# Patient Record
Sex: MALE | Race: BLACK OR AFRICAN AMERICAN | NOT HISPANIC OR LATINO | Employment: UNEMPLOYED | URBAN - METROPOLITAN AREA
[De-identification: names, ages, dates, MRNs, and addresses within clinical notes are randomized per-mention and may not be internally consistent; named-entity substitution may affect disease eponyms.]

---

## 2021-09-18 ENCOUNTER — HOSPITAL ENCOUNTER (INPATIENT)
Facility: HOSPITAL | Age: 46
LOS: 3 days | Discharge: HOME | DRG: 177 | End: 2021-09-21
Attending: EMERGENCY MEDICINE | Admitting: EMERGENCY MEDICINE
Payer: OTHER GOVERNMENT

## 2021-09-18 DIAGNOSIS — J12.82 PNEUMONIA DUE TO COVID-19 VIRUS: Primary | ICD-10-CM

## 2021-09-18 DIAGNOSIS — U07.1 PNEUMONIA DUE TO COVID-19 VIRUS: Primary | ICD-10-CM

## 2021-09-18 DIAGNOSIS — R50.9 ACUTE FEBRILE ILLNESS: ICD-10-CM

## 2021-09-18 DIAGNOSIS — R73.9 HYPERGLYCEMIA: ICD-10-CM

## 2021-09-18 DIAGNOSIS — U07.1 COVID-19 VIRUS INFECTION: ICD-10-CM

## 2021-09-18 DIAGNOSIS — R07.9 CHEST PAIN: ICD-10-CM

## 2021-09-18 DIAGNOSIS — E11.9 DIABETES MELLITUS, NEW ONSET: ICD-10-CM

## 2021-09-18 DIAGNOSIS — R05.9 COUGH: ICD-10-CM

## 2021-09-18 PROBLEM — E87.5 HYPERKALEMIA: Status: ACTIVE | Noted: 2021-09-18

## 2021-09-18 PROBLEM — D72.819 LEUKOPENIA: Status: ACTIVE | Noted: 2021-09-18

## 2021-09-18 PROBLEM — E87.29 INCREASED ANION GAP METABOLIC ACIDOSIS: Status: ACTIVE | Noted: 2021-09-18

## 2021-09-18 LAB
ALBUMIN SERPL BCP-MCNC: 3.3 G/DL (ref 3.5–5.2)
ALLENS TEST: ABNORMAL
ALP SERPL-CCNC: 89 U/L (ref 55–135)
ALT SERPL W/O P-5'-P-CCNC: 21 U/L (ref 10–44)
ANION GAP SERPL CALC-SCNC: 11 MMOL/L (ref 8–16)
ANION GAP SERPL CALC-SCNC: 13 MMOL/L (ref 8–16)
ANION GAP SERPL CALC-SCNC: 16 MMOL/L (ref 8–16)
AST SERPL-CCNC: 32 U/L (ref 10–40)
B-OH-BUTYR BLD STRIP-SCNC: 1.5 MMOL/L (ref 0–0.5)
B-OH-BUTYR BLD STRIP-SCNC: 1.7 MMOL/L (ref 0–0.5)
B-OH-BUTYR BLD STRIP-SCNC: 2.7 MMOL/L (ref 0–0.5)
BACTERIA #/AREA URNS HPF: NORMAL /HPF
BASOPHILS # BLD AUTO: 0.01 K/UL (ref 0–0.2)
BASOPHILS NFR BLD: 0.3 % (ref 0–1.9)
BILIRUB SERPL-MCNC: 0.7 MG/DL (ref 0.1–1)
BILIRUB UR QL STRIP: NEGATIVE
BNP SERPL-MCNC: <10 PG/ML (ref 0–99)
BUN SERPL-MCNC: 11 MG/DL (ref 6–20)
BUN SERPL-MCNC: 11 MG/DL (ref 6–20)
BUN SERPL-MCNC: 13 MG/DL (ref 6–20)
CALCIUM SERPL-MCNC: 10.4 MG/DL (ref 8.7–10.5)
CALCIUM SERPL-MCNC: 9 MG/DL (ref 8.7–10.5)
CALCIUM SERPL-MCNC: 9.4 MG/DL (ref 8.7–10.5)
CHLORIDE SERPL-SCNC: 94 MMOL/L (ref 95–110)
CHLORIDE SERPL-SCNC: 98 MMOL/L (ref 95–110)
CHLORIDE SERPL-SCNC: 98 MMOL/L (ref 95–110)
CK SERPL-CCNC: 83 U/L (ref 20–200)
CLARITY UR: CLEAR
CO2 SERPL-SCNC: 22 MMOL/L (ref 23–29)
CO2 SERPL-SCNC: 22 MMOL/L (ref 23–29)
CO2 SERPL-SCNC: 24 MMOL/L (ref 23–29)
COLOR UR: YELLOW
CREAT SERPL-MCNC: 1.1 MG/DL (ref 0.5–1.4)
CREAT SERPL-MCNC: 1.1 MG/DL (ref 0.5–1.4)
CREAT SERPL-MCNC: 1.4 MG/DL (ref 0.5–1.4)
CRP SERPL-MCNC: 137.2 MG/L (ref 0–8.2)
CTP QC/QA: YES
D DIMER PPP IA.FEU-MCNC: 1.69 MG/L FEU
DIFFERENTIAL METHOD: ABNORMAL
EOSINOPHIL # BLD AUTO: 0 K/UL (ref 0–0.5)
EOSINOPHIL NFR BLD: 0 % (ref 0–8)
ERYTHROCYTE [DISTWIDTH] IN BLOOD BY AUTOMATED COUNT: 11.3 % (ref 11.5–14.5)
EST. GFR  (AFRICAN AMERICAN): >60 ML/MIN/1.73 M^2
EST. GFR  (NON AFRICAN AMERICAN): >60 ML/MIN/1.73 M^2
FERRITIN SERPL-MCNC: 1272 NG/ML (ref 20–300)
GLUCOSE SERPL-MCNC: 272 MG/DL (ref 70–110)
GLUCOSE SERPL-MCNC: 281 MG/DL (ref 70–110)
GLUCOSE SERPL-MCNC: 413 MG/DL (ref 70–110)
GLUCOSE UR QL STRIP: ABNORMAL
HCO3 UR-SCNC: 25.3 MMOL/L (ref 24–28)
HCT VFR BLD AUTO: 44.8 % (ref 40–54)
HGB BLD-MCNC: 15.2 G/DL (ref 14–18)
HGB UR QL STRIP: NEGATIVE
HYALINE CASTS #/AREA URNS LPF: 0 /LPF
IMM GRANULOCYTES # BLD AUTO: 0.01 K/UL (ref 0–0.04)
IMM GRANULOCYTES NFR BLD AUTO: 0.3 % (ref 0–0.5)
KETONES UR QL STRIP: ABNORMAL
LACTATE SERPL-SCNC: 1.2 MMOL/L (ref 0.5–2.2)
LACTATE SERPL-SCNC: 2.4 MMOL/L (ref 0.5–2.2)
LDH SERPL L TO P-CCNC: 306 U/L (ref 110–260)
LEUKOCYTE ESTERASE UR QL STRIP: NEGATIVE
LYMPHOCYTES # BLD AUTO: 0.7 K/UL (ref 1–4.8)
LYMPHOCYTES NFR BLD: 17.8 % (ref 18–48)
MAGNESIUM SERPL-MCNC: 2.1 MG/DL (ref 1.6–2.6)
MCH RBC QN AUTO: 30 PG (ref 27–31)
MCHC RBC AUTO-ENTMCNC: 33.9 G/DL (ref 32–36)
MCV RBC AUTO: 89 FL (ref 82–98)
MICROSCOPIC COMMENT: NORMAL
MONOCYTES # BLD AUTO: 0.3 K/UL (ref 0.3–1)
MONOCYTES NFR BLD: 9.3 % (ref 4–15)
NEUTROPHILS # BLD AUTO: 2.7 K/UL (ref 1.8–7.7)
NEUTROPHILS NFR BLD: 72.3 % (ref 38–73)
NITRITE UR QL STRIP: NEGATIVE
NRBC BLD-RTO: 0 /100 WBC
PCO2 BLDA: 44.2 MMHG (ref 35–45)
PH SMN: 7.37 [PH] (ref 7.35–7.45)
PH UR STRIP: 6 [PH] (ref 5–8)
PLATELET # BLD AUTO: 196 K/UL (ref 150–450)
PMV BLD AUTO: 10.2 FL (ref 9.2–12.9)
PO2 BLDA: 27 MMHG (ref 40–60)
POC BE: 0 MMOL/L
POC SATURATED O2: 47 % (ref 95–100)
POC TCO2: 27 MMOL/L (ref 24–29)
POCT GLUCOSE: 300 MG/DL (ref 70–110)
POCT GLUCOSE: 347 MG/DL (ref 70–110)
POTASSIUM SERPL-SCNC: 3.7 MMOL/L (ref 3.5–5.1)
POTASSIUM SERPL-SCNC: 3.9 MMOL/L (ref 3.5–5.1)
POTASSIUM SERPL-SCNC: 5.3 MMOL/L (ref 3.5–5.1)
PROCALCITONIN SERPL IA-MCNC: 0.09 NG/ML
PROT SERPL-MCNC: 8.7 G/DL (ref 6–8.4)
PROT UR QL STRIP: ABNORMAL
RBC # BLD AUTO: 5.06 M/UL (ref 4.6–6.2)
RBC #/AREA URNS HPF: 0 /HPF (ref 0–4)
SAMPLE: ABNORMAL
SARS-COV-2 RDRP RESP QL NAA+PROBE: POSITIVE
SITE: ABNORMAL
SODIUM SERPL-SCNC: 132 MMOL/L (ref 136–145)
SODIUM SERPL-SCNC: 133 MMOL/L (ref 136–145)
SODIUM SERPL-SCNC: 133 MMOL/L (ref 136–145)
SP GR UR STRIP: >1.03 (ref 1–1.03)
TROPONIN I SERPL DL<=0.01 NG/ML-MCNC: 0.02 NG/ML (ref 0–0.03)
URN SPEC COLLECT METH UR: ABNORMAL
UROBILINOGEN UR STRIP-ACNC: NEGATIVE EU/DL
WBC # BLD AUTO: 3.66 K/UL (ref 3.9–12.7)
WBC #/AREA URNS HPF: 2 /HPF (ref 0–5)
YEAST URNS QL MICRO: NORMAL

## 2021-09-18 PROCEDURE — 94640 AIRWAY INHALATION TREATMENT: CPT

## 2021-09-18 PROCEDURE — 80053 COMPREHEN METABOLIC PANEL: CPT | Performed by: PHYSICIAN ASSISTANT

## 2021-09-18 PROCEDURE — 82010 KETONE BODYS QUAN: CPT | Mod: 91 | Performed by: PHYSICIAN ASSISTANT

## 2021-09-18 PROCEDURE — 96365 THER/PROPH/DIAG IV INF INIT: CPT

## 2021-09-18 PROCEDURE — 63600175 PHARM REV CODE 636 W HCPCS: Performed by: PHYSICIAN ASSISTANT

## 2021-09-18 PROCEDURE — 84145 PROCALCITONIN (PCT): CPT | Performed by: PHYSICIAN ASSISTANT

## 2021-09-18 PROCEDURE — 87040 BLOOD CULTURE FOR BACTERIA: CPT | Mod: 59 | Performed by: PHYSICIAN ASSISTANT

## 2021-09-18 PROCEDURE — 83880 ASSAY OF NATRIURETIC PEPTIDE: CPT | Performed by: PHYSICIAN ASSISTANT

## 2021-09-18 PROCEDURE — 25000003 PHARM REV CODE 250: Performed by: PHYSICIAN ASSISTANT

## 2021-09-18 PROCEDURE — 84484 ASSAY OF TROPONIN QUANT: CPT | Performed by: PHYSICIAN ASSISTANT

## 2021-09-18 PROCEDURE — 27100098 HC SPACER

## 2021-09-18 PROCEDURE — U0002 COVID-19 LAB TEST NON-CDC: HCPCS | Performed by: EMERGENCY MEDICINE

## 2021-09-18 PROCEDURE — 83605 ASSAY OF LACTIC ACID: CPT | Mod: 91 | Performed by: STUDENT IN AN ORGANIZED HEALTH CARE EDUCATION/TRAINING PROGRAM

## 2021-09-18 PROCEDURE — 25000242 PHARM REV CODE 250 ALT 637 W/ HCPCS: Performed by: PHYSICIAN ASSISTANT

## 2021-09-18 PROCEDURE — 82550 ASSAY OF CK (CPK): CPT | Performed by: PHYSICIAN ASSISTANT

## 2021-09-18 PROCEDURE — 83735 ASSAY OF MAGNESIUM: CPT | Performed by: PHYSICIAN ASSISTANT

## 2021-09-18 PROCEDURE — 25000003 PHARM REV CODE 250: Performed by: STUDENT IN AN ORGANIZED HEALTH CARE EDUCATION/TRAINING PROGRAM

## 2021-09-18 PROCEDURE — 86140 C-REACTIVE PROTEIN: CPT | Performed by: PHYSICIAN ASSISTANT

## 2021-09-18 PROCEDURE — 93005 ELECTROCARDIOGRAM TRACING: CPT

## 2021-09-18 PROCEDURE — 85379 FIBRIN DEGRADATION QUANT: CPT | Performed by: PHYSICIAN ASSISTANT

## 2021-09-18 PROCEDURE — 83615 LACTATE (LD) (LDH) ENZYME: CPT | Performed by: PHYSICIAN ASSISTANT

## 2021-09-18 PROCEDURE — 80048 BASIC METABOLIC PNL TOTAL CA: CPT | Performed by: PHYSICIAN ASSISTANT

## 2021-09-18 PROCEDURE — 27000207 HC ISOLATION

## 2021-09-18 PROCEDURE — 99900035 HC TECH TIME PER 15 MIN (STAT)

## 2021-09-18 PROCEDURE — 85025 COMPLETE CBC W/AUTO DIFF WBC: CPT | Performed by: PHYSICIAN ASSISTANT

## 2021-09-18 PROCEDURE — 82010 KETONE BODYS QUAN: CPT | Performed by: PHYSICIAN ASSISTANT

## 2021-09-18 PROCEDURE — 82803 BLOOD GASES ANY COMBINATION: CPT

## 2021-09-18 PROCEDURE — 93010 ELECTROCARDIOGRAM REPORT: CPT | Mod: ,,, | Performed by: INTERNAL MEDICINE

## 2021-09-18 PROCEDURE — 36415 COLL VENOUS BLD VENIPUNCTURE: CPT | Performed by: PHYSICIAN ASSISTANT

## 2021-09-18 PROCEDURE — C9399 UNCLASSIFIED DRUGS OR BIOLOG: HCPCS | Performed by: PHYSICIAN ASSISTANT

## 2021-09-18 PROCEDURE — 83605 ASSAY OF LACTIC ACID: CPT | Performed by: PHYSICIAN ASSISTANT

## 2021-09-18 PROCEDURE — 99285 EMERGENCY DEPT VISIT HI MDM: CPT | Mod: 25

## 2021-09-18 PROCEDURE — 82728 ASSAY OF FERRITIN: CPT | Performed by: PHYSICIAN ASSISTANT

## 2021-09-18 PROCEDURE — 93010 EKG 12-LEAD: ICD-10-PCS | Mod: ,,, | Performed by: INTERNAL MEDICINE

## 2021-09-18 PROCEDURE — 81000 URINALYSIS NONAUTO W/SCOPE: CPT | Performed by: PHYSICIAN ASSISTANT

## 2021-09-18 PROCEDURE — 63600175 PHARM REV CODE 636 W HCPCS: Performed by: STUDENT IN AN ORGANIZED HEALTH CARE EDUCATION/TRAINING PROGRAM

## 2021-09-18 PROCEDURE — 11000001 HC ACUTE MED/SURG PRIVATE ROOM

## 2021-09-18 PROCEDURE — 94761 N-INVAS EAR/PLS OXIMETRY MLT: CPT

## 2021-09-18 RX ORDER — INSULIN ASPART 100 [IU]/ML
3 INJECTION, SOLUTION INTRAVENOUS; SUBCUTANEOUS
Status: DISCONTINUED | OUTPATIENT
Start: 2021-09-19 | End: 2021-09-19

## 2021-09-18 RX ORDER — SODIUM CHLORIDE 9 MG/ML
INJECTION, SOLUTION INTRAVENOUS CONTINUOUS
Status: ACTIVE | OUTPATIENT
Start: 2021-09-18 | End: 2021-09-19

## 2021-09-18 RX ORDER — SODIUM,POTASSIUM PHOSPHATES 280-250MG
2 POWDER IN PACKET (EA) ORAL
Status: DISCONTINUED | OUTPATIENT
Start: 2021-09-18 | End: 2021-09-21 | Stop reason: HOSPADM

## 2021-09-18 RX ORDER — GLUCAGON 1 MG
1 KIT INJECTION
Status: DISCONTINUED | OUTPATIENT
Start: 2021-09-18 | End: 2021-09-21 | Stop reason: HOSPADM

## 2021-09-18 RX ORDER — TALC
6 POWDER (GRAM) TOPICAL NIGHTLY PRN
Status: DISCONTINUED | OUTPATIENT
Start: 2021-09-18 | End: 2021-09-21 | Stop reason: HOSPADM

## 2021-09-18 RX ORDER — AMOXICILLIN 250 MG
1 CAPSULE ORAL DAILY
Status: DISCONTINUED | OUTPATIENT
Start: 2021-09-18 | End: 2021-09-18

## 2021-09-18 RX ORDER — LANOLIN ALCOHOL/MO/W.PET/CERES
800 CREAM (GRAM) TOPICAL
Status: DISCONTINUED | OUTPATIENT
Start: 2021-09-18 | End: 2021-09-21 | Stop reason: HOSPADM

## 2021-09-18 RX ORDER — INSULIN ASPART 100 [IU]/ML
5 INJECTION, SOLUTION INTRAVENOUS; SUBCUTANEOUS
Status: DISCONTINUED | OUTPATIENT
Start: 2021-09-18 | End: 2021-09-18

## 2021-09-18 RX ORDER — ALBUTEROL SULFATE 90 UG/1
2 AEROSOL, METERED RESPIRATORY (INHALATION) EVERY 6 HOURS
Status: DISCONTINUED | OUTPATIENT
Start: 2021-09-18 | End: 2021-09-18

## 2021-09-18 RX ORDER — SODIUM CHLORIDE 0.9 % (FLUSH) 0.9 %
10 SYRINGE (ML) INJECTION
Status: DISCONTINUED | OUTPATIENT
Start: 2021-09-18 | End: 2021-09-21 | Stop reason: HOSPADM

## 2021-09-18 RX ORDER — ASCORBIC ACID 500 MG
500 TABLET ORAL 2 TIMES DAILY
Status: DISCONTINUED | OUTPATIENT
Start: 2021-09-18 | End: 2021-09-21 | Stop reason: HOSPADM

## 2021-09-18 RX ORDER — ACETAMINOPHEN 325 MG/1
650 TABLET ORAL EVERY 8 HOURS PRN
Status: DISCONTINUED | OUTPATIENT
Start: 2021-09-18 | End: 2021-09-21 | Stop reason: HOSPADM

## 2021-09-18 RX ORDER — ENOXAPARIN SODIUM 100 MG/ML
40 INJECTION SUBCUTANEOUS EVERY 24 HOURS
Status: DISCONTINUED | OUTPATIENT
Start: 2021-09-18 | End: 2021-09-18

## 2021-09-18 RX ORDER — SODIUM CHLORIDE 0.9 % (FLUSH) 0.9 %
10 SYRINGE (ML) INJECTION EVERY 8 HOURS
Status: DISCONTINUED | OUTPATIENT
Start: 2021-09-18 | End: 2021-09-18

## 2021-09-18 RX ORDER — ENOXAPARIN SODIUM 100 MG/ML
100 INJECTION SUBCUTANEOUS EVERY 12 HOURS
Status: DISCONTINUED | OUTPATIENT
Start: 2021-09-18 | End: 2021-09-21 | Stop reason: HOSPADM

## 2021-09-18 RX ORDER — ACETAMINOPHEN 325 MG/1
650 TABLET ORAL EVERY 4 HOURS PRN
Status: DISCONTINUED | OUTPATIENT
Start: 2021-09-18 | End: 2021-09-21 | Stop reason: HOSPADM

## 2021-09-18 RX ORDER — NALOXONE HCL 0.4 MG/ML
0.02 VIAL (ML) INJECTION
Status: DISCONTINUED | OUTPATIENT
Start: 2021-09-18 | End: 2021-09-21 | Stop reason: HOSPADM

## 2021-09-18 RX ORDER — ALBUTEROL SULFATE 90 UG/1
2 AEROSOL, METERED RESPIRATORY (INHALATION) 2 TIMES DAILY
Status: DISCONTINUED | OUTPATIENT
Start: 2021-09-18 | End: 2021-09-21 | Stop reason: HOSPADM

## 2021-09-18 RX ORDER — ACETAMINOPHEN 500 MG
1000 TABLET ORAL
Status: COMPLETED | OUTPATIENT
Start: 2021-09-18 | End: 2021-09-18

## 2021-09-18 RX ORDER — IBUPROFEN 200 MG
16 TABLET ORAL
Status: DISCONTINUED | OUTPATIENT
Start: 2021-09-18 | End: 2021-09-21 | Stop reason: HOSPADM

## 2021-09-18 RX ORDER — IBUPROFEN 200 MG
24 TABLET ORAL
Status: DISCONTINUED | OUTPATIENT
Start: 2021-09-18 | End: 2021-09-21 | Stop reason: HOSPADM

## 2021-09-18 RX ORDER — INSULIN ASPART 100 [IU]/ML
1-10 INJECTION, SOLUTION INTRAVENOUS; SUBCUTANEOUS
Status: DISCONTINUED | OUTPATIENT
Start: 2021-09-18 | End: 2021-09-21 | Stop reason: HOSPADM

## 2021-09-18 RX ADMIN — INSULIN DETEMIR 12 UNITS: 100 INJECTION, SOLUTION SUBCUTANEOUS at 11:09

## 2021-09-18 RX ADMIN — ENOXAPARIN SODIUM 100 MG: 100 INJECTION SUBCUTANEOUS at 08:09

## 2021-09-18 RX ADMIN — ACETAMINOPHEN 650 MG: 325 TABLET ORAL at 10:09

## 2021-09-18 RX ADMIN — CEFTRIAXONE 2 G: 2 INJECTION, SOLUTION INTRAVENOUS at 12:09

## 2021-09-18 RX ADMIN — SODIUM ZIRCONIUM CYCLOSILICATE 10 G: 10 POWDER, FOR SUSPENSION ORAL at 10:09

## 2021-09-18 RX ADMIN — SODIUM CHLORIDE 1000 ML: 0.9 INJECTION, SOLUTION INTRAVENOUS at 12:09

## 2021-09-18 RX ADMIN — OXYCODONE HYDROCHLORIDE AND ACETAMINOPHEN 500 MG: 500 TABLET ORAL at 08:09

## 2021-09-18 RX ADMIN — ALBUTEROL SULFATE 2 PUFF: 108 INHALANT RESPIRATORY (INHALATION) at 08:09

## 2021-09-18 RX ADMIN — REMDESIVIR 200 MG: 100 INJECTION, POWDER, LYOPHILIZED, FOR SOLUTION INTRAVENOUS at 08:09

## 2021-09-18 RX ADMIN — SODIUM CHLORIDE: 0.9 INJECTION, SOLUTION INTRAVENOUS at 04:09

## 2021-09-18 RX ADMIN — INSULIN ASPART 4 UNITS: 100 INJECTION, SOLUTION INTRAVENOUS; SUBCUTANEOUS at 10:09

## 2021-09-18 RX ADMIN — ACETAMINOPHEN 1000 MG: 500 TABLET ORAL at 11:09

## 2021-09-19 LAB
25(OH)D3+25(OH)D2 SERPL-MCNC: 26 NG/ML (ref 30–96)
ALBUMIN SERPL BCP-MCNC: 2.5 G/DL (ref 3.5–5.2)
ALP SERPL-CCNC: 67 U/L (ref 55–135)
ALT SERPL W/O P-5'-P-CCNC: 17 U/L (ref 10–44)
ANION GAP SERPL CALC-SCNC: 11 MMOL/L (ref 8–16)
APTT BLDCRRT: 33 SEC (ref 21–32)
AST SERPL-CCNC: 30 U/L (ref 10–40)
BASOPHILS # BLD AUTO: 0.02 K/UL (ref 0–0.2)
BASOPHILS NFR BLD: 0.6 % (ref 0–1.9)
BILIRUB SERPL-MCNC: 0.5 MG/DL (ref 0.1–1)
BUN SERPL-MCNC: 10 MG/DL (ref 6–20)
CALCIUM SERPL-MCNC: 8.8 MG/DL (ref 8.7–10.5)
CHLORIDE SERPL-SCNC: 101 MMOL/L (ref 95–110)
CO2 SERPL-SCNC: 22 MMOL/L (ref 23–29)
CREAT SERPL-MCNC: 1 MG/DL (ref 0.5–1.4)
DIFFERENTIAL METHOD: ABNORMAL
EOSINOPHIL # BLD AUTO: 0 K/UL (ref 0–0.5)
EOSINOPHIL NFR BLD: 0 % (ref 0–8)
ERYTHROCYTE [DISTWIDTH] IN BLOOD BY AUTOMATED COUNT: 11.4 % (ref 11.5–14.5)
ERYTHROCYTE [SEDIMENTATION RATE] IN BLOOD BY WESTERGREN METHOD: 55 MM/HR (ref 0–10)
EST. GFR  (AFRICAN AMERICAN): >60 ML/MIN/1.73 M^2
EST. GFR  (NON AFRICAN AMERICAN): >60 ML/MIN/1.73 M^2
GLUCOSE SERPL-MCNC: 229 MG/DL (ref 70–110)
HCT VFR BLD AUTO: 39.2 % (ref 40–54)
HGB BLD-MCNC: 13.3 G/DL (ref 14–18)
IMM GRANULOCYTES # BLD AUTO: 0.01 K/UL (ref 0–0.04)
IMM GRANULOCYTES NFR BLD AUTO: 0.3 % (ref 0–0.5)
INR PPP: 1 (ref 0.8–1.2)
LYMPHOCYTES # BLD AUTO: 1.6 K/UL (ref 1–4.8)
LYMPHOCYTES NFR BLD: 44.4 % (ref 18–48)
MAGNESIUM SERPL-MCNC: 1.9 MG/DL (ref 1.6–2.6)
MCH RBC QN AUTO: 29.9 PG (ref 27–31)
MCHC RBC AUTO-ENTMCNC: 33.9 G/DL (ref 32–36)
MCV RBC AUTO: 88 FL (ref 82–98)
MONOCYTES # BLD AUTO: 0.3 K/UL (ref 0.3–1)
MONOCYTES NFR BLD: 8.9 % (ref 4–15)
NEUTROPHILS # BLD AUTO: 1.6 K/UL (ref 1.8–7.7)
NEUTROPHILS NFR BLD: 45.8 % (ref 38–73)
NRBC BLD-RTO: 0 /100 WBC
PHOSPHATE SERPL-MCNC: 3.8 MG/DL (ref 2.7–4.5)
PLATELET # BLD AUTO: 170 K/UL (ref 150–450)
PMV BLD AUTO: 10.8 FL (ref 9.2–12.9)
POCT GLUCOSE: 236 MG/DL (ref 70–110)
POCT GLUCOSE: 239 MG/DL (ref 70–110)
POCT GLUCOSE: 239 MG/DL (ref 70–110)
POCT GLUCOSE: 282 MG/DL (ref 70–110)
POTASSIUM SERPL-SCNC: 3.8 MMOL/L (ref 3.5–5.1)
PROT SERPL-MCNC: 6.8 G/DL (ref 6–8.4)
PROTHROMBIN TIME: 10.3 SEC (ref 9–12.5)
RBC # BLD AUTO: 4.45 M/UL (ref 4.6–6.2)
SODIUM SERPL-SCNC: 134 MMOL/L (ref 136–145)
WBC # BLD AUTO: 3.58 K/UL (ref 3.9–12.7)

## 2021-09-19 PROCEDURE — 27000207 HC ISOLATION

## 2021-09-19 PROCEDURE — 85025 COMPLETE CBC W/AUTO DIFF WBC: CPT | Performed by: PHYSICIAN ASSISTANT

## 2021-09-19 PROCEDURE — 36415 COLL VENOUS BLD VENIPUNCTURE: CPT | Performed by: PHYSICIAN ASSISTANT

## 2021-09-19 PROCEDURE — 85610 PROTHROMBIN TIME: CPT | Performed by: STUDENT IN AN ORGANIZED HEALTH CARE EDUCATION/TRAINING PROGRAM

## 2021-09-19 PROCEDURE — 94640 AIRWAY INHALATION TREATMENT: CPT

## 2021-09-19 PROCEDURE — 25000242 PHARM REV CODE 250 ALT 637 W/ HCPCS: Performed by: PHYSICIAN ASSISTANT

## 2021-09-19 PROCEDURE — 36415 COLL VENOUS BLD VENIPUNCTURE: CPT | Performed by: STUDENT IN AN ORGANIZED HEALTH CARE EDUCATION/TRAINING PROGRAM

## 2021-09-19 PROCEDURE — 83735 ASSAY OF MAGNESIUM: CPT | Performed by: PHYSICIAN ASSISTANT

## 2021-09-19 PROCEDURE — 84100 ASSAY OF PHOSPHORUS: CPT | Performed by: PHYSICIAN ASSISTANT

## 2021-09-19 PROCEDURE — 85652 RBC SED RATE AUTOMATED: CPT | Performed by: STUDENT IN AN ORGANIZED HEALTH CARE EDUCATION/TRAINING PROGRAM

## 2021-09-19 PROCEDURE — 80053 COMPREHEN METABOLIC PANEL: CPT | Performed by: PHYSICIAN ASSISTANT

## 2021-09-19 PROCEDURE — 83036 HEMOGLOBIN GLYCOSYLATED A1C: CPT | Performed by: PHYSICIAN ASSISTANT

## 2021-09-19 PROCEDURE — 63600175 PHARM REV CODE 636 W HCPCS: Performed by: PHYSICIAN ASSISTANT

## 2021-09-19 PROCEDURE — 82306 VITAMIN D 25 HYDROXY: CPT | Performed by: STUDENT IN AN ORGANIZED HEALTH CARE EDUCATION/TRAINING PROGRAM

## 2021-09-19 PROCEDURE — 94760 N-INVAS EAR/PLS OXIMETRY 1: CPT

## 2021-09-19 PROCEDURE — 25000003 PHARM REV CODE 250: Performed by: PHYSICIAN ASSISTANT

## 2021-09-19 PROCEDURE — 85730 THROMBOPLASTIN TIME PARTIAL: CPT | Performed by: STUDENT IN AN ORGANIZED HEALTH CARE EDUCATION/TRAINING PROGRAM

## 2021-09-19 PROCEDURE — 25000003 PHARM REV CODE 250: Performed by: STUDENT IN AN ORGANIZED HEALTH CARE EDUCATION/TRAINING PROGRAM

## 2021-09-19 PROCEDURE — 11000001 HC ACUTE MED/SURG PRIVATE ROOM

## 2021-09-19 RX ORDER — INSULIN ASPART 100 [IU]/ML
5 INJECTION, SOLUTION INTRAVENOUS; SUBCUTANEOUS
Status: DISCONTINUED | OUTPATIENT
Start: 2021-09-19 | End: 2021-09-20

## 2021-09-19 RX ADMIN — OXYCODONE HYDROCHLORIDE AND ACETAMINOPHEN 500 MG: 500 TABLET ORAL at 09:09

## 2021-09-19 RX ADMIN — ACETAMINOPHEN 650 MG: 325 TABLET ORAL at 01:09

## 2021-09-19 RX ADMIN — INSULIN ASPART 5 UNITS: 100 INJECTION, SOLUTION INTRAVENOUS; SUBCUTANEOUS at 01:09

## 2021-09-19 RX ADMIN — ALBUTEROL SULFATE 2 PUFF: 108 INHALANT RESPIRATORY (INHALATION) at 11:09

## 2021-09-19 RX ADMIN — ALBUTEROL SULFATE 2 PUFF: 108 INHALANT RESPIRATORY (INHALATION) at 09:09

## 2021-09-19 RX ADMIN — INSULIN ASPART 3 UNITS: 100 INJECTION, SOLUTION INTRAVENOUS; SUBCUTANEOUS at 08:09

## 2021-09-19 RX ADMIN — OXYCODONE HYDROCHLORIDE AND ACETAMINOPHEN 500 MG: 500 TABLET ORAL at 08:09

## 2021-09-19 RX ADMIN — INSULIN ASPART 4 UNITS: 100 INJECTION, SOLUTION INTRAVENOUS; SUBCUTANEOUS at 05:09

## 2021-09-19 RX ADMIN — ACETAMINOPHEN 650 MG: 325 TABLET ORAL at 08:09

## 2021-09-19 RX ADMIN — INSULIN ASPART 4 UNITS: 100 INJECTION, SOLUTION INTRAVENOUS; SUBCUTANEOUS at 01:09

## 2021-09-19 RX ADMIN — THERA TABS 1 TABLET: TAB at 09:09

## 2021-09-19 RX ADMIN — INSULIN ASPART 5 UNITS: 100 INJECTION, SOLUTION INTRAVENOUS; SUBCUTANEOUS at 05:09

## 2021-09-19 RX ADMIN — INSULIN ASPART 4 UNITS: 100 INJECTION, SOLUTION INTRAVENOUS; SUBCUTANEOUS at 09:09

## 2021-09-19 RX ADMIN — INSULIN ASPART 3 UNITS: 100 INJECTION, SOLUTION INTRAVENOUS; SUBCUTANEOUS at 09:09

## 2021-09-19 RX ADMIN — ENOXAPARIN SODIUM 100 MG: 100 INJECTION SUBCUTANEOUS at 08:09

## 2021-09-19 RX ADMIN — REMDESIVIR 100 MG: 100 INJECTION, POWDER, LYOPHILIZED, FOR SOLUTION INTRAVENOUS at 09:09

## 2021-09-19 RX ADMIN — ENOXAPARIN SODIUM 100 MG: 100 INJECTION SUBCUTANEOUS at 09:09

## 2021-09-19 RX ADMIN — DEXAMETHASONE 6 MG: 2 TABLET ORAL at 09:09

## 2021-09-20 LAB
ALBUMIN SERPL BCP-MCNC: 2.7 G/DL (ref 3.5–5.2)
ALP SERPL-CCNC: 68 U/L (ref 55–135)
ALT SERPL W/O P-5'-P-CCNC: 23 U/L (ref 10–44)
ANION GAP SERPL CALC-SCNC: 13 MMOL/L (ref 8–16)
AST SERPL-CCNC: 36 U/L (ref 10–40)
BASOPHILS # BLD AUTO: 0 K/UL (ref 0–0.2)
BASOPHILS NFR BLD: 0 % (ref 0–1.9)
BILIRUB SERPL-MCNC: 0.5 MG/DL (ref 0.1–1)
BUN SERPL-MCNC: 13 MG/DL (ref 6–20)
CALCIUM SERPL-MCNC: 9.8 MG/DL (ref 8.7–10.5)
CHLORIDE SERPL-SCNC: 100 MMOL/L (ref 95–110)
CO2 SERPL-SCNC: 23 MMOL/L (ref 23–29)
CREAT SERPL-MCNC: 0.9 MG/DL (ref 0.5–1.4)
CRP SERPL-MCNC: 110.9 MG/L (ref 0–8.2)
D DIMER PPP IA.FEU-MCNC: 0.37 MG/L FEU
DIFFERENTIAL METHOD: ABNORMAL
EOSINOPHIL # BLD AUTO: 0 K/UL (ref 0–0.5)
EOSINOPHIL NFR BLD: 0 % (ref 0–8)
ERYTHROCYTE [DISTWIDTH] IN BLOOD BY AUTOMATED COUNT: 11 % (ref 11.5–14.5)
EST. GFR  (AFRICAN AMERICAN): >60 ML/MIN/1.73 M^2
EST. GFR  (NON AFRICAN AMERICAN): >60 ML/MIN/1.73 M^2
ESTIMATED AVG GLUCOSE: 321 MG/DL (ref 68–131)
FERRITIN SERPL-MCNC: 905 NG/ML (ref 20–300)
GLUCOSE SERPL-MCNC: 250 MG/DL (ref 70–110)
HBA1C MFR BLD: 12.8 % (ref 4–5.6)
HCT VFR BLD AUTO: 41.1 % (ref 40–54)
HGB BLD-MCNC: 14.3 G/DL (ref 14–18)
IMM GRANULOCYTES # BLD AUTO: 0.01 K/UL (ref 0–0.04)
IMM GRANULOCYTES NFR BLD AUTO: 0.4 % (ref 0–0.5)
LYMPHOCYTES # BLD AUTO: 1 K/UL (ref 1–4.8)
LYMPHOCYTES NFR BLD: ABNORMAL % (ref 18–48)
MAGNESIUM SERPL-MCNC: 1.9 MG/DL (ref 1.6–2.6)
MCH RBC QN AUTO: 30 PG (ref 27–31)
MCHC RBC AUTO-ENTMCNC: 34.8 G/DL (ref 32–36)
MCV RBC AUTO: 86 FL (ref 82–98)
MONOCYTES # BLD AUTO: 0.3 K/UL (ref 0.3–1)
MONOCYTES NFR BLD: 10 % (ref 4–15)
NEUTROPHILS # BLD AUTO: 1.4 K/UL (ref 1.8–7.7)
NEUTROPHILS NFR BLD: 51.2 % (ref 38–73)
NRBC BLD-RTO: 0 /100 WBC
PHOSPHATE SERPL-MCNC: 4.9 MG/DL (ref 2.7–4.5)
PLATELET # BLD AUTO: 219 K/UL (ref 150–450)
PMV BLD AUTO: 10.5 FL (ref 9.2–12.9)
POCT GLUCOSE: 242 MG/DL (ref 70–110)
POCT GLUCOSE: 262 MG/DL (ref 70–110)
POCT GLUCOSE: 279 MG/DL (ref 70–110)
POCT GLUCOSE: 286 MG/DL (ref 70–110)
POTASSIUM SERPL-SCNC: 4 MMOL/L (ref 3.5–5.1)
PROT SERPL-MCNC: 7.6 G/DL (ref 6–8.4)
RBC # BLD AUTO: 4.77 M/UL (ref 4.6–6.2)
SODIUM SERPL-SCNC: 136 MMOL/L (ref 136–145)
WBC # BLD AUTO: 2.71 K/UL (ref 3.9–12.7)

## 2021-09-20 PROCEDURE — 83735 ASSAY OF MAGNESIUM: CPT | Performed by: PHYSICIAN ASSISTANT

## 2021-09-20 PROCEDURE — 86140 C-REACTIVE PROTEIN: CPT | Performed by: PHYSICIAN ASSISTANT

## 2021-09-20 PROCEDURE — 94761 N-INVAS EAR/PLS OXIMETRY MLT: CPT

## 2021-09-20 PROCEDURE — 25000003 PHARM REV CODE 250: Performed by: PHYSICIAN ASSISTANT

## 2021-09-20 PROCEDURE — 11000001 HC ACUTE MED/SURG PRIVATE ROOM

## 2021-09-20 PROCEDURE — 25000242 PHARM REV CODE 250 ALT 637 W/ HCPCS: Performed by: PHYSICIAN ASSISTANT

## 2021-09-20 PROCEDURE — 85379 FIBRIN DEGRADATION QUANT: CPT | Performed by: PHYSICIAN ASSISTANT

## 2021-09-20 PROCEDURE — 80053 COMPREHEN METABOLIC PANEL: CPT | Performed by: PHYSICIAN ASSISTANT

## 2021-09-20 PROCEDURE — 63600175 PHARM REV CODE 636 W HCPCS: Performed by: PHYSICIAN ASSISTANT

## 2021-09-20 PROCEDURE — 94640 AIRWAY INHALATION TREATMENT: CPT

## 2021-09-20 PROCEDURE — C9399 UNCLASSIFIED DRUGS OR BIOLOG: HCPCS | Performed by: PHYSICIAN ASSISTANT

## 2021-09-20 PROCEDURE — 82728 ASSAY OF FERRITIN: CPT | Performed by: PHYSICIAN ASSISTANT

## 2021-09-20 PROCEDURE — 36415 COLL VENOUS BLD VENIPUNCTURE: CPT | Performed by: PHYSICIAN ASSISTANT

## 2021-09-20 PROCEDURE — 85025 COMPLETE CBC W/AUTO DIFF WBC: CPT | Performed by: PHYSICIAN ASSISTANT

## 2021-09-20 PROCEDURE — 84100 ASSAY OF PHOSPHORUS: CPT | Performed by: PHYSICIAN ASSISTANT

## 2021-09-20 PROCEDURE — 27000207 HC ISOLATION

## 2021-09-20 RX ORDER — INSULIN ASPART 100 [IU]/ML
6 INJECTION, SOLUTION INTRAVENOUS; SUBCUTANEOUS
Status: DISCONTINUED | OUTPATIENT
Start: 2021-09-20 | End: 2021-09-21 | Stop reason: HOSPADM

## 2021-09-20 RX ORDER — CHOLECALCIFEROL (VITAMIN D3) 25 MCG
1000 TABLET ORAL DAILY
Status: DISCONTINUED | OUTPATIENT
Start: 2021-09-20 | End: 2021-09-21 | Stop reason: HOSPADM

## 2021-09-20 RX ADMIN — INSULIN ASPART 6 UNITS: 100 INJECTION, SOLUTION INTRAVENOUS; SUBCUTANEOUS at 04:09

## 2021-09-20 RX ADMIN — ALBUTEROL SULFATE 2 PUFF: 108 INHALANT RESPIRATORY (INHALATION) at 10:09

## 2021-09-20 RX ADMIN — INSULIN DETEMIR 18 UNITS: 100 INJECTION, SOLUTION SUBCUTANEOUS at 09:09

## 2021-09-20 RX ADMIN — INSULIN ASPART 4 UNITS: 100 INJECTION, SOLUTION INTRAVENOUS; SUBCUTANEOUS at 12:09

## 2021-09-20 RX ADMIN — THERA TABS 1 TABLET: TAB at 08:09

## 2021-09-20 RX ADMIN — ALBUTEROL SULFATE 2 PUFF: 108 INHALANT RESPIRATORY (INHALATION) at 07:09

## 2021-09-20 RX ADMIN — OXYCODONE HYDROCHLORIDE AND ACETAMINOPHEN 500 MG: 500 TABLET ORAL at 09:09

## 2021-09-20 RX ADMIN — ENOXAPARIN SODIUM 100 MG: 100 INJECTION SUBCUTANEOUS at 09:09

## 2021-09-20 RX ADMIN — REMDESIVIR 100 MG: 100 INJECTION, POWDER, LYOPHILIZED, FOR SOLUTION INTRAVENOUS at 08:09

## 2021-09-20 RX ADMIN — INSULIN ASPART 6 UNITS: 100 INJECTION, SOLUTION INTRAVENOUS; SUBCUTANEOUS at 12:09

## 2021-09-20 RX ADMIN — INSULIN ASPART 4 UNITS: 100 INJECTION, SOLUTION INTRAVENOUS; SUBCUTANEOUS at 04:09

## 2021-09-20 RX ADMIN — INSULIN ASPART 6 UNITS: 100 INJECTION, SOLUTION INTRAVENOUS; SUBCUTANEOUS at 08:09

## 2021-09-20 RX ADMIN — DEXAMETHASONE 6 MG: 2 TABLET ORAL at 08:09

## 2021-09-20 RX ADMIN — CHOLECALCIFEROL TAB 25 MCG (1000 UNIT) 1000 UNITS: 25 TAB at 08:09

## 2021-09-20 RX ADMIN — INSULIN ASPART 3 UNITS: 100 INJECTION, SOLUTION INTRAVENOUS; SUBCUTANEOUS at 10:09

## 2021-09-20 RX ADMIN — ENOXAPARIN SODIUM 100 MG: 100 INJECTION SUBCUTANEOUS at 08:09

## 2021-09-20 RX ADMIN — INSULIN ASPART 5 UNITS: 100 INJECTION, SOLUTION INTRAVENOUS; SUBCUTANEOUS at 07:09

## 2021-09-20 RX ADMIN — OXYCODONE HYDROCHLORIDE AND ACETAMINOPHEN 500 MG: 500 TABLET ORAL at 08:09

## 2021-09-21 VITALS
OXYGEN SATURATION: 100 % | TEMPERATURE: 98 F | SYSTOLIC BLOOD PRESSURE: 136 MMHG | WEIGHT: 199.94 LBS | RESPIRATION RATE: 18 BRPM | HEART RATE: 98 BPM | DIASTOLIC BLOOD PRESSURE: 90 MMHG | BODY MASS INDEX: 24.86 KG/M2 | HEIGHT: 75 IN

## 2021-09-21 LAB
ALBUMIN SERPL BCP-MCNC: 2.7 G/DL (ref 3.5–5.2)
ALP SERPL-CCNC: 67 U/L (ref 55–135)
ALT SERPL W/O P-5'-P-CCNC: 24 U/L (ref 10–44)
ANION GAP SERPL CALC-SCNC: 10 MMOL/L (ref 8–16)
AST SERPL-CCNC: 32 U/L (ref 10–40)
BASOPHILS # BLD AUTO: 0.01 K/UL (ref 0–0.2)
BASOPHILS NFR BLD: 0.2 % (ref 0–1.9)
BILIRUB SERPL-MCNC: 0.5 MG/DL (ref 0.1–1)
BUN SERPL-MCNC: 14 MG/DL (ref 6–20)
CALCIUM SERPL-MCNC: 9.5 MG/DL (ref 8.7–10.5)
CHLORIDE SERPL-SCNC: 101 MMOL/L (ref 95–110)
CO2 SERPL-SCNC: 23 MMOL/L (ref 23–29)
CREAT SERPL-MCNC: 0.9 MG/DL (ref 0.5–1.4)
DIFFERENTIAL METHOD: ABNORMAL
EOSINOPHIL # BLD AUTO: 0 K/UL (ref 0–0.5)
EOSINOPHIL NFR BLD: 0 % (ref 0–8)
ERYTHROCYTE [DISTWIDTH] IN BLOOD BY AUTOMATED COUNT: 11 % (ref 11.5–14.5)
EST. GFR  (AFRICAN AMERICAN): >60 ML/MIN/1.73 M^2
EST. GFR  (NON AFRICAN AMERICAN): >60 ML/MIN/1.73 M^2
GLUCOSE SERPL-MCNC: 233 MG/DL (ref 70–110)
HCT VFR BLD AUTO: 38.8 % (ref 40–54)
HGB BLD-MCNC: 13.7 G/DL (ref 14–18)
IMM GRANULOCYTES # BLD AUTO: 0.03 K/UL (ref 0–0.04)
IMM GRANULOCYTES NFR BLD AUTO: 0.5 % (ref 0–0.5)
LYMPHOCYTES # BLD AUTO: 1.5 K/UL (ref 1–4.8)
LYMPHOCYTES NFR BLD: ABNORMAL % (ref 18–48)
MAGNESIUM SERPL-MCNC: 2 MG/DL (ref 1.6–2.6)
MCH RBC QN AUTO: 30.4 PG (ref 27–31)
MCHC RBC AUTO-ENTMCNC: 35.3 G/DL (ref 32–36)
MCV RBC AUTO: 86 FL (ref 82–98)
MONOCYTES # BLD AUTO: 0.5 K/UL (ref 0.3–1)
MONOCYTES NFR BLD: 7.4 % (ref 4–15)
NEUTROPHILS # BLD AUTO: 4.1 K/UL (ref 1.8–7.7)
NEUTROPHILS NFR BLD: 67.5 % (ref 38–73)
NRBC BLD-RTO: 0 /100 WBC
PHOSPHATE SERPL-MCNC: 4.4 MG/DL (ref 2.7–4.5)
PLATELET # BLD AUTO: 253 K/UL (ref 150–450)
PMV BLD AUTO: 11 FL (ref 9.2–12.9)
POCT GLUCOSE: 223 MG/DL (ref 70–110)
POCT GLUCOSE: 237 MG/DL (ref 70–110)
POCT GLUCOSE: 280 MG/DL (ref 70–110)
POTASSIUM SERPL-SCNC: 3.7 MMOL/L (ref 3.5–5.1)
PROT SERPL-MCNC: 7.1 G/DL (ref 6–8.4)
RBC # BLD AUTO: 4.51 M/UL (ref 4.6–6.2)
SODIUM SERPL-SCNC: 134 MMOL/L (ref 136–145)
WBC # BLD AUTO: 6.11 K/UL (ref 3.9–12.7)

## 2021-09-21 PROCEDURE — 63600175 PHARM REV CODE 636 W HCPCS: Performed by: PHYSICIAN ASSISTANT

## 2021-09-21 PROCEDURE — 91300 PHARM REV CODE 636 W HCPCS: CPT | Performed by: HOSPITALIST

## 2021-09-21 PROCEDURE — 0001A HC IMMUNIZ ADMIN, SARS-COV-2 COVID-19 VACC, 30MCG/0.3ML, 1ST DOSE: CPT | Performed by: HOSPITALIST

## 2021-09-21 PROCEDURE — 80053 COMPREHEN METABOLIC PANEL: CPT | Performed by: PHYSICIAN ASSISTANT

## 2021-09-21 PROCEDURE — 84100 ASSAY OF PHOSPHORUS: CPT | Performed by: PHYSICIAN ASSISTANT

## 2021-09-21 PROCEDURE — 25000003 PHARM REV CODE 250: Performed by: STUDENT IN AN ORGANIZED HEALTH CARE EDUCATION/TRAINING PROGRAM

## 2021-09-21 PROCEDURE — 94640 AIRWAY INHALATION TREATMENT: CPT

## 2021-09-21 PROCEDURE — 25000242 PHARM REV CODE 250 ALT 637 W/ HCPCS: Performed by: PHYSICIAN ASSISTANT

## 2021-09-21 PROCEDURE — 36415 COLL VENOUS BLD VENIPUNCTURE: CPT | Performed by: PHYSICIAN ASSISTANT

## 2021-09-21 PROCEDURE — 63600175 PHARM REV CODE 636 W HCPCS: Performed by: HOSPITALIST

## 2021-09-21 PROCEDURE — 85025 COMPLETE CBC W/AUTO DIFF WBC: CPT | Performed by: PHYSICIAN ASSISTANT

## 2021-09-21 PROCEDURE — 25000003 PHARM REV CODE 250: Performed by: PHYSICIAN ASSISTANT

## 2021-09-21 PROCEDURE — 83735 ASSAY OF MAGNESIUM: CPT | Performed by: PHYSICIAN ASSISTANT

## 2021-09-21 PROCEDURE — 94799 UNLISTED PULMONARY SVC/PX: CPT

## 2021-09-21 RX ORDER — LANCETS 33 GAUGE
EACH MISCELLANEOUS 2 TIMES DAILY WITH MEALS
Qty: 100 EACH | Refills: 11 | Status: SHIPPED | OUTPATIENT
Start: 2021-09-21

## 2021-09-21 RX ORDER — BLOOD SUGAR DIAGNOSTIC
1 STRIP MISCELLANEOUS 2 TIMES DAILY WITH MEALS
Qty: 100 EACH | Refills: 11 | Status: SHIPPED | OUTPATIENT
Start: 2021-09-21

## 2021-09-21 RX ORDER — BENZONATATE 100 MG/1
100 CAPSULE ORAL 3 TIMES DAILY PRN
Qty: 30 CAPSULE | Refills: 0 | Status: SHIPPED | OUTPATIENT
Start: 2021-09-21 | End: 2021-10-01

## 2021-09-21 RX ORDER — DEXTROMETHORPHAN HBR. AND GUAIFENESIN 10; 100 MG/5ML; MG/5ML
5 SOLUTION ORAL EVERY 6 HOURS PRN
Qty: 118 ML | Refills: 0 | Status: SHIPPED | OUTPATIENT
Start: 2021-09-21 | End: 2021-10-01

## 2021-09-21 RX ORDER — LANCING DEVICE
1 EACH MISCELLANEOUS 2 TIMES DAILY WITH MEALS
Qty: 1 EACH | Refills: 0 | Status: SHIPPED | OUTPATIENT
Start: 2021-09-21 | End: 2022-09-21

## 2021-09-21 RX ORDER — ALBUTEROL SULFATE 90 UG/1
2 AEROSOL, METERED RESPIRATORY (INHALATION) 2 TIMES DAILY
Qty: 8.5 G | Refills: 0 | Status: SHIPPED | OUTPATIENT
Start: 2021-09-21 | End: 2022-09-21

## 2021-09-21 RX ORDER — INSULIN ASPART 100 [IU]/ML
6 INJECTION, SOLUTION INTRAVENOUS; SUBCUTANEOUS 3 TIMES DAILY
Qty: 15 ML | Refills: 11 | Status: SHIPPED | OUTPATIENT
Start: 2021-09-21 | End: 2022-09-21

## 2021-09-21 RX ORDER — DEXTROSE 4 G
1 TABLET,CHEWABLE ORAL 2 TIMES DAILY WITH MEALS
Qty: 1 EACH | Refills: 0 | Status: SHIPPED | OUTPATIENT
Start: 2021-09-21 | End: 2022-09-21

## 2021-09-21 RX ADMIN — RNA INGREDIENT BNT-162B2 0.3 ML: 0.23 INJECTION, SUSPENSION INTRAMUSCULAR at 01:09

## 2021-09-21 RX ADMIN — THERA TABS 1 TABLET: TAB at 09:09

## 2021-09-21 RX ADMIN — INSULIN ASPART 6 UNITS: 100 INJECTION, SOLUTION INTRAVENOUS; SUBCUTANEOUS at 12:09

## 2021-09-21 RX ADMIN — INSULIN ASPART 4 UNITS: 100 INJECTION, SOLUTION INTRAVENOUS; SUBCUTANEOUS at 09:09

## 2021-09-21 RX ADMIN — ACETAMINOPHEN 650 MG: 325 TABLET ORAL at 04:09

## 2021-09-21 RX ADMIN — INSULIN ASPART 6 UNITS: 100 INJECTION, SOLUTION INTRAVENOUS; SUBCUTANEOUS at 04:09

## 2021-09-21 RX ADMIN — REMDESIVIR 100 MG: 100 INJECTION, POWDER, LYOPHILIZED, FOR SOLUTION INTRAVENOUS at 10:09

## 2021-09-21 RX ADMIN — OXYCODONE HYDROCHLORIDE AND ACETAMINOPHEN 500 MG: 500 TABLET ORAL at 09:09

## 2021-09-21 RX ADMIN — ALBUTEROL SULFATE 2 PUFF: 108 INHALANT RESPIRATORY (INHALATION) at 08:09

## 2021-09-21 RX ADMIN — CHOLECALCIFEROL TAB 25 MCG (1000 UNIT) 1000 UNITS: 25 TAB at 09:09

## 2021-09-21 RX ADMIN — ENOXAPARIN SODIUM 100 MG: 100 INJECTION SUBCUTANEOUS at 09:09

## 2021-09-21 RX ADMIN — INSULIN ASPART 4 UNITS: 100 INJECTION, SOLUTION INTRAVENOUS; SUBCUTANEOUS at 12:09

## 2021-09-21 RX ADMIN — DEXAMETHASONE 6 MG: 2 TABLET ORAL at 09:09

## 2021-09-21 RX ADMIN — INSULIN ASPART 6 UNITS: 100 INJECTION, SOLUTION INTRAVENOUS; SUBCUTANEOUS at 10:09

## 2021-09-22 ENCOUNTER — NURSE TRIAGE (OUTPATIENT)
Dept: ADMINISTRATIVE | Facility: CLINIC | Age: 46
End: 2021-09-22

## 2021-09-22 LAB
BACTERIA BLD CULT: NORMAL
BACTERIA BLD CULT: NORMAL

## 2022-03-26 ENCOUNTER — HOSPITAL ENCOUNTER (INPATIENT)
Facility: HOSPITAL | Age: 47
LOS: 11 days | Discharge: HOME | DRG: 623 | End: 2022-04-06
Attending: EMERGENCY MEDICINE | Admitting: INTERNAL MEDICINE
Payer: MEDICAID

## 2022-03-26 DIAGNOSIS — L97.509 DIABETIC FOOT ULCER: ICD-10-CM

## 2022-03-26 DIAGNOSIS — E11.621 DIABETIC FOOT ULCER ASSOCIATED WITH TYPE 2 DIABETES MELLITUS, UNSPECIFIED LATERALITY, UNSPECIFIED PART OF FOOT, UNSPECIFIED ULCER STAGE: ICD-10-CM

## 2022-03-26 DIAGNOSIS — L97.506: ICD-10-CM

## 2022-03-26 DIAGNOSIS — E11.621 DIABETIC FOOT ULCER: ICD-10-CM

## 2022-03-26 DIAGNOSIS — L97.509 DIABETIC FOOT ULCER ASSOCIATED WITH TYPE 2 DIABETES MELLITUS, UNSPECIFIED LATERALITY, UNSPECIFIED PART OF FOOT, UNSPECIFIED ULCER STAGE: ICD-10-CM

## 2022-03-26 DIAGNOSIS — E11.621: Primary | ICD-10-CM

## 2022-03-26 DIAGNOSIS — T14.90XA TRAUMA: ICD-10-CM

## 2022-03-26 DIAGNOSIS — L97.505: Primary | ICD-10-CM

## 2022-03-26 DIAGNOSIS — E11.621: ICD-10-CM

## 2022-03-26 DIAGNOSIS — L03.115 CELLULITIS OF RIGHT ANKLE: ICD-10-CM

## 2022-03-26 PROCEDURE — 63600175 PHARM REV CODE 636 W HCPCS: Performed by: EMERGENCY MEDICINE

## 2022-03-26 PROCEDURE — 25000003 PHARM REV CODE 250: Performed by: EMERGENCY MEDICINE

## 2022-03-26 PROCEDURE — 80053 COMPREHEN METABOLIC PANEL: CPT | Performed by: EMERGENCY MEDICINE

## 2022-03-26 PROCEDURE — 93005 ELECTROCARDIOGRAM TRACING: CPT

## 2022-03-26 PROCEDURE — 96375 TX/PRO/DX INJ NEW DRUG ADDON: CPT

## 2022-03-26 PROCEDURE — 96365 THER/PROPH/DIAG IV INF INIT: CPT

## 2022-03-26 PROCEDURE — 83605 ASSAY OF LACTIC ACID: CPT | Performed by: EMERGENCY MEDICINE

## 2022-03-26 PROCEDURE — 93010 EKG 12-LEAD: ICD-10-PCS | Mod: ,,, | Performed by: INTERNAL MEDICINE

## 2022-03-26 PROCEDURE — 85025 COMPLETE CBC W/AUTO DIFF WBC: CPT | Performed by: EMERGENCY MEDICINE

## 2022-03-26 PROCEDURE — 93010 ELECTROCARDIOGRAM REPORT: CPT | Mod: ,,, | Performed by: INTERNAL MEDICINE

## 2022-03-26 PROCEDURE — U0002 COVID-19 LAB TEST NON-CDC: HCPCS | Performed by: EMERGENCY MEDICINE

## 2022-03-26 PROCEDURE — 96361 HYDRATE IV INFUSION ADD-ON: CPT

## 2022-03-26 PROCEDURE — 12000002 HC ACUTE/MED SURGE SEMI-PRIVATE ROOM

## 2022-03-26 PROCEDURE — 99285 EMERGENCY DEPT VISIT HI MDM: CPT | Mod: 25

## 2022-03-26 PROCEDURE — 87040 BLOOD CULTURE FOR BACTERIA: CPT | Performed by: EMERGENCY MEDICINE

## 2022-03-26 RX ADMIN — PIPERACILLIN AND TAZOBACTAM 4.5 G: 4; .5 INJECTION, POWDER, LYOPHILIZED, FOR SOLUTION INTRAVENOUS; PARENTERAL at 11:03

## 2022-03-26 RX ADMIN — SODIUM CHLORIDE, SODIUM LACTATE, POTASSIUM CHLORIDE, AND CALCIUM CHLORIDE 1000 ML: .6; .31; .03; .02 INJECTION, SOLUTION INTRAVENOUS at 11:03

## 2022-03-27 PROBLEM — L97.509 DIABETIC FOOT ULCER: Status: ACTIVE | Noted: 2022-03-27

## 2022-03-27 PROBLEM — R73.9 HYPERGLYCEMIA: Status: ACTIVE | Noted: 2022-03-27

## 2022-03-27 PROBLEM — E11.621 DIABETIC FOOT ULCER: Status: ACTIVE | Noted: 2022-03-27

## 2022-03-27 LAB
ALBUMIN SERPL BCP-MCNC: 3.2 G/DL (ref 3.5–5.2)
ALP SERPL-CCNC: 102 U/L (ref 55–135)
ALT SERPL W/O P-5'-P-CCNC: 49 U/L (ref 10–44)
ANION GAP SERPL CALC-SCNC: 11 MMOL/L (ref 8–16)
AST SERPL-CCNC: 35 U/L (ref 10–40)
BASOPHILS # BLD AUTO: 0.02 K/UL (ref 0–0.2)
BASOPHILS NFR BLD: 0.2 % (ref 0–1.9)
BILIRUB SERPL-MCNC: 0.5 MG/DL (ref 0.1–1)
BUN SERPL-MCNC: 10 MG/DL (ref 6–20)
CALCIUM SERPL-MCNC: 9.7 MG/DL (ref 8.7–10.5)
CHLORIDE SERPL-SCNC: 97 MMOL/L (ref 95–110)
CO2 SERPL-SCNC: 27 MMOL/L (ref 23–29)
CREAT SERPL-MCNC: 1.2 MG/DL (ref 0.5–1.4)
CTP QC/QA: YES
DIFFERENTIAL METHOD: ABNORMAL
EOSINOPHIL # BLD AUTO: 0 K/UL (ref 0–0.5)
EOSINOPHIL NFR BLD: 0.4 % (ref 0–8)
ERYTHROCYTE [DISTWIDTH] IN BLOOD BY AUTOMATED COUNT: 11.7 % (ref 11.5–14.5)
EST. GFR  (AFRICAN AMERICAN): >60 ML/MIN/1.73 M^2
EST. GFR  (NON AFRICAN AMERICAN): >60 ML/MIN/1.73 M^2
ESTIMATED AVG GLUCOSE: 312 MG/DL (ref 68–131)
GLUCOSE SERPL-MCNC: 324 MG/DL (ref 70–110)
GRAM STN SPEC: NORMAL
HBA1C MFR BLD: 12.5 % (ref 4–5.6)
HCT VFR BLD AUTO: 36.8 % (ref 40–54)
HGB BLD-MCNC: 12.9 G/DL (ref 14–18)
IMM GRANULOCYTES # BLD AUTO: 0.06 K/UL (ref 0–0.04)
IMM GRANULOCYTES NFR BLD AUTO: 0.6 % (ref 0–0.5)
LACTATE SERPL-SCNC: 1.1 MMOL/L (ref 0.5–2.2)
LYMPHOCYTES # BLD AUTO: 2.5 K/UL (ref 1–4.8)
LYMPHOCYTES NFR BLD: 25.1 % (ref 18–48)
MCH RBC QN AUTO: 30.7 PG (ref 27–31)
MCHC RBC AUTO-ENTMCNC: 35.1 G/DL (ref 32–36)
MCV RBC AUTO: 88 FL (ref 82–98)
MONOCYTES # BLD AUTO: 0.6 K/UL (ref 0.3–1)
MONOCYTES NFR BLD: 6.3 % (ref 4–15)
NEUTROPHILS # BLD AUTO: 6.8 K/UL (ref 1.8–7.7)
NEUTROPHILS NFR BLD: 67.4 % (ref 38–73)
NRBC BLD-RTO: 0 /100 WBC
PLATELET # BLD AUTO: 319 K/UL (ref 150–450)
PMV BLD AUTO: 9.8 FL (ref 9.2–12.9)
POCT GLUCOSE: 212 MG/DL (ref 70–110)
POCT GLUCOSE: 272 MG/DL (ref 70–110)
POCT GLUCOSE: 289 MG/DL (ref 70–110)
POCT GLUCOSE: 358 MG/DL (ref 70–110)
POTASSIUM SERPL-SCNC: 4.1 MMOL/L (ref 3.5–5.1)
PROT SERPL-MCNC: 9.1 G/DL (ref 6–8.4)
RBC # BLD AUTO: 4.2 M/UL (ref 4.6–6.2)
SARS-COV-2 RDRP RESP QL NAA+PROBE: NEGATIVE
SODIUM SERPL-SCNC: 135 MMOL/L (ref 136–145)
WBC # BLD AUTO: 10.13 K/UL (ref 3.9–12.7)

## 2022-03-27 PROCEDURE — G0378 HOSPITAL OBSERVATION PER HR: HCPCS

## 2022-03-27 PROCEDURE — 63600175 PHARM REV CODE 636 W HCPCS: Performed by: EMERGENCY MEDICINE

## 2022-03-27 PROCEDURE — 87077 CULTURE AEROBIC IDENTIFY: CPT

## 2022-03-27 PROCEDURE — 99223 PR INITIAL HOSPITAL CARE,LEVL III: ICD-10-PCS | Mod: ,,, | Performed by: PODIATRIST

## 2022-03-27 PROCEDURE — 87075 CULTR BACTERIA EXCEPT BLOOD: CPT

## 2022-03-27 PROCEDURE — 83036 HEMOGLOBIN GLYCOSYLATED A1C: CPT | Performed by: NURSE PRACTITIONER

## 2022-03-27 PROCEDURE — 87186 SC STD MICRODIL/AGAR DIL: CPT

## 2022-03-27 PROCEDURE — 87205 SMEAR GRAM STAIN: CPT

## 2022-03-27 PROCEDURE — 25000003 PHARM REV CODE 250: Performed by: EMERGENCY MEDICINE

## 2022-03-27 PROCEDURE — 25000003 PHARM REV CODE 250: Performed by: NURSE PRACTITIONER

## 2022-03-27 PROCEDURE — 36415 COLL VENOUS BLD VENIPUNCTURE: CPT | Performed by: NURSE PRACTITIONER

## 2022-03-27 PROCEDURE — 21400001 HC TELEMETRY ROOM

## 2022-03-27 PROCEDURE — 63600175 PHARM REV CODE 636 W HCPCS: Performed by: NURSE PRACTITIONER

## 2022-03-27 PROCEDURE — 94760 N-INVAS EAR/PLS OXIMETRY 1: CPT

## 2022-03-27 PROCEDURE — 96366 THER/PROPH/DIAG IV INF ADDON: CPT | Mod: 59

## 2022-03-27 PROCEDURE — 96372 THER/PROPH/DIAG INJ SC/IM: CPT | Performed by: NURSE PRACTITIONER

## 2022-03-27 PROCEDURE — 87070 CULTURE OTHR SPECIMN AEROBIC: CPT

## 2022-03-27 PROCEDURE — 96365 THER/PROPH/DIAG IV INF INIT: CPT | Mod: 59

## 2022-03-27 PROCEDURE — 99223 1ST HOSP IP/OBS HIGH 75: CPT | Mod: ,,, | Performed by: PODIATRIST

## 2022-03-27 PROCEDURE — 87076 CULTURE ANAEROBE IDENT EACH: CPT

## 2022-03-27 RX ORDER — ENOXAPARIN SODIUM 100 MG/ML
40 INJECTION SUBCUTANEOUS EVERY 24 HOURS
Status: DISCONTINUED | OUTPATIENT
Start: 2022-03-27 | End: 2022-03-27

## 2022-03-27 RX ORDER — IBUPROFEN 200 MG
24 TABLET ORAL
Status: DISCONTINUED | OUTPATIENT
Start: 2022-03-27 | End: 2022-04-06 | Stop reason: HOSPADM

## 2022-03-27 RX ORDER — HYDRALAZINE HYDROCHLORIDE 20 MG/ML
10 INJECTION INTRAMUSCULAR; INTRAVENOUS EVERY 8 HOURS PRN
Status: DISCONTINUED | OUTPATIENT
Start: 2022-03-27 | End: 2022-04-06 | Stop reason: HOSPADM

## 2022-03-27 RX ORDER — SODIUM CHLORIDE 0.9 % (FLUSH) 0.9 %
10 SYRINGE (ML) INJECTION EVERY 12 HOURS PRN
Status: DISCONTINUED | OUTPATIENT
Start: 2022-03-27 | End: 2022-04-06 | Stop reason: HOSPADM

## 2022-03-27 RX ORDER — IBUPROFEN 200 MG
16 TABLET ORAL
Status: DISCONTINUED | OUTPATIENT
Start: 2022-03-27 | End: 2022-04-06 | Stop reason: HOSPADM

## 2022-03-27 RX ORDER — ENOXAPARIN SODIUM 100 MG/ML
40 INJECTION SUBCUTANEOUS EVERY 24 HOURS
Status: ACTIVE | OUTPATIENT
Start: 2022-03-27 | End: 2022-03-28

## 2022-03-27 RX ORDER — ACETAMINOPHEN 325 MG/1
650 TABLET ORAL EVERY 6 HOURS PRN
Status: DISCONTINUED | OUTPATIENT
Start: 2022-03-27 | End: 2022-03-27

## 2022-03-27 RX ORDER — INSULIN ASPART 100 [IU]/ML
1-10 INJECTION, SOLUTION INTRAVENOUS; SUBCUTANEOUS
Status: DISCONTINUED | OUTPATIENT
Start: 2022-03-27 | End: 2022-04-06 | Stop reason: HOSPADM

## 2022-03-27 RX ORDER — TRAMADOL HYDROCHLORIDE 50 MG/1
50 TABLET ORAL EVERY 6 HOURS PRN
Status: DISCONTINUED | OUTPATIENT
Start: 2022-03-27 | End: 2022-04-01 | Stop reason: DRUGHIGH

## 2022-03-27 RX ORDER — INSULIN ASPART 100 [IU]/ML
6 INJECTION, SOLUTION INTRAVENOUS; SUBCUTANEOUS
Status: DISCONTINUED | OUTPATIENT
Start: 2022-03-27 | End: 2022-03-27

## 2022-03-27 RX ORDER — ACETAMINOPHEN 500 MG
1000 TABLET ORAL EVERY 6 HOURS PRN
Status: DISCONTINUED | OUTPATIENT
Start: 2022-03-27 | End: 2022-04-06 | Stop reason: HOSPADM

## 2022-03-27 RX ORDER — INSULIN ASPART 100 [IU]/ML
5 INJECTION, SOLUTION INTRAVENOUS; SUBCUTANEOUS
Status: DISCONTINUED | OUTPATIENT
Start: 2022-03-27 | End: 2022-03-28

## 2022-03-27 RX ORDER — GLUCAGON 1 MG
1 KIT INJECTION
Status: DISCONTINUED | OUTPATIENT
Start: 2022-03-27 | End: 2022-04-06 | Stop reason: HOSPADM

## 2022-03-27 RX ADMIN — INSULIN ASPART 10 UNITS: 100 INJECTION, SOLUTION INTRAVENOUS; SUBCUTANEOUS at 04:03

## 2022-03-27 RX ADMIN — INSULIN HUMAN 4 UNITS: 100 INJECTION, SOLUTION PARENTERAL at 12:03

## 2022-03-27 RX ADMIN — PIPERACILLIN AND TAZOBACTAM 4.5 G: 4; .5 INJECTION, POWDER, LYOPHILIZED, FOR SOLUTION INTRAVENOUS; PARENTERAL at 04:03

## 2022-03-27 RX ADMIN — INSULIN ASPART 5 UNITS: 100 INJECTION, SOLUTION INTRAVENOUS; SUBCUTANEOUS at 04:03

## 2022-03-27 RX ADMIN — VANCOMYCIN HYDROCHLORIDE 2000 MG: 10 INJECTION, POWDER, LYOPHILIZED, FOR SOLUTION INTRAVENOUS at 12:03

## 2022-03-27 RX ADMIN — TRAMADOL HYDROCHLORIDE 50 MG: 50 TABLET, COATED ORAL at 03:03

## 2022-03-27 RX ADMIN — VANCOMYCIN HYDROCHLORIDE 1500 MG: 1.5 INJECTION, POWDER, LYOPHILIZED, FOR SOLUTION INTRAVENOUS at 01:03

## 2022-03-27 RX ADMIN — INSULIN ASPART 2 UNITS: 100 INJECTION, SOLUTION INTRAVENOUS; SUBCUTANEOUS at 08:03

## 2022-03-27 RX ADMIN — PIPERACILLIN AND TAZOBACTAM 4.5 G: 4; .5 INJECTION, POWDER, LYOPHILIZED, FOR SOLUTION INTRAVENOUS; PARENTERAL at 09:03

## 2022-03-27 RX ADMIN — TRAMADOL HYDROCHLORIDE 50 MG: 50 TABLET, COATED ORAL at 04:03

## 2022-03-27 RX ADMIN — TRAMADOL HYDROCHLORIDE 50 MG: 50 TABLET, COATED ORAL at 11:03

## 2022-03-27 RX ADMIN — VANCOMYCIN HYDROCHLORIDE 1500 MG: 1.5 INJECTION, POWDER, LYOPHILIZED, FOR SOLUTION INTRAVENOUS at 11:03

## 2022-03-27 NOTE — NURSING
Patient on the bed.  Not in any distress. IV site flushing well.  Assessment and vital signs are charted on the flow sheet.  Safety maintained in every ways.  Medication given per order and charting done accordingly.  Shift was uneventful.
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
## 2022-03-27 NOTE — NURSING
Patient received from the ED.  Patient placed on comfortable position.  Tele on place.  Orientation given about Visiting hours and call light.  He has wound to his rt foot lateral on his little finger, it was open to air and oozing pus and blood. Placed dressing on the wound with gauze and curlix dressing.  Patient given sandwich to eat.  A set of vitals taken and general assessment done.  Patient in pain of level 8 so gave Tramadol PO pill to him.  Urinal and call bell in reach.  Safety maintained.  Bed in lowest locked position.  Will continue to monitor.

## 2022-03-27 NOTE — CARE UPDATE
Patient admitted with right foot injury that occurred at work last Friday (9 days ago). Soaking right foot in epson salt without improvement.   Right ankle X ray no acute displaced fracture   Right foot x ray no fracture but does show soft tissue swelling with small amount of tissue air involving 5th toe.   Afebrile and no leukocytosis  Right ventral lateral foot wound and abcess? Erythema and edema extending up dorsal foot and lateral malleolus. Able to wiggle toes. Numbness on exam.   No drainage expressed  Continue IV zosyn, vancomycin  NPO   MRI right forefoot  Podiatry consult     Terri Nicolas NP   Staff: Olya De Santiago MD

## 2022-03-27 NOTE — SUBJECTIVE & OBJECTIVE
"Past Medical History:   Diagnosis Date    Diabetes mellitus     Essential hypertension, benign 7/18/2013    Herpes        History reviewed. No pertinent surgical history.    Review of patient's allergies indicates:  No Known Allergies    No current facility-administered medications on file prior to encounter.     Current Outpatient Medications on File Prior to Encounter   Medication Sig    albuterol (PROVENTIL/VENTOLIN HFA) 90 mcg/actuation inhaler Inhale 2 puffs into the lungs 2 (two) times daily. Rescue    blood sugar diagnostic Strp 1 strip by Misc.(Non-Drug; Combo Route) route 2 (two) times daily with meals.    blood-glucose meter Misc 1 Device by Misc.(Non-Drug; Combo Route) route 2 (two) times daily with meals.    insulin aspart U-100 (NOVOLOG) 100 unit/mL (3 mL) InPn pen Inject 6 Units into the skin 3 (three) times daily.    insulin detemir U-100 (LEVEMIR FLEXTOUCH) 100 unit/mL (3 mL) SubQ InPn pen Inject 18 Units into the skin every evening.    lancets 33 gauge Misc 1 each by Misc.(Non-Drug; Combo Route) route 2 (two) times daily with meals.    lancing device Misc 1 Device by Misc.(Non-Drug; Combo Route) route 2 (two) times daily with meals.    multivitamin (THERAGRAN) per tablet Take 1 tablet by mouth once daily.     pen needle, diabetic 32 gauge x 1/4" Ndle 1 each by Misc.(Non-Drug; Combo Route) route 2 (two) times daily with meals.    pulse oximeter (PULSE OXIMETER) device by Apply Externally route 2 (two) times a day. Use twice daily at 8 AM and 3 PM and [facility] as directed.     Family History       Problem Relation (Age of Onset)    Diabetes Mother          Tobacco Use    Smoking status: Never Smoker    Smokeless tobacco: Never Used   Substance and Sexual Activity    Alcohol use: No    Drug use: No    Sexual activity: Yes     Partners: Female     Birth control/protection: None, Condom     Review of Systems  Constitutional: Positive for chills (resolved). Negative for activity change, " appetite change, diaphoresis, fatigue, fever and unexpected weight change.   HENT: Negative.    Eyes: Negative.    Respiratory: Negative.    Cardiovascular: Negative.    Gastrointestinal: Positive for vomiting (resolved). Negative for abdominal distention, abdominal pain, anal bleeding, blood in stool, constipation, diarrhea, nausea and rectal pain.   Genitourinary: Negative.    Musculoskeletal: Negative for back pain, neck pain and neck stiffness.        Positive for right ankle swelling. Positive for right foot swelling. Positive for right foot pain.    Skin: Negative.    Neurological: Negative.    Objective:     Vital Signs (Most Recent):  Temp: 98.2 °F (36.8 °C) (03/26/22 2110)  Pulse: 89 (03/27/22 0101)  Resp: 17 (03/26/22 2110)  BP: (!) 177/92 (03/27/22 0101)  SpO2: 100 % (03/27/22 0101)   Vital Signs (24h Range):  Temp:  [98.2 °F (36.8 °C)] 98.2 °F (36.8 °C)  Pulse:  [] 89  Resp:  [17] 17  SpO2:  [97 %-100 %] 100 %  BP: (136-177)/(88-93) 177/92     Weight: 95.3 kg (210 lb)  Body mass index is 26.25 kg/m².    Physical Exam  Constitutional:       Appearance: Normal appearance.   HENT:      Head: Normocephalic and atraumatic.      Mouth/Throat:      Mouth: Mucous membranes are moist.   Eyes:      Extraocular Movements: Extraocular movements intact.      Pupils: Pupils are equal, round, and reactive to light.   Cardiovascular:      Rate and Rhythm: Normal rate and regular rhythm.   Pulmonary:      Effort: Pulmonary effort is normal.      Breath sounds: Normal breath sounds.   Abdominal:      General: Bowel sounds are normal.      Palpations: Abdomen is soft.   Musculoskeletal:         General: Swelling present.      Cervical back: Normal range of motion.      Right lower leg: Edema present.   Skin:     General: Skin is warm and dry.      Comments: Open wound to right outer last toe and foot    Neurological:      Mental Status: He is alert and oriented to person, place, and time. Mental status is at  baseline.         CRANIAL NERVES     CN III, IV, VI   Pupils are equal, round, and reactive to light.     Significant Labs: All pertinent labs within the past 24 hours have been reviewed.    Significant Imaging: I have reviewed all pertinent imaging results/findings within the past 24 hours.

## 2022-03-27 NOTE — CONSULTS
Thank you for your consult to [facility]. We have reviewed the patient chart. This patient does not meet criteria for [facility] service at this time due to Patient is a new admission. Will hand back to In-house service.     Please re-consult tomorrow for acceptance to [facility] on Tuesday AM.    hPoebe La MD  [facility]

## 2022-03-27 NOTE — PROGRESS NOTES
Pharmacokinetic Initial Assessment: IV Vancomycin    Assessment/Plan:    Initiate intravenous vancomycin with loading dose of 2000 mg once followed by a maintenance dose of vancomycin 1500mg IV every 12 hours  Desired empiric serum trough concentration is 10 to 20 mcg/mL  Draw vancomycin trough level 60 min prior to fourth dose on 3/28/22 at approximately 11:00  Pharmacy will continue to follow and monitor vancomycin.      Please contact pharmacy at extension [phone] with any questions regarding this assessment.     Thank you for the consult,   Chante Adams       Patient brief summary:  Ashutosh Ferrari is a 46 y.o. male initiated on antimicrobial therapy with IV Vancomycin for treatment of suspected skin & soft tissue infection    Drug Allergies:   Review of patient's allergies indicates:  No Known Allergies    Actual Body Weight:   95.3 kg    Renal Function:   Estimated Creatinine Clearance: 93.9 mL/min (based on SCr of 1.2 mg/dL).,     Dialysis Method (if applicable):  N/A    CBC (last 72 hours):  Recent Labs   Lab Result Units 03/26/22  2341   WBC K/uL 10.13   Hemoglobin g/dL 12.9*   Hematocrit % 36.8*   Platelets K/uL 319   Gran % % 67.4   Lymph % % 25.1   Mono % % 6.3   Eosinophil % % 0.4   Basophil % % 0.2   Differential Method  Automated       Metabolic Panel (last 72 hours):  Recent Labs   Lab Result Units 03/26/22  2341   Sodium mmol/L 135*   Potassium mmol/L 4.1   Chloride mmol/L 97   CO2 mmol/L 27   Glucose mg/dL 324*   BUN mg/dL 10   Creatinine mg/dL 1.2   Albumin g/dL 3.2*   Total Bilirubin mg/dL 0.5   Alkaline Phosphatase U/L 102   AST U/L 35   ALT U/L 49*       Drug levels (last 3 results):  No results for input(s): VANCOMYCINRA, VANCOMYCINPE, VANCOMYCINTR in the last 72 hours.    Microbiologic Results:  Microbiology Results (last 7 days)       Procedure Component Value Units Date/Time    Blood Culture #1 **CANNOT BE ORDERED STAT** [[id number]] Collected: 03/26/22 2349    Order Status: Sent Specimen: Blood  from Peripheral, Forearm, Left Updated: 03/26/22 2357    Blood Culture #2 **CANNOT BE ORDERED STAT** [[id number]] Collected: 03/26/22 2337    Order Status: Sent Specimen: Blood from Peripheral, Forearm, Right Updated: 03/26/22 2357

## 2022-03-27 NOTE — SUBJECTIVE & OBJECTIVE
Scheduled Meds:   enoxaparin  40 mg Subcutaneous Daily    [START ON 3/28/2022] insulin detemir U-100  40 Units Subcutaneous QHS    piperacillin-tazobactam (ZOSYN) IVPB  4.5 g Intravenous Q8H    vancomycin (VANCOCIN) IVPB  1,500 mg Intravenous Q12H     Continuous Infusions:  PRN Meds:acetaminophen, dextrose 10%, dextrose 10%, glucagon (human recombinant), glucose, glucose, insulin aspart U-100, sodium chloride 0.9%, traMADoL, Pharmacy to dose Vancomycin consult **AND** vancomycin - pharmacy to dose    Review of patient's allergies indicates:  No Known Allergies     Past Medical History:   Diagnosis Date    Diabetes mellitus     Essential hypertension, benign 7/18/2013    Herpes      History reviewed. No pertinent surgical history.    Family History       Problem Relation (Age of Onset)    Diabetes Mother          Tobacco Use    Smoking status: Never Smoker    Smokeless tobacco: Never Used   Substance and Sexual Activity    Alcohol use: No    Drug use: No    Sexual activity: Yes     Partners: Female     Birth control/protection: None, Condom     Review of Systems   Constitutional:  Positive for activity change. Negative for appetite change, chills and fever.   Cardiovascular:  Negative for chest pain and leg swelling.   Gastrointestinal:  Negative for abdominal pain, diarrhea, nausea and vomiting.   Endocrine: Negative for polydipsia, polyphagia and polyuria.   Skin:  Positive for color change and wound.   Neurological:  Positive for numbness. Negative for weakness.   Psychiatric/Behavioral:  Negative for agitation, behavioral problems and confusion.    Objective:     Vital Signs (Most Recent):  Temp: 98.7 °F (37.1 °C) (03/27/22 0725)  Pulse: 66 (03/27/22 0725)  Resp: 19 (03/27/22 0725)  BP: 105/65 (03/27/22 0725)  SpO2: 100 % (03/27/22 0725) Vital Signs (24h Range):  Temp:  [98.2 °F (36.8 °C)-99.2 °F (37.3 °C)] 98.7 °F (37.1 °C)  Pulse:  [] 66  Resp:  [16-19] 19  SpO2:  [97 %-100 %] 100 %  BP: (105-177)/(65-96)  105/65     Weight: 86.2 kg (190 lb 1.6 oz)  Body mass index is 23.76 kg/m².    Foot Exam    General  Orientation: alert and oriented to person, place, and time   Affect: appropriate       Right Foot/Ankle     Inspection and Palpation  Skin Exam: drainage, cellulitis, skin changes, abnormal color, ulcer and erythema; skin not intact     Neurovascular  Dorsalis pedis: 2+  Posterior tibial: 1+  Saphenous nerve sensation: absent  Tibial nerve sensation: absent  Superficial peroneal nerve sensation: absent  Deep peroneal nerve sensation: absent  Sural nerve sensation: absent      Left Foot/Ankle      Inspection and Palpation  Ecchymosis: none  Tenderness: none   Swelling: none   Skin Exam: no drainage, skin not intact, no cellulitis, no abnormal color and no erythema     Neurovascular  Dorsalis pedis: 2+  Posterior tibial: 1+  Saphenous nerve sensation: absent  Tibial nerve sensation: absent  Superficial peroneal nerve sensation: absent  Deep peroneal nerve sensation: absent  Sural nerve sensation: absent    Comments  Plantar lateral hyperkeratotic lesion sub 5th met head      Laboratory:    CBC:   Recent Labs   Lab 03/26/22  2341   WBC 10.13   RBC 4.20*   HGB 12.9*   HCT 36.8*      MCV 88   MCH 30.7   MCHC 35.1     CMP:   Recent Labs   Lab 03/26/22  2341   *   CALCIUM 9.7   ALBUMIN 3.2*   PROT 9.1*   *   K 4.1   CO2 27   CL 97   BUN 10   CREATININE 1.2   ALKPHOS 102   ALT 49*   AST 35   BILITOT 0.5     All pertinent labs reviewed within the last 24 hours.    Diagnostic Results:    Right Foot/Ankle X-Ray 3/26: No evidence of acute displaced fracture, dislocation, or osseous destructive process.  Ankle mortise is maintained.  Lisfranc articulation appears congruent.  Mild degenerative changes and spurring are seen throughout the midfoot.  There is mild posterior and plantar calcaneal spurring.     Prominent soft tissue swelling with small amount of soft tissue air is seen involving the 5th toe.  Mild  soft tissue swelling is also seen over the lateral malleolus.       Clinical Findings:  Right foot:   Purulence, Mal odor, erythema ascending from plantar lateral distal right foot to mid and fore foot. Temperature notably increased compared to contralateral. Dorsal sinus probes to capsule/periosteum. Lateral sinus probes to soft tissue. Plantar ulceration undermines distally about 1.5 cm.     Left foot:   Stable hyperkeratotic lesion over lateral 5th metatarsal head without SOIs.

## 2022-03-27 NOTE — PLAN OF CARE
[facility]  Initial Discharge Assessment       Primary Care Provider: [facility]    Admission Diagnosis: Trauma [T14.90XA]  Diabetic foot ulcer [E11.621, L97.509]  Cellulitis of right ankle [L03.115]    Admission Date: 3/26/2022  Expected Discharge Date:     Discharge Barriers Identified: None    Payor: /     Extended Emergency Contact Information  Primary Emergency Contact: ReynaldoryderCatie   [place]  Mobile Phone: [phone]  Relation: Brother  Secondary Emergency Contact: Nica Ferrari   [facility]  Home Phone: [phone]  Relation: Relative    Discharge Plan A: Home  Discharge Plan B: Other (TBD)      [id number]  [address]  [address]  [place] [address]  Phone: [phone] Fax: [phone]      Initial Assessment (most recent)     Adult Discharge Assessment - 03/27/22 1013        Discharge Assessment    Assessment Type Discharge Planning Assessment     Confirmed/corrected address, phone number and insurance Yes     Confirmed Demographics Correct on Facesheet     Source of Information patient;health record     Does patient/caregiver understand observation status Yes     Reason For Admission Trauma     Lives With alone     Facility Arrived From: Home     Do you expect to return to your current living situation? Yes     Do you have help at home or someone to help you manage your care at home? Yes     Who are your caregiver(s) and their phone number(s)? Latisha: [phone]     Prior to hospitilization cognitive status: Alert/Oriented     Current cognitive status: Alert/Oriented     Walking or Climbing Stairs Difficulty none     Dressing/Bathing Difficulty none     Home Accessibility wheelchair accessible     Home Layout Able to live on 1st floor     Equipment Currently Used at Home glucometer     Readmission within 30 days? No     Patient currently being followed by outpatient case management? No     Do you  currently have service(s) that help you manage your care at home? No     Do you take prescription medications? Yes     Do you have prescription coverage? Yes     Coverage None     Do you have any problems affording any of your prescribed medications? TBD     Is the patient taking medications as prescribed? yes     Who is going to help you get home at discharge? Labar-brother     How do you get to doctors appointments? car, drives self     Are you on dialysis? No     Do you take coumadin? No     Discharge Plan A Home     Discharge Plan B Other   TBD    DME Needed Upon Discharge  other (see comments)   TBD    Discharge Plan discussed with: Patient     Discharge Barriers Identified None             SW Role explained to patient; two patient identifiers recognized; SW contact information placed on Communication board. Discussed patient managing health care at home; determined who would be helping patient at home with recovery: Catie, brother, relatives, and friends will help with recovery    PCP: [facility]     Extended Emergency Contact Information  Primary Emergency Contact: Catie Ferrari   [place]  Mobile Phone: [phone]  Relation: Brother  Secondary Emergency Contact: Nica Ferrari   [facility]  Home Phone: [phone]  Relation: Relative     [facility]  [address] [place] [address]  Phone: [phone] Fax: [phone]    Payor: /

## 2022-03-28 ENCOUNTER — ANESTHESIA EVENT (OUTPATIENT)
Dept: SURGERY | Facility: HOSPITAL | Age: 47
DRG: 623 | End: 2022-03-28
Payer: MEDICAID

## 2022-03-28 ENCOUNTER — ANESTHESIA (OUTPATIENT)
Dept: SURGERY | Facility: HOSPITAL | Age: 47
DRG: 623 | End: 2022-03-28
Payer: MEDICAID

## 2022-03-28 PROBLEM — Z79.4 TYPE 2 DIABETES MELLITUS WITH HYPERGLYCEMIA, WITH LONG-TERM CURRENT USE OF INSULIN: Status: ACTIVE | Noted: 2022-03-27

## 2022-03-28 PROBLEM — E11.65 TYPE 2 DIABETES MELLITUS WITH HYPERGLYCEMIA, WITH LONG-TERM CURRENT USE OF INSULIN: Status: ACTIVE | Noted: 2022-03-27

## 2022-03-28 LAB
ALBUMIN SERPL BCP-MCNC: 2.8 G/DL (ref 3.5–5.2)
ALP SERPL-CCNC: 84 U/L (ref 55–135)
ALT SERPL W/O P-5'-P-CCNC: 36 U/L (ref 10–44)
ANION GAP SERPL CALC-SCNC: 12 MMOL/L (ref 8–16)
AST SERPL-CCNC: 22 U/L (ref 10–40)
BASOPHILS # BLD AUTO: 0.03 K/UL (ref 0–0.2)
BASOPHILS NFR BLD: 0.3 % (ref 0–1.9)
BILIRUB SERPL-MCNC: 0.6 MG/DL (ref 0.1–1)
BUN SERPL-MCNC: 9 MG/DL (ref 6–20)
CALCIUM SERPL-MCNC: 9.3 MG/DL (ref 8.7–10.5)
CHLORIDE SERPL-SCNC: 98 MMOL/L (ref 95–110)
CO2 SERPL-SCNC: 24 MMOL/L (ref 23–29)
CREAT SERPL-MCNC: 1.1 MG/DL (ref 0.5–1.4)
DIFFERENTIAL METHOD: ABNORMAL
EOSINOPHIL # BLD AUTO: 0 K/UL (ref 0–0.5)
EOSINOPHIL NFR BLD: 0.1 % (ref 0–8)
ERYTHROCYTE [DISTWIDTH] IN BLOOD BY AUTOMATED COUNT: 11.5 % (ref 11.5–14.5)
EST. GFR  (AFRICAN AMERICAN): >60 ML/MIN/1.73 M^2
EST. GFR  (NON AFRICAN AMERICAN): >60 ML/MIN/1.73 M^2
GLUCOSE SERPL-MCNC: 282 MG/DL (ref 70–110)
HCT VFR BLD AUTO: 35.7 % (ref 40–54)
HGB BLD-MCNC: 12.2 G/DL (ref 14–18)
IMM GRANULOCYTES # BLD AUTO: 0.03 K/UL (ref 0–0.04)
IMM GRANULOCYTES NFR BLD AUTO: 0.3 % (ref 0–0.5)
LYMPHOCYTES # BLD AUTO: 1.6 K/UL (ref 1–4.8)
LYMPHOCYTES NFR BLD: 18.7 % (ref 18–48)
MCH RBC QN AUTO: 29.9 PG (ref 27–31)
MCHC RBC AUTO-ENTMCNC: 34.2 G/DL (ref 32–36)
MCV RBC AUTO: 88 FL (ref 82–98)
MONOCYTES # BLD AUTO: 0.4 K/UL (ref 0.3–1)
MONOCYTES NFR BLD: 4.6 % (ref 4–15)
NEUTROPHILS # BLD AUTO: 6.5 K/UL (ref 1.8–7.7)
NEUTROPHILS NFR BLD: 76 % (ref 38–73)
NRBC BLD-RTO: 0 /100 WBC
PLATELET # BLD AUTO: 363 K/UL (ref 150–450)
PMV BLD AUTO: 9.5 FL (ref 9.2–12.9)
POCT GLUCOSE: 259 MG/DL (ref 70–110)
POCT GLUCOSE: 301 MG/DL (ref 70–110)
POCT GLUCOSE: 312 MG/DL (ref 70–110)
POTASSIUM SERPL-SCNC: 4.1 MMOL/L (ref 3.5–5.1)
PROT SERPL-MCNC: 8.2 G/DL (ref 6–8.4)
RBC # BLD AUTO: 4.08 M/UL (ref 4.6–6.2)
SODIUM SERPL-SCNC: 134 MMOL/L (ref 136–145)
VANCOMYCIN TROUGH SERPL-MCNC: 13 UG/ML (ref 10–22)
WBC # BLD AUTO: 8.62 K/UL (ref 3.9–12.7)

## 2022-03-28 PROCEDURE — 80053 COMPREHEN METABOLIC PANEL: CPT | Performed by: NURSE PRACTITIONER

## 2022-03-28 PROCEDURE — 87070 CULTURE OTHR SPECIMN AEROBIC: CPT | Performed by: PODIATRIST

## 2022-03-28 PROCEDURE — 25000003 PHARM REV CODE 250: Performed by: NURSE ANESTHETIST, CERTIFIED REGISTERED

## 2022-03-28 PROCEDURE — 71000033 HC RECOVERY, INTIAL HOUR: Performed by: PODIATRIST

## 2022-03-28 PROCEDURE — 99232 SBSQ HOSP IP/OBS MODERATE 35: CPT | Mod: 25,,, | Performed by: PODIATRIST

## 2022-03-28 PROCEDURE — 87075 CULTR BACTERIA EXCEPT BLOOD: CPT | Performed by: PODIATRIST

## 2022-03-28 PROCEDURE — 25000003 PHARM REV CODE 250: Performed by: NURSE PRACTITIONER

## 2022-03-28 PROCEDURE — 88307 TISSUE EXAM BY PATHOLOGIST: CPT | Mod: 26,,, | Performed by: PATHOLOGY

## 2022-03-28 PROCEDURE — D9220A PRA ANESTHESIA: ICD-10-PCS | Mod: CRNA,,, | Performed by: NURSE ANESTHETIST, CERTIFIED REGISTERED

## 2022-03-28 PROCEDURE — D9220A PRA ANESTHESIA: Mod: ANES,,, | Performed by: ANESTHESIOLOGY

## 2022-03-28 PROCEDURE — 87102 FUNGUS ISOLATION CULTURE: CPT | Performed by: PODIATRIST

## 2022-03-28 PROCEDURE — 87205 SMEAR GRAM STAIN: CPT | Performed by: PODIATRIST

## 2022-03-28 PROCEDURE — 63600175 PHARM REV CODE 636 W HCPCS: Performed by: NURSE ANESTHETIST, CERTIFIED REGISTERED

## 2022-03-28 PROCEDURE — 37000008 HC ANESTHESIA 1ST 15 MINUTES: Performed by: PODIATRIST

## 2022-03-28 PROCEDURE — 36415 COLL VENOUS BLD VENIPUNCTURE: CPT | Performed by: INTERNAL MEDICINE

## 2022-03-28 PROCEDURE — D9220A PRA ANESTHESIA: ICD-10-PCS | Mod: ANES,,, | Performed by: ANESTHESIOLOGY

## 2022-03-28 PROCEDURE — 63600175 PHARM REV CODE 636 W HCPCS: Performed by: NURSE PRACTITIONER

## 2022-03-28 PROCEDURE — 36000707: Performed by: PODIATRIST

## 2022-03-28 PROCEDURE — 87206 SMEAR FLUORESCENT/ACID STAI: CPT | Performed by: PODIATRIST

## 2022-03-28 PROCEDURE — C9399 UNCLASSIFIED DRUGS OR BIOLOG: HCPCS | Performed by: NURSE PRACTITIONER

## 2022-03-28 PROCEDURE — 88307 PR  SURG PATH,LEVEL V: ICD-10-PCS | Mod: 26,,, | Performed by: PATHOLOGY

## 2022-03-28 PROCEDURE — 20220 BONE BIOPSY TROCAR/NDL SUPFC: CPT | Mod: 51,,, | Performed by: PODIATRIST

## 2022-03-28 PROCEDURE — 25000003 PHARM REV CODE 250: Performed by: ANESTHESIOLOGY

## 2022-03-28 PROCEDURE — 63600175 PHARM REV CODE 636 W HCPCS: Performed by: ANESTHESIOLOGY

## 2022-03-28 PROCEDURE — 88307 TISSUE EXAM BY PATHOLOGIST: CPT | Performed by: PATHOLOGY

## 2022-03-28 PROCEDURE — 10061 I&D ABSCESS COMP/MULTIPLE: CPT | Mod: ,,, | Performed by: PODIATRIST

## 2022-03-28 PROCEDURE — 25000003 PHARM REV CODE 250: Performed by: PODIATRIST

## 2022-03-28 PROCEDURE — 20220 PR BONE BIOPSY,TROCAR/NEEDLE SUPERF: ICD-10-PCS | Mod: 51,,, | Performed by: PODIATRIST

## 2022-03-28 PROCEDURE — 10061 PR DRAIN SKIN ABSCESS COMPLIC: ICD-10-PCS | Mod: ,,, | Performed by: PODIATRIST

## 2022-03-28 PROCEDURE — 71000039 HC RECOVERY, EACH ADD'L HOUR: Performed by: PODIATRIST

## 2022-03-28 PROCEDURE — 36415 COLL VENOUS BLD VENIPUNCTURE: CPT | Performed by: NURSE PRACTITIONER

## 2022-03-28 PROCEDURE — 85025 COMPLETE CBC W/AUTO DIFF WBC: CPT | Performed by: NURSE PRACTITIONER

## 2022-03-28 PROCEDURE — 87116 MYCOBACTERIA CULTURE: CPT | Performed by: PODIATRIST

## 2022-03-28 PROCEDURE — 37000009 HC ANESTHESIA EA ADD 15 MINS: Performed by: PODIATRIST

## 2022-03-28 PROCEDURE — 99232 PR SUBSEQUENT HOSPITAL CARE,LEVL II: ICD-10-PCS | Mod: 25,,, | Performed by: PODIATRIST

## 2022-03-28 PROCEDURE — 36000706: Performed by: PODIATRIST

## 2022-03-28 PROCEDURE — D9220A PRA ANESTHESIA: Mod: CRNA,,, | Performed by: NURSE ANESTHETIST, CERTIFIED REGISTERED

## 2022-03-28 PROCEDURE — 21400001 HC TELEMETRY ROOM

## 2022-03-28 PROCEDURE — 80202 ASSAY OF VANCOMYCIN: CPT | Performed by: INTERNAL MEDICINE

## 2022-03-28 RX ORDER — ONDANSETRON 2 MG/ML
INJECTION INTRAMUSCULAR; INTRAVENOUS
Status: DISCONTINUED | OUTPATIENT
Start: 2022-03-28 | End: 2022-03-28

## 2022-03-28 RX ORDER — PROPOFOL 10 MG/ML
VIAL (ML) INTRAVENOUS
Status: DISCONTINUED | OUTPATIENT
Start: 2022-03-28 | End: 2022-03-28

## 2022-03-28 RX ORDER — PROPOFOL 10 MG/ML
VIAL (ML) INTRAVENOUS CONTINUOUS PRN
Status: DISCONTINUED | OUTPATIENT
Start: 2022-03-28 | End: 2022-03-28

## 2022-03-28 RX ORDER — MIDAZOLAM HYDROCHLORIDE 1 MG/ML
INJECTION, SOLUTION INTRAMUSCULAR; INTRAVENOUS
Status: DISCONTINUED | OUTPATIENT
Start: 2022-03-28 | End: 2022-03-28

## 2022-03-28 RX ORDER — INSULIN ASPART 100 [IU]/ML
7 INJECTION, SOLUTION INTRAVENOUS; SUBCUTANEOUS
Status: DISCONTINUED | OUTPATIENT
Start: 2022-03-28 | End: 2022-03-29

## 2022-03-28 RX ORDER — SODIUM CHLORIDE 0.9 % (FLUSH) 0.9 %
3 SYRINGE (ML) INJECTION
Status: DISCONTINUED | OUTPATIENT
Start: 2022-03-28 | End: 2022-03-28 | Stop reason: HOSPADM

## 2022-03-28 RX ORDER — FENTANYL CITRATE 50 UG/ML
25 INJECTION, SOLUTION INTRAMUSCULAR; INTRAVENOUS EVERY 5 MIN PRN
Status: COMPLETED | OUTPATIENT
Start: 2022-03-28 | End: 2022-03-28

## 2022-03-28 RX ORDER — HYDROMORPHONE HYDROCHLORIDE 2 MG/ML
0.2 INJECTION, SOLUTION INTRAMUSCULAR; INTRAVENOUS; SUBCUTANEOUS EVERY 5 MIN PRN
Status: DISCONTINUED | OUTPATIENT
Start: 2022-03-28 | End: 2022-03-28 | Stop reason: HOSPADM

## 2022-03-28 RX ORDER — LABETALOL HYDROCHLORIDE 5 MG/ML
10 INJECTION, SOLUTION INTRAVENOUS ONCE
Status: COMPLETED | OUTPATIENT
Start: 2022-03-28 | End: 2022-03-28

## 2022-03-28 RX ORDER — ONDANSETRON 4 MG/1
4 TABLET, ORALLY DISINTEGRATING ORAL EVERY 8 HOURS PRN
Status: DISCONTINUED | OUTPATIENT
Start: 2022-03-28 | End: 2022-04-06 | Stop reason: HOSPADM

## 2022-03-28 RX ORDER — LIDOCAINE HYDROCHLORIDE 20 MG/ML
INJECTION INTRAVENOUS
Status: DISCONTINUED | OUTPATIENT
Start: 2022-03-28 | End: 2022-03-28

## 2022-03-28 RX ORDER — BUPIVACAINE HYDROCHLORIDE 2.5 MG/ML
INJECTION, SOLUTION INFILTRATION; PERINEURAL
Status: DISCONTINUED | OUTPATIENT
Start: 2022-03-28 | End: 2022-03-28 | Stop reason: HOSPADM

## 2022-03-28 RX ORDER — AMLODIPINE BESYLATE 2.5 MG/1
2.5 TABLET ORAL DAILY
Status: DISCONTINUED | OUTPATIENT
Start: 2022-03-28 | End: 2022-04-06 | Stop reason: HOSPADM

## 2022-03-28 RX ORDER — LIDOCAINE HYDROCHLORIDE 10 MG/ML
INJECTION INFILTRATION; PERINEURAL
Status: DISCONTINUED | OUTPATIENT
Start: 2022-03-28 | End: 2022-03-28 | Stop reason: HOSPADM

## 2022-03-28 RX ADMIN — ONDANSETRON 4 MG: 4 TABLET, ORALLY DISINTEGRATING ORAL at 08:03

## 2022-03-28 RX ADMIN — VANCOMYCIN HYDROCHLORIDE 1500 MG: 1.5 INJECTION, POWDER, LYOPHILIZED, FOR SOLUTION INTRAVENOUS at 04:03

## 2022-03-28 RX ADMIN — PROPOFOL 20 MG: 10 INJECTION, EMULSION INTRAVENOUS at 02:03

## 2022-03-28 RX ADMIN — FENTANYL CITRATE 25 MCG: 50 INJECTION, SOLUTION INTRAMUSCULAR; INTRAVENOUS at 02:03

## 2022-03-28 RX ADMIN — INSULIN DETEMIR 20 UNITS: 100 INJECTION, SOLUTION SUBCUTANEOUS at 09:03

## 2022-03-28 RX ADMIN — TRAMADOL HYDROCHLORIDE 50 MG: 50 TABLET, COATED ORAL at 08:03

## 2022-03-28 RX ADMIN — HYDROMORPHONE HYDROCHLORIDE 0.2 MG: 2 INJECTION INTRAMUSCULAR; INTRAVENOUS; SUBCUTANEOUS at 04:03

## 2022-03-28 RX ADMIN — AMLODIPINE BESYLATE 2.5 MG: 2.5 TABLET ORAL at 04:03

## 2022-03-28 RX ADMIN — LIDOCAINE HYDROCHLORIDE 50 MG: 20 INJECTION, SOLUTION INTRAVENOUS at 02:03

## 2022-03-28 RX ADMIN — INSULIN ASPART 5 UNITS: 100 INJECTION, SOLUTION INTRAVENOUS; SUBCUTANEOUS at 09:03

## 2022-03-28 RX ADMIN — PIPERACILLIN AND TAZOBACTAM 4.5 G: 4; .5 INJECTION, POWDER, LYOPHILIZED, FOR SOLUTION INTRAVENOUS; PARENTERAL at 08:03

## 2022-03-28 RX ADMIN — ONDANSETRON 4 MG: 4 TABLET, ORALLY DISINTEGRATING ORAL at 05:03

## 2022-03-28 RX ADMIN — MIDAZOLAM HYDROCHLORIDE 2 MG: 1 INJECTION, SOLUTION INTRAMUSCULAR; INTRAVENOUS at 01:03

## 2022-03-28 RX ADMIN — ONDANSETRON 4 MG: 2 INJECTION, SOLUTION INTRAMUSCULAR; INTRAVENOUS at 01:03

## 2022-03-28 RX ADMIN — PIPERACILLIN AND TAZOBACTAM 4.5 G: 4; .5 INJECTION, POWDER, LYOPHILIZED, FOR SOLUTION INTRAVENOUS; PARENTERAL at 06:03

## 2022-03-28 RX ADMIN — LABETALOL HYDROCHLORIDE 10 MG: 5 INJECTION INTRAVENOUS at 04:03

## 2022-03-28 RX ADMIN — HYDRALAZINE HYDROCHLORIDE 10 MG: 20 INJECTION, SOLUTION INTRAMUSCULAR; INTRAVENOUS at 05:03

## 2022-03-28 RX ADMIN — PROPOFOL 50 MCG/KG/MIN: 10 INJECTION, EMULSION INTRAVENOUS at 02:03

## 2022-03-28 RX ADMIN — HYDRALAZINE HYDROCHLORIDE 10 MG: 20 INJECTION, SOLUTION INTRAMUSCULAR; INTRAVENOUS at 03:03

## 2022-03-28 RX ADMIN — SODIUM CHLORIDE, SODIUM LACTATE, POTASSIUM CHLORIDE, AND CALCIUM CHLORIDE: .6; .31; .03; .02 INJECTION, SOLUTION INTRAVENOUS at 01:03

## 2022-03-28 NOTE — HOSPITAL COURSE
Admitted with right foot injury that occurred at work last Friday (9 days ago). Soaking right foot in epson salt without improvement. Right ankle X ray no acute displaced fracture Right foot x ray no fracture but does show soft tissue swelling with small amount of tissue air involving 5th toe.Right ventral lateral foot wound and abcess? Erythema and edema extending up dorsal foot and lateral malleolus. Able to wiggle toes. Numbness on exam. Podiatry following. Follow up wound culture. Continue IV zosyn, vancomycin. MRI right forefoot with concerning for osteomyelitis. Underwent bone biopsy and I&D on 3/28 with Podiatry. He was found to have purulent drainage during this procedure. Cultures growing Proteus and GBS. Changed to Cefazolin. Found to have GABRIELA on 3/29 with increase to 2.4 on 3/30. Started on sodium bicarb drip. Still no improvement, Nephrology consulted. Plan to close foot wound at later date, possibly Monday if patient is still inpatient. Renal function stable but no improvement. Continue with IVF. Wound closure done by podiatry, GABRIELA continued to improve, IV cefazolin switched to PO abx per ID recs and patient discharged home with outpatient follow up.

## 2022-03-28 NOTE — TRANSFER OF CARE
"Anesthesia Transfer of Care Note    Patient: Ashutosh Ferrari    Procedure(s) Performed: Procedure(s) (LRB):  INCISION AND DRAINAGE RIGHT FOOT (Right)    Patient location: PACU    Anesthesia Type: MAC    Transport from OR: Transported from OR on room air with adequate spontaneous ventilation    Post pain: adequate analgesia    Post assessment: no apparent anesthetic complications and tolerated procedure well    Post vital signs: stable    Level of consciousness: awake and alert    Nausea/Vomiting: no nausea/vomiting    Complications: none    Transfer of care protocol was followed      Last vitals:   Visit Vitals  BP (!) 139/90 (BP Location: Left arm, Patient Position: Lying)   Pulse 107   Temp 37.1 °C (98.7 °F) (Oral)   Resp 17   Ht 6' 3" (1.905 m)   Wt 86.2 kg (190 lb 1.6 oz)   SpO2 99%   BMI 23.76 kg/m²     "

## 2022-03-28 NOTE — NURSING
Patient on the bed.  Not in any distress. IV site flushing well.  Assessment and vital signs are charted on the flow sheet.  Slept was on and off during night.  Patient was nauseated so tab zofran given.  Had elevated BP this morning. Gave Inj Hydralazine for high BP.  Rechecked again which was 130/80.  chlorox bath given as a part of preparing the patient and changed gown , linen covers.  Is in NPO for the procedure.  Tele box in place.  Safety maintained in every ways.  Medication given per order and charting done accordingly.

## 2022-03-28 NOTE — PROGRESS NOTES
Progress Note    Admit Date: 3/26/2022   LOS: 0 days     SUBJECTIVE:     Follow-up For:  F/u right foot ulceration. Reports pain well controlled. Denies N/V/F/C. Plan for OR today.     Scheduled Meds:   amLODIPine  2.5 mg Oral Daily    enoxaparin  40 mg Subcutaneous Daily    insulin aspart U-100  7 Units Subcutaneous TIDWM    insulin detemir U-100  20 Units Subcutaneous QHS    piperacillin-tazobactam (ZOSYN) IVPB  4.5 g Intravenous Q8H    vancomycin (VANCOCIN) IVPB  1,500 mg Intravenous Q12H     Continuous Infusions:  PRN Meds:acetaminophen, dextrose 10%, dextrose 10%, glucagon (human recombinant), glucose, glucose, hydrALAZINE, insulin aspart U-100, ondansetron, sodium chloride 0.9%, traMADoL, Pharmacy to dose Vancomycin consult **AND** vancomycin - pharmacy to dose    Review of patient's allergies indicates:  No Known Allergies    Review of Systems    Constitutional:  Positive for activity change. Negative for appetite change, chills and fever.   Cardiovascular:  Negative for chest pain and leg swelling.   Gastrointestinal:  Negative for abdominal pain, diarrhea, nausea and vomiting.   Endocrine: Negative for polydipsia, polyphagia and polyuria.   Skin:  Positive for color change and wound.   Neurological:  Positive for numbness. Negative for weakness.   Psychiatric/Behavioral:  Negative for agitation, behavioral problems and confusion.      OBJECTIVE:     Vital Signs (Most Recent)  Temp: 98.7 °F (37.1 °C) (03/28/22 0835)  Pulse: 105 (03/28/22 0835)  Resp: 20 (03/28/22 0835)  BP: (!) 154/91 (03/28/22 0835)  SpO2: 98 % (03/28/22 0835)    Vital Signs Range (Last 24H):  Temp:  [98.4 °F (36.9 °C)-98.9 °F (37.2 °C)]   Pulse:  []   Resp:  [18-20]   BP: (130-181)/()   SpO2:  [97 %-100 %]     I & O (Last 24H):    Intake/Output Summary (Last 24 hours) at 3/28/2022 1049  Last data filed at 3/28/2022 0000  Gross per 24 hour   Intake 480 ml   Output 1000 ml   Net -520 ml     Physical Exam:  Foot  Exam     General  Orientation: alert and oriented to person, place, and time   Affect: appropriate         Right Foot/Ankle      Inspection and Palpation  Skin Exam: drainage, cellulitis, skin changes, abnormal color, ulcer and erythema; skin not intact      Neurovascular  Dorsalis pedis: 2+  Posterior tibial: 1+  Saphenous nerve sensation: absent  Tibial nerve sensation: absent  Superficial peroneal nerve sensation: absent  Deep peroneal nerve sensation: absent  Sural nerve sensation: absent        Left Foot/Ankle       Inspection and Palpation  Ecchymosis: none  Tenderness: none   Swelling: none   Skin Exam: no drainage, skin not intact, no cellulitis, no abnormal color and no erythema      Neurovascular  Dorsalis pedis: 2+  Posterior tibial: 1+  Saphenous nerve sensation: absent  Tibial nerve sensation: absent  Superficial peroneal nerve sensation: absent  Deep peroneal nerve sensation: absent  Sural nerve sensation: absent     Comments  Plantar lateral hyperkeratotic lesion sub 5th met head     Right foot:   Purulence, Mal odor, erythema ascending from plantar lateral distal right foot to mid and fore foot. Temperature notably increased compared to contralateral. Dorsal sinus probes to capsule/periosteum. Lateral sinus probes to soft tissue. Plantar ulceration undermines distally about 1.5 cm.     3/28/22:              Laboratory:  CBC:   Recent Labs   Lab 03/28/22  0446   WBC 8.62   RBC 4.08*   HGB 12.2*   HCT 35.7*      MCV 88   MCH 29.9   MCHC 34.2     BMP:   Recent Labs   Lab 03/28/22  0446   *   *   K 4.1   CL 98   CO2 24   BUN 9   CREATININE 1.1   CALCIUM 9.3       Diagnostic Results:  MRI: Pending    Xray: X-Ray Foot Complete Right  Order: [id number]   Status: Final result     Visible to patient: No (inaccessible in Patient Portal)     Next appt: None     0 Result Notes    Details    Reading Physician Reading Date Result Priority   Wyatt Francois MD  [phone]  3/26/2022      Narrative & Impression  EXAMINATION:  XR ANKLE COMPLETE 3 VIEW RIGHT; XR FOOT COMPLETE 3 VIEW RIGHT     CLINICAL HISTORY:  Injury, unspecified, initial encounter     TECHNIQUE:  AP, lateral, and oblique images of the right ankle were performed.  Right foot three views.     COMPARISON:  None     FINDINGS:  No evidence of acute displaced fracture, dislocation, or osseous destructive process.  Ankle mortise is maintained.  Lisfranc articulation appears congruent.  Mild degenerative changes and spurring are seen throughout the midfoot.  There is mild posterior and plantar calcaneal spurring.     Prominent soft tissue swelling with small amount of soft tissue air is seen involving the 5th toe.  Mild soft tissue swelling is also seen over the lateral malleolus.     Impression:     No acute displaced fracture seen.        X-Ray Ankle Complete Right  Order: [id number]   Status: Final result     Visible to patient: No (inaccessible in Patient Portal)     Next appt: None     0 Result Notes    Details    Reading Physician Reading Date Result Priority   Wyatt Francois MD  [phone]  [phone] 3/26/2022 STAT     Narrative & Impression  EXAMINATION:  XR ANKLE COMPLETE 3 VIEW RIGHT; XR FOOT COMPLETE 3 VIEW RIGHT     CLINICAL HISTORY:  Injury, unspecified, initial encounter     TECHNIQUE:  AP, lateral, and oblique images of the right ankle were performed.  Right foot three views.     COMPARISON:  None     FINDINGS:  No evidence of acute displaced fracture, dislocation, or osseous destructive process.  Ankle mortise is maintained.  Lisfranc articulation appears congruent.  Mild degenerative changes and spurring are seen throughout the midfoot.  There is mild posterior and plantar calcaneal spurring.     Prominent soft tissue swelling with small amount of soft tissue air is seen involving the 5th toe.  Mild soft tissue swelling is also seen over the lateral malleolus.     Impression:     No acute displaced  fracture seen.      \  MRI: MRI Foot (Forefoot) Right Without Contrast  Order: [id number]   Status: Final result     Visible to patient: No (inaccessible in Patient Portal)     Next appt: None     0 Result Notes    Details    Reading Physician Reading Date Result Priority   Davy Echeverria MD  [phone] 3/28/2022 STAT     Narrative & Impression  EXAMINATION:  MRI FOOT (FOREFOOT) RIGHT WITHOUT CONTRAST; MRI FOOT (MIDFOOT) RIGHT WITHOUT CONTRAST     CLINICAL HISTORY:  assess for deep space infection, gas on x ray;; deep space infection;     TECHNIQUE:  Multisequence multiplanar images of the right forefoot and midfoot, without intravenous contrast.     COMPARISON:  Foot radiographs 03/26/2022     FINDINGS:  There is a soft tissue defect along the dorsal and lateral aspect of the 5th MTP joint.  There is adjacent skin thickening and subcutaneous edema.  There is a 2.0 x 0.6 x 0.5 cm subcutaneous fluid collection in the dorsal forefoot overlying the 5th MTP joint (4:27 and 8:21).  There is STIR hyperintensity in the base of the 5th proximal phalanx, with corresponding T1 marrow replacement and cortical irregularity, concerning for osteomyelitis.  There is mild STIR hyperintensity in the head of the 5th metatarsal, with preserved T1 marrow signal, suggesting reactive osteitis.  No 5th MTP joint effusion.     Normal bone marrow signal throughout the remainder of the forefoot and midfoot.  No fracture or osteonecrosis.  No talar dome osteochondral defect.     Small volume tibiotalar and subtalar joint fluid.  Otherwise no joint effusion.     Visualized flexor and extensor tendons are intact, without evidence of tenosynovitis.     There is skin thickening and subcutaneous edema in the midfoot and hindfoot as well, most pronounced over the lateral malleolus.  No organized subcutaneous fluid collections.  Diffuse muscle atrophy with corresponding STIR hyperintensity, likely reflecting denervation given history of  diabetes.     Impression:     Bone marrow signal changes in the base of the 5th proximal phalanx concerning for osteomyelitis.  There is reactive osteitis in the head of the 5th metatarsal.     2.0 cm subcutaneous fluid collection in the dorsal forefoot underlying the soft tissue defect at the level of the 5th MTP joint, which could represent abscess or postoperative changes if the patient underwent I&D prior to MRI.     No evidence of septic arthritis or tenosynovitis.     This report was flagged in Epic as abnormal.         ASSESSMENT/PLAN:     Right foot infection     Plan:     Plan for I&D bone biopsy  in OR today    Discussed two options with patient. Amputation of right 5th  digit vs IV abx for six weeks with aggressive wound care for several months with no guarantee of wound resolution.  Patient would like to pursue wound care, abx.     The risks, benefits, complications, treatment options, and expected outcomes were discussed with the patient. The risks and potential complications of their problem and purposed treatment include but are not limited to infection, nerve injury, vascular injury, persistent pain, potential skin necrosis, deep vein thrombosis, possible pulmonary embolus,and complications of the anesthetics. Informed verbal and written consent was obtained. Consent forms read, signed, witnessed.          DSD applied.

## 2022-03-28 NOTE — OP NOTE
[facility] - Surgery  Operative Note      Date of Procedure: 3/28/2022     Procedure: Procedure(s) (LRB):  INCISION AND DRAINAGE RIGHT FOOT (Right)   Bone biopsy     Surgeon(s) and Role:     * Amena Martinez DPM - Primary    Assisting Surgeon: None    Pre-Operative Diagnosis: Diabetic foot ulcer [E11.621, L97.509]    Post-Operative Diagnosis: Post-Op Diagnosis Codes:     * Diabetic foot ulcer [E11.621, L97.509]    Anesthesia: Local MAC + 10cc 1:1 mixture 0.5% marcaine plain, 1% lidocaine plain       Description of Technical Procedures:     The patient was brought to the operating room on a stretcher and placed on the operating table in a supine position. Following the successful induction of MAC anesthesia, a tourniquet was applied to the patients right  ankle. Following this, a local anesthetic block consisting of aproximately 10cc of  1:1 mixture of 1% lidocaine plain + 0.5% marcaine plain was injected . Then, the right  foot was scrubbed, prepped and draped in the usual aseptic manner. . A time out was performed. Tourniquet not inflated for duration of the procedure.     Attention was then directed to wounds left dorsal foot proximal to 5th toe. [place] elevator utilized to inspect area dorsal lateral and medial wound noted to connect. Next a # 15 blade utilized to connect these 2 wounds. Purulent drainage noted from area. Incision extend proximal. Next freer utilized to inspect wound tracking noted to both medial and lateral 5th toe. Next wound cleansed with 3L NS pulse lavage. Next jamshidi needle utilized to obtain bone specimen from right 5th toe and metatarsal head. Bone noted to be hard. Wound packed with 4x4 gauze soaked in betadine wrapped with cast padding, kerlix and ACE.     The patient tolerated the procedure and anesthesia well. He was transferred to the recovery room with vital signs stable, vascular status intact, and capillary refill time < 3 seconds to the distal right foot.    Following a period  "of post op monitoring, the patient will be returned to inpatient status with the following written and oral post op instructions:     1. Keep dressing dry and intact until reassessment tomorrow   2. Partial heel WB right foot for transfers  3. Continue pain control and medical management per Medicine team.  4. ID consult placed.           Significant Surgical Tasks Conducted by the Assistant(s), if Applicable: None     Estimated Blood Loss (EBL): <10ml           Implants: * No implants in log *    Specimens:   Specimen (24h ago, onward)             Start     Ordered    03/28/22 1436  Specimen to Pathology, Surgery Other (PODIATRY)  Once        Comments: Pre-op Diagnosis: Diabetic foot ulcer [E11.621, L97.509]    Procedure(s):  INCISION AND DRAINAGE RIGHT FOOT     Number of specimens: 1    Name of specimens: "2. RIGHT FIFTH TOE"   References:    Click here for ordering Quick Tip   Question Answer Comment   Procedure Type: Other PODIATRY   Specimen Class: Routine/Screening    Release to patient Immediate        03/28/22 1448                        Condition: Good    Disposition: PACU - hemodynamically stable.    Attestation: I was present and scrubbed for the entire procedure.      " Mucosal Advancement Flap Text: Given the location of the defect, shape of the defect and the proximity to free margins a mucosal advancement flap was deemed most appropriate. Incisions were made with a 15 blade scalpel in the appropriate fashion along the cutaneous vermilion border and the mucosal lip. The remaining actinically damaged mucosal tissue was excised.  The mucosal advancement flap was then elevated to the gingival sulcus with care taken to preserve the neurovascular structures and advanced into the primary defect. Care was taken to ensure that precise realignment of the vermilion border was achieved.

## 2022-03-28 NOTE — PROGRESS NOTES
03/28/22 1600   Vital Signs   BP (!) 180/100     MD notified. 10 mg of labetalol administered .

## 2022-03-28 NOTE — PLAN OF CARE
Problem: Adult Inpatient Plan of Care  Goal: Plan of Care Review  Outcome: Ongoing, Progressing  Goal: Patient-Specific Goal (Individualized)  Outcome: Ongoing, Progressing  Goal: Absence of Hospital-Acquired Illness or Injury  Outcome: Ongoing, Progressing  Goal: Optimal Comfort and Wellbeing  Outcome: Ongoing, Progressing  Goal: Readiness for Transition of Care  Outcome: Ongoing, Progressing     Problem: Oral Intake Inadequate (Acute Kidney Injury/Impairment)  Goal: Optimal Nutrition Intake  Outcome: Ongoing, Progressing

## 2022-03-28 NOTE — NURSING
Patient received from the day nurse.  Is on the bed is not in distress and pain.  IV site assessed.  Introduced myself and safety maintained.  Bed in lowest locked position.personal items and call bell in reach.  Patient will be having procedure I and D.  So will prepare the patient in the morning and keep on NPO after midnight.  Will continue to monitor.

## 2022-03-28 NOTE — NURSING
Pt arrived from Post op. Pt provided with tray and ice water. VSS. Tricia noted to R foot. WCTM. 1500 Zosyn to be rescheduled by pharmacy.
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
## 2022-03-28 NOTE — SUBJECTIVE & OBJECTIVE
"Interval History: currently feels "fine" denies sob or chest pain     Review of Systems   Constitutional:  Positive for activity change and fatigue. Negative for chills and fever.   Respiratory: Negative.     Cardiovascular: Negative.    Gastrointestinal: Negative.    Musculoskeletal:  Positive for gait problem and joint swelling.   Skin:  Positive for wound.   Objective:     Vital Signs (Most Recent):  Temp: 98.7 °F (37.1 °C) (03/28/22 0835)  Pulse: 105 (03/28/22 0835)  Resp: 20 (03/28/22 0835)  BP: (!) 154/91 (03/28/22 0835)  SpO2: 98 % (03/28/22 0835)   Vital Signs (24h Range):  Temp:  [98.4 °F (36.9 °C)-98.9 °F (37.2 °C)] 98.7 °F (37.1 °C)  Pulse:  [] 105  Resp:  [18-20] 20  SpO2:  [97 %-100 %] 98 %  BP: (130-181)/() 154/91     Weight: 86.2 kg (190 lb 1.6 oz)  Body mass index is 23.76 kg/m².    Intake/Output Summary (Last 24 hours) at 3/28/2022 1019  Last data filed at 3/28/2022 0000  Gross per 24 hour   Intake 480 ml   Output 1000 ml   Net -520 ml      Physical Exam  Constitutional:       General: He is not in acute distress.     Appearance: Normal appearance. He is normal weight. He is not ill-appearing.   HENT:      Head: Atraumatic.   Cardiovascular:      Rate and Rhythm: Normal rate and regular rhythm.   Pulmonary:      Effort: Pulmonary effort is normal.      Breath sounds: Normal breath sounds.   Musculoskeletal:         General: Swelling, tenderness and signs of injury present.      Right lower leg: Edema present.      Comments: Right ventral lateral aspect of foot/5th toe. Erythema and edema extending up dorsal foot and lateral malleolus. Able to wiggle toes. Numbness on exam.    Skin:     General: Skin is warm and dry.   Neurological:      Mental Status: He is oriented to person, place, and time. Mental status is at baseline.      Sensory: Sensory deficit present.      Gait: Gait abnormal.       Significant Labs: All pertinent labs within the past 24 hours have been reviewed.    Significant " Imaging: I have reviewed all pertinent imaging results/findings within the past 24 hours.

## 2022-03-28 NOTE — ANESTHESIA PREPROCEDURE EVALUATION
03/28/2022  Ashutosh Ferrari is a 46 y.o., male.      Pre-op Assessment     I have reviewed the Nursing Notes.       Review of Systems  Anesthesia Hx:  No problems with previous Anesthesia    Social:  Non-Smoker    Cardiovascular:   Denies Pacemaker. Hypertension  Denies CABG/stent.     Pulmonary:  Pulmonary Normal    Hepatic/GI:  Hepatic/GI Normal    Neurological:  Neurology Normal    Endocrine:   Diabetes, poorly controlled, type 2, using insulin Denies Hypothyroidism. Denies Hyperthyroidism.  Denies Obesity / BMI > 30, Denies Morbid Obesity / BMI > 40      Physical Exam  General: Well nourished, Cooperative, Alert and Oriented    Airway:  Mallampati: IV   Mouth Opening: < 3 cm  TM Distance: Normal  Tongue: Normal  Neck ROM: Normal ROM    Dental:Many missing teeth and others with caries. Denies loose.      Anesthesia Plan  Type of Anesthesia, risks & benefits discussed:    Anesthesia Type: MAC  Intra-op Monitoring Plan: Standard ASA Monitors  Post Op Pain Control Plan: multimodal analgesia  Induction:  IV  Informed Consent: Informed consent signed with the Patient and all parties understand the risks and agree with anesthesia plan.  All questions answered.   ASA Score: 2  Day of Surgery Review of History & Physical: H&P Update referred to the surgeon/provider.    Ready For Surgery From Anesthesia Perspective.     .

## 2022-03-28 NOTE — PROGRESS NOTES
Pharmacokinetic Assessment Follow Up: IV Vancomycin    Vancomycin serum concentration assessment(s):    The trough level was drawn correctly and can be used to guide therapy at this time. The measurement is within the desired definitive target range of 10 to 20 mcg/mL.    Vancomycin Regimen Plan:    Continue regimen to Vancomycin 1500 mg IV every 12 hours with next serum trough concentration measured at 2300 prior to 3rd dose on 3/29/2022     Drug levels (last 3 results):  Recent Labs   Lab Result Units 03/28/22  1058   Vancomycin-Trough ug/mL 13.0       Pharmacy will continue to follow and monitor vancomycin.    Please contact pharmacy at extension [phone] for questions regarding this assessment.    Thank you for the consult,   Harvey Wallace Jr       Patient brief summary:  Ashutosh Ferrari is a 46 y.o. male initiated on antimicrobial therapy with IV Vancomycin for treatment of skin & soft tissue infection    Drug Allergies:   Review of patient's allergies indicates:  No Known Allergies    Actual Body Weight:   86.2 kg    Renal Function:   Estimated Creatinine Clearance: 102.4 mL/min (based on SCr of 1.1 mg/dL).,     Dialysis Method (if applicable):  N/A    CBC (last 72 hours):  Recent Labs   Lab Result Units 03/26/22  2341 03/27/22  0503 03/28/22  0446   WBC K/uL 10.13  --  8.62   Hemoglobin g/dL 12.9*  --  12.2*   Hemoglobin A1C %  --  12.5*  --    Hematocrit % 36.8*  --  35.7*   Platelets K/uL 319  --  363   Gran % % 67.4  --  76.0*   Lymph % % 25.1  --  18.7   Mono % % 6.3  --  4.6   Eosinophil % % 0.4  --  0.1   Basophil % % 0.2  --  0.3   Differential Method  Automated  --  Automated       Metabolic Panel (last 72 hours):  Recent Labs   Lab Result Units 03/26/22  2341 03/28/22  0446   Sodium mmol/L 135* 134*   Potassium mmol/L 4.1 4.1   Chloride mmol/L 97 98   CO2 mmol/L 27 24   Glucose mg/dL 324* 282*   BUN mg/dL 10 9   Creatinine mg/dL 1.2 1.1   Albumin g/dL 3.2* 2.8*   Total Bilirubin mg/dL 0.5 0.6   Alkaline  Phosphatase U/L 102 84   AST U/L 35 22   ALT U/L 49* 36       Vancomycin Administrations:  vancomycin given in the last 96 hours                     vancomycin 1.5 g in dextrose 5 % 250 mL IVPB (ready to mix) (mg) 1,500 mg New Bag 03/27/22 2355     1,500 mg New Bag  1342    vancomycin (VANCOCIN) 2,000 mg in dextrose 5 % 500 mL IVPB (mg) 2,000 mg New Bag 03/27/22 0000                    Microbiologic Results:  Microbiology Results (last 7 days)       Procedure Component Value Units Date/Time    Aerobic culture [[id number]]  (Abnormal) Collected: 03/27/22 1354    Order Status: Completed Specimen: Wound from Foot, Right Updated: 03/28/22 1103     Aerobic Bacterial Culture PRESUMPTIVE PROTEUS SPECIES  Few  Identification and susceptibility pending        STREPTOCOCCUS AGALACTIAE (GROUP B)  Many  Beta-hemolytic streptococci are routinely susceptible to   penicillins,cephalosporins and carbapenems.      Blood Culture #1 **CANNOT BE ORDERED STAT** [[id number]] Collected: 03/26/22 2349    Order Status: Completed Specimen: Blood from Peripheral, Forearm, Left Updated: 03/28/22 0303     Blood Culture, Routine No Growth to date      No Growth to date    Blood Culture #2 **CANNOT BE ORDERED STAT** [[id number]] Collected: 03/26/22 2337    Order Status: Completed Specimen: Blood from Peripheral, Forearm, Right Updated: 03/28/22 0303     Blood Culture, Routine No Growth to date      No Growth to date    Gram stain [[id number]] Collected: 03/27/22 1354    Order Status: Completed Specimen: Wound from Foot, Right Updated: 03/27/22 1442     Gram Stain Result Few WBC's      Moderate Gram positive cocci in pairs      Few Gram negative rods    Culture, Anaerobe [[id number]] Collected: 03/27/22 1354    Order Status: Sent Specimen: Wound from Foot, Right Updated: 03/27/22 8279

## 2022-03-29 LAB
ALBUMIN SERPL BCP-MCNC: 2.6 G/DL (ref 3.5–5.2)
ALP SERPL-CCNC: 74 U/L (ref 55–135)
ALT SERPL W/O P-5'-P-CCNC: 29 U/L (ref 10–44)
ANION GAP SERPL CALC-SCNC: 11 MMOL/L (ref 8–16)
AST SERPL-CCNC: 19 U/L (ref 10–40)
BACTERIA SPEC AEROBE CULT: ABNORMAL
BACTERIA SPEC AEROBE CULT: ABNORMAL
BASOPHILS # BLD AUTO: 0.02 K/UL (ref 0–0.2)
BASOPHILS NFR BLD: 0.2 % (ref 0–1.9)
BILIRUB SERPL-MCNC: 0.6 MG/DL (ref 0.1–1)
BUN SERPL-MCNC: 12 MG/DL (ref 6–20)
CALCIUM SERPL-MCNC: 9 MG/DL (ref 8.7–10.5)
CHLORIDE SERPL-SCNC: 97 MMOL/L (ref 95–110)
CO2 SERPL-SCNC: 26 MMOL/L (ref 23–29)
CREAT SERPL-MCNC: 1.7 MG/DL (ref 0.5–1.4)
DIFFERENTIAL METHOD: ABNORMAL
EOSINOPHIL # BLD AUTO: 0 K/UL (ref 0–0.5)
EOSINOPHIL NFR BLD: 0.3 % (ref 0–8)
ERYTHROCYTE [DISTWIDTH] IN BLOOD BY AUTOMATED COUNT: 11.8 % (ref 11.5–14.5)
EST. GFR  (AFRICAN AMERICAN): 55 ML/MIN/1.73 M^2
EST. GFR  (NON AFRICAN AMERICAN): 48 ML/MIN/1.73 M^2
GLUCOSE SERPL-MCNC: 307 MG/DL (ref 70–110)
GRAM STN SPEC: NORMAL
GRAM STN SPEC: NORMAL
HCT VFR BLD AUTO: 33.7 % (ref 40–54)
HGB BLD-MCNC: 11.3 G/DL (ref 14–18)
IMM GRANULOCYTES # BLD AUTO: 0.03 K/UL (ref 0–0.04)
IMM GRANULOCYTES NFR BLD AUTO: 0.3 % (ref 0–0.5)
LYMPHOCYTES # BLD AUTO: 1.7 K/UL (ref 1–4.8)
LYMPHOCYTES NFR BLD: 19.1 % (ref 18–48)
MCH RBC QN AUTO: 29.3 PG (ref 27–31)
MCHC RBC AUTO-ENTMCNC: 33.5 G/DL (ref 32–36)
MCV RBC AUTO: 87 FL (ref 82–98)
MONOCYTES # BLD AUTO: 0.7 K/UL (ref 0.3–1)
MONOCYTES NFR BLD: 7.6 % (ref 4–15)
NEUTROPHILS # BLD AUTO: 6.4 K/UL (ref 1.8–7.7)
NEUTROPHILS NFR BLD: 72.5 % (ref 38–73)
NRBC BLD-RTO: 0 /100 WBC
PLATELET # BLD AUTO: 408 K/UL (ref 150–450)
PMV BLD AUTO: 9.3 FL (ref 9.2–12.9)
POCT GLUCOSE: 115 MG/DL (ref 70–110)
POCT GLUCOSE: 194 MG/DL (ref 70–110)
POCT GLUCOSE: 275 MG/DL (ref 70–110)
POCT GLUCOSE: 329 MG/DL (ref 70–110)
POTASSIUM SERPL-SCNC: 3.7 MMOL/L (ref 3.5–5.1)
PROT SERPL-MCNC: 7.8 G/DL (ref 6–8.4)
RBC # BLD AUTO: 3.86 M/UL (ref 4.6–6.2)
SODIUM SERPL-SCNC: 134 MMOL/L (ref 136–145)
VANCOMYCIN TROUGH SERPL-MCNC: 19.8 UG/ML (ref 10–22)
WBC # BLD AUTO: 8.9 K/UL (ref 3.9–12.7)

## 2022-03-29 PROCEDURE — 25000003 PHARM REV CODE 250: Performed by: NURSE PRACTITIONER

## 2022-03-29 PROCEDURE — 21400001 HC TELEMETRY ROOM

## 2022-03-29 PROCEDURE — 85025 COMPLETE CBC W/AUTO DIFF WBC: CPT | Performed by: NURSE PRACTITIONER

## 2022-03-29 PROCEDURE — 36415 COLL VENOUS BLD VENIPUNCTURE: CPT | Performed by: NURSE PRACTITIONER

## 2022-03-29 PROCEDURE — 25000003 PHARM REV CODE 250: Performed by: STUDENT IN AN ORGANIZED HEALTH CARE EDUCATION/TRAINING PROGRAM

## 2022-03-29 PROCEDURE — 99024 PR POST-OP FOLLOW-UP VISIT: ICD-10-PCS | Mod: ,,, | Performed by: PODIATRIST

## 2022-03-29 PROCEDURE — 99222 1ST HOSP IP/OBS MODERATE 55: CPT | Mod: CMP,,, | Performed by: INTERNAL MEDICINE

## 2022-03-29 PROCEDURE — 99222 PR INITIAL HOSPITAL CARE,LEVL II: ICD-10-PCS | Mod: CMP,,, | Performed by: INTERNAL MEDICINE

## 2022-03-29 PROCEDURE — 63600175 PHARM REV CODE 636 W HCPCS: Performed by: NURSE PRACTITIONER

## 2022-03-29 PROCEDURE — 36415 COLL VENOUS BLD VENIPUNCTURE: CPT | Performed by: STUDENT IN AN ORGANIZED HEALTH CARE EDUCATION/TRAINING PROGRAM

## 2022-03-29 PROCEDURE — 80202 ASSAY OF VANCOMYCIN: CPT | Performed by: STUDENT IN AN ORGANIZED HEALTH CARE EDUCATION/TRAINING PROGRAM

## 2022-03-29 PROCEDURE — 63600175 PHARM REV CODE 636 W HCPCS: Performed by: STUDENT IN AN ORGANIZED HEALTH CARE EDUCATION/TRAINING PROGRAM

## 2022-03-29 PROCEDURE — 99024 POSTOP FOLLOW-UP VISIT: CPT | Mod: ,,, | Performed by: PODIATRIST

## 2022-03-29 PROCEDURE — 80053 COMPREHEN METABOLIC PANEL: CPT | Performed by: NURSE PRACTITIONER

## 2022-03-29 PROCEDURE — C9399 UNCLASSIFIED DRUGS OR BIOLOG: HCPCS | Performed by: STUDENT IN AN ORGANIZED HEALTH CARE EDUCATION/TRAINING PROGRAM

## 2022-03-29 RX ORDER — INSULIN ASPART 100 [IU]/ML
12 INJECTION, SOLUTION INTRAVENOUS; SUBCUTANEOUS
Status: DISCONTINUED | OUTPATIENT
Start: 2022-03-29 | End: 2022-04-06 | Stop reason: HOSPADM

## 2022-03-29 RX ORDER — CEFAZOLIN SODIUM 2 G/50ML
2 SOLUTION INTRAVENOUS
Status: DISCONTINUED | OUTPATIENT
Start: 2022-03-29 | End: 2022-03-31

## 2022-03-29 RX ADMIN — TRAMADOL HYDROCHLORIDE 50 MG: 50 TABLET, COATED ORAL at 02:03

## 2022-03-29 RX ADMIN — VANCOMYCIN HYDROCHLORIDE 1500 MG: 1.5 INJECTION, POWDER, LYOPHILIZED, FOR SOLUTION INTRAVENOUS at 12:03

## 2022-03-29 RX ADMIN — AMLODIPINE BESYLATE 2.5 MG: 2.5 TABLET ORAL at 08:03

## 2022-03-29 RX ADMIN — INSULIN ASPART 8 UNITS: 100 INJECTION, SOLUTION INTRAVENOUS; SUBCUTANEOUS at 07:03

## 2022-03-29 RX ADMIN — TRAMADOL HYDROCHLORIDE 50 MG: 50 TABLET, COATED ORAL at 10:03

## 2022-03-29 RX ADMIN — INSULIN DETEMIR 20 UNITS: 100 INJECTION, SOLUTION SUBCUTANEOUS at 08:03

## 2022-03-29 RX ADMIN — CEFAZOLIN SODIUM 2 G: 2 SOLUTION INTRAVENOUS at 10:03

## 2022-03-29 RX ADMIN — INSULIN ASPART 7 UNITS: 100 INJECTION, SOLUTION INTRAVENOUS; SUBCUTANEOUS at 07:03

## 2022-03-29 RX ADMIN — VANCOMYCIN HYDROCHLORIDE 1500 MG: 1.5 INJECTION, POWDER, LYOPHILIZED, FOR SOLUTION INTRAVENOUS at 11:03

## 2022-03-29 RX ADMIN — INSULIN ASPART 12 UNITS: 100 INJECTION, SOLUTION INTRAVENOUS; SUBCUTANEOUS at 05:03

## 2022-03-29 RX ADMIN — INSULIN ASPART 2 UNITS: 100 INJECTION, SOLUTION INTRAVENOUS; SUBCUTANEOUS at 05:03

## 2022-03-29 RX ADMIN — PIPERACILLIN AND TAZOBACTAM 4.5 G: 4; .5 INJECTION, POWDER, LYOPHILIZED, FOR SOLUTION INTRAVENOUS; PARENTERAL at 04:03

## 2022-03-29 RX ADMIN — INSULIN ASPART 6 UNITS: 100 INJECTION, SOLUTION INTRAVENOUS; SUBCUTANEOUS at 11:03

## 2022-03-29 RX ADMIN — INSULIN DETEMIR 20 UNITS: 100 INJECTION, SOLUTION SUBCUTANEOUS at 10:03

## 2022-03-29 RX ADMIN — CEFAZOLIN SODIUM 2 G: 2 SOLUTION INTRAVENOUS at 01:03

## 2022-03-29 RX ADMIN — ONDANSETRON 4 MG: 4 TABLET, ORALLY DISINTEGRATING ORAL at 10:03

## 2022-03-29 RX ADMIN — INSULIN ASPART 12 UNITS: 100 INJECTION, SOLUTION INTRAVENOUS; SUBCUTANEOUS at 11:03

## 2022-03-29 NOTE — PLAN OF CARE
Problem: Adult Inpatient Plan of Care  Goal: Plan of Care Review  Outcome: Ongoing, Progressing  Flowsheets (Taken 3/29/2022 0429)  Plan of Care Reviewed With: patient  Goal: Patient-Specific Goal (Individualized)  Outcome: Ongoing, Progressing  Goal: Absence of Hospital-Acquired Illness or Injury  Outcome: Ongoing, Progressing  Intervention: Identify and Manage Fall Risk  Flowsheets (Taken 3/29/2022 0429)  Safety Promotion/Fall Prevention:   side rails raised x 2   bed alarm set   chair alarm set  Intervention: Prevent Skin Injury  Flowsheets (Taken 3/29/2022 0429)  Body Position: supine  Skin Protection: skin-to-skin areas padded  Intervention: Prevent and Manage VTE (Venous Thromboembolism) Risk  Flowsheets (Taken 3/29/2022 0429)  Activity Management: Ambulated -L4  VTE Prevention/Management: ROM (passive) performed  Intervention: Prevent Infection  Flowsheets (Taken 3/29/2022 0429)  Infection Prevention:   cohorting utilized   environmental surveillance performed   equipment surfaces disinfected   hand hygiene promoted   visitors restricted/screened   single patient room provided   rest/sleep promoted   personal protective equipment utilized  Goal: Optimal Comfort and Wellbeing  Outcome: Ongoing, Progressing  Intervention: Monitor Pain and Promote Comfort  Flowsheets (Taken 3/29/2022 0429)  Pain Management Interventions: pillow support provided  Intervention: Provide Person-Centered Care  Flowsheets (Taken 3/29/2022 0429)  Trust Relationship/Rapport:   care explained   choices provided   thoughts/feelings acknowledged   questions encouraged   reassurance provided   questions answered  Goal: Readiness for Transition of Care  Outcome: Ongoing, Progressing

## 2022-03-29 NOTE — PROGRESS NOTES
[facility] - Telemetry  Podiatry  Progress Note    Patient Name: Ashutosh Ferrari  MRN: [id number]  Admission Date: 3/26/2022  Hospital Length of Stay: 1 days  Attending Physician: Brayden Darden MD  Primary Care Provider: [facility]     Subjective:     Interval History:     03/29/2022 post operative day 1 status post incision and drainage of the right foot with trocar bone biopsy of the proximal phalanx of the 5th digit.  Patient seen at bedside resting comfortably.  He relates some tingling and burning to the surgical site over night but overall pain well controlled.  No further pedal complaints.    Follow-up For: Procedure(s) (LRB):  INCISION AND DRAINAGE RIGHT FOOT (Right)    Post-Operative Day: 1 Day Post-Op    Scheduled Meds:   amLODIPine  2.5 mg Oral Daily    insulin aspart U-100  12 Units Subcutaneous TIDWM    insulin detemir U-100  20 Units Subcutaneous BID    piperacillin-tazobactam (ZOSYN) IVPB  4.5 g Intravenous Q8H    vancomycin (VANCOCIN) IVPB  1,500 mg Intravenous Q12H     Continuous Infusions:  PRN Meds:acetaminophen, dextrose 10%, dextrose 10%, glucagon (human recombinant), glucose, glucose, hydrALAZINE, insulin aspart U-100, ondansetron, sodium chloride 0.9%, traMADoL, Pharmacy to dose Vancomycin consult **AND** vancomycin - pharmacy to dose    Review of Systems   Constitutional: Negative for activity change, appetite change, chills, fatigue and fever.   Respiratory: Negative for cough and shortness of breath.    Cardiovascular: Negative for chest pain and leg swelling.   Gastrointestinal: Negative for diarrhea, nausea and vomiting.   Musculoskeletal: Positive for arthralgias and myalgias.   Skin: Positive for wound.   Neurological: Negative for weakness.        + paresthesia      Objective:     Vital Signs (Most Recent):  Temp: 99.1 °F (37.3 °C) (03/29/22 0807)  Pulse: 93 (03/29/22 0807)  Resp: 16 (03/29/22 0807)  BP: (!) 152/91 (03/29/22 0807)  SpO2: 97 % (03/29/22 0807) Vital Signs  (24h Range):  Temp:  [97.7 °F (36.5 °C)-99.1 °F (37.3 °C)] 99.1 °F (37.3 °C)  Pulse:  [] 93  Resp:  [13-21] 16  SpO2:  [95 %-100 %] 97 %  BP: (129-186)/() 152/91     Weight: 86.2 kg (190 lb 1.6 oz)  Body mass index is 23.76 kg/m².    Foot Exam     General  Orientation: alert and oriented to person, place, and time   Affect: appropriate         Right Foot/Ankle      Inspection and Palpation  Skin Exam: drainage, cellulitis, skin changes, abnormal color, ulcer and erythema; skin not intact      Neurovascular  Dorsalis pedis: 2+  Posterior tibial: 1+  Saphenous nerve sensation: absent  Tibial nerve sensation: absent  Superficial peroneal nerve sensation: absent  Deep peroneal nerve sensation: absent  Sural nerve sensation: absent        Left Foot/Ankle       Inspection and Palpation  Ecchymosis: none  Tenderness: none   Swelling: none   Skin Exam: no drainage, skin not intact, no cellulitis, no abnormal color and no erythema      Neurovascular  Dorsalis pedis: 2+  Posterior tibial: 1+  Saphenous nerve sensation: absent  Tibial nerve sensation: absent  Superficial peroneal nerve sensation: absent  Deep peroneal nerve sensation: absent  Sural nerve sensation: absent     Comments  Plantar lateral hyperkeratotic lesion sub 5th met head     03/29/2022    Ulcer location: surgical incision dorsal lateral proximal phalanx of the right 5th digit  Signs of infection:local edema and erythema  Drainage: primarily sanguinous  Purulence: scant  Crepitus/fluctuance: no  Periwound: Reddened  Base: Granular/Healthy  Depth: muscle  Probe to bone: yes          3/28/22:  Right foot:   Purulence, Mal odor, erythema ascending from plantar lateral distal right foot to mid and fore foot. Temperature notably increased compared to contralateral. Dorsal sinus probes to capsule/periosteum. Lateral sinus probes to soft tissue. Plantar ulceration undermines distally about 1.5 cm.                 Laboratory:  CBC:   Recent Labs   Lab  03/29/22  0402   WBC 8.90   RBC 3.86*   HGB 11.3*   HCT 33.7*      MCV 87   MCH 29.3   MCHC 33.5     CMP:   Recent Labs   Lab 03/29/22  0402   *   CALCIUM 9.0   ALBUMIN 2.6*   PROT 7.8   *   K 3.7   CO2 26   CL 97   BUN 12   CREATININE 1.7*   ALKPHOS 74   ALT 29   AST 19   BILITOT 0.6     CRP: No results for input(s): CRP in the last 168 hours.  ESR: No results for input(s): SEDRATE in the last 168 hours.  Microbiology Results (last 7 days)     Procedure Component Value Units Date/Time    Aerobic culture [[id number]]  (Abnormal)  (Susceptibility) Collected: 03/27/22 1354    Order Status: Completed Specimen: Wound from Foot, Right Updated: 03/29/22 0915     Aerobic Bacterial Culture PROTEUS MIRABILIS  Few        STREPTOCOCCUS AGALACTIAE (GROUP B)  Many  Beta-hemolytic streptococci are routinely susceptible to   penicillins,cephalosporins and carbapenems.      Culture, Anaerobe [[id number]] Collected: 03/27/22 1354    Order Status: Completed Specimen: Wound from Foot, Right Updated: 03/29/22 0839     Anaerobic Culture Culture in progress    Culture, Anaerobe [[id number]] Collected: 03/28/22 1436    Order Status: Completed Specimen: Bone from Toe, Right Foot Updated: 03/29/22 0837     Anaerobic Culture Culture in progress    Narrative:      1. RIGHT FIFTH TOE    Gram stain [[id number]] Collected: 03/28/22 1436    Order Status: Completed Specimen: Bone from Toe, Right Foot Updated: 03/29/22 0738     Gram Stain Result No WBC's      No organisms seen    Narrative:      1. RIGHT FIFTH TOE    Blood Culture #1 **CANNOT BE ORDERED STAT** [[id number]] Collected: 03/26/22 2349    Order Status: Completed Specimen: Blood from Peripheral, Forearm, Left Updated: 03/29/22 0303     Blood Culture, Routine No Growth to date      No Growth to date      No Growth to date    Blood Culture #2 **CANNOT BE ORDERED STAT** [[id number]] Collected: 03/26/22 8887    Order Status: Completed Specimen: Blood from Peripheral, Forearm,  Right Updated: 03/29/22 0303     Blood Culture, Routine No Growth to date      No Growth to date      No Growth to date    Fungus culture [[id number]] Collected: 03/28/22 1436    Order Status: Sent Specimen: Bone from Toe, Right Foot Updated: 03/28/22 1509    AFB Culture & Smear [[id number]] Collected: 03/28/22 1436    Order Status: Sent Specimen: Bone from Toe, Right Foot Updated: 03/28/22 1509    Aerobic culture [[id number]] Collected: 03/28/22 1436    Order Status: Sent Specimen: Bone from Toe, Right Foot Updated: 03/28/22 1508    Gram stain [[id number]] Collected: 03/27/22 1354    Order Status: Completed Specimen: Wound from Foot, Right Updated: 03/27/22 1442     Gram Stain Result Few WBC's      Moderate Gram positive cocci in pairs      Few Gram negative rods          Diagnostic Results:  Imaging Results          X-Ray Foot Complete Right (Final result)  Result time 03/26/22 22:16:22    Final result by Wyatt Francois MD (03/26/22 22:16:22)                 Impression:      No acute displaced fracture seen.      Electronically signed by: Wyatt Francois MD  Date:    03/26/2022  Time:    22:16             Narrative:    EXAMINATION:  XR ANKLE COMPLETE 3 VIEW RIGHT; XR FOOT COMPLETE 3 VIEW RIGHT    CLINICAL HISTORY:  Injury, unspecified, initial encounter    TECHNIQUE:  AP, lateral, and oblique images of the right ankle were performed.  Right foot three views.    COMPARISON:  None    FINDINGS:  No evidence of acute displaced fracture, dislocation, or osseous destructive process.  Ankle mortise is maintained.  Lisfranc articulation appears congruent.  Mild degenerative changes and spurring are seen throughout the midfoot.  There is mild posterior and plantar calcaneal spurring.    Prominent soft tissue swelling with small amount of soft tissue air is seen involving the 5th toe.  Mild soft tissue swelling is also seen over the lateral malleolus.                               X-Ray Ankle Complete Right (Final result)   Result time 03/26/22 22:16:22    Final result by Wyatt Francois MD (03/26/22 22:16:22)                 Impression:      No acute displaced fracture seen.      Electronically signed by: Wyatt Francois MD  Date:    03/26/2022  Time:    22:16             Narrative:    EXAMINATION:  XR ANKLE COMPLETE 3 VIEW RIGHT; XR FOOT COMPLETE 3 VIEW RIGHT    CLINICAL HISTORY:  Injury, unspecified, initial encounter    TECHNIQUE:  AP, lateral, and oblique images of the right ankle were performed.  Right foot three views.    COMPARISON:  None    FINDINGS:  No evidence of acute displaced fracture, dislocation, or osseous destructive process.  Ankle mortise is maintained.  Lisfranc articulation appears congruent.  Mild degenerative changes and spurring are seen throughout the midfoot.  There is mild posterior and plantar calcaneal spurring.    Prominent soft tissue swelling with small amount of soft tissue air is seen involving the 5th toe.  Mild soft tissue swelling is also seen over the lateral malleolus.                                  Assessment/Plan:     Active Diagnoses:    Diagnosis Date Noted POA    PRINCIPAL PROBLEM:  Diabetic foot wound and Cellulitis [E11.621, L97.509] 03/27/2022 Yes    Type 2 diabetes mellitus with hyperglycemia, with long-term current use of insulin [E11.65, Z79.4] 03/27/2022 Not Applicable    HTN (hypertension) [I10] 07/18/2013 Unknown      Problems Resolved During this Admission:         Greater than 50% of this visit spent on counseling and coordination of care.    Education about the prevention of limb loss.    Discussed wound healing cycle, skin integrity, ways to care for skin.Counseled patient on the effects of high blood glucose on healing. He verbalizes understanding that it can increase the chances of delayed healing and this prolonged exposure leads to infection or progression of infection which subsequently can result in loss of limb.    Adequate vitamin supplementation, protein intake,  and hydration - discussed with patient    The surgical wound is cleansed of foreign material as much as possible and the base inspected for bone or abscess.  Scant purulence noted on initial pulling of surgical packing.  Copiously flushed site with vashe.  DSD applied with AquacelAg packing.     Patient understands that in the presence of osteomyelitis, incision and drainage with antibiotics are not guarantee for healing but relates that he is willing to take the necessary steps to assist in healing including but not limited to take control of his blood glucose, avoiding excess ambulation, keeping all appointments, keeping dressing intact.    Surgical cultures and pathology pending.  Infectious Disease on board for tailoring of antibiotics.    Patient will follow in wound clinic on discharge.     Short-term goals include maintaining good offloading and minimizing bioburden, promoting granulation and epithelialization to healing.  Long-term goals include keeping the wound healed by good offloading and medical management under the direction of internist.      Serina Morales DPM  [place]iat[place]  [facility] [place]
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
## 2022-03-29 NOTE — SUBJECTIVE & OBJECTIVE
Interval History: doing well today, had wound unwrapped and redressed. Just wants to get some rest.    Review of Systems   Constitutional:  Positive for activity change and fatigue. Negative for chills and fever.   Respiratory: Negative.     Cardiovascular: Negative.    Gastrointestinal: Negative.    Musculoskeletal:  Positive for gait problem and joint swelling.   Skin:  Positive for wound.   Objective:     Vital Signs (Most Recent):  Temp: 98.9 °F (37.2 °C) (03/29/22 1058)  Pulse: 92 (03/29/22 1058)  Resp: 17 (03/29/22 1058)  BP: (!) 153/94 (03/29/22 1058)  SpO2: 96 % (03/29/22 1058)   Vital Signs (24h Range):  Temp:  [97.7 °F (36.5 °C)-99.1 °F (37.3 °C)] 98.9 °F (37.2 °C)  Pulse:  [] 92  Resp:  [13-21] 17  SpO2:  [95 %-100 %] 96 %  BP: (129-186)/() 153/94     Weight: 86.2 kg (190 lb 1.6 oz)  Body mass index is 23.76 kg/m².    Intake/Output Summary (Last 24 hours) at 3/29/2022 1137  Last data filed at 3/29/2022 0811  Gross per 24 hour   Intake 770 ml   Output 1350 ml   Net -580 ml        Physical Exam  Constitutional:       General: He is not in acute distress.     Appearance: Normal appearance. He is normal weight. He is not ill-appearing.   HENT:      Head: Atraumatic.   Cardiovascular:      Rate and Rhythm: Normal rate and regular rhythm.   Pulmonary:      Effort: Pulmonary effort is normal.      Breath sounds: Normal breath sounds.   Musculoskeletal:      Comments: Right foot is wrapped and dressing intact. Evaluated by Podiatry.   Skin:     General: Skin is warm and dry.   Neurological:      Mental Status: He is oriented to person, place, and time. Mental status is at baseline.      Sensory: Sensory deficit present.      Gait: Gait abnormal.       Significant Labs: All pertinent labs within the past 24 hours have been reviewed.    Significant Imaging: I have reviewed all pertinent imaging results/findings within the past 24 hours.

## 2022-03-29 NOTE — PROGRESS NOTES
Therapy with Vancomycin complete and/or consult discontinued by provider.  Pharmacy will sign off, please re-consult as needed. Thank You
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
## 2022-03-29 NOTE — SUBJECTIVE & OBJECTIVE
"Past Medical History:   Diagnosis Date    Diabetes mellitus     Essential hypertension, benign 7/18/2013    Herpes      Fhx: non contributory    Social History     Socioeconomic History    Marital status: Single   Tobacco Use    Smoking status: Never Smoker    Smokeless tobacco: Never Used   Substance and Sexual Activity    Alcohol use: No    Drug use: No    Sexual activity: Yes     Partners: Female     Birth control/protection: None, Condom         History reviewed. No pertinent surgical history.    Review of patient's allergies indicates:  No Known Allergies    No current facility-administered medications on file prior to encounter.     Current Outpatient Medications on File Prior to Encounter   Medication Sig    albuterol (PROVENTIL/VENTOLIN HFA) 90 mcg/actuation inhaler Inhale 2 puffs into the lungs 2 (two) times daily. Rescue    blood sugar diagnostic Strp 1 strip by Misc.(Non-Drug; Combo Route) route 2 (two) times daily with meals.    blood-glucose meter Misc 1 Device by Misc.(Non-Drug; Combo Route) route 2 (two) times daily with meals.    insulin aspart U-100 (NOVOLOG) 100 unit/mL (3 mL) InPn pen Inject 6 Units into the skin 3 (three) times daily.    insulin detemir U-100 (LEVEMIR) 100 unit/mL injection Inject 40 Units into the skin every evening.    lancets 33 gauge Misc 1 each by Misc.(Non-Drug; Combo Route) route 2 (two) times daily with meals.    lancing device Misc 1 Device by Misc.(Non-Drug; Combo Route) route 2 (two) times daily with meals.    multivitamin (THERAGRAN) per tablet Take 1 tablet by mouth once daily.     pen needle, diabetic 32 gauge x 1/4" Ndle 1 each by Misc.(Non-Drug; Combo Route) route 2 (two) times daily with meals.    pulse oximeter (PULSE OXIMETER) device by Apply Externally route 2 (two) times a day. Use twice daily at 8 AM and 3 PM and record the value in [employer] as directed.     Family History       Problem Relation (Age of Onset)    Diabetes Mother          Tobacco Use    " Smoking status: Never Smoker    Smokeless tobacco: Never Used   Substance and Sexual Activity    Alcohol use: No    Drug use: No    Sexual activity: Yes     Partners: Female     Birth control/protection: None, Condom     Review of Systems   Constitutional:  Positive for chills. Negative for fever.   Respiratory:  Negative for cough and shortness of breath.    Cardiovascular:  Negative for chest pain and leg swelling.   Gastrointestinal:  Negative for abdominal pain.   Genitourinary:  Negative for frequency and urgency.   Musculoskeletal:  Negative for arthralgias.   Skin:  Positive for wound.   Neurological:  Negative for weakness.   Psychiatric/Behavioral:  Negative for agitation.    All other systems reviewed and are negative.      Objective:     Vital Signs (Most Recent):  Temp: 98.9 °F (37.2 °C) (03/29/22 1058)  Pulse: 92 (03/29/22 1058)  Resp: 17 (03/29/22 1058)  BP: (!) 153/94 (03/29/22 1058)  SpO2: 96 % (03/29/22 1058)   Vital Signs (24h Range):  Temp:  [97.7 °F (36.5 °C)-99.1 °F (37.3 °C)] 98.9 °F (37.2 °C)  Pulse:  [] 92  Resp:  [13-21] 17  SpO2:  [95 %-100 %] 96 %  BP: (129-186)/() 153/94     Weight: 86.2 kg (190 lb 1.6 oz)  Body mass index is 23.76 kg/m².    Physical Exam  Constitutional:       Appearance: Normal appearance.   HENT:      Head: Normocephalic and atraumatic.      Mouth/Throat:      Mouth: Mucous membranes are moist.   Eyes:      Extraocular Movements: Extraocular movements intact.      Pupils: Pupils are equal, round, and reactive to light.   Cardiovascular:      Rate and Rhythm: Normal rate and regular rhythm.   Pulmonary:      Effort: Pulmonary effort is normal.      Breath sounds: Normal breath sounds.   Abdominal:      General: Bowel sounds are normal.      Palpations: Abdomen is soft.   Musculoskeletal:         General: Swelling present.      Cervical back: Normal range of motion.      Right lower leg: Edema present.   Skin:     General: Skin is warm and dry.      Comments:  Open wound to right outer last toe and foot    Neurological:      Mental Status: He is alert and oriented to person, place, and time. Mental status is at baseline.           CRANIAL NERVES     CN III, IV, VI   Pupils are equal, round, and reactive to light.     Significant Labs: All pertinent labs within the past 24 hours have been reviewed.    Significant Imaging: I have reviewed all pertinent imaging results/findings within the past 24 hours.

## 2022-03-29 NOTE — NURSING
Pt's BP elevated, pt previously treated with Norvasc 2.5 mg PO, hydralazine 10 mg IV, and labetalol 10 mg IV. PM nurse to recheck BP to ensure medication is effective.
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
## 2022-03-29 NOTE — NURSING
[employer] provided pt with large post-op shoe since XL was not in stock. Shoe doesn't fit pt. Charge Nurse, Kristel, notified. States [employer] will have to try to order show tomorrow and if not possible pt may have to have provider order post-op shoe as a DME request. Will handoff information to next shift RN.

## 2022-03-29 NOTE — CONSULTS
Thank you for your consult to [facility]. We have reviewed the patient chart. This patient does meet criteria for [facility] service at this time. Will assume care on 03/29/22 at 6AM     Phoebe La MD  [facility]

## 2022-03-29 NOTE — PROGRESS NOTES
Vancomycin consult follow-up:    Patient reviewed, renal function decreased, trough rescheduled prior to next dose; Next levels due: 3/29/2022 at 1100.  Pharmacy will adjust dosing following levels.

## 2022-03-30 LAB
ANION GAP SERPL CALC-SCNC: 11 MMOL/L (ref 8–16)
BUN SERPL-MCNC: 16 MG/DL (ref 6–20)
CALCIUM SERPL-MCNC: 9.7 MG/DL (ref 8.7–10.5)
CHLORIDE SERPL-SCNC: 101 MMOL/L (ref 95–110)
CO2 SERPL-SCNC: 25 MMOL/L (ref 23–29)
CREAT SERPL-MCNC: 2.4 MG/DL (ref 0.5–1.4)
EST. GFR  (AFRICAN AMERICAN): 37 ML/MIN/1.73 M^2
EST. GFR  (NON AFRICAN AMERICAN): 32 ML/MIN/1.73 M^2
FINAL PATHOLOGIC DIAGNOSIS: NORMAL
GLUCOSE SERPL-MCNC: 110 MG/DL (ref 70–110)
GROSS: NORMAL
Lab: NORMAL
POCT GLUCOSE: 120 MG/DL (ref 70–110)
POCT GLUCOSE: 164 MG/DL (ref 70–110)
POCT GLUCOSE: 197 MG/DL (ref 70–110)
POCT GLUCOSE: 199 MG/DL (ref 70–110)
POTASSIUM SERPL-SCNC: 3.5 MMOL/L (ref 3.5–5.1)
SODIUM SERPL-SCNC: 137 MMOL/L (ref 136–145)

## 2022-03-30 PROCEDURE — 99024 POSTOP FOLLOW-UP VISIT: CPT | Mod: ,,, | Performed by: PODIATRIST

## 2022-03-30 PROCEDURE — 36415 COLL VENOUS BLD VENIPUNCTURE: CPT | Performed by: STUDENT IN AN ORGANIZED HEALTH CARE EDUCATION/TRAINING PROGRAM

## 2022-03-30 PROCEDURE — 25000003 PHARM REV CODE 250: Performed by: NURSE PRACTITIONER

## 2022-03-30 PROCEDURE — 25000003 PHARM REV CODE 250: Performed by: STUDENT IN AN ORGANIZED HEALTH CARE EDUCATION/TRAINING PROGRAM

## 2022-03-30 PROCEDURE — 63600175 PHARM REV CODE 636 W HCPCS: Performed by: STUDENT IN AN ORGANIZED HEALTH CARE EDUCATION/TRAINING PROGRAM

## 2022-03-30 PROCEDURE — 80048 BASIC METABOLIC PNL TOTAL CA: CPT | Performed by: STUDENT IN AN ORGANIZED HEALTH CARE EDUCATION/TRAINING PROGRAM

## 2022-03-30 PROCEDURE — 94760 N-INVAS EAR/PLS OXIMETRY 1: CPT

## 2022-03-30 PROCEDURE — 99024 PR POST-OP FOLLOW-UP VISIT: ICD-10-PCS | Mod: ,,, | Performed by: PODIATRIST

## 2022-03-30 PROCEDURE — 21400001 HC TELEMETRY ROOM

## 2022-03-30 RX ADMIN — INSULIN ASPART 12 UNITS: 100 INJECTION, SOLUTION INTRAVENOUS; SUBCUTANEOUS at 08:03

## 2022-03-30 RX ADMIN — CEFAZOLIN SODIUM 2 G: 2 SOLUTION INTRAVENOUS at 09:03

## 2022-03-30 RX ADMIN — INSULIN DETEMIR 20 UNITS: 100 INJECTION, SOLUTION SUBCUTANEOUS at 08:03

## 2022-03-30 RX ADMIN — SODIUM BICARBONATE: 84 INJECTION, SOLUTION INTRAVENOUS at 04:03

## 2022-03-30 RX ADMIN — ONDANSETRON 4 MG: 4 TABLET, ORALLY DISINTEGRATING ORAL at 04:03

## 2022-03-30 RX ADMIN — CEFAZOLIN SODIUM 2 G: 2 SOLUTION INTRAVENOUS at 01:03

## 2022-03-30 RX ADMIN — AMLODIPINE BESYLATE 2.5 MG: 2.5 TABLET ORAL at 08:03

## 2022-03-30 RX ADMIN — CEFAZOLIN SODIUM 2 G: 2 SOLUTION INTRAVENOUS at 04:03

## 2022-03-30 RX ADMIN — INSULIN DETEMIR 20 UNITS: 100 INJECTION, SOLUTION SUBCUTANEOUS at 09:03

## 2022-03-30 RX ADMIN — INSULIN ASPART 2 UNITS: 100 INJECTION, SOLUTION INTRAVENOUS; SUBCUTANEOUS at 08:03

## 2022-03-30 NOTE — SUBJECTIVE & OBJECTIVE
Interval History: Had some nausea/vomiting but states he feels better now IV antibiotics from yesterday have stopped, feeling okay today. Worsening renal function.    Review of Systems   Constitutional:  Positive for activity change and fatigue. Negative for chills and fever.   Respiratory: Negative.     Cardiovascular: Negative.    Gastrointestinal: Negative.    Musculoskeletal:  Positive for gait problem and joint swelling.   Skin:  Positive for wound.   Objective:     Vital Signs (Most Recent):  Temp: 97.2 °F (36.2 °C) (03/30/22 1126)  Pulse: 99 (03/30/22 1126)  Resp: 18 (03/30/22 1126)  BP: (!) 158/96 (03/30/22 1126)  SpO2: 99 % (03/30/22 1126)   Vital Signs (24h Range):  Temp:  [97.2 °F (36.2 °C)-98.4 °F (36.9 °C)] 97.2 °F (36.2 °C)  Pulse:  [] 99  Resp:  [16-18] 18  SpO2:  [93 %-99 %] 99 %  BP: (139-162)/(80-96) 158/96     Weight: 86.2 kg (190 lb 1.6 oz)  Body mass index is 23.76 kg/m².    Intake/Output Summary (Last 24 hours) at 3/30/2022 1431  Last data filed at 3/30/2022 1126  Gross per 24 hour   Intake 480 ml   Output 750 ml   Net -270 ml        Physical Exam  Constitutional:       General: He is not in acute distress.     Appearance: Normal appearance. He is normal weight. He is not ill-appearing.   HENT:      Head: Atraumatic.   Cardiovascular:      Rate and Rhythm: Normal rate and regular rhythm.   Pulmonary:      Effort: Pulmonary effort is normal.      Breath sounds: Normal breath sounds.   Musculoskeletal:      Comments: Right foot dressing intact   Skin:     General: Skin is warm and dry.   Neurological:      Mental Status: He is oriented to person, place, and time. Mental status is at baseline.      Sensory: Sensory deficit present.      Gait: Gait abnormal.       Significant Labs: All pertinent labs within the past 24 hours have been reviewed.    Significant Imaging: I have reviewed all pertinent imaging results/findings within the past 24 hours.

## 2022-03-30 NOTE — PLAN OF CARE
Pt not seen today because path was still pending at the time. It has now resulted without evidence of osteomyelitis. Bone cultures also neg. Podiatry noted hard bone during debridement. GABRIELA now worse. Would treat infection as SSTI and not osteomyelitis. Cefazolin (or ceftriaxone) monotherapy fine while inpatient and can step down to an oral cephalosporin on d/c. Will f/u with pt tomorrow
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
## 2022-03-31 LAB
ANION GAP SERPL CALC-SCNC: 11 MMOL/L (ref 8–16)
BACTERIA BLD CULT: NORMAL
BACTERIA BLD CULT: NORMAL
BUN SERPL-MCNC: 16 MG/DL (ref 6–20)
CALCIUM SERPL-MCNC: 9.1 MG/DL (ref 8.7–10.5)
CHLORIDE SERPL-SCNC: 97 MMOL/L (ref 95–110)
CO2 SERPL-SCNC: 31 MMOL/L (ref 23–29)
CREAT SERPL-MCNC: 2.3 MG/DL (ref 0.5–1.4)
EST. GFR  (AFRICAN AMERICAN): 38 ML/MIN/1.73 M^2
EST. GFR  (NON AFRICAN AMERICAN): 33 ML/MIN/1.73 M^2
GLUCOSE SERPL-MCNC: 179 MG/DL (ref 70–110)
POCT GLUCOSE: 137 MG/DL (ref 70–110)
POCT GLUCOSE: 197 MG/DL (ref 70–110)
POCT GLUCOSE: 236 MG/DL (ref 70–110)
POCT GLUCOSE: 94 MG/DL (ref 70–110)
POTASSIUM SERPL-SCNC: 3.6 MMOL/L (ref 3.5–5.1)
SODIUM SERPL-SCNC: 139 MMOL/L (ref 136–145)

## 2022-03-31 PROCEDURE — 25000003 PHARM REV CODE 250: Performed by: NURSE PRACTITIONER

## 2022-03-31 PROCEDURE — 21400001 HC TELEMETRY ROOM

## 2022-03-31 PROCEDURE — 80048 BASIC METABOLIC PNL TOTAL CA: CPT | Performed by: STUDENT IN AN ORGANIZED HEALTH CARE EDUCATION/TRAINING PROGRAM

## 2022-03-31 PROCEDURE — 99232 SBSQ HOSP IP/OBS MODERATE 35: CPT | Mod: ,,, | Performed by: INTERNAL MEDICINE

## 2022-03-31 PROCEDURE — 25000003 PHARM REV CODE 250: Performed by: STUDENT IN AN ORGANIZED HEALTH CARE EDUCATION/TRAINING PROGRAM

## 2022-03-31 PROCEDURE — 63600175 PHARM REV CODE 636 W HCPCS: Performed by: STUDENT IN AN ORGANIZED HEALTH CARE EDUCATION/TRAINING PROGRAM

## 2022-03-31 PROCEDURE — 36415 COLL VENOUS BLD VENIPUNCTURE: CPT | Performed by: STUDENT IN AN ORGANIZED HEALTH CARE EDUCATION/TRAINING PROGRAM

## 2022-03-31 PROCEDURE — 99232 PR SUBSEQUENT HOSPITAL CARE,LEVL II: ICD-10-PCS | Mod: ,,, | Performed by: INTERNAL MEDICINE

## 2022-03-31 RX ORDER — CEFAZOLIN SODIUM 1 G/50ML
2 SOLUTION INTRAVENOUS
Status: DISCONTINUED | OUTPATIENT
Start: 2022-03-31 | End: 2022-04-01

## 2022-03-31 RX ORDER — MORPHINE SULFATE 4 MG/ML
2 INJECTION, SOLUTION INTRAMUSCULAR; INTRAVENOUS EVERY 6 HOURS PRN
Status: DISCONTINUED | OUTPATIENT
Start: 2022-04-01 | End: 2022-04-06 | Stop reason: HOSPADM

## 2022-03-31 RX ADMIN — INSULIN ASPART 2 UNITS: 100 INJECTION, SOLUTION INTRAVENOUS; SUBCUTANEOUS at 09:03

## 2022-03-31 RX ADMIN — ONDANSETRON 4 MG: 4 TABLET, ORALLY DISINTEGRATING ORAL at 08:03

## 2022-03-31 RX ADMIN — CEFAZOLIN SODIUM 2 G: 1 SOLUTION INTRAVENOUS at 09:03

## 2022-03-31 RX ADMIN — CEFAZOLIN SODIUM 2 G: 2 SOLUTION INTRAVENOUS at 01:03

## 2022-03-31 RX ADMIN — AMLODIPINE BESYLATE 2.5 MG: 2.5 TABLET ORAL at 09:03

## 2022-03-31 RX ADMIN — SODIUM BICARBONATE: 84 INJECTION, SOLUTION INTRAVENOUS at 02:03

## 2022-03-31 RX ADMIN — TRAMADOL HYDROCHLORIDE 50 MG: 50 TABLET, COATED ORAL at 08:03

## 2022-03-31 RX ADMIN — CEFAZOLIN SODIUM 2 G: 2 SOLUTION INTRAVENOUS at 04:03

## 2022-03-31 RX ADMIN — INSULIN DETEMIR 20 UNITS: 100 INJECTION, SOLUTION SUBCUTANEOUS at 08:03

## 2022-03-31 RX ADMIN — INSULIN ASPART 12 UNITS: 100 INJECTION, SOLUTION INTRAVENOUS; SUBCUTANEOUS at 01:03

## 2022-03-31 RX ADMIN — INSULIN ASPART 12 UNITS: 100 INJECTION, SOLUTION INTRAVENOUS; SUBCUTANEOUS at 09:03

## 2022-03-31 RX ADMIN — INSULIN ASPART 4 UNITS: 100 INJECTION, SOLUTION INTRAVENOUS; SUBCUTANEOUS at 01:03

## 2022-03-31 RX ADMIN — INSULIN DETEMIR 20 UNITS: 100 INJECTION, SOLUTION SUBCUTANEOUS at 09:03

## 2022-03-31 NOTE — SUBJECTIVE & OBJECTIVE
Interval History: no new issues, feeling okay today.     Review of Systems   Constitutional:  Positive for activity change and fatigue. Negative for chills and fever.   Respiratory: Negative.     Cardiovascular: Negative.    Gastrointestinal: Negative.    Musculoskeletal:  Positive for gait problem and joint swelling.   Skin:  Positive for wound.   Objective:     Vital Signs (Most Recent):  Temp: 98.9 °F (37.2 °C) (03/31/22 0743)  Pulse: 95 (03/31/22 0743)  Resp: 18 (03/31/22 0844)  BP: (!) 152/84 (03/31/22 0743)  SpO2: 97 % (03/31/22 0743)   Vital Signs (24h Range):  Temp:  [97.2 °F (36.2 °C)-99.1 °F (37.3 °C)] 98.9 °F (37.2 °C)  Pulse:  [86-99] 95  Resp:  [18-20] 18  SpO2:  [96 %-100 %] 97 %  BP: (151-160)/(84-97) 152/84     Weight: 86.2 kg (190 lb 1.6 oz)  Body mass index is 23.76 kg/m².    Intake/Output Summary (Last 24 hours) at 3/31/2022 1113  Last data filed at 3/31/2022 0743  Gross per 24 hour   Intake 600 ml   Output 2200 ml   Net -1600 ml        Physical Exam  Constitutional:       General: He is not in acute distress.     Appearance: Normal appearance. He is normal weight. He is not ill-appearing.   HENT:      Head: Atraumatic.   Cardiovascular:      Rate and Rhythm: Normal rate and regular rhythm.   Pulmonary:      Effort: Pulmonary effort is normal.      Breath sounds: Normal breath sounds.   Musculoskeletal:      Comments: Right foot dressing intact   Skin:     General: Skin is warm and dry.   Neurological:      Mental Status: He is oriented to person, place, and time. Mental status is at baseline.      Sensory: Sensory deficit present.      Gait: Gait abnormal.       Significant Labs: All pertinent labs within the past 24 hours have been reviewed.    Significant Imaging: I have reviewed all pertinent imaging results/findings within the past 24 hours.
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
## 2022-03-31 NOTE — SUBJECTIVE & OBJECTIVE
"Interval history: NAEO. Denies complaints    Past Medical History:   Diagnosis Date    Diabetes mellitus     Essential hypertension, benign 7/18/2013    Herpes          Fhx: non contributory    Social History     Socioeconomic History    Marital status: Single   Tobacco Use    Smoking status: Never Smoker    Smokeless tobacco: Never Used   Substance and Sexual Activity    Alcohol use: No    Drug use: No    Sexual activity: Yes     Partners: Female     Birth control/protection: None, Condom         Past Surgical History:   Procedure Laterality Date    INCISION AND DRAINAGE Right 3/28/2022    Procedure: INCISION AND DRAINAGE RIGHT FOOT;  Surgeon: Amena Martinez DPM;  Location: [facility];  Service: Podiatry;  Laterality: Right;       Review of patient's allergies indicates:  No Known Allergies    No current facility-administered medications on file prior to encounter.     Current Outpatient Medications on File Prior to Encounter   Medication Sig    albuterol (PROVENTIL/VENTOLIN HFA) 90 mcg/actuation inhaler Inhale 2 puffs into the lungs 2 (two) times daily. Rescue    blood sugar diagnostic Strp 1 strip by Misc.(Non-Drug; Combo Route) route 2 (two) times daily with meals.    blood-glucose meter Misc 1 Device by Misc.(Non-Drug; Combo Route) route 2 (two) times daily with meals.    insulin aspart U-100 (NOVOLOG) 100 unit/mL (3 mL) InPn pen Inject 6 Units into the skin 3 (three) times daily.    insulin detemir U-100 (LEVEMIR) 100 unit/mL injection Inject 40 Units into the skin every evening.    lancets 33 gauge Misc 1 each by Misc.(Non-Drug; Combo Route) route 2 (two) times daily with meals.    lancing device Misc 1 Device by Misc.(Non-Drug; Combo Route) route 2 (two) times daily with meals.    multivitamin (THERAGRAN) per tablet Take 1 tablet by mouth once daily.     pen needle, diabetic 32 gauge x 1/4" Ndle 1 each by Misc.(Non-Drug; Combo Route) route 2 (two) times daily with meals.    pulse oximeter (PULSE OXIMETER) " device by Apply Externally route 2 (two) times a day. Use twice daily at 8 AM and 3 PM and record the value in MyChart as directed.     Family History       Problem Relation (Age of Onset)    Diabetes Mother          Tobacco Use    Smoking status: Never Smoker    Smokeless tobacco: Never Used   Substance and Sexual Activity    Alcohol use: No    Drug use: No    Sexual activity: Yes     Partners: Female     Birth control/protection: None, Condom     Review of Systems   Constitutional:  Positive for chills. Negative for fever.   Respiratory:  Negative for cough and shortness of breath.    Cardiovascular:  Negative for chest pain and leg swelling.   Gastrointestinal:  Negative for abdominal pain.   Genitourinary:  Negative for frequency and urgency.   Musculoskeletal:  Negative for arthralgias.   Skin:  Positive for wound.   Neurological:  Negative for weakness.   Psychiatric/Behavioral:  Negative for agitation.    All other systems reviewed and are negative.      Objective:     Vital Signs (Most Recent):  Temp: 98.5 °F (36.9 °C) (03/31/22 1150)  Pulse: 81 (03/31/22 1150)  Resp: 20 (03/31/22 1150)  BP: (!) 147/87 (03/31/22 1150)  SpO2: 100 % (03/31/22 1150)   Vital Signs (24h Range):  Temp:  [98 °F (36.7 °C)-99.1 °F (37.3 °C)] 98.5 °F (36.9 °C)  Pulse:  [81-95] 81  Resp:  [18-20] 20  SpO2:  [96 %-100 %] 100 %  BP: (147-160)/(84-97) 147/87     Weight: 86.2 kg (190 lb 1.6 oz)  Body mass index is 23.76 kg/m².    Physical Exam  Constitutional:       Appearance: Normal appearance.   HENT:      Head: Normocephalic and atraumatic.      Mouth/Throat:      Mouth: Mucous membranes are moist.   Eyes:      Extraocular Movements: Extraocular movements intact.      Pupils: Pupils are equal, round, and reactive to light.   Cardiovascular:      Rate and Rhythm: Normal rate and regular rhythm.   Pulmonary:      Effort: Pulmonary effort is normal.      Breath sounds: Normal breath sounds.   Abdominal:      General: Bowel sounds are  normal.      Palpations: Abdomen is soft.   Musculoskeletal:         General: Swelling present.      Cervical back: Normal range of motion.      Right lower leg: Edema present.   Skin:     General: Skin is warm and dry.      Comments: Open wound to right outer last toe and foot    Neurological:      Mental Status: He is alert and oriented to person, place, and time. Mental status is at baseline.           CRANIAL NERVES     CN III, IV, VI   Pupils are equal, round, and reactive to light.     Significant Labs: All pertinent labs within the past 24 hours have been reviewed.    Significant Imaging: I have reviewed all pertinent imaging results/findings within the past 24 hours.
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
## 2022-03-31 NOTE — PROGRESS NOTES
[facility] Telemetry  Podiatry  Progress Note    Patient Name: Ashutosh Ferrari  MRN: [id number]  Admission Date: 3/26/2022  Hospital Length of Stay: 3 days  Attending Physician: Brayden Darden MD  Primary Care Provider: [facility]     Subjective:     Interval History:     03/29/2022 post operative day 1 status post incision and drainage of the right foot with trocar bone biopsy of the proximal phalanx of the 5th digit.  Patient seen at bedside resting comfortably.  He relates some tingling and burning to the surgical site over night but overall pain well controlled.  No further pedal complaints.    3/30/22: Patient seen bedside. Bandage intact right foot.     Follow-up For: Procedure(s) (LRB):  INCISION AND DRAINAGE RIGHT FOOT (Right)    Post-Operative Day: 1 Day Post-Op    Scheduled Meds:   amLODIPine  2.5 mg Oral Daily    ceFAZolin (ANCEF) IVPB  2 g Intravenous Q8H    insulin aspart U-100  12 Units Subcutaneous TIDWM    insulin detemir U-100  20 Units Subcutaneous BID     Continuous Infusions:   sodium bicarbonate drip 125 mL/hr at 03/30/22 1656     PRN Meds:acetaminophen, dextrose 10%, dextrose 10%, glucagon (human recombinant), glucose, glucose, hydrALAZINE, insulin aspart U-100, ondansetron, sodium chloride 0.9%, traMADoL    Review of Systems   Constitutional: Negative for activity change, appetite change, chills, fatigue and fever.   Respiratory: Negative for cough and shortness of breath.    Cardiovascular: Negative for chest pain and leg swelling.   Gastrointestinal: Negative for diarrhea, nausea and vomiting.   Musculoskeletal: Positive for arthralgias and myalgias.   Skin: Positive for wound.   Neurological: Negative for weakness.        + paresthesia      Objective:     Vital Signs (Most Recent):  Temp: 98.8 °F (37.1 °C) (03/31/22 0450)  Pulse: 90 (03/31/22 0450)  Resp: 19 (03/31/22 0450)  BP: (!) 151/89 (reported) (03/31/22 0450)  SpO2: 98 % (03/31/22 0450) Vital Signs (24h Range):  Temp:   [97.2 °F (36.2 °C)-99.1 °F (37.3 °C)] 98.8 °F (37.1 °C)  Pulse:  [] 90  Resp:  [18-19] 19  SpO2:  [95 %-100 %] 98 %  BP: (151-160)/(89-97) 151/89     Weight: 86.2 kg (190 lb 1.6 oz)  Body mass index is 23.76 kg/m².    Foot Exam     General  Orientation: alert and oriented to person, place, and time   Affect: appropriate         Right Foot/Ankle      Inspection and Palpation  Skin Exam: drainage, cellulitis, skin changes, abnormal color, ulcer and erythema; skin not intact      Neurovascular  Dorsalis pedis: 2+  Posterior tibial: 1+  Saphenous nerve sensation: absent  Tibial nerve sensation: absent  Superficial peroneal nerve sensation: absent  Deep peroneal nerve sensation: absent  Sural nerve sensation: absent        Left Foot/Ankle       Inspection and Palpation  Ecchymosis: none  Tenderness: none   Swelling: none   Skin Exam: no drainage, skin not intact, no cellulitis, no abnormal color and no erythema      Neurovascular  Dorsalis pedis: 2+  Posterior tibial: 1+  Saphenous nerve sensation: absent  Tibial nerve sensation: absent  Superficial peroneal nerve sensation: absent  Deep peroneal nerve sensation: absent  Sural nerve sensation: absent     Comments  Plantar lateral hyperkeratotic lesion sub 5th met head    3/30/22:               03/29/2022    Ulcer location: surgical incision dorsal lateral proximal phalanx of the right 5th digit  Signs of infection:local edema and erythema  Drainage: primarily sanguinous  Purulence: scant  Crepitus/fluctuance: no  Periwound: Reddened  Base: Granular/Healthy  Depth: muscle  Probe to bone: yes          3/28/22:  Right foot:   Purulence, Mal odor, erythema ascending from plantar lateral distal right foot to mid and fore foot. Temperature notably increased compared to contralateral. Dorsal sinus probes to capsule/periosteum. Lateral sinus probes to soft tissue. Plantar ulceration undermines distally about 1.5 cm.                 Laboratory:  CBC:   Recent Labs   Lab  03/29/22  0402   WBC 8.90   RBC 3.86*   HGB 11.3*   HCT 33.7*      MCV 87   MCH 29.3   MCHC 33.5     CMP:   Recent Labs   Lab 03/29/22  0402 03/30/22  1326   * 110   CALCIUM 9.0 9.7   ALBUMIN 2.6*  --    PROT 7.8  --    * 137   K 3.7 3.5   CO2 26 25   CL 97 101   BUN 12 16   CREATININE 1.7* 2.4*   ALKPHOS 74  --    ALT 29  --    AST 19  --    BILITOT 0.6  --      CRP: No results for input(s): CRP in the last 168 hours.  ESR: No results for input(s): SEDRATE in the last 168 hours.  Microbiology Results (last 7 days)     Procedure Component Value Units Date/Time    Blood Culture #1 **CANNOT BE ORDERED STAT** [[id number]] Collected: 03/26/22 2349    Order Status: Completed Specimen: Blood from Peripheral, Forearm, Left Updated: 03/31/22 0303     Blood Culture, Routine No Growth after 4 days.     Blood Culture #2 **CANNOT BE ORDERED STAT** [[id number]] Collected: 03/26/22 2337    Order Status: Completed Specimen: Blood from Peripheral, Forearm, Right Updated: 03/31/22 0303     Blood Culture, Routine No Growth after 4 days.     AFB Culture & Smear [[id number]] Collected: 03/28/22 1436    Order Status: Completed Specimen: Bone from Toe, Right Foot Updated: 03/30/22 2127     AFB Culture & Smear Culture in progress     AFB CULTURE STAIN No acid fast bacilli seen.    Narrative:      1. RIGHT FIFTH TOE    Culture, Anaerobe [[id number]] Collected: 03/28/22 1436    Order Status: Completed Specimen: Bone from Toe, Right Foot Updated: 03/30/22 0835     Anaerobic Culture Culture in progress    Narrative:      1. RIGHT FIFTH TOE    Aerobic culture [[id number]] Collected: 03/28/22 1436    Order Status: Completed Specimen: Bone from Toe, Right Foot Updated: 03/30/22 0815     Aerobic Bacterial Culture No growth    Narrative:      1. RIGHT FIFTH TOE    Aerobic culture [[id number]]  (Abnormal)  (Susceptibility) Collected: 03/27/22 1354    Order Status: Completed Specimen: Wound from Foot, Right Updated: 03/29/22 1039      Aerobic Bacterial Culture PROTEUS MIRABILIS  Few        STREPTOCOCCUS AGALACTIAE (GROUP B)  Many  Beta-hemolytic streptococci are routinely susceptible to   penicillins,cephalosporins and carbapenems.      Culture, Anaerobe [[id number]] Collected: 03/27/22 1354    Order Status: Completed Specimen: Wound from Foot, Right Updated: 03/29/22 0839     Anaerobic Culture Culture in progress    Gram stain [[id number]] Collected: 03/28/22 1436    Order Status: Completed Specimen: Bone from Toe, Right Foot Updated: 03/29/22 0738     Gram Stain Result No WBC's      No organisms seen    Narrative:      1. RIGHT FIFTH TOE    Fungus culture [[id number]] Collected: 03/28/22 1436    Order Status: Sent Specimen: Bone from Toe, Right Foot Updated: 03/28/22 1509    Gram stain [[id number]] Collected: 03/27/22 1354    Order Status: Completed Specimen: Wound from Foot, Right Updated: 03/27/22 1442     Gram Stain Result Few WBC's      Moderate Gram positive cocci in pairs      Few Gram negative rods          Diagnostic Results:  Imaging Results          X-Ray Foot Complete Right (Final result)  Result time 03/26/22 22:16:22    Final result by Wyatt Francois MD (03/26/22 22:16:22)                 Impression:      No acute displaced fracture seen.      Electronically signed by: Wyatt Francois MD  Date:    03/26/2022  Time:    22:16             Narrative:    EXAMINATION:  XR ANKLE COMPLETE 3 VIEW RIGHT; XR FOOT COMPLETE 3 VIEW RIGHT    CLINICAL HISTORY:  Injury, unspecified, initial encounter    TECHNIQUE:  AP, lateral, and oblique images of the right ankle were performed.  Right foot three views.    COMPARISON:  None    FINDINGS:  No evidence of acute displaced fracture, dislocation, or osseous destructive process.  Ankle mortise is maintained.  Lisfranc articulation appears congruent.  Mild degenerative changes and spurring are seen throughout the midfoot.  There is mild posterior and plantar calcaneal spurring.    Prominent soft tissue  swelling with small amount of soft tissue air is seen involving the 5th toe.  Mild soft tissue swelling is also seen over the lateral malleolus.                               X-Ray Ankle Complete Right (Final result)  Result time 03/26/22 22:16:22    Final result by Wyatt Francois MD (03/26/22 22:16:22)                 Impression:      No acute displaced fracture seen.      Electronically signed by: Wyatt Francois MD  Date:    03/26/2022  Time:    22:16             Narrative:    EXAMINATION:  XR ANKLE COMPLETE 3 VIEW RIGHT; XR FOOT COMPLETE 3 VIEW RIGHT    CLINICAL HISTORY:  Injury, unspecified, initial encounter    TECHNIQUE:  AP, lateral, and oblique images of the right ankle were performed.  Right foot three views.    COMPARISON:  None    FINDINGS:  No evidence of acute displaced fracture, dislocation, or osseous destructive process.  Ankle mortise is maintained.  Lisfranc articulation appears congruent.  Mild degenerative changes and spurring are seen throughout the midfoot.  There is mild posterior and plantar calcaneal spurring.    Prominent soft tissue swelling with small amount of soft tissue air is seen involving the 5th toe.  Mild soft tissue swelling is also seen over the lateral malleolus.                                  Assessment/Plan:     Active Diagnoses:    Diagnosis Date Noted POA    PRINCIPAL PROBLEM:  Diabetic foot wound and Cellulitis [E11.621, L97.509] 03/27/2022 Yes    Type 2 diabetes mellitus with hyperglycemia, with long-term current use of insulin [E11.65, Z79.4] 03/27/2022 Not Applicable    HTN (hypertension) [I10] 07/18/2013 Yes    GABRIELA (acute kidney injury) [N17.9] 07/17/2013 Yes      Problems Resolved During this Admission:         Greater than 50% of this visit spent on counseling and coordination of care.    Education about the prevention of limb loss.    Discussed wound healing cycle, skin integrity, ways to care for skin.Counseled patient on the effects of high blood glucose  on healing. He verbalizes understanding that it can increase the chances of delayed healing and this prolonged exposure leads to infection or progression of infection which subsequently can result in loss of limb.    Adequate vitamin supplementation, protein intake, and hydration - discussed with patient    The surgical wound is cleansed of foreign material as much as possible and the base inspected for bone or abscess.  Scant purulence noted on initial pulling of surgical packing.  Copiously flushed site with vashe.  DSD applied with AquacelAg packing.     Patient understands that in the presence of osteomyelitis, incision and drainage with antibiotics are not guarantee for healing but relates that he is willing to take the necessary steps to assist in healing including but not limited to take control of his blood glucose, avoiding excess ambulation, keeping all appointments, keeping dressing intact.     Infectious Disease on board for tailoring of antibiotics. Bone pathology and micro negative for OM.     Will consider delayed primary closure of wound possibly on 4/1/22.     Patient will follow in wound clinic on discharge.     Short-term goals include maintaining good offloading and minimizing bioburden, promoting granulation and epithelialization to healing.  Long-term goals include keeping the wound healed by good offloading and medical management under the direction of internist.      Amena Martinez DPM  Podiatry  [facility] - Telemetry

## 2022-04-01 LAB
ANION GAP SERPL CALC-SCNC: 10 MMOL/L (ref 8–16)
BACTERIA SPEC AEROBE CULT: NO GROWTH
BACTERIA SPEC ANAEROBE CULT: ABNORMAL
BACTERIA SPEC ANAEROBE CULT: NORMAL
BILIRUB UR QL STRIP: NEGATIVE
BUN SERPL-MCNC: 17 MG/DL (ref 6–20)
CALCIUM SERPL-MCNC: 9.4 MG/DL (ref 8.7–10.5)
CHLORIDE SERPL-SCNC: 93 MMOL/L (ref 95–110)
CLARITY UR: CLEAR
CO2 SERPL-SCNC: 36 MMOL/L (ref 23–29)
COLOR UR: COLORLESS
CREAT SERPL-MCNC: 2.5 MG/DL (ref 0.5–1.4)
CREAT UR-MCNC: 44.7 MG/DL (ref 23–375)
CREAT UR-MCNC: 44.7 MG/DL (ref 23–375)
EOSINOPHIL URNS QL WRIGHT STN: NORMAL
EST. GFR  (AFRICAN AMERICAN): 35 ML/MIN/1.73 M^2
EST. GFR  (NON AFRICAN AMERICAN): 30 ML/MIN/1.73 M^2
GLUCOSE SERPL-MCNC: 182 MG/DL (ref 70–110)
GLUCOSE UR QL STRIP: ABNORMAL
HGB UR QL STRIP: NEGATIVE
KETONES UR QL STRIP: NEGATIVE
LEUKOCYTE ESTERASE UR QL STRIP: NEGATIVE
NITRITE UR QL STRIP: NEGATIVE
PH UR STRIP: 8 [PH] (ref 5–8)
POCT GLUCOSE: 179 MG/DL (ref 70–110)
POCT GLUCOSE: 181 MG/DL (ref 70–110)
POCT GLUCOSE: 206 MG/DL (ref 70–110)
POCT GLUCOSE: 227 MG/DL (ref 70–110)
POCT GLUCOSE: 299 MG/DL (ref 70–110)
POTASSIUM SERPL-SCNC: 3.8 MMOL/L (ref 3.5–5.1)
PROT UR QL STRIP: NEGATIVE
PROT UR-MCNC: 9 MG/DL
PROT/CREAT UR: 0.2 MG/G{CREAT} (ref 0–0.2)
SODIUM SERPL-SCNC: 139 MMOL/L (ref 136–145)
SODIUM UR-SCNC: 68 MMOL/L (ref 20–250)
SP GR UR STRIP: 1.01 (ref 1–1.03)
URN SPEC COLLECT METH UR: ABNORMAL
UROBILINOGEN UR STRIP-ACNC: NEGATIVE EU/DL

## 2022-04-01 PROCEDURE — 36415 COLL VENOUS BLD VENIPUNCTURE: CPT | Performed by: STUDENT IN AN ORGANIZED HEALTH CARE EDUCATION/TRAINING PROGRAM

## 2022-04-01 PROCEDURE — 63600175 PHARM REV CODE 636 W HCPCS: Performed by: STUDENT IN AN ORGANIZED HEALTH CARE EDUCATION/TRAINING PROGRAM

## 2022-04-01 PROCEDURE — 99499 UNLISTED E&M SERVICE: CPT | Mod: ,,, | Performed by: PODIATRIST

## 2022-04-01 PROCEDURE — 25000003 PHARM REV CODE 250: Performed by: NURSE PRACTITIONER

## 2022-04-01 PROCEDURE — 81003 URINALYSIS AUTO W/O SCOPE: CPT | Performed by: INTERNAL MEDICINE

## 2022-04-01 PROCEDURE — 84156 ASSAY OF PROTEIN URINE: CPT | Performed by: STUDENT IN AN ORGANIZED HEALTH CARE EDUCATION/TRAINING PROGRAM

## 2022-04-01 PROCEDURE — 25000003 PHARM REV CODE 250: Performed by: INTERNAL MEDICINE

## 2022-04-01 PROCEDURE — 87205 SMEAR GRAM STAIN: CPT | Performed by: STUDENT IN AN ORGANIZED HEALTH CARE EDUCATION/TRAINING PROGRAM

## 2022-04-01 PROCEDURE — 63600175 PHARM REV CODE 636 W HCPCS: Performed by: NURSE PRACTITIONER

## 2022-04-01 PROCEDURE — 25000003 PHARM REV CODE 250: Performed by: STUDENT IN AN ORGANIZED HEALTH CARE EDUCATION/TRAINING PROGRAM

## 2022-04-01 PROCEDURE — 99499 NO LOS: ICD-10-PCS | Mod: ,,, | Performed by: PODIATRIST

## 2022-04-01 PROCEDURE — 99024 POSTOP FOLLOW-UP VISIT: CPT | Mod: ,,, | Performed by: PODIATRIST

## 2022-04-01 PROCEDURE — 84300 ASSAY OF URINE SODIUM: CPT | Performed by: STUDENT IN AN ORGANIZED HEALTH CARE EDUCATION/TRAINING PROGRAM

## 2022-04-01 PROCEDURE — 21400001 HC TELEMETRY ROOM

## 2022-04-01 PROCEDURE — 80048 BASIC METABOLIC PNL TOTAL CA: CPT | Performed by: STUDENT IN AN ORGANIZED HEALTH CARE EDUCATION/TRAINING PROGRAM

## 2022-04-01 PROCEDURE — 99024 PR POST-OP FOLLOW-UP VISIT: ICD-10-PCS | Mod: ,,, | Performed by: PODIATRIST

## 2022-04-01 RX ORDER — SODIUM CHLORIDE 9 MG/ML
INJECTION, SOLUTION INTRAVENOUS CONTINUOUS
Status: DISCONTINUED | OUTPATIENT
Start: 2022-04-01 | End: 2022-04-02

## 2022-04-01 RX ORDER — CEFAZOLIN SODIUM 2 G/50ML
2 SOLUTION INTRAVENOUS
Status: DISCONTINUED | OUTPATIENT
Start: 2022-04-01 | End: 2022-04-04

## 2022-04-01 RX ORDER — HYDRALAZINE HYDROCHLORIDE 25 MG/1
25 TABLET, FILM COATED ORAL EVERY 8 HOURS
Status: DISCONTINUED | OUTPATIENT
Start: 2022-04-01 | End: 2022-04-06 | Stop reason: HOSPADM

## 2022-04-01 RX ORDER — TRAMADOL HYDROCHLORIDE 50 MG/1
50 TABLET ORAL EVERY 6 HOURS PRN
Status: DISCONTINUED | OUTPATIENT
Start: 2022-04-01 | End: 2022-04-06 | Stop reason: HOSPADM

## 2022-04-01 RX ADMIN — CEFAZOLIN SODIUM 2 G: 1 SOLUTION INTRAVENOUS at 05:04

## 2022-04-01 RX ADMIN — HYDRALAZINE HYDROCHLORIDE 25 MG: 25 TABLET, FILM COATED ORAL at 01:04

## 2022-04-01 RX ADMIN — INSULIN ASPART 4 UNITS: 100 INJECTION, SOLUTION INTRAVENOUS; SUBCUTANEOUS at 09:04

## 2022-04-01 RX ADMIN — ONDANSETRON 4 MG: 4 TABLET, ORALLY DISINTEGRATING ORAL at 02:04

## 2022-04-01 RX ADMIN — HYDRALAZINE HYDROCHLORIDE 25 MG: 25 TABLET, FILM COATED ORAL at 08:04

## 2022-04-01 RX ADMIN — TRAMADOL HYDROCHLORIDE 50 MG: 50 TABLET, COATED ORAL at 08:04

## 2022-04-01 RX ADMIN — MORPHINE SULFATE 2 MG: 4 INJECTION, SOLUTION INTRAMUSCULAR; INTRAVENOUS at 02:04

## 2022-04-01 RX ADMIN — DEXTROSE 2 G: 50 INJECTION, SOLUTION INTRAVENOUS at 10:04

## 2022-04-01 RX ADMIN — INSULIN DETEMIR 20 UNITS: 100 INJECTION, SOLUTION SUBCUTANEOUS at 09:04

## 2022-04-01 RX ADMIN — AMLODIPINE BESYLATE 2.5 MG: 2.5 TABLET ORAL at 09:04

## 2022-04-01 RX ADMIN — INSULIN ASPART 1 UNITS: 100 INJECTION, SOLUTION INTRAVENOUS; SUBCUTANEOUS at 08:04

## 2022-04-01 RX ADMIN — INSULIN DETEMIR 20 UNITS: 100 INJECTION, SOLUTION SUBCUTANEOUS at 08:04

## 2022-04-01 RX ADMIN — SODIUM BICARBONATE: 84 INJECTION, SOLUTION INTRAVENOUS at 07:04

## 2022-04-01 RX ADMIN — DEXTROSE 2 G: 50 INJECTION, SOLUTION INTRAVENOUS at 01:04

## 2022-04-01 RX ADMIN — INSULIN ASPART 6 UNITS: 100 INJECTION, SOLUTION INTRAVENOUS; SUBCUTANEOUS at 05:04

## 2022-04-01 RX ADMIN — SODIUM CHLORIDE: 0.9 INJECTION, SOLUTION INTRAVENOUS at 03:04

## 2022-04-01 RX ADMIN — INSULIN ASPART 12 UNITS: 100 INJECTION, SOLUTION INTRAVENOUS; SUBCUTANEOUS at 05:04

## 2022-04-01 NOTE — PROGRESS NOTES
[facility]  [facility]  Progress Note    Patient Name: Ashutosh Ferrari  MRN: [id number]  Admission Date: 3/26/2022  Hospital Length of Stay: 4 days  Attending Physician: Brayden Darden MD  Primary Care Provider: [facility]     Subjective:     Interval History:     03/29/2022 post operative day 1 status post incision and drainage of the right foot with trocar bone biopsy of the proximal phalanx of the 5th digit.  Patient seen at bedside resting comfortably.  He relates some tingling and burning to the surgical site over night but overall pain well controlled.  No further pedal complaints.    3/30/22: Patient seen bedside. Bandage intact right foot.     4/1/22: Patient seen bedside. Right foot OR debridement rescheduled to 4/4/22 due scheduling conflicts.     Follow-up For: Procedure(s) (LRB):  INCISION AND DRAINAGE RIGHT FOOT (Right)    Post-Operative Day: 1 Day Post-Op    Scheduled Meds:   amLODIPine  2.5 mg Oral Daily    ceFAZolin (ANCEF) IVPB  2 g Intravenous Q8H    hydrALAZINE  25 mg Oral Q8H    insulin aspart U-100  12 Units Subcutaneous TIDWM    insulin detemir U-100  20 Units Subcutaneous BID     Continuous Infusions:   sodium chloride 0.9%       PRN Meds:acetaminophen, dextrose 10%, dextrose 10%, glucagon (human recombinant), glucose, glucose, hydrALAZINE, insulin aspart U-100, morphine, ondansetron, sodium chloride 0.9%, traMADoL    Review of Systems   Constitutional: Negative for activity change, appetite change, chills, fatigue and fever.   Respiratory: Negative for cough and shortness of breath.    Cardiovascular: Negative for chest pain and leg swelling.   Gastrointestinal: Negative for diarrhea, nausea and vomiting.   Musculoskeletal: Positive for arthralgias and myalgias.   Skin: Positive for wound.   Neurological: Negative for weakness.        + paresthesia      Objective:     Vital Signs (Most Recent):  Temp: 98.1 °F (36.7 °C) (04/01/22 1155)  Pulse: 76 (04/01/22 1155)  Resp:  18 (04/01/22 1155)  BP: (!) 153/89 (04/01/22 1155)  SpO2: 99 % (04/01/22 1155) Vital Signs (24h Range):  Temp:  [97.7 °F (36.5 °C)-98.9 °F (37.2 °C)] 98.1 °F (36.7 °C)  Pulse:  [76-84] 76  Resp:  [15-20] 18  SpO2:  [96 %-99 %] 99 %  BP: (140-173)/() 153/89     Weight: 86.2 kg (190 lb 1.6 oz)  Body mass index is 23.76 kg/m².    Foot Exam     General  Orientation: alert and oriented to person, place, and time   Affect: appropriate         Right Foot/Ankle      Inspection and Palpation  Skin Exam: drainage, cellulitis, skin changes, abnormal color, ulcer and erythema; skin not intact      Neurovascular  Dorsalis pedis: 2+  Posterior tibial: 1+  Saphenous nerve sensation: absent  Tibial nerve sensation: absent  Superficial peroneal nerve sensation: absent  Deep peroneal nerve sensation: absent  Sural nerve sensation: absent        Left Foot/Ankle       Inspection and Palpation  Ecchymosis: none  Tenderness: none   Swelling: none   Skin Exam: no drainage, skin not intact, no cellulitis, no abnormal color and no erythema      Neurovascular  Dorsalis pedis: 2+  Posterior tibial: 1+  Saphenous nerve sensation: absent  Tibial nerve sensation: absent  Superficial peroneal nerve sensation: absent  Deep peroneal nerve sensation: absent  Sural nerve sensation: absent     Comments  Plantar lateral hyperkeratotic lesion sub 5th met head    3/30/22:               03/29/2022    Ulcer location: surgical incision dorsal lateral proximal phalanx of the right 5th digit  Signs of infection:local edema and erythema  Drainage: primarily sanguinous  Purulence: scant  Crepitus/fluctuance: no  Periwound: Reddened  Base: Granular/Healthy  Depth: muscle  Probe to bone: yes          3/28/22:  Right foot:   Purulence, Mal odor, erythema ascending from plantar lateral distal right foot to mid and fore foot. Temperature notably increased compared to contralateral. Dorsal sinus probes to capsule/periosteum. Lateral sinus probes to soft tissue.  Plantar ulceration undermines distally about 1.5 cm.                 Laboratory:  CBC:   Recent Labs   Lab 03/29/22  0402   WBC 8.90   RBC 3.86*   HGB 11.3*   HCT 33.7*      MCV 87   MCH 29.3   MCHC 33.5     CMP:   Recent Labs   Lab 03/29/22  0402 03/30/22  1326 04/01/22  0602   *   < > 182*   CALCIUM 9.0   < > 9.4   ALBUMIN 2.6*  --   --    PROT 7.8  --   --    *   < > 139   K 3.7   < > 3.8   CO2 26   < > 36*   CL 97   < > 93*   BUN 12   < > 17   CREATININE 1.7*   < > 2.5*   ALKPHOS 74  --   --    ALT 29  --   --    AST 19  --   --    BILITOT 0.6  --   --     < > = values in this interval not displayed.     CRP: No results for input(s): CRP in the last 168 hours.  ESR: No results for input(s): SEDRATE in the last 168 hours.  Microbiology Results (last 7 days)     Procedure Component Value Units Date/Time    Culture, Anaerobe [[id number]]  (Abnormal) Collected: 03/27/22 1354    Order Status: Completed Specimen: Wound from Foot, Right Updated: 04/01/22 1337     Anaerobic Culture PREVOTELLA (B.) BIVIA  Few      Culture, Anaerobe [[id number]] Collected: 03/28/22 1436    Order Status: Completed Specimen: Bone from Toe, Right Foot Updated: 04/01/22 0915     Anaerobic Culture No anaerobes isolated    Narrative:      1. RIGHT FIFTH TOE    Aerobic culture [[id number]] Collected: 03/28/22 1436    Order Status: Completed Specimen: Bone from Toe, Right Foot Updated: 04/01/22 0806     Aerobic Bacterial Culture No growth    Narrative:      1. RIGHT FIFTH TOE    Blood Culture #1 **CANNOT BE ORDERED STAT** [[id number]] Collected: 03/26/22 2349    Order Status: Completed Specimen: Blood from Peripheral, Forearm, Left Updated: 03/31/22 0303     Blood Culture, Routine No Growth after 4 days.     Blood Culture #2 **CANNOT BE ORDERED STAT** [[id number]] Collected: 03/26/22 2337    Order Status: Completed Specimen: Blood from Peripheral, Forearm, Right Updated: 03/31/22 0303     Blood Culture, Routine No Growth after 4  days.     AFB Culture & Smear [[id number]] Collected: 03/28/22 1436    Order Status: Completed Specimen: Bone from Toe, Right Foot Updated: 03/30/22 2127     AFB Culture & Smear Culture in progress     AFB CULTURE STAIN No acid fast bacilli seen.    Narrative:      1. RIGHT FIFTH TOE    Aerobic culture [[id number]]  (Abnormal)  (Susceptibility) Collected: 03/27/22 1354    Order Status: Completed Specimen: Wound from Foot, Right Updated: 03/29/22 1039     Aerobic Bacterial Culture PROTEUS MIRABILIS  Few        STREPTOCOCCUS AGALACTIAE (GROUP B)  Many  Beta-hemolytic streptococci are routinely susceptible to   penicillins,cephalosporins and carbapenems.      Gram stain [[id number]] Collected: 03/28/22 1436    Order Status: Completed Specimen: Bone from Toe, Right Foot Updated: 03/29/22 0738     Gram Stain Result No WBC's      No organisms seen    Narrative:      1. RIGHT FIFTH TOE    Fungus culture [[id number]] Collected: 03/28/22 1436    Order Status: Sent Specimen: Bone from Toe, Right Foot Updated: 03/28/22 1509    Gram stain [[id number]] Collected: 03/27/22 1354    Order Status: Completed Specimen: Wound from Foot, Right Updated: 03/27/22 1442     Gram Stain Result Few WBC's      Moderate Gram positive cocci in pairs      Few Gram negative rods          Diagnostic Results:  Imaging Results          X-Ray Foot Complete Right (Final result)  Result time 03/26/22 22:16:22    Final result by Wyatt Francois MD (03/26/22 22:16:22)                 Impression:      No acute displaced fracture seen.      Electronically signed by: Wyatt Francois MD  Date:    03/26/2022  Time:    22:16             Narrative:    EXAMINATION:  XR ANKLE COMPLETE 3 VIEW RIGHT; XR FOOT COMPLETE 3 VIEW RIGHT    CLINICAL HISTORY:  Injury, unspecified, initial encounter    TECHNIQUE:  AP, lateral, and oblique images of the right ankle were performed.  Right foot three views.    COMPARISON:  None    FINDINGS:  No evidence of acute displaced fracture,  dislocation, or osseous destructive process.  Ankle mortise is maintained.  Lisfranc articulation appears congruent.  Mild degenerative changes and spurring are seen throughout the midfoot.  There is mild posterior and plantar calcaneal spurring.    Prominent soft tissue swelling with small amount of soft tissue air is seen involving the 5th toe.  Mild soft tissue swelling is also seen over the lateral malleolus.                               X-Ray Ankle Complete Right (Final result)  Result time 03/26/22 22:16:22    Final result by Wyatt Francois MD (03/26/22 22:16:22)                 Impression:      No acute displaced fracture seen.      Electronically signed by: Wyatt Francois MD  Date:    03/26/2022  Time:    22:16             Narrative:    EXAMINATION:  XR ANKLE COMPLETE 3 VIEW RIGHT; XR FOOT COMPLETE 3 VIEW RIGHT    CLINICAL HISTORY:  Injury, unspecified, initial encounter    TECHNIQUE:  AP, lateral, and oblique images of the right ankle were performed.  Right foot three views.    COMPARISON:  None    FINDINGS:  No evidence of acute displaced fracture, dislocation, or osseous destructive process.  Ankle mortise is maintained.  Lisfranc articulation appears congruent.  Mild degenerative changes and spurring are seen throughout the midfoot.  There is mild posterior and plantar calcaneal spurring.    Prominent soft tissue swelling with small amount of soft tissue air is seen involving the 5th toe.  Mild soft tissue swelling is also seen over the lateral malleolus.                                  Assessment/Plan:     Active Diagnoses:    Diagnosis Date Noted POA    PRINCIPAL PROBLEM:  Diabetic foot wound and Cellulitis [E11.621, L97.509] 03/27/2022 Yes    Type 2 diabetes mellitus with hyperglycemia, with long-term current use of insulin [E11.65, Z79.4] 03/27/2022 Not Applicable    HTN (hypertension) [I10] 07/18/2013 Yes    GABRIELA (acute kidney injury) [N17.9] 07/17/2013 Yes      Problems Resolved During this  Admission:         Greater than 50% of this visit spent on counseling and coordination of care.    Education about the prevention of limb loss.    Discussed wound healing cycle, skin integrity, ways to care for skin.Counseled patient on the effects of high blood glucose on healing. He verbalizes understanding that it can increase the chances of delayed healing and this prolonged exposure leads to infection or progression of infection which subsequently can result in loss of limb.    Adequate vitamin supplementation, protein intake, and hydration - discussed with patient    The surgical wound is cleansed of foreign material as much as possible and the base inspected for bone or abscess.  Scant purulence noted on initial pulling of surgical packing.  Copiously flushed site with vashe.  DSD applied with AquacelAg packing.     Patient understands that in the presence of osteomyelitis, incision and drainage with antibiotics are not guarantee for healing but relates that he is willing to take the necessary steps to assist in healing including but not limited to take control of his blood glucose, avoiding excess ambulation, keeping all appointments, keeping dressing intact.     Infectious Disease on board for tailoring of antibiotics. Bone pathology and micro negative for OM.     Will consider delayed primary closure of wound possibly on 4/4/22 inpatient vs. Outpatient.     Patient will follow in wound clinic on discharge.     Short-term goals include maintaining good offloading and minimizing bioburden, promoting granulation and epithelialization to healing.  Long-term goals include keeping the wound healed by good offloading and medical management under the direction of internist.      Amena Martinez DPM  Podiatry  [facility] - Telemetry

## 2022-04-01 NOTE — PLAN OF CARE
04/01/22 1621   Discharge Reassessment   Assessment Type Discharge Planning Reassessment   Did the patient's condition or plan change since previous assessment? No   Discharge Plan discussed with: Patient   DME Needed Upon Discharge    (tbd)   Discharge Barriers Identified None   Why the patient remains in the hospital Requires continued medical care   Post-Acute Status   Post-Acute Authorization   (tbd)   Patient plans to return home at time of DC.  Her brother will help at home.  Will need Podiatry f/u.

## 2022-04-01 NOTE — PLAN OF CARE
Pt compliant with achs accucheks. Insulin administered per order. I/d postponed til later date. Pt receiving NaCl at 75cc per hr. Labs collected and sent. Wound care to right foot performed by MD. FINENY or reported. Pt remains free from falls or injuries.
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
## 2022-04-01 NOTE — SUBJECTIVE & OBJECTIVE
Interval History: having pain in his left foot overnight as well as some nausea. Renal function has not improved.    Review of Systems   Constitutional:  Positive for activity change and fatigue. Negative for chills and fever.   Respiratory: Negative.     Cardiovascular: Negative.    Gastrointestinal: Negative.    Musculoskeletal:  Positive for gait problem and joint swelling.   Skin:  Positive for wound.   Objective:     Vital Signs (Most Recent):  Temp: 98.1 °F (36.7 °C) (04/01/22 1155)  Pulse: 76 (04/01/22 1155)  Resp: 18 (04/01/22 1155)  BP: (!) 153/89 (04/01/22 1155)  SpO2: 99 % (04/01/22 1155)   Vital Signs (24h Range):  Temp:  [97.7 °F (36.5 °C)-98.9 °F (37.2 °C)] 98.1 °F (36.7 °C)  Pulse:  [76-84] 76  Resp:  [15-20] 18  SpO2:  [96 %-99 %] 99 %  BP: (140-173)/() 153/89     Weight: 86.2 kg (190 lb 1.6 oz)  Body mass index is 23.76 kg/m².    Intake/Output Summary (Last 24 hours) at 4/1/2022 1201  Last data filed at 4/1/2022 1100  Gross per 24 hour   Intake --   Output 1600 ml   Net -1600 ml        Physical Exam  Constitutional:       General: He is not in acute distress.     Appearance: Normal appearance. He is normal weight. He is not ill-appearing.   HENT:      Head: Atraumatic.   Cardiovascular:      Rate and Rhythm: Normal rate and regular rhythm.   Pulmonary:      Effort: Pulmonary effort is normal.      Breath sounds: Normal breath sounds.   Musculoskeletal:      Comments: Right foot dressing intact   Skin:     General: Skin is warm and dry.   Neurological:      Mental Status: He is oriented to person, place, and time. Mental status is at baseline.      Sensory: Sensory deficit present.      Gait: Gait abnormal.       Significant Labs: All pertinent labs within the past 24 hours have been reviewed.    Significant Imaging: I have reviewed all pertinent imaging results/findings within the past 24 hours.
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
## 2022-04-02 LAB
ANION GAP SERPL CALC-SCNC: 11 MMOL/L (ref 8–16)
BUN SERPL-MCNC: 20 MG/DL (ref 6–20)
CALCIUM SERPL-MCNC: 9.2 MG/DL (ref 8.7–10.5)
CHLORIDE SERPL-SCNC: 99 MMOL/L (ref 95–110)
CO2 SERPL-SCNC: 30 MMOL/L (ref 23–29)
CREAT SERPL-MCNC: 2.3 MG/DL (ref 0.5–1.4)
EST. GFR  (AFRICAN AMERICAN): 38 ML/MIN/1.73 M^2
EST. GFR  (NON AFRICAN AMERICAN): 33 ML/MIN/1.73 M^2
GLUCOSE SERPL-MCNC: 139 MG/DL (ref 70–110)
POCT GLUCOSE: 112 MG/DL (ref 70–110)
POCT GLUCOSE: 139 MG/DL (ref 70–110)
POCT GLUCOSE: 155 MG/DL (ref 70–110)
POCT GLUCOSE: 200 MG/DL (ref 70–110)
POTASSIUM SERPL-SCNC: 4 MMOL/L (ref 3.5–5.1)
SODIUM SERPL-SCNC: 140 MMOL/L (ref 136–145)

## 2022-04-02 PROCEDURE — 63600175 PHARM REV CODE 636 W HCPCS: Performed by: STUDENT IN AN ORGANIZED HEALTH CARE EDUCATION/TRAINING PROGRAM

## 2022-04-02 PROCEDURE — 25000003 PHARM REV CODE 250: Performed by: NURSE PRACTITIONER

## 2022-04-02 PROCEDURE — 80048 BASIC METABOLIC PNL TOTAL CA: CPT | Performed by: STUDENT IN AN ORGANIZED HEALTH CARE EDUCATION/TRAINING PROGRAM

## 2022-04-02 PROCEDURE — 25000003 PHARM REV CODE 250: Performed by: STUDENT IN AN ORGANIZED HEALTH CARE EDUCATION/TRAINING PROGRAM

## 2022-04-02 PROCEDURE — 25000003 PHARM REV CODE 250: Performed by: INTERNAL MEDICINE

## 2022-04-02 PROCEDURE — 21400001 HC TELEMETRY ROOM

## 2022-04-02 PROCEDURE — 36415 COLL VENOUS BLD VENIPUNCTURE: CPT | Performed by: STUDENT IN AN ORGANIZED HEALTH CARE EDUCATION/TRAINING PROGRAM

## 2022-04-02 RX ORDER — SODIUM CHLORIDE 450 MG/100ML
INJECTION, SOLUTION INTRAVENOUS CONTINUOUS
Status: ACTIVE | OUTPATIENT
Start: 2022-04-02 | End: 2022-04-04

## 2022-04-02 RX ADMIN — DEXTROSE 2 G: 50 INJECTION, SOLUTION INTRAVENOUS at 10:04

## 2022-04-02 RX ADMIN — INSULIN ASPART 12 UNITS: 100 INJECTION, SOLUTION INTRAVENOUS; SUBCUTANEOUS at 09:04

## 2022-04-02 RX ADMIN — ACETAMINOPHEN 1000 MG: 500 TABLET ORAL at 07:04

## 2022-04-02 RX ADMIN — TRAMADOL HYDROCHLORIDE 50 MG: 50 TABLET, COATED ORAL at 09:04

## 2022-04-02 RX ADMIN — AMLODIPINE BESYLATE 2.5 MG: 2.5 TABLET ORAL at 09:04

## 2022-04-02 RX ADMIN — HYDRALAZINE HYDROCHLORIDE 25 MG: 25 TABLET, FILM COATED ORAL at 06:04

## 2022-04-02 RX ADMIN — SODIUM CHLORIDE 100 ML/HR: 0.45 INJECTION, SOLUTION INTRAVENOUS at 02:04

## 2022-04-02 RX ADMIN — INSULIN ASPART 12 UNITS: 100 INJECTION, SOLUTION INTRAVENOUS; SUBCUTANEOUS at 12:04

## 2022-04-02 RX ADMIN — HYDRALAZINE HYDROCHLORIDE 25 MG: 25 TABLET, FILM COATED ORAL at 02:04

## 2022-04-02 RX ADMIN — INSULIN DETEMIR 20 UNITS: 100 INJECTION, SOLUTION SUBCUTANEOUS at 09:04

## 2022-04-02 RX ADMIN — HYDRALAZINE HYDROCHLORIDE 25 MG: 25 TABLET, FILM COATED ORAL at 09:04

## 2022-04-02 RX ADMIN — DEXTROSE 2 G: 50 INJECTION, SOLUTION INTRAVENOUS at 02:04

## 2022-04-02 RX ADMIN — SODIUM CHLORIDE: 0.9 INJECTION, SOLUTION INTRAVENOUS at 03:04

## 2022-04-02 RX ADMIN — DEXTROSE 2 G: 50 INJECTION, SOLUTION INTRAVENOUS at 06:04

## 2022-04-02 NOTE — NURSING
Patient  Left unit via wheelchair  and IV with Normal saline running @75ml/hr  to Ultrasound  telemetry in place.

## 2022-04-02 NOTE — PLAN OF CARE
Problem: Adult Inpatient Plan of Care  Goal: Plan of Care Review  Outcome: Ongoing, Progressing  Goal: Patient-Specific Goal (Individualized)  Outcome: Ongoing, Progressing  Goal: Absence of Hospital-Acquired Illness or Injury  Outcome: Ongoing, Progressing  Goal: Optimal Comfort and Wellbeing  Outcome: Ongoing, Progressing  Goal: Readiness for Transition of Care  Outcome: Ongoing, Progressing     Problem: Fluid and Electrolyte Imbalance (Acute Kidney Injury/Impairment)  Goal: Fluid and Electrolyte Balance  Outcome: Ongoing, Progressing     Problem: Oral Intake Inadequate (Acute Kidney Injury/Impairment)  Goal: Optimal Nutrition Intake  Outcome: Ongoing, Progressing

## 2022-04-02 NOTE — PLAN OF CARE
Problem: Adult Inpatient Plan of Care  Goal: Plan of Care Review  Outcome: Ongoing, Progressing  Goal: Patient-Specific Goal (Individualized)  Outcome: Ongoing, Progressing  Goal: Absence of Hospital-Acquired Illness or Injury  Outcome: Ongoing, Progressing  Goal: Optimal Comfort and Wellbeing  Outcome: Ongoing, Progressing  Goal: Readiness for Transition of Care  Outcome: Ongoing, Progressing     Problem: Renal Function Impairment (Acute Kidney Injury/Impairment)  Goal: Effective Renal Function  Outcome: Ongoing, Progressing     Problem: Diabetes Comorbidity  Goal: Blood Glucose Level Within Targeted Range  Outcome: Ongoing, Progressing   PRN medication effective for pain . Explained plan of care, verbalized understanding.  No injury during shift, Side rails up x 2, call light by bedside.

## 2022-04-02 NOTE — PROGRESS NOTES
Ashutosh Ferrari is a 46 y.o. male patient.    Follow for GABRIELA    No new c/o, comfortable    Scheduled Meds:   amLODIPine  2.5 mg Oral Daily    ceFAZolin (ANCEF) IVPB  2 g Intravenous Q8H    hydrALAZINE  25 mg Oral Q8H    insulin aspart U-100  12 Units Subcutaneous TIDWM    insulin detemir U-100  20 Units Subcutaneous BID       Review of patient's allergies indicates:  No Known Allergies      Vital Signs Range (Last 24H):  Temp:  [98.1 °F (36.7 °C)-99.2 °F (37.3 °C)]   Pulse:  [78-93]   Resp:  [14-20]   BP: (140-177)/()   SpO2:  [97 %-100 %]     I & O (Last 24H):    Intake/Output Summary (Last 24 hours) at 4/2/2022 1321  Last data filed at 4/2/2022 0743  Gross per 24 hour   Intake 1368.06 ml   Output 1800 ml   Net -431.94 ml           Physical Exam:  General appearance: well developed, well nourished, no distress  Lungs:  clear to auscultation bilaterally and normal respiratory effort  Heart: regular rate and rhythm  Abdomen: soft, non-tender non-distented; bowel sounds normal; no masses,  no organomegaly  Extremities: no cyanosis or edema, or clubbing    Laboratory:  I have reviewed all pertinent lab results within the past 24 hours.  CBC:   Recent Labs   Lab 03/29/22  0402   WBC 8.90   RBC 3.86*   HGB 11.3*   HCT 33.7*      MCV 87   MCH 29.3   MCHC 33.5     CMP:   Recent Labs   Lab 03/29/22  0402 03/30/22  1326 04/02/22  0441   *   < > 139*   CALCIUM 9.0   < > 9.2   ALBUMIN 2.6*  --   --    PROT 7.8  --   --    *   < > 140   K 3.7   < > 4.0   CO2 26   < > 30*   CL 97   < > 99   BUN 12   < > 20   CREATININE 1.7*   < > 2.3*   ALKPHOS 74  --   --    ALT 29  --   --    AST 19  --   --    BILITOT 0.6  --   --     < > = values in this interval not displayed.       Imp/Plan    GABRIELA - ATN, creatinine plateau  DM foot infection  HTN  DM type 2  Anemia of chronic disease    Continue present Rx  CMP in am    Trac T Le  4/2/2022
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
## 2022-04-02 NOTE — SUBJECTIVE & OBJECTIVE
Interval History: Still nauseated. He is able to keep his food down though. His foot pain is controlled.    Review of Systems   Constitutional:  Positive for activity change and fatigue. Negative for chills and fever.   Respiratory: Negative.     Cardiovascular: Negative.    Gastrointestinal:  Positive for nausea.   Musculoskeletal:  Positive for gait problem and joint swelling.   Skin:  Positive for wound.   Objective:     Vital Signs (Most Recent):  Temp: 98.7 °F (37.1 °C) (04/02/22 0743)  Pulse: 78 (04/02/22 0743)  Resp: 17 (04/02/22 0743)  BP: (!) 158/95 (04/02/22 0743)  SpO2: 100 % (04/02/22 0743)   Vital Signs (24h Range):  Temp:  [98.1 °F (36.7 °C)-99.2 °F (37.3 °C)] 98.7 °F (37.1 °C)  Pulse:  [76-93] 78  Resp:  [14-20] 17  SpO2:  [97 %-100 %] 100 %  BP: (140-177)/() 158/95     Weight: 86.2 kg (190 lb 1.6 oz)  Body mass index is 23.76 kg/m².    Intake/Output Summary (Last 24 hours) at 4/2/2022 1115  Last data filed at 4/2/2022 0700  Gross per 24 hour   Intake 1248.06 ml   Output 1800 ml   Net -551.94 ml        Physical Exam  Constitutional:       General: He is not in acute distress.     Appearance: Normal appearance. He is normal weight. He is not ill-appearing.   HENT:      Head: Atraumatic.   Cardiovascular:      Rate and Rhythm: Normal rate and regular rhythm.   Pulmonary:      Effort: Pulmonary effort is normal.      Breath sounds: Normal breath sounds.   Musculoskeletal:      Comments: Right foot dressing intact   Skin:     General: Skin is warm and dry.   Neurological:      Mental Status: He is oriented to person, place, and time. Mental status is at baseline.      Sensory: Sensory deficit present.      Gait: Gait abnormal.       Significant Labs: All pertinent labs within the past 24 hours have been reviewed.    Significant Imaging: I have reviewed all pertinent imaging results/findings within the past 24 hours.

## 2022-04-03 ENCOUNTER — ANESTHESIA EVENT (OUTPATIENT)
Dept: SURGERY | Facility: HOSPITAL | Age: 47
DRG: 623 | End: 2022-04-03
Payer: MEDICAID

## 2022-04-03 LAB
ALBUMIN SERPL BCP-MCNC: 2.9 G/DL (ref 3.5–5.2)
ALP SERPL-CCNC: 66 U/L (ref 55–135)
ALT SERPL W/O P-5'-P-CCNC: 6 U/L (ref 10–44)
ANION GAP SERPL CALC-SCNC: 10 MMOL/L (ref 8–16)
ANION GAP SERPL CALC-SCNC: 10 MMOL/L (ref 8–16)
AST SERPL-CCNC: 13 U/L (ref 10–40)
BILIRUB SERPL-MCNC: 0.4 MG/DL (ref 0.1–1)
BUN SERPL-MCNC: 19 MG/DL (ref 6–20)
BUN SERPL-MCNC: 19 MG/DL (ref 6–20)
CALCIUM SERPL-MCNC: 9.2 MG/DL (ref 8.7–10.5)
CALCIUM SERPL-MCNC: 9.2 MG/DL (ref 8.7–10.5)
CHLORIDE SERPL-SCNC: 101 MMOL/L (ref 95–110)
CHLORIDE SERPL-SCNC: 101 MMOL/L (ref 95–110)
CO2 SERPL-SCNC: 27 MMOL/L (ref 23–29)
CO2 SERPL-SCNC: 27 MMOL/L (ref 23–29)
CREAT SERPL-MCNC: 2.3 MG/DL (ref 0.5–1.4)
CREAT SERPL-MCNC: 2.3 MG/DL (ref 0.5–1.4)
EST. GFR  (AFRICAN AMERICAN): 38 ML/MIN/1.73 M^2
EST. GFR  (AFRICAN AMERICAN): 38 ML/MIN/1.73 M^2
EST. GFR  (NON AFRICAN AMERICAN): 33 ML/MIN/1.73 M^2
EST. GFR  (NON AFRICAN AMERICAN): 33 ML/MIN/1.73 M^2
GLUCOSE SERPL-MCNC: 128 MG/DL (ref 70–110)
GLUCOSE SERPL-MCNC: 128 MG/DL (ref 70–110)
POCT GLUCOSE: 139 MG/DL (ref 70–110)
POCT GLUCOSE: 146 MG/DL (ref 70–110)
POCT GLUCOSE: 178 MG/DL (ref 70–110)
POTASSIUM SERPL-SCNC: 4 MMOL/L (ref 3.5–5.1)
POTASSIUM SERPL-SCNC: 4 MMOL/L (ref 3.5–5.1)
PROT SERPL-MCNC: 8 G/DL (ref 6–8.4)
SODIUM SERPL-SCNC: 138 MMOL/L (ref 136–145)
SODIUM SERPL-SCNC: 138 MMOL/L (ref 136–145)

## 2022-04-03 PROCEDURE — 80053 COMPREHEN METABOLIC PANEL: CPT | Performed by: INTERNAL MEDICINE

## 2022-04-03 PROCEDURE — 21400001 HC TELEMETRY ROOM

## 2022-04-03 PROCEDURE — 25000003 PHARM REV CODE 250: Performed by: NURSE PRACTITIONER

## 2022-04-03 PROCEDURE — 25000003 PHARM REV CODE 250: Performed by: INTERNAL MEDICINE

## 2022-04-03 PROCEDURE — 25000003 PHARM REV CODE 250: Performed by: STUDENT IN AN ORGANIZED HEALTH CARE EDUCATION/TRAINING PROGRAM

## 2022-04-03 PROCEDURE — 94760 N-INVAS EAR/PLS OXIMETRY 1: CPT

## 2022-04-03 PROCEDURE — 36415 COLL VENOUS BLD VENIPUNCTURE: CPT | Performed by: INTERNAL MEDICINE

## 2022-04-03 PROCEDURE — 63600175 PHARM REV CODE 636 W HCPCS: Performed by: STUDENT IN AN ORGANIZED HEALTH CARE EDUCATION/TRAINING PROGRAM

## 2022-04-03 RX ADMIN — DEXTROSE 2 G: 50 INJECTION, SOLUTION INTRAVENOUS at 10:04

## 2022-04-03 RX ADMIN — ONDANSETRON 4 MG: 4 TABLET, ORALLY DISINTEGRATING ORAL at 08:04

## 2022-04-03 RX ADMIN — TRAMADOL HYDROCHLORIDE 50 MG: 50 TABLET, COATED ORAL at 08:04

## 2022-04-03 RX ADMIN — INSULIN ASPART 12 UNITS: 100 INJECTION, SOLUTION INTRAVENOUS; SUBCUTANEOUS at 08:04

## 2022-04-03 RX ADMIN — SODIUM CHLORIDE: 0.45 INJECTION, SOLUTION INTRAVENOUS at 01:04

## 2022-04-03 RX ADMIN — INSULIN ASPART 2 UNITS: 100 INJECTION, SOLUTION INTRAVENOUS; SUBCUTANEOUS at 08:04

## 2022-04-03 RX ADMIN — SODIUM CHLORIDE: 0.45 INJECTION, SOLUTION INTRAVENOUS at 03:04

## 2022-04-03 RX ADMIN — HYDRALAZINE HYDROCHLORIDE 25 MG: 25 TABLET, FILM COATED ORAL at 10:04

## 2022-04-03 RX ADMIN — HYDRALAZINE HYDROCHLORIDE 25 MG: 25 TABLET, FILM COATED ORAL at 03:04

## 2022-04-03 RX ADMIN — AMLODIPINE BESYLATE 2.5 MG: 2.5 TABLET ORAL at 08:04

## 2022-04-03 RX ADMIN — DEXTROSE 2 G: 50 INJECTION, SOLUTION INTRAVENOUS at 03:04

## 2022-04-03 RX ADMIN — HYDRALAZINE HYDROCHLORIDE 25 MG: 25 TABLET, FILM COATED ORAL at 05:04

## 2022-04-03 RX ADMIN — INSULIN ASPART 12 UNITS: 100 INJECTION, SOLUTION INTRAVENOUS; SUBCUTANEOUS at 12:04

## 2022-04-03 RX ADMIN — DEXTROSE 2 G: 50 INJECTION, SOLUTION INTRAVENOUS at 05:04

## 2022-04-03 RX ADMIN — INSULIN DETEMIR 20 UNITS: 100 INJECTION, SOLUTION SUBCUTANEOUS at 08:04

## 2022-04-03 NOTE — NURSING
Report given to nurse Clover who will assume the care of the patient. He is resting comfortably. conplete chart check done.

## 2022-04-03 NOTE — PLAN OF CARE
Problem: Adult Inpatient Plan of Care  Goal: Plan of Care Review  Outcome: Ongoing, Progressing  Goal: Patient-Specific Goal (Individualized)  Outcome: Ongoing, Progressing  Goal: Absence of Hospital-Acquired Illness or Injury  Outcome: Ongoing, Progressing  Goal: Optimal Comfort and Wellbeing  Outcome: Ongoing, Progressing     Problem: Fluid and Electrolyte Imbalance (Acute Kidney Injury/Impairment)  Goal: Fluid and Electrolyte Balance  Outcome: Ongoing, Progressing     Problem: Oral Intake Inadequate (Acute Kidney Injury/Impairment)  Goal: Optimal Nutrition Intake  Outcome: Ongoing, Progressing     Problem: Renal Function Impairment (Acute Kidney Injury/Impairment)  Goal: Effective Renal Function  Outcome: Ongoing, Progressing    PRN  moderately effective for medication effective. Explained plan of care, verbalized understanding. Educated pt .on new medicine . No injury during shift, Side rails up x 2, call light by bedside.
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
## 2022-04-03 NOTE — PROGRESS NOTES
Ashuotsh Ferrari is a 46 y.o. male patient.    Follow for GABRIELA    C/o feeling tired, otherwise stable  Comfortable    Scheduled Meds:   amLODIPine  2.5 mg Oral Daily    ceFAZolin (ANCEF) IVPB  2 g Intravenous Q8H    hydrALAZINE  25 mg Oral Q8H    insulin aspart U-100  12 Units Subcutaneous TIDWM    insulin detemir U-100  20 Units Subcutaneous BID       Review of patient's allergies indicates:  No Known Allergies      Vital Signs Range (Last 24H):  Temp:  [97.9 °F (36.6 °C)-99.1 °F (37.3 °C)]   Pulse:  [75-93]   Resp:  [14-18]   BP: (134-165)/(78-92)   SpO2:  [97 %-100 %]     I & O (Last 24H):    Intake/Output Summary (Last 24 hours) at 4/3/2022 1333  Last data filed at 4/3/2022 1200  Gross per 24 hour   Intake 1140 ml   Output 2250 ml   Net -1110 ml           Physical Exam:  General appearance: well developed, well nourished, no distress  Lungs:  clear to auscultation bilaterally and normal respiratory effort  Heart: regular rate and rhythm  Abdomen: soft, non-tender non-distented; bowel sounds normal; no masses,  no organomegaly  Extremities: no cyanosis or edema, or clubbing    Laboratory:  I have reviewed all pertinent lab results within the past 24 hours.  CBC:   Recent Labs   Lab 03/29/22  0402   WBC 8.90   RBC 3.86*   HGB 11.3*   HCT 33.7*      MCV 87   MCH 29.3   MCHC 33.5     CMP:   Recent Labs   Lab 04/03/22  0440   *  128*   CALCIUM 9.2  9.2   ALBUMIN 2.9*   PROT 8.0     138   K 4.0  4.0   CO2 27  27     101   BUN 19  19   CREATININE 2.3*  2.3*   ALKPHOS 66   ALT 6*   AST 13   BILITOT 0.4       Imp/Plan    GABRIELA - creatinine stable, on oliguric  HTN  DM type 2  DM foot infection  Anemia    Continue present Rx  Watch renal function closely          Trac T Deyanira  4/3/2022

## 2022-04-03 NOTE — CONSULTS
Thank you for your consult to [facility]. We have reviewed the patient chart. This patient does meet criteria for [facility] service at this time. Will assume care on 04/04/22 at 6AM     Phoebe La MD  [facility]

## 2022-04-03 NOTE — SUBJECTIVE & OBJECTIVE
Interval History: Tired this morning. Seen again later, he tells me his vomited his lunch up but currently feels okay.     Review of Systems   Constitutional:  Positive for activity change and fatigue. Negative for chills and fever.   Respiratory: Negative.     Cardiovascular: Negative.    Gastrointestinal:  Positive for nausea.   Musculoskeletal:  Positive for gait problem and joint swelling.   Skin:  Positive for wound.   Objective:     Vital Signs (Most Recent):  Temp: 97.9 °F (36.6 °C) (04/03/22 1234)  Pulse: 86 (04/03/22 1234)  Resp: 18 (04/03/22 1234)  BP: (!) 160/86 (04/03/22 1234)  SpO2: 99 % (04/03/22 1234)   Vital Signs (24h Range):  Temp:  [97.9 °F (36.6 °C)-99.1 °F (37.3 °C)] 97.9 °F (36.6 °C)  Pulse:  [75-93] 86  Resp:  [14-18] 18  SpO2:  [97 %-100 %] 99 %  BP: (134-165)/(78-92) 160/86     Weight: 86.2 kg (190 lb 1.6 oz)  Body mass index is 23.76 kg/m².    Intake/Output Summary (Last 24 hours) at 4/3/2022 1514  Last data filed at 4/3/2022 1200  Gross per 24 hour   Intake 1140 ml   Output 3050 ml   Net -1910 ml        Physical Exam  Constitutional:       General: He is not in acute distress.     Appearance: Normal appearance. He is normal weight. He is not ill-appearing.   HENT:      Head: Atraumatic.   Cardiovascular:      Rate and Rhythm: Normal rate and regular rhythm.   Pulmonary:      Effort: Pulmonary effort is normal.      Breath sounds: Normal breath sounds.   Musculoskeletal:      Comments: Right foot dressing intact   Skin:     General: Skin is warm and dry.   Neurological:      Mental Status: He is oriented to person, place, and time. Mental status is at baseline.      Sensory: Sensory deficit present.      Gait: Gait abnormal.       Significant Labs: All pertinent labs within the past 24 hours have been reviewed.    Significant Imaging: I have reviewed all pertinent imaging results/findings within the past 24 hours.

## 2022-04-03 NOTE — NURSING
Pt refused to have his accu check done by nurse Darin. He stated he didn't eat that much and his sugar should be okay. Last accu check was 146.

## 2022-04-03 NOTE — ANESTHESIA PREPROCEDURE EVALUATION
04/03/2022  Ashutosh Ferrari is a 46 y.o., male.  To undergo Procedure(s) (LRB):  DEBRIDEMENT, FOOT (Right)       Past Medical History:  Past Medical History:   Diagnosis Date    Diabetes mellitus     Essential hypertension, benign 7/18/2013    Herpes        Past Surgical History:  Past Surgical History:   Procedure Laterality Date    INCISION AND DRAINAGE Right 3/28/2022    Procedure: INCISION AND DRAINAGE RIGHT FOOT;  Surgeon: Amena Martinez DPM;  Location: [facility];  Service: Podiatry;  Laterality: Right;       Social History:  Social History     Socioeconomic History    Marital status: Single   Tobacco Use    Smoking status: Never Smoker    Smokeless tobacco: Never Used   Substance and Sexual Activity    Alcohol use: No    Drug use: No    Sexual activity: Yes     Partners: Female     Birth control/protection: None, Condom       Medications:  No current facility-administered medications on file prior to encounter.     Current Outpatient Medications on File Prior to Encounter   Medication Sig Dispense Refill    albuterol (PROVENTIL/VENTOLIN HFA) 90 mcg/actuation inhaler Inhale 2 puffs into the lungs 2 (two) times daily. Rescue 8.5 g 0    blood sugar diagnostic Strp 1 strip by Misc.(Non-Drug; Combo Route) route 2 (two) times daily with meals. 100 each 11    blood-glucose meter Misc 1 Device by Misc.(Non-Drug; Combo Route) route 2 (two) times daily with meals. 1 each 0    insulin aspart U-100 (NOVOLOG) 100 unit/mL (3 mL) InPn pen Inject 6 Units into the skin 3 (three) times daily. 15 mL 11    insulin detemir U-100 (LEVEMIR) 100 unit/mL injection Inject 40 Units into the skin every evening.      lancets 33 gauge Misc 1 each by Misc.(Non-Drug; Combo Route) route 2 (two) times daily with meals. 100 each 11    lancing device Misc 1 Device by Misc.(Non-Drug; Combo Route) route 2 (two) times daily with  "meals. 1 each 0    multivitamin (THERAGRAN) per tablet Take 1 tablet by mouth once daily.       pen needle, diabetic 32 gauge x 1/4" Ndle 1 each by Misc.(Non-Drug; Combo Route) route 2 (two) times daily with meals. 100 each 11    pulse oximeter (PULSE OXIMETER) device by Apply Externally route 2 (two) times a day. Use twice daily at 8 AM and 3 PM and record the value in [employer] as directed. 1 each 0       Allergies:  Review of patient's allergies indicates:  No Known Allergies    Active Problems:  Patient Active Problem List   Diagnosis    Diabetes mellitus, new onset    HHNC (hyperglycemic hyperosmolar nonketotic coma)    Volume depletion, unspecified    GABRIELA (acute kidney injury)    DM type 2, uncontrolled, with renal complications    Abscess, scalp    Dehydration, moderate    Hypophosphatasia    Hypomagnesemia    Hypokalemia    HTN (hypertension)    Pneumonia due to COVID-19 virus    Hyperkalemia    Leukopenia    Increased anion gap metabolic acidosis    Type 2 diabetes mellitus with hyperglycemia, with long-term current use of insulin    Diabetic foot wound and Cellulitis       Diagnostic Studies:   Latest Reference Range & Units 04/04/22 05:26   Sodium 136 - 145 mmol/L 137   Potassium 3.5 - 5.1 mmol/L 3.9   Chloride 95 - 110 mmol/L 101   CO2 23 - 29 mmol/L 26   Anion Gap 8 - 16 mmol/L 10   BUN 6 - 20 mg/dL 19   Creatinine 0.5 - 1.4 mg/dL 2.3 (H)   EGFR if non African American >60 mL/min/1.73 m^2 33 ! [1]   EGFR if African American >60 mL/min/1.73 m^2 38 !   Glucose 70 - 110 mg/dL 133 (H)   Calcium 8.7 - 10.5 mg/dL 9.2   Alkaline Phosphatase 55 - 135 U/L 73   PROTEIN TOTAL 6.0 - 8.4 g/dL 8.2   Albumin 3.5 - 5.2 g/dL 3.0 (L)   BILIRUBIN TOTAL 0.1 - 1.0 mg/dL 0.4 [2]   AST 10 - 40 U/L 14   ALT 10 - 44 U/L 6 (L)     EKG (3/26/22):  Sinus tachycardia   Right bundle branch block     24 Hour Vitals:  Temp:  [36.7 °C (98.1 °F)-37.3 °C (99.1 °F)] 36.7 °C (98.1 °F)  Pulse:  [75-93] 80  Resp:  [14-18] " 16  SpO2:  [97 %-100 %] 99 %  BP: (134-165)/(78-92) 140/82   See Nursing Charting For Additional Vitals      Pre-op Assessment    I have reviewed the Patient Summary Reports.     I have reviewed the Nursing Notes.       Review of Systems  Anesthesia Hx:  No problems with previous Anesthesia  Denies Family Hx of Anesthesia complications.   Denies Personal Hx of Anesthesia complications.   Social:  Non-Smoker, No Alcohol Use    Cardiovascular:   Exercise tolerance: good Hypertension Denies Dysrhythmias.   Denies Angina. ECG has been reviewed.    Pulmonary:  Pulmonary Normal    Renal/:   Chronic Renal Disease    Hepatic/GI:  Hepatic/GI Normal    Musculoskeletal:   R foot diabetic ulcer   Neurological:  Neurology Normal    Endocrine:   Diabetes        Physical Exam  General: Well nourished, Cooperative and Alert    Airway:  Mallampati: III   Mouth Opening: Normal  TM Distance: Normal      Dental:Multiple missing teeth; none loose per pt.      Anesthesia Plan  Type of Anesthesia, risks & benefits discussed:    Anesthesia Type: MAC, Gen Supraglottic Airway  Intra-op Monitoring Plan: Standard ASA Monitors  Post Op Pain Control Plan: multimodal analgesia and IV/PO Opioids PRN  Induction:  IV  Informed Consent: Informed consent signed with the Patient and all parties understand the risks and agree with anesthesia plan.  All questions answered. Patient consented to blood products? Yes  ASA Score: 3  Day of Surgery Review of History & Physical: H&P Update referred to the surgeon/provider.  Anesthesia Plan Notes: Paper consents placed in chart.    Ready For Surgery From Anesthesia Perspective.     .

## 2022-04-04 ENCOUNTER — ANESTHESIA (OUTPATIENT)
Dept: SURGERY | Facility: HOSPITAL | Age: 47
DRG: 623 | End: 2022-04-04
Payer: MEDICAID

## 2022-04-04 LAB
ALBUMIN SERPL BCP-MCNC: 3 G/DL (ref 3.5–5.2)
ALP SERPL-CCNC: 73 U/L (ref 55–135)
ALT SERPL W/O P-5'-P-CCNC: 6 U/L (ref 10–44)
ANION GAP SERPL CALC-SCNC: 10 MMOL/L (ref 8–16)
AST SERPL-CCNC: 14 U/L (ref 10–40)
BILIRUB SERPL-MCNC: 0.4 MG/DL (ref 0.1–1)
BUN SERPL-MCNC: 19 MG/DL (ref 6–20)
CALCIUM SERPL-MCNC: 9.2 MG/DL (ref 8.7–10.5)
CHLORIDE SERPL-SCNC: 101 MMOL/L (ref 95–110)
CO2 SERPL-SCNC: 26 MMOL/L (ref 23–29)
CREAT SERPL-MCNC: 2.3 MG/DL (ref 0.5–1.4)
EST. GFR  (AFRICAN AMERICAN): 38 ML/MIN/1.73 M^2
EST. GFR  (NON AFRICAN AMERICAN): 33 ML/MIN/1.73 M^2
GLUCOSE SERPL-MCNC: 133 MG/DL (ref 70–110)
POCT GLUCOSE: 188 MG/DL (ref 70–110)
POCT GLUCOSE: 75 MG/DL (ref 70–110)
POTASSIUM SERPL-SCNC: 3.9 MMOL/L (ref 3.5–5.1)
PROT SERPL-MCNC: 8.2 G/DL (ref 6–8.4)
SODIUM SERPL-SCNC: 137 MMOL/L (ref 136–145)

## 2022-04-04 PROCEDURE — D9220A PRA ANESTHESIA: Mod: CRNA,,, | Performed by: NURSE ANESTHETIST, CERTIFIED REGISTERED

## 2022-04-04 PROCEDURE — 80053 COMPREHEN METABOLIC PANEL: CPT | Performed by: INTERNAL MEDICINE

## 2022-04-04 PROCEDURE — 37000008 HC ANESTHESIA 1ST 15 MINUTES: Performed by: PODIATRIST

## 2022-04-04 PROCEDURE — 99232 SBSQ HOSP IP/OBS MODERATE 35: CPT | Mod: 25,,, | Performed by: PODIATRIST

## 2022-04-04 PROCEDURE — 94760 N-INVAS EAR/PLS OXIMETRY 1: CPT

## 2022-04-04 PROCEDURE — 21400001 HC TELEMETRY ROOM

## 2022-04-04 PROCEDURE — 11043 PR DEBRIDEMENT, SKIN, SUB-Q TISSUE,MUSCLE,=<20 SQ CM: ICD-10-PCS | Mod: ,,, | Performed by: PODIATRIST

## 2022-04-04 PROCEDURE — D9220A PRA ANESTHESIA: ICD-10-PCS | Mod: ANES,,, | Performed by: ANESTHESIOLOGY

## 2022-04-04 PROCEDURE — 25000003 PHARM REV CODE 250: Performed by: NURSE ANESTHETIST, CERTIFIED REGISTERED

## 2022-04-04 PROCEDURE — 36000707: Performed by: PODIATRIST

## 2022-04-04 PROCEDURE — 37000009 HC ANESTHESIA EA ADD 15 MINS: Performed by: PODIATRIST

## 2022-04-04 PROCEDURE — 25000003 PHARM REV CODE 250: Performed by: STUDENT IN AN ORGANIZED HEALTH CARE EDUCATION/TRAINING PROGRAM

## 2022-04-04 PROCEDURE — D9220A PRA ANESTHESIA: ICD-10-PCS | Mod: CRNA,,, | Performed by: NURSE ANESTHETIST, CERTIFIED REGISTERED

## 2022-04-04 PROCEDURE — 36415 COLL VENOUS BLD VENIPUNCTURE: CPT | Performed by: INTERNAL MEDICINE

## 2022-04-04 PROCEDURE — 11043 DBRDMT MUSC&/FSCA 1ST 20/<: CPT | Mod: ,,, | Performed by: PODIATRIST

## 2022-04-04 PROCEDURE — D9220A PRA ANESTHESIA: Mod: ANES,,, | Performed by: ANESTHESIOLOGY

## 2022-04-04 PROCEDURE — 36000706: Performed by: PODIATRIST

## 2022-04-04 PROCEDURE — 63600175 PHARM REV CODE 636 W HCPCS: Performed by: STUDENT IN AN ORGANIZED HEALTH CARE EDUCATION/TRAINING PROGRAM

## 2022-04-04 PROCEDURE — 63600175 PHARM REV CODE 636 W HCPCS: Performed by: NURSE ANESTHETIST, CERTIFIED REGISTERED

## 2022-04-04 PROCEDURE — 71000033 HC RECOVERY, INTIAL HOUR: Performed by: PODIATRIST

## 2022-04-04 PROCEDURE — 27201423 OPTIME MED/SURG SUP & DEVICES STERILE SUPPLY: Performed by: PODIATRIST

## 2022-04-04 PROCEDURE — 99232 PR SUBSEQUENT HOSPITAL CARE,LEVL II: ICD-10-PCS | Mod: 25,,, | Performed by: PODIATRIST

## 2022-04-04 PROCEDURE — 25000003 PHARM REV CODE 250: Performed by: PODIATRIST

## 2022-04-04 RX ORDER — ONDANSETRON 2 MG/ML
INJECTION INTRAMUSCULAR; INTRAVENOUS
Status: DISCONTINUED | OUTPATIENT
Start: 2022-04-04 | End: 2022-04-04

## 2022-04-04 RX ORDER — MIDAZOLAM HYDROCHLORIDE 1 MG/ML
INJECTION, SOLUTION INTRAMUSCULAR; INTRAVENOUS
Status: DISCONTINUED | OUTPATIENT
Start: 2022-04-04 | End: 2022-04-04

## 2022-04-04 RX ORDER — PROPOFOL 10 MG/ML
VIAL (ML) INTRAVENOUS CONTINUOUS PRN
Status: DISCONTINUED | OUTPATIENT
Start: 2022-04-04 | End: 2022-04-04

## 2022-04-04 RX ORDER — LIDOCAINE HYDROCHLORIDE 20 MG/ML
INJECTION INTRAVENOUS
Status: DISCONTINUED | OUTPATIENT
Start: 2022-04-04 | End: 2022-04-04

## 2022-04-04 RX ORDER — SODIUM CHLORIDE 0.9 % (FLUSH) 0.9 %
10 SYRINGE (ML) INJECTION
Status: DISCONTINUED | OUTPATIENT
Start: 2022-04-04 | End: 2022-04-04 | Stop reason: HOSPADM

## 2022-04-04 RX ORDER — BUPIVACAINE HYDROCHLORIDE 2.5 MG/ML
INJECTION, SOLUTION INFILTRATION; PERINEURAL
Status: DISCONTINUED | OUTPATIENT
Start: 2022-04-04 | End: 2022-04-04 | Stop reason: HOSPADM

## 2022-04-04 RX ORDER — LIDOCAINE HYDROCHLORIDE 10 MG/ML
INJECTION INFILTRATION; PERINEURAL
Status: DISCONTINUED | OUTPATIENT
Start: 2022-04-04 | End: 2022-04-04 | Stop reason: HOSPADM

## 2022-04-04 RX ORDER — FENTANYL CITRATE 50 UG/ML
INJECTION, SOLUTION INTRAMUSCULAR; INTRAVENOUS
Status: DISCONTINUED | OUTPATIENT
Start: 2022-04-04 | End: 2022-04-04

## 2022-04-04 RX ORDER — ONDANSETRON 2 MG/ML
4 INJECTION INTRAMUSCULAR; INTRAVENOUS DAILY PRN
Status: DISCONTINUED | OUTPATIENT
Start: 2022-04-04 | End: 2022-04-04 | Stop reason: HOSPADM

## 2022-04-04 RX ORDER — HYDROMORPHONE HYDROCHLORIDE 2 MG/ML
0.2 INJECTION, SOLUTION INTRAMUSCULAR; INTRAVENOUS; SUBCUTANEOUS EVERY 5 MIN PRN
Status: DISCONTINUED | OUTPATIENT
Start: 2022-04-04 | End: 2022-04-04 | Stop reason: HOSPADM

## 2022-04-04 RX ORDER — PROPOFOL 10 MG/ML
VIAL (ML) INTRAVENOUS
Status: DISCONTINUED | OUTPATIENT
Start: 2022-04-04 | End: 2022-04-04

## 2022-04-04 RX ORDER — CEFPODOXIME PROXETIL 200 MG/1
400 TABLET, FILM COATED ORAL EVERY 12 HOURS
Status: DISCONTINUED | OUTPATIENT
Start: 2022-04-04 | End: 2022-04-06 | Stop reason: HOSPADM

## 2022-04-04 RX ADMIN — DEXTROSE 2 G: 50 INJECTION, SOLUTION INTRAVENOUS at 06:04

## 2022-04-04 RX ADMIN — HYDRALAZINE HYDROCHLORIDE 25 MG: 25 TABLET, FILM COATED ORAL at 06:04

## 2022-04-04 RX ADMIN — MIDAZOLAM HYDROCHLORIDE 2 MG: 1 INJECTION, SOLUTION INTRAMUSCULAR; INTRAVENOUS at 11:04

## 2022-04-04 RX ADMIN — PROPOFOL 30 MG: 10 INJECTION, EMULSION INTRAVENOUS at 12:04

## 2022-04-04 RX ADMIN — ONDANSETRON 4 MG: 2 INJECTION, SOLUTION INTRAMUSCULAR; INTRAVENOUS at 12:04

## 2022-04-04 RX ADMIN — INSULIN ASPART 2 UNITS: 100 INJECTION, SOLUTION INTRAVENOUS; SUBCUTANEOUS at 05:04

## 2022-04-04 RX ADMIN — FENTANYL CITRATE 25 MCG: 50 INJECTION, SOLUTION INTRAMUSCULAR; INTRAVENOUS at 12:04

## 2022-04-04 RX ADMIN — HYDRALAZINE HYDROCHLORIDE 25 MG: 25 TABLET, FILM COATED ORAL at 02:04

## 2022-04-04 RX ADMIN — GLYCOPYRROLATE 0.1 MG: 0.2 INJECTION, SOLUTION INTRAMUSCULAR; INTRAVITREAL at 11:04

## 2022-04-04 RX ADMIN — CEFPODOXIME PROXETIL 400 MG: 200 TABLET, FILM COATED ORAL at 08:04

## 2022-04-04 RX ADMIN — PROPOFOL 50 MCG/KG/MIN: 10 INJECTION, EMULSION INTRAVENOUS at 12:04

## 2022-04-04 RX ADMIN — SODIUM CHLORIDE, SODIUM LACTATE, POTASSIUM CHLORIDE, AND CALCIUM CHLORIDE: .6; .31; .03; .02 INJECTION, SOLUTION INTRAVENOUS at 11:04

## 2022-04-04 RX ADMIN — FENTANYL CITRATE 50 MCG: 50 INJECTION, SOLUTION INTRAMUSCULAR; INTRAVENOUS at 12:04

## 2022-04-04 RX ADMIN — INSULIN ASPART 12 UNITS: 100 INJECTION, SOLUTION INTRAVENOUS; SUBCUTANEOUS at 05:04

## 2022-04-04 RX ADMIN — HYDRALAZINE HYDROCHLORIDE 25 MG: 25 TABLET, FILM COATED ORAL at 10:04

## 2022-04-04 RX ADMIN — LIDOCAINE HYDROCHLORIDE 60 MG: 20 INJECTION, SOLUTION INTRAVENOUS at 12:04

## 2022-04-04 NOTE — OP NOTE
[facility] [place] - Surgery  Operative Note      Date of Procedure: 4/4/2022     Procedure: Procedure(s) (LRB):  DEBRIDEMENT, FOOT (Right)     Surgeon(s) and Role:     * Amena Martinez DPM - Primary    Assisting Surgeon: None    Pre-Operative Diagnosis: Diabetic foot ulcer associated with type 2 diabetes mellitus, unspecified laterality, unspecified part of foot, unspecified ulcer stage [E11.621, L97.509]    Post-Operative Diagnosis: Post-Op Diagnosis Codes:     * Diabetic foot ulcer associated with type 2 diabetes mellitus, unspecified laterality, unspecified part of foot, unspecified ulcer stage [E11.621, L97.509]    Anesthesia: Local MAC + 10cc 1:1 mixture 0.5% marcaine plain 1% lidocaine plain       Description of Technical Procedures:     The patient was brought to the operating room on a stretcher and placed on the operating table in a supine position. Following the successful induction of MAC anesthesia, a tourniquet was applied to the patients right ankle. Following this, a local anesthetic block consisting of aproximately 10cc of  1:1 mixture of 1% lidocaine plain + 0.5% marcaine  plain was injected. Then, the right foot was scrubbed, prepped and draped in the usual aseptic manner. Tourniquet not applied for duration of procedure.     Attention was then directed to the right foot. A sterile #15 blade was used to excisionally debride viable and non viable tissue on the right foot. Tissue debrided through epidermis, dermis, and subcutaneous tissue. Tissue excised included epidermis, dermis, sucutaneous tissue, fibrin, biofilm including muscle. The wound was 100% granular and no drainage, or signs of infection were noted post-debridement. The wound was cleansed with 3L NS pulse lavage. Surgical site closed with 3-0 nylon multiple simple sutures lateral incision site packed with 1/4in plain packing soaked in betadine. Wound wrapped with cast padding, kerlix and ACE.     The patient tolerated the procedure and  anesthesia well. He was transferred to the recovery room with vital signs stable, vascular status intact, and capillary refill time < 3 seconds to the distal right foot. The patient tolerated the procedure and anesthesia well.  Following a period of post op monitoring, the patient will be discharged home on the following written and oral post op instructions:   1. Keep dressing dry and intact until clinic visit.  2. Avoid excessive ambulation, ambulate with surgical shoe on at all times.   3. Ice and elevate right foot when at rest.            Significant Surgical Tasks Conducted by the Assistant(s), if Applicable: None     Estimated Blood Loss (EBL): 15 mL           Implants: * No implants in log *    Specimens:   Specimen (24h ago, onward)            None                  Condition: Good    Disposition: PACU - hemodynamically stable.    Attestation: I was present and scrubbed for the entire procedure.

## 2022-04-04 NOTE — ANESTHESIA POSTPROCEDURE EVALUATION
Anesthesia Post Evaluation    Patient: Ashutosh Ferrari    Procedure(s) Performed: Procedure(s) (LRB):  INCISION AND DRAINAGE RIGHT FOOT (Right)    Final Anesthesia Type: MAC      Patient location during evaluation: PACU  Patient participation: Yes- Able to Participate  Level of consciousness: awake and alert  Post-procedure vital signs: reviewed and stable  Pain management: adequate  Airway patency: patent    PONV status at discharge: No PONV  Anesthetic complications: no      Cardiovascular status: blood pressure returned to baseline and hemodynamically stable  Respiratory status: unassisted and spontaneous ventilation  Hydration status: euvolemic  Follow-up not needed.          Vitals Value Taken Time   /84 04/04/22 0438   Temp 37 °C (98.6 °F) 04/04/22 0438   Pulse 84 04/04/22 0438   Resp 19 04/04/22 0438   SpO2 97 % 04/04/22 0438         Event Time   Out of Recovery 03/28/2022 18:08:00         Pain/Samantha Score: Pain Rating Prior to Med Admin: 7 (4/3/2022  8:40 PM)

## 2022-04-04 NOTE — PROGRESS NOTES
Renal Progress Note    Date of Admission:  3/26/2022 11:08 PM    Length of Stay: 7  Days    Subjective: n/a    Objective:    Current Facility-Administered Medications   Medication    0.45% NaCl infusion    acetaminophen tablet 1,000 mg    amLODIPine tablet 2.5 mg    cefpodoxime tablet 400 mg    dextrose 10% bolus 125 mL    dextrose 10% bolus 250 mL    glucagon (human recombinant) injection 1 mg    glucose chewable tablet 16 g    glucose chewable tablet 24 g    hydrALAZINE injection 10 mg    hydrALAZINE tablet 25 mg    influenza (QUADRIVALENT PF) vaccine 0.5 mL    insulin aspart U-100 pen 1-10 Units    insulin aspart U-100 pen 12 Units    insulin detemir U-100 pen 20 Units    morphine injection 2 mg    ondansetron disintegrating tablet 4 mg    sars-cov-2 (covid-19) (Pfizer COVID-19) 30 mcg/0.3 ml injection 0.3 mL    sodium chloride 0.9% flush 10 mL    traMADoL tablet 50 mg       Vitals:    04/03/22 2040 04/03/22 2319 04/04/22 0438 04/04/22 0745   BP:  (!) 147/83 (!) 144/84 138/77   BP Location:  Right arm Right arm    Patient Position:  Lying Lying    Pulse:  101 84 97   Resp: 19 18 19 20   Temp:  99.3 °F (37.4 °C) 98.6 °F (37 °C) 98.2 °F (36.8 °C)   TempSrc:  Oral Oral    SpO2:  97% 97% 97%   Weight:       Height:           I/O last 3 completed shifts:  In: 720 [P.O.:720]  Out: 3650 [Urine:3650]  No intake/output data recorded.      Physical Exam: out of unit in OR      Laboratories:    No results for input(s): WBC, RBC, HGB, HCT, PLT, MCV, MCH, MCHC in the last 24 hours.    Recent Labs   Lab 04/04/22  0526   CALCIUM 9.2   PROT 8.2      K 3.9   CO2 26      BUN 19   CREATININE 2.3*   ALKPHOS 73   ALT 6*   AST 14   BILITOT 0.4       No results for input(s): COLORU, CLARITYU, SPECGRAV, PHUR, PROTEINUA, GLUCOSEU, BLOODU, WBCU, RBCU, BACTERIA, MUCUS in the last 24 hours.    Invalid input(s):  BILIRUBINCON    Microbiology Results (last 7 days)     Procedure Component  Value Units Date/Time    Culture, Anaerobe [[id number]]  (Abnormal) Collected: 03/27/22 1354    Order Status: Completed Specimen: Wound from Foot, Right Updated: 04/01/22 1337     Anaerobic Culture PREVOTELLA (B.) BIVIA  Few      Culture, Anaerobe [[id number]] Collected: 03/28/22 1436    Order Status: Completed Specimen: Bone from Toe, Right Foot Updated: 04/01/22 0915     Anaerobic Culture No anaerobes isolated    Narrative:      1. RIGHT FIFTH TOE    Aerobic culture [[id number]] Collected: 03/28/22 1436    Order Status: Completed Specimen: Bone from Toe, Right Foot Updated: 04/01/22 0806     Aerobic Bacterial Culture No growth    Narrative:      1. RIGHT FIFTH TOE    Blood Culture #1 **CANNOT BE ORDERED STAT** [[id number]] Collected: 03/26/22 2349    Order Status: Completed Specimen: Blood from Peripheral, Forearm, Left Updated: 03/31/22 0303     Blood Culture, Routine No Growth after 4 days.     Blood Culture #2 **CANNOT BE ORDERED STAT** [[id number]] Collected: 03/26/22 2337    Order Status: Completed Specimen: Blood from Peripheral, Forearm, Right Updated: 03/31/22 0303     Blood Culture, Routine No Growth after 4 days.     AFB Culture & Smear [[id number]] Collected: 03/28/22 1436    Order Status: Completed Specimen: Bone from Toe, Right Foot Updated: 03/30/22 2127     AFB Culture & Smear Culture in progress     AFB CULTURE STAIN No acid fast bacilli seen.    Narrative:      1. RIGHT FIFTH TOE    Aerobic culture [[id number]]  (Abnormal)  (Susceptibility) Collected: 03/27/22 1354    Order Status: Completed Specimen: Wound from Foot, Right Updated: 03/29/22 1039     Aerobic Bacterial Culture PROTEUS MIRABILIS  Few        STREPTOCOCCUS AGALACTIAE (GROUP B)  Many  Beta-hemolytic streptococci are routinely susceptible to   penicillins,cephalosporins and carbapenems.      Gram stain [[id number]] Collected: 03/28/22 1436    Order Status: Completed Specimen: Bone from Toe, Right Foot Updated: 03/29/22 0738     Gram Stain  Result No WBC's      No organisms seen    Narrative:      1. RIGHT FIFTH TOE    Fungus culture [[id number]] Collected: 03/28/22 143    Order Status: Sent Specimen: Bone from Toe, Right Foot Updated: 03/28/22 1080            Diagnostic Tests: n/a        Assessment:    45 y/o male admitted with:    GABRIELA - non oliguric  HTN  Chronically uncontrolled DM type 2 (HgbA1C 12.5)  DM foot infection s/p I&D  Anemia  Hypoalbuminemia        Plan:    - Wound care per Podiatry  - Antib. Per admitting  - ADA diet  - Will follow Ffb-ujlmg-bbofh-acid/base  - Glycemic control and other problems per admitting

## 2022-04-04 NOTE — PROGRESS NOTES
[facility] [place] [facility]  Podiatry  Progress Note    Patient Name: Ashutosh Ferrari  MRN: [id number]  Admission Date: 3/26/2022  Hospital Length of Stay: 7 days  Attending Physician: Phoebe La MD  Primary Care Provider: [facility]     Subjective:     Interval History:     03/29/2022 post operative day 1 status post incision and drainage of the right foot with trocar bone biopsy of the proximal phalanx of the 5th digit.  Patient seen at bedside resting comfortably.  He relates some tingling and burning to the surgical site over night but overall pain well controlled.  No further pedal complaints.    3/30/22: Patient seen bedside. Bandage intact right foot.     4/1/22: Patient seen bedside. Right foot OR debridement rescheduled to 4/4/22 due scheduling conflicts.     4/4/22: Plan for right foot debridement today with delayed primary closure.     Follow-up For: Procedure(s) (LRB):  INCISION AND DRAINAGE RIGHT FOOT (Right)    Post-Operative Day: 1 Day Post-Op    Scheduled Meds:   amLODIPine  2.5 mg Oral Daily    cefpodoxime  400 mg Oral Q12H    hydrALAZINE  25 mg Oral Q8H    insulin aspart U-100  12 Units Subcutaneous TIDWM    insulin detemir U-100  20 Units Subcutaneous BID     Continuous Infusions:   sodium chloride 0.45% 100 mL/hr at 04/03/22 1536     PRN Meds:acetaminophen, dextrose 10%, dextrose 10%, glucagon (human recombinant), glucose, glucose, hydrALAZINE, HYDROmorphone, influenza, insulin aspart U-100, morphine, ondansetron, ondansetron, sars-cov-2 (covid-19), sodium chloride 0.9%, sodium chloride 0.9%, traMADoL    Review of Systems   Constitutional: Negative for activity change, appetite change, chills, fatigue and fever.   Respiratory: Negative for cough and shortness of breath.    Cardiovascular: Negative for chest pain and leg swelling.   Gastrointestinal: Negative for diarrhea, nausea and vomiting.   Musculoskeletal: Positive for arthralgias and myalgias.   Skin: Positive for wound.    Neurological: Negative for weakness.        + paresthesia      Objective:     Vital Signs (Most Recent):  Temp: 98.3 °F (36.8 °C) (04/04/22 1622)  Pulse: 91 (04/04/22 1622)  Resp: 19 (04/04/22 1622)  BP: 139/78 (04/04/22 1622)  SpO2: 99 % (04/04/22 1622) Vital Signs (24h Range):  Temp:  [97.8 °F (36.6 °C)-99.3 °F (37.4 °C)] 98.3 °F (36.8 °C)  Pulse:  [] 91  Resp:  [18-20] 19  SpO2:  [97 %-99 %] 99 %  BP: (132-158)/(77-99) 139/78     Weight: 86.2 kg (190 lb 1.6 oz)  Body mass index is 23.76 kg/m².    Foot Exam     General  Orientation: alert and oriented to person, place, and time   Affect: appropriate         Right Foot/Ankle      Inspection and Palpation  Skin Exam: drainage, cellulitis, skin changes, abnormal color, ulcer and erythema; skin not intact      Neurovascular  Dorsalis pedis: 2+  Posterior tibial: 1+  Saphenous nerve sensation: absent  Tibial nerve sensation: absent  Superficial peroneal nerve sensation: absent  Deep peroneal nerve sensation: absent  Sural nerve sensation: absent        Left Foot/Ankle       Inspection and Palpation  Ecchymosis: none  Tenderness: none   Swelling: none   Skin Exam: no drainage, skin not intact, no cellulitis, no abnormal color and no erythema      Neurovascular  Dorsalis pedis: 2+  Posterior tibial: 1+  Saphenous nerve sensation: absent  Tibial nerve sensation: absent  Superficial peroneal nerve sensation: absent  Deep peroneal nerve sensation: absent  Sural nerve sensation: absent     Comments  Plantar lateral hyperkeratotic lesion sub 5th met head    3/30/22:               03/29/2022    Ulcer location: surgical incision dorsal lateral proximal phalanx of the right 5th digit  Signs of infection:local edema and erythema  Drainage: primarily sanguinous  Purulence: scant  Crepitus/fluctuance: no  Periwound: Reddened  Base: Granular/Healthy  Depth: muscle  Probe to bone: yes          3/28/22:  Right foot:   Purulence, Mal odor, erythema ascending from plantar  lateral distal right foot to mid and fore foot. Temperature notably increased compared to contralateral. Dorsal sinus probes to capsule/periosteum. Lateral sinus probes to soft tissue. Plantar ulceration undermines distally about 1.5 cm.                 Laboratory:  CBC:   Recent Labs   Lab 03/29/22  0402   WBC 8.90   RBC 3.86*   HGB 11.3*   HCT 33.7*      MCV 87   MCH 29.3   MCHC 33.5     CMP:   Recent Labs   Lab 04/04/22  0526   *   CALCIUM 9.2   ALBUMIN 3.0*   PROT 8.2      K 3.9   CO2 26      BUN 19   CREATININE 2.3*   ALKPHOS 73   ALT 6*   AST 14   BILITOT 0.4     CRP: No results for input(s): CRP in the last 168 hours.  ESR: No results for input(s): SEDRATE in the last 168 hours.  Microbiology Results (last 7 days)     Procedure Component Value Units Date/Time    Culture, Anaerobe [[id number]]  (Abnormal) Collected: 03/27/22 1354    Order Status: Completed Specimen: Wound from Foot, Right Updated: 04/01/22 1337     Anaerobic Culture PREVOTELLA (B.) BIVIA  Few      Culture, Anaerobe [[id number]] Collected: 03/28/22 1436    Order Status: Completed Specimen: Bone from Toe, Right Foot Updated: 04/01/22 0915     Anaerobic Culture No anaerobes isolated    Narrative:      1. RIGHT FIFTH TOE    Aerobic culture [[id number]] Collected: 03/28/22 1436    Order Status: Completed Specimen: Bone from Toe, Right Foot Updated: 04/01/22 0806     Aerobic Bacterial Culture No growth    Narrative:      1. RIGHT FIFTH TOE    Blood Culture #1 **CANNOT BE ORDERED STAT** [[id number]] Collected: 03/26/22 2349    Order Status: Completed Specimen: Blood from Peripheral, Forearm, Left Updated: 03/31/22 0303     Blood Culture, Routine No Growth after 4 days.     Blood Culture #2 **CANNOT BE ORDERED STAT** [[id number]] Collected: 03/26/22 2337    Order Status: Completed Specimen: Blood from Peripheral, Forearm, Right Updated: 03/31/22 0303     Blood Culture, Routine No Growth after 4 days.     AFB Culture & Smear  [[id number]] Collected: 03/28/22 1436    Order Status: Completed Specimen: Bone from Toe, Right Foot Updated: 03/30/22 2127     AFB Culture & Smear Culture in progress     AFB CULTURE STAIN No acid fast bacilli seen.    Narrative:      1. RIGHT FIFTH TOE    Aerobic culture [[id number]]  (Abnormal)  (Susceptibility) Collected: 03/27/22 1354    Order Status: Completed Specimen: Wound from Foot, Right Updated: 03/29/22 1039     Aerobic Bacterial Culture PROTEUS MIRABILIS  Few        STREPTOCOCCUS AGALACTIAE (GROUP B)  Many  Beta-hemolytic streptococci are routinely susceptible to   penicillins,cephalosporins and carbapenems.      Gram stain [[id number]] Collected: 03/28/22 1436    Order Status: Completed Specimen: Bone from Toe, Right Foot Updated: 03/29/22 0738     Gram Stain Result No WBC's      No organisms seen    Narrative:      1. RIGHT FIFTH TOE          Diagnostic Results:  Imaging Results          X-Ray Foot Complete Right (Final result)  Result time 03/26/22 22:16:22    Final result by Wyatt Francois MD (03/26/22 22:16:22)                 Impression:      No acute displaced fracture seen.      Electronically signed by: Wyatt Francois MD  Date:    03/26/2022  Time:    22:16             Narrative:    EXAMINATION:  XR ANKLE COMPLETE 3 VIEW RIGHT; XR FOOT COMPLETE 3 VIEW RIGHT    CLINICAL HISTORY:  Injury, unspecified, initial encounter    TECHNIQUE:  AP, lateral, and oblique images of the right ankle were performed.  Right foot three views.    COMPARISON:  None    FINDINGS:  No evidence of acute displaced fracture, dislocation, or osseous destructive process.  Ankle mortise is maintained.  Lisfranc articulation appears congruent.  Mild degenerative changes and spurring are seen throughout the midfoot.  There is mild posterior and plantar calcaneal spurring.    Prominent soft tissue swelling with small amount of soft tissue air is seen involving the 5th toe.  Mild soft tissue swelling is also seen over the  lateral malleolus.                               X-Ray Ankle Complete Right (Final result)  Result time 03/26/22 22:16:22    Final result by Wyatt Francois MD (03/26/22 22:16:22)                 Impression:      No acute displaced fracture seen.      Electronically signed by: Wyatt Francois MD  Date:    03/26/2022  Time:    22:16             Narrative:    EXAMINATION:  XR ANKLE COMPLETE 3 VIEW RIGHT; XR FOOT COMPLETE 3 VIEW RIGHT    CLINICAL HISTORY:  Injury, unspecified, initial encounter    TECHNIQUE:  AP, lateral, and oblique images of the right ankle were performed.  Right foot three views.    COMPARISON:  None    FINDINGS:  No evidence of acute displaced fracture, dislocation, or osseous destructive process.  Ankle mortise is maintained.  Lisfranc articulation appears congruent.  Mild degenerative changes and spurring are seen throughout the midfoot.  There is mild posterior and plantar calcaneal spurring.    Prominent soft tissue swelling with small amount of soft tissue air is seen involving the 5th toe.  Mild soft tissue swelling is also seen over the lateral malleolus.                                  Assessment/Plan:     Active Diagnoses:    Diagnosis Date Noted POA    PRINCIPAL PROBLEM:  Diabetic foot wound and Cellulitis [E11.621, L97.509] 03/27/2022 Yes    Type 2 diabetes mellitus with hyperglycemia, with long-term current use of insulin [E11.65, Z79.4] 03/27/2022 Not Applicable    HTN (hypertension) [I10] 07/18/2013 Yes    GABRIELA (acute kidney injury) [N17.9] 07/17/2013 Yes      Problems Resolved During this Admission:         Plan for delayed primary closure/ right foot debridement today.     The risks, benefits, complications, treatment options, and expected outcomes were discussed with the patient. The risks and potential complications of their problem and purposed treatment include but are not limited to infection, nerve injury, vascular injury, persistent pain, potential skin necrosis, deep  vein thrombosis, possible pulmonary embolus,and complications of the anesthetics. Informed verbal and written consent was obtained. Consent forms read, signed, witnessed.    F/u one week Dr. Martinez upon discharge.     Short-term goals include maintaining good offloading and minimizing bioburden, promoting granulation and epithelialization to healing.  Long-term goals include keeping the wound healed by good offloading and medical management under the direction of internist.      Amena Martinez DPM  Podiatry  [facility] - Telemetry

## 2022-04-04 NOTE — PLAN OF CARE
Problem: Adult Inpatient Plan of Care  Goal: Plan of Care Review  Outcome: Ongoing, Progressing  Goal: Patient-Specific Goal (Individualized)  Outcome: Ongoing, Progressing  Goal: Absence of Hospital-Acquired Illness or Injury  Outcome: Ongoing, Progressing  Goal: Optimal Comfort and Wellbeing  Outcome: Ongoing, Progressing  Goal: Readiness for Transition of Care  Outcome: Ongoing, Progressing     Problem: Fluid and Electrolyte Imbalance (Acute Kidney Injury/Impairment)  Goal: Fluid and Electrolyte Balance  Outcome: Ongoing, Progressing     Problem: Oral Intake Inadequate (Acute Kidney Injury/Impairment)  Goal: Optimal Nutrition Intake  Outcome: Ongoing, Progressing     Problem: Renal Function Impairment (Acute Kidney Injury/Impairment)  Goal: Effective Renal Function  Outcome: Ongoing, Progressing     Problem: Diabetes Comorbidity  Goal: Blood Glucose Level Within Targeted Range  Outcome: Ongoing, Progressing     Problem: Fall Injury Risk  Goal: Absence of Fall and Fall-Related Injury  Outcome: Ongoing, Progressing

## 2022-04-04 NOTE — ANESTHESIA POSTPROCEDURE EVALUATION
Anesthesia Post Evaluation    Patient: Ashutosh Ferrari    Procedure(s) Performed: Procedure(s) (LRB):  DEBRIDEMENT, FOOT (Right)    Final Anesthesia Type: MAC      Patient location during evaluation: PACU  Patient participation: Yes- Able to Participate  Level of consciousness: awake and alert  Post-procedure vital signs: reviewed and stable  Pain management: adequate  Airway patency: patent    PONV status at discharge: No PONV  Anesthetic complications: no      Cardiovascular status: hemodynamically stable  Respiratory status: unassisted and spontaneous ventilation  Hydration status: euvolemic  Follow-up not needed.          Vitals Value Taken Time   /99 04/04/22 1400   Temp 36.6 °C (97.8 °F) 04/04/22 1312   Pulse 80 04/04/22 1345   Resp 20 04/04/22 1345   SpO2 98 % 04/04/22 1345   Vitals shown include unvalidated device data.      Event Time   Out of Recovery 04/04/2022 13:48:05         Pain/Samantha Score: Pain Rating Prior to Med Admin: 7 (4/3/2022  8:40 PM)  Samantha Score: 10 (4/4/2022  1:34 PM)

## 2022-04-04 NOTE — SUBJECTIVE & OBJECTIVE
Interval History: patient doing well post op and is anxious to go home. Will discuss with nephrology and podiatry tomorrow, sCr remains stable but still elevated.     Review of Systems   Constitutional:  Positive for activity change and fatigue. Negative for chills and fever.   Respiratory: Negative.     Cardiovascular: Negative.    Gastrointestinal:  Positive for nausea.   Musculoskeletal:  Positive for gait problem and joint swelling.   Skin:  Positive for wound.   Objective:     Vital Signs (Most Recent):  Temp: 98.3 °F (36.8 °C) (04/04/22 1622)  Pulse: 91 (04/04/22 1622)  Resp: 19 (04/04/22 1622)  BP: 139/78 (04/04/22 1622)  SpO2: 99 % (04/04/22 1622)   Vital Signs (24h Range):  Temp:  [97.8 °F (36.6 °C)-99.3 °F (37.4 °C)] 98.3 °F (36.8 °C)  Pulse:  [] 91  Resp:  [18-20] 19  SpO2:  [97 %-99 %] 99 %  BP: (132-158)/(77-99) 139/78     Weight: 86.2 kg (190 lb 1.6 oz)  Body mass index is 23.76 kg/m².    Intake/Output Summary (Last 24 hours) at 4/4/2022 1817  Last data filed at 4/4/2022 1814  Gross per 24 hour   Intake 650 ml   Output 1515 ml   Net -865 ml        Physical Exam  Constitutional:       General: He is not in acute distress.     Appearance: Normal appearance. He is normal weight. He is not ill-appearing.   HENT:      Head: Atraumatic.   Cardiovascular:      Rate and Rhythm: Normal rate and regular rhythm.   Pulmonary:      Effort: Pulmonary effort is normal.      Breath sounds: Normal breath sounds.   Musculoskeletal:      Comments: Right foot dressing intact   Skin:     General: Skin is warm and dry.   Neurological:      Mental Status: He is oriented to person, place, and time. Mental status is at baseline.      Sensory: Sensory deficit present.      Gait: Gait abnormal.       Significant Labs: All pertinent labs within the past 24 hours have been reviewed.    Significant Imaging: I have reviewed all pertinent imaging results/findings within the past 24 hours.

## 2022-04-04 NOTE — NURSING
Pt refused his morning accucheck. Pt states that he is not eating so he is refusing it. Also asked to hold off on morning medications as he wants to get sleep. Pt updated on plan of care, all questions answered.

## 2022-04-04 NOTE — NURSING
Assumed care of patient from JHONY Espinosa. Patient lying in bed resting, NAD noted, Safety Precautions maintained.

## 2022-04-04 NOTE — TRANSFER OF CARE
"Anesthesia Transfer of Care Note    Patient: Ashutosh Ferrari    Procedure(s) Performed: Procedure(s) (LRB):  DEBRIDEMENT, FOOT (Right)    Patient location: PACU    Anesthesia Type: MAC    Transport from OR: Transported from OR on room air with adequate spontaneous ventilation    Post pain: adequate analgesia    Post assessment: no apparent anesthetic complications    Post vital signs: stable    Level of consciousness: awake, alert and oriented    Nausea/Vomiting: no nausea/vomiting    Complications: none    Transfer of care protocol was followed      Last vitals:   Visit Vitals  /86 (BP Location: Left arm, Patient Position: Lying)   Pulse 97   Temp 36.6 °C (97.8 °F) (Oral)   Resp 18   Ht 6' 3" (1.905 m)   Wt 86.2 kg (190 lb 1.6 oz)   SpO2 98%   BMI 23.76 kg/m²     "

## 2022-04-04 NOTE — NURSING
Report given to nurse Weiss who will assume the patient's care. He appears to be asleep in no apparent distress. Chart check completed.
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
## 2022-04-04 NOTE — NURSING
Patient returned back to room from PACU. Dressing C/D/I to right foot. NAD noted. Safety Precautions maintained, will monitor. /99, P 75 R 20 SpO2 100% (RA).

## 2022-04-04 NOTE — PLAN OF CARE
04/04/22 1640   Discharge Reassessment   Assessment Type Discharge Planning Reassessment   Did the patient's condition or plan change since previous assessment? No   Discharge Plan discussed with: Patient   Communicated ROCCO with patient/caregiver Yes   Discharge Plan A Home   DME Needed Upon Discharge  none   Discharge Barriers Identified None   Why the patient remains in the hospital Requires continued medical care   Patient will be established with wound care clinic at time of DC

## 2022-04-05 LAB
ALBUMIN SERPL BCP-MCNC: 3.5 G/DL (ref 3.5–5.2)
ANION GAP SERPL CALC-SCNC: 12 MMOL/L (ref 8–16)
BUN SERPL-MCNC: 23 MG/DL (ref 6–20)
CALCIUM SERPL-MCNC: 10.4 MG/DL (ref 8.7–10.5)
CHLORIDE SERPL-SCNC: 101 MMOL/L (ref 95–110)
CO2 SERPL-SCNC: 23 MMOL/L (ref 23–29)
CREAT SERPL-MCNC: 2.5 MG/DL (ref 0.5–1.4)
EST. GFR  (AFRICAN AMERICAN): 35 ML/MIN/1.73 M^2
EST. GFR  (NON AFRICAN AMERICAN): 30 ML/MIN/1.73 M^2
GLUCOSE SERPL-MCNC: 149 MG/DL (ref 70–110)
PHOSPHATE SERPL-MCNC: 3.8 MG/DL (ref 2.7–4.5)
POCT GLUCOSE: 115 MG/DL (ref 70–110)
POCT GLUCOSE: 157 MG/DL (ref 70–110)
POCT GLUCOSE: 192 MG/DL (ref 70–110)
POCT GLUCOSE: 86 MG/DL (ref 70–110)
POTASSIUM SERPL-SCNC: 4.2 MMOL/L (ref 3.5–5.1)
SODIUM SERPL-SCNC: 136 MMOL/L (ref 136–145)

## 2022-04-05 PROCEDURE — 21400001 HC TELEMETRY ROOM

## 2022-04-05 PROCEDURE — 25000003 PHARM REV CODE 250: Performed by: STUDENT IN AN ORGANIZED HEALTH CARE EDUCATION/TRAINING PROGRAM

## 2022-04-05 PROCEDURE — 99232 SBSQ HOSP IP/OBS MODERATE 35: CPT | Mod: 24,,, | Performed by: PODIATRIST

## 2022-04-05 PROCEDURE — 25000003 PHARM REV CODE 250: Performed by: NURSE PRACTITIONER

## 2022-04-05 PROCEDURE — 99232 PR SUBSEQUENT HOSPITAL CARE,LEVL II: ICD-10-PCS | Mod: 24,,, | Performed by: PODIATRIST

## 2022-04-05 PROCEDURE — 36415 COLL VENOUS BLD VENIPUNCTURE: CPT | Performed by: STUDENT IN AN ORGANIZED HEALTH CARE EDUCATION/TRAINING PROGRAM

## 2022-04-05 PROCEDURE — 80069 RENAL FUNCTION PANEL: CPT | Performed by: STUDENT IN AN ORGANIZED HEALTH CARE EDUCATION/TRAINING PROGRAM

## 2022-04-05 RX ADMIN — CEFPODOXIME PROXETIL 400 MG: 200 TABLET, FILM COATED ORAL at 09:04

## 2022-04-05 RX ADMIN — INSULIN DETEMIR 20 UNITS: 100 INJECTION, SOLUTION SUBCUTANEOUS at 09:04

## 2022-04-05 RX ADMIN — AMLODIPINE BESYLATE 2.5 MG: 2.5 TABLET ORAL at 09:04

## 2022-04-05 RX ADMIN — INSULIN ASPART 12 UNITS: 100 INJECTION, SOLUTION INTRAVENOUS; SUBCUTANEOUS at 07:04

## 2022-04-05 RX ADMIN — INSULIN ASPART 12 UNITS: 100 INJECTION, SOLUTION INTRAVENOUS; SUBCUTANEOUS at 05:04

## 2022-04-05 RX ADMIN — HYDRALAZINE HYDROCHLORIDE 25 MG: 25 TABLET, FILM COATED ORAL at 05:04

## 2022-04-05 RX ADMIN — HYDRALAZINE HYDROCHLORIDE 25 MG: 25 TABLET, FILM COATED ORAL at 09:04

## 2022-04-05 RX ADMIN — TRAMADOL HYDROCHLORIDE 50 MG: 50 TABLET, COATED ORAL at 09:04

## 2022-04-05 RX ADMIN — INSULIN ASPART 2 UNITS: 100 INJECTION, SOLUTION INTRAVENOUS; SUBCUTANEOUS at 07:04

## 2022-04-05 RX ADMIN — HYDRALAZINE HYDROCHLORIDE 25 MG: 25 TABLET, FILM COATED ORAL at 02:04

## 2022-04-05 NOTE — NURSING
Report received from Isela HODGE RN. Patient care assumed. Patient observed lying in bed sleeping with cardiac monitor in place and urinal within reach. Patient stable at this time and will continue to be monitored.

## 2022-04-05 NOTE — PLAN OF CARE
Problem: Adult Inpatient Plan of Care  Goal: Plan of Care Review  Flowsheets (Taken 4/5/2022 0245)  Plan of Care Reviewed With: patient  Goal: Absence of Hospital-Acquired Illness or Injury  Intervention: Identify and Manage Fall Risk  Flowsheets (Taken 4/5/2022 0245)  Safety Promotion/Fall Prevention: bed alarm set  Intervention: Prevent and Manage VTE (Venous Thromboembolism) Risk  Flowsheets (Taken 4/5/2022 0245)  VTE Prevention/Management: ROM (active) performed  Range of Motion: active ROM (range of motion) encouraged  Goal: Optimal Comfort and Wellbeing  Intervention: Monitor Pain and Promote Comfort  Flowsheets (Taken 4/5/2022 0245)  Pain Management Interventions: pain management plan reviewed with patient/caregiver  Intervention: Provide Person-Centered Care  Flowsheets (Taken 4/5/2022 0245)  Trust Relationship/Rapport: care explained     Problem: Renal Function Impairment (Acute Kidney Injury/Impairment)  Goal: Effective Renal Function  Intervention: Monitor and Support Renal Function  Flowsheets (Taken 4/5/2022 0245)  Medication Review/Management: medications reviewed     Problem: Diabetes Comorbidity  Goal: Blood Glucose Level Within Targeted Range  Intervention: Monitor and Manage Glycemia  Flowsheets (Taken 4/5/2022 0245)  Glycemic Management: blood glucose monitored     Problem: Fall Injury Risk  Goal: Absence of Fall and Fall-Related Injury  Intervention: Identify and Manage Contributors  Flowsheets (Taken 4/5/2022 0245)  Self-Care Promotion: independence encouraged  Medication Review/Management: medications reviewed  Intervention: Promote Injury-Free Environment  Flowsheets (Taken 4/5/2022 0245)  Safety Promotion/Fall Prevention: bed alarm set

## 2022-04-05 NOTE — PLAN OF CARE
Problem: Diabetes Comorbidity  Goal: Blood Glucose Level Within Targeted Range  Outcome: Ongoing, Progressing  Intervention: Monitor and Manage Glycemia  Flowsheets (Taken 4/5/2022 9268)  Glycemic Management:   blood glucose monitored   oral hydration promoted   supplemental insulin given

## 2022-04-05 NOTE — PROGRESS NOTES
[facility] Telemetry  Podiatry  Progress Note    Patient Name: Ashutosh Ferrari  MRN: [id number]  Admission Date: 3/26/2022  Hospital Length of Stay: 8 days  Attending Physician: Phoebe La MD  Primary Care Provider: [facility]     Subjective:     Interval History:     03/29/2022 post operative day 1 status post incision and drainage of the right foot with trocar bone biopsy of the proximal phalanx of the 5th digit.  Patient seen at bedside resting comfortably.  He relates some tingling and burning to the surgical site over night but overall pain well controlled.  No further pedal complaints.    3/30/22: Patient seen bedside. Bandage intact right foot.     4/1/22: Patient seen bedside. Right foot OR debridement rescheduled to 4/4/22 due scheduling conflicts.     4/4/22: Plan for right foot debridement today with delayed primary closure.     4/5/2022 POD # 1 s/p surgical debridement of right foot wound with delayed primary closure. patient relates minimal to no pain post operatively.  No new pedal complaint.     Scheduled Meds:   amLODIPine  2.5 mg Oral Daily    cefpodoxime  400 mg Oral Q12H    hydrALAZINE  25 mg Oral Q8H    insulin aspart U-100  12 Units Subcutaneous TIDWM    insulin detemir U-100  20 Units Subcutaneous BID     Continuous Infusions:    PRN Meds:acetaminophen, dextrose 10%, dextrose 10%, glucagon (human recombinant), glucose, glucose, hydrALAZINE, influenza, insulin aspart U-100, morphine, ondansetron, sars-cov-2 (covid-19), sodium chloride 0.9%, traMADoL    Review of Systems   Constitutional: Negative for activity change, appetite change, chills, fatigue and fever.   Respiratory: Negative for cough and shortness of breath.    Cardiovascular: Negative for chest pain and leg swelling.   Gastrointestinal: Negative for diarrhea, nausea and vomiting.   Musculoskeletal: Positive for arthralgias and myalgias.   Skin: Positive for wound.   Neurological: Negative for weakness.        +  paresthesia      Objective:     Vital Signs (Most Recent):  Temp: 98.6 °F (37 °C) (04/05/22 0452)  Pulse: 96 (04/05/22 0452)  Resp: 19 (04/05/22 0452)  BP: 122/76 (04/05/22 0452)  SpO2: 96 % (04/05/22 0452) Vital Signs (24h Range):  Temp:  [97.8 °F (36.6 °C)-98.9 °F (37.2 °C)] 98.6 °F (37 °C)  Pulse:  [] 96  Resp:  [18-20] 19  SpO2:  [96 %-99 %] 96 %  BP: (122-158)/(76-99) 122/76     Weight: 86.2 kg (190 lb 1.6 oz)  Body mass index is 23.76 kg/m².    Foot Exam     General  Orientation: alert and oriented to person, place, and time   Affect: appropriate         Right Foot/Ankle      Inspection and Palpation  Skin Exam: drainage, cellulitis, skin changes, abnormal color, ulcer and erythema; skin not intact      Neurovascular  Dorsalis pedis: 2+  Posterior tibial: 1+  Saphenous nerve sensation: absent  Tibial nerve sensation: absent  Superficial peroneal nerve sensation: absent  Deep peroneal nerve sensation: absent  Sural nerve sensation: absent        Left Foot/Ankle       Inspection and Palpation  Ecchymosis: none  Tenderness: none   Swelling: none   Skin Exam: no drainage, skin not intact, no cellulitis, no abnormal color and no erythema      Neurovascular  Dorsalis pedis: 2+  Posterior tibial: 1+  Saphenous nerve sensation: absent  Tibial nerve sensation: absent  Superficial peroneal nerve sensation: absent  Deep peroneal nerve sensation: absent  Sural nerve sensation: absent     Comments  Plantar lateral hyperkeratotic lesion sub 5th met head    4/5/2022     Incision site well coapted with purposeful opening laterally for drainage, packing pulled.  Mild maceration at packing site.  No purulence, no fluctuance, no malodor.           3/30/22:               03/29/2022    Ulcer location: surgical incision dorsal lateral proximal phalanx of the right 5th digit  Signs of infection:local edema and erythema  Drainage: primarily sanguinous  Purulence: scant  Crepitus/fluctuance: no  Periwound: Reddened  Base:  Granular/Healthy  Depth: muscle  Probe to bone: yes          3/28/22:  Right foot:   Purulence, Mal odor, erythema ascending from plantar lateral distal right foot to mid and fore foot. Temperature notably increased compared to contralateral. Dorsal sinus probes to capsule/periosteum. Lateral sinus probes to soft tissue. Plantar ulceration undermines distally about 1.5 cm.                 Laboratory:  CBC:   No results for input(s): WBC, RBC, HGB, HCT, PLT, MCV, MCH, MCHC in the last 168 hours.  CMP:   Recent Labs   Lab 04/04/22  0526   *   CALCIUM 9.2   ALBUMIN 3.0*   PROT 8.2      K 3.9   CO2 26      BUN 19   CREATININE 2.3*   ALKPHOS 73   ALT 6*   AST 14   BILITOT 0.4     CRP: No results for input(s): CRP in the last 168 hours.  ESR: No results for input(s): SEDRATE in the last 168 hours.  Microbiology Results (last 7 days)     Procedure Component Value Units Date/Time    Culture, Anaerobe [[id number]]  (Abnormal) Collected: 03/27/22 1354    Order Status: Completed Specimen: Wound from Foot, Right Updated: 04/01/22 1337     Anaerobic Culture PREVOTELLA (B.) BIVIA  Few      Culture, Anaerobe [[id number]] Collected: 03/28/22 1436    Order Status: Completed Specimen: Bone from Toe, Right Foot Updated: 04/01/22 0915     Anaerobic Culture No anaerobes isolated    Narrative:      1. RIGHT FIFTH TOE    Aerobic culture [[id number]] Collected: 03/28/22 1436    Order Status: Completed Specimen: Bone from Toe, Right Foot Updated: 04/01/22 0806     Aerobic Bacterial Culture No growth    Narrative:      1. RIGHT FIFTH TOE    Blood Culture #1 **CANNOT BE ORDERED STAT** [[id number]] Collected: 03/26/22 2349    Order Status: Completed Specimen: Blood from Peripheral, Forearm, Left Updated: 03/31/22 0303     Blood Culture, Routine No Growth after 4 days.     Blood Culture #2 **CANNOT BE ORDERED STAT** [[id number]] Collected: 03/26/22 2337    Order Status: Completed Specimen: Blood from Peripheral, Forearm, Right  Updated: 03/31/22 0303     Blood Culture, Routine No Growth after 4 days.     AFB Culture & Smear [[id number]] Collected: 03/28/22 1436    Order Status: Completed Specimen: Bone from Toe, Right Foot Updated: 03/30/22 2127     AFB Culture & Smear Culture in progress     AFB CULTURE STAIN No acid fast bacilli seen.    Narrative:      1. RIGHT FIFTH TOE    Aerobic culture [[id number]]  (Abnormal)  (Susceptibility) Collected: 03/27/22 1354    Order Status: Completed Specimen: Wound from Foot, Right Updated: 03/29/22 1039     Aerobic Bacterial Culture PROTEUS MIRABILIS  Few        STREPTOCOCCUS AGALACTIAE (GROUP B)  Many  Beta-hemolytic streptococci are routinely susceptible to   penicillins,cephalosporins and carbapenems.      Gram stain [[id number]] Collected: 03/28/22 1436    Order Status: Completed Specimen: Bone from Toe, Right Foot Updated: 03/29/22 0738     Gram Stain Result No WBC's      No organisms seen    Narrative:      1. RIGHT FIFTH TOE          Diagnostic Results:  Imaging Results          X-Ray Foot Complete Right (Final result)  Result time 03/26/22 22:16:22    Final result by Wyatt Francois MD (03/26/22 22:16:22)                 Impression:      No acute displaced fracture seen.      Electronically signed by: Wyatt Francois MD  Date:    03/26/2022  Time:    22:16             Narrative:    EXAMINATION:  XR ANKLE COMPLETE 3 VIEW RIGHT; XR FOOT COMPLETE 3 VIEW RIGHT    CLINICAL HISTORY:  Injury, unspecified, initial encounter    TECHNIQUE:  AP, lateral, and oblique images of the right ankle were performed.  Right foot three views.    COMPARISON:  None    FINDINGS:  No evidence of acute displaced fracture, dislocation, or osseous destructive process.  Ankle mortise is maintained.  Lisfranc articulation appears congruent.  Mild degenerative changes and spurring are seen throughout the midfoot.  There is mild posterior and plantar calcaneal spurring.    Prominent soft tissue swelling with small amount of soft  tissue air is seen involving the 5th toe.  Mild soft tissue swelling is also seen over the lateral malleolus.                               X-Ray Ankle Complete Right (Final result)  Result time 03/26/22 22:16:22    Final result by Wyatt Francois MD (03/26/22 22:16:22)                 Impression:      No acute displaced fracture seen.      Electronically signed by: Wyatt Francois MD  Date:    03/26/2022  Time:    22:16             Narrative:    EXAMINATION:  XR ANKLE COMPLETE 3 VIEW RIGHT; XR FOOT COMPLETE 3 VIEW RIGHT    CLINICAL HISTORY:  Injury, unspecified, initial encounter    TECHNIQUE:  AP, lateral, and oblique images of the right ankle were performed.  Right foot three views.    COMPARISON:  None    FINDINGS:  No evidence of acute displaced fracture, dislocation, or osseous destructive process.  Ankle mortise is maintained.  Lisfranc articulation appears congruent.  Mild degenerative changes and spurring are seen throughout the midfoot.  There is mild posterior and plantar calcaneal spurring.    Prominent soft tissue swelling with small amount of soft tissue air is seen involving the 5th toe.  Mild soft tissue swelling is also seen over the lateral malleolus.                                  Assessment/Plan:     Active Diagnoses:    Diagnosis Date Noted POA    PRINCIPAL PROBLEM:  Diabetic foot wound and Cellulitis [E11.621, L97.509] 03/27/2022 Yes    Type 2 diabetes mellitus with hyperglycemia, with long-term current use of insulin [E11.65, Z79.4] 03/27/2022 Not Applicable    HTN (hypertension) [I10] 07/18/2013 Yes    GABRIELA (acute kidney injury) [N17.9] 07/17/2013 Yes      Problems Resolved During this Admission:       Greater than 50% of this visit spent on counseling and coordination of care.    Education about the diabetic foot, neuropathy, and prevention of limb loss.    Surgical site stable.  Packing pulled. flushed with vashe    hydrofera blue football applied    Can remain intact for 3-5  days    He is to limit excessive ambulation with pressure to heel in surgical shoe with all ambulation    F/u 3-5 days with Dr. Martinez upon discharge.     Short-term goals include maintaining good offloading and minimizing bioburden, promoting granulation and epithelialization to healing.  Long-term goals include keeping the wound healed by good offloading and medical management under the direction of internist.    Discussed wound healing cycle, skin integrity, ways to care for skin.Counseled patient on the effects of  high blood glucose on healing. He verbalizes understanding that it can increase the chances of delayed healing and this prolonged exposure leads to infection or progression of infection which subsequently can result in loss of limb.    Adequate vitamin supplementation, protein intake, and hydration - discussed with patient    Serina Morales DPM  Podiatry  [facility] - [place] - Telemetry

## 2022-04-05 NOTE — PROGRESS NOTES
Renal Progress Note    Date of Admission:  3/26/2022 11:08 PM    Length of Stay: 8  Days    Subjective: no complaints    Objective:    Current Facility-Administered Medications   Medication    acetaminophen tablet 1,000 mg    amLODIPine tablet 2.5 mg    cefpodoxime tablet 400 mg    dextrose 10% bolus 125 mL    dextrose 10% bolus 250 mL    glucagon (human recombinant) injection 1 mg    glucose chewable tablet 16 g    glucose chewable tablet 24 g    hydrALAZINE injection 10 mg    hydrALAZINE tablet 25 mg    influenza (QUADRIVALENT PF) vaccine 0.5 mL    insulin aspart U-100 pen 1-10 Units    insulin aspart U-100 pen 12 Units    insulin detemir U-100 pen 20 Units    morphine injection 2 mg    ondansetron disintegrating tablet 4 mg    sars-cov-2 (covid-19) (Pfizer COVID-19) 30 mcg/0.3 ml injection 0.3 mL    sodium chloride 0.9% flush 10 mL    traMADoL tablet 50 mg       Vitals:    04/04/22 1944 04/04/22 2002 04/04/22 2247 04/05/22 0452   BP: (!) 153/88  (!) 155/80 122/76   BP Location: Right arm  Right arm Right arm   Patient Position: Lying  Lying Lying   Pulse: 88 102 102 96   Resp: 19 19 19 19   Temp: 98.9 °F (37.2 °C)  98.3 °F (36.8 °C) 98.6 °F (37 °C)   TempSrc: Oral  Oral Oral   SpO2: 99% 97% 99% 96%   Weight:       Height:           I/O last 3 completed shifts:  In: 650 [P.O.:350; IV Piggyback:300]  Out: 2165 [Urine:2150; Blood:15]  No intake/output data recorded.      Physical Exam:     Constitutional:       General: He is not in acute distress.  HENT:      Head: Atraumatic.   Cardiovascular:      Normal rate   Pulmonary:      Effort: Pulmonary effort is normal.    Musculoskeletal:      Comments: Right foot dressing intact   Neurological: AAO x 3    Laboratories:    No results for input(s): WBC, RBC, HGB, HCT, PLT, MCV, MCH, MCHC in the last 24 hours.    No results for input(s): GLUCOSE, CALCIUM, PROT, NA, K, CO2, CL, BUN, CREATININE, ALKPHOS, ALT, AST, BILITOT in the  last 24 hours.    Invalid input(s):  ALBUMIN    No results for input(s): COLORU, CLARITYU, SPECGRAV, PHUR, PROTEINUA, GLUCOSEU, BLOODU, WBCU, RBCU, BACTERIA, MUCUS in the last 24 hours.    Invalid input(s):  BILIRUBINCON    Microbiology Results (last 7 days)     Procedure Component Value Units Date/Time    Culture, Anaerobe [[id number]]  (Abnormal) Collected: 03/27/22 1354    Order Status: Completed Specimen: Wound from Foot, Right Updated: 04/01/22 1337     Anaerobic Culture PREVOTELLA (B.) BIVIA  Few      Culture, Anaerobe [[id number]] Collected: 03/28/22 1436    Order Status: Completed Specimen: Bone from Toe, Right Foot Updated: 04/01/22 0915     Anaerobic Culture No anaerobes isolated    Narrative:      1. RIGHT FIFTH TOE    Aerobic culture [[id number]] Collected: 03/28/22 1436    Order Status: Completed Specimen: Bone from Toe, Right Foot Updated: 04/01/22 0806     Aerobic Bacterial Culture No growth    Narrative:      1. RIGHT FIFTH TOE    Blood Culture #1 **CANNOT BE ORDERED STAT** [[id number]] Collected: 03/26/22 2349    Order Status: Completed Specimen: Blood from Peripheral, Forearm, Left Updated: 03/31/22 0303     Blood Culture, Routine No Growth after 4 days.     Blood Culture #2 **CANNOT BE ORDERED STAT** [[id number]] Collected: 03/26/22 2337    Order Status: Completed Specimen: Blood from Peripheral, Forearm, Right Updated: 03/31/22 0303     Blood Culture, Routine No Growth after 4 days.     AFB Culture & Smear [[id number]] Collected: 03/28/22 1436    Order Status: Completed Specimen: Bone from Toe, Right Foot Updated: 03/30/22 2127     AFB Culture & Smear Culture in progress     AFB CULTURE STAIN No acid fast bacilli seen.    Narrative:      1. RIGHT FIFTH TOE    Aerobic culture [[id number]]  (Abnormal)  (Susceptibility) Collected: 03/27/22 1354    Order Status: Completed Specimen: Wound from Foot, Right Updated: 03/29/22 1039     Aerobic Bacterial Culture PROTEUS MIRABILIS  Few        STREPTOCOCCUS  AGALACTIAE (GROUP B)  Many  Beta-hemolytic streptococci are routinely susceptible to   penicillins,cephalosporins and carbapenems.              Diagnostic Tests: n/a        Assessment:    45 y/o male admitted with:    GABRIELA - non oliguric (creatinine at a plateau level)  HTN  Chronically uncontrolled DM type 2 (HgbA1C 12.5)  DM foot infection s/p I&D  Anemia  Hypoalbuminemia        Plan:    - Wound care per Podiatry  - Antib. Per admitting  - NO ARBs or ACEI  - ADA diet  - Will follow Sos-[id number]-acid/base  - Glycemic control and other problems per admitting  - May discharge at your leisure, will f/u in Clinic

## 2022-04-05 NOTE — NURSING
Reported off to oncoming nurse, patient resting in bed, aaox4. Patient can make needs known to staff, minimal assist required for adls and transfers, no acute distress noted, safety precautions maintained.      Chart check completed.

## 2022-04-06 VITALS
HEART RATE: 96 BPM | DIASTOLIC BLOOD PRESSURE: 89 MMHG | WEIGHT: 190.13 LBS | RESPIRATION RATE: 18 BRPM | TEMPERATURE: 98 F | OXYGEN SATURATION: 99 % | SYSTOLIC BLOOD PRESSURE: 135 MMHG | BODY MASS INDEX: 23.64 KG/M2 | HEIGHT: 75 IN

## 2022-04-06 LAB
ALBUMIN SERPL BCP-MCNC: 3.2 G/DL (ref 3.5–5.2)
ANION GAP SERPL CALC-SCNC: 10 MMOL/L (ref 8–16)
BASOPHILS # BLD AUTO: 0.06 K/UL (ref 0–0.2)
BASOPHILS NFR BLD: 1.1 % (ref 0–1.9)
BUN SERPL-MCNC: 25 MG/DL (ref 6–20)
CALCIUM SERPL-MCNC: 9.4 MG/DL (ref 8.7–10.5)
CHLORIDE SERPL-SCNC: 100 MMOL/L (ref 95–110)
CO2 SERPL-SCNC: 26 MMOL/L (ref 23–29)
CREAT SERPL-MCNC: 2.3 MG/DL (ref 0.5–1.4)
DIFFERENTIAL METHOD: ABNORMAL
EOSINOPHIL # BLD AUTO: 0.1 K/UL (ref 0–0.5)
EOSINOPHIL NFR BLD: 2 % (ref 0–8)
ERYTHROCYTE [DISTWIDTH] IN BLOOD BY AUTOMATED COUNT: 11.4 % (ref 11.5–14.5)
EST. GFR  (AFRICAN AMERICAN): 38 ML/MIN/1.73 M^2
EST. GFR  (NON AFRICAN AMERICAN): 33 ML/MIN/1.73 M^2
GLUCOSE SERPL-MCNC: 135 MG/DL (ref 70–110)
HCT VFR BLD AUTO: 35 % (ref 40–54)
HGB BLD-MCNC: 11.4 G/DL (ref 14–18)
IMM GRANULOCYTES # BLD AUTO: 0.01 K/UL (ref 0–0.04)
IMM GRANULOCYTES NFR BLD AUTO: 0.2 % (ref 0–0.5)
LYMPHOCYTES # BLD AUTO: 2.3 K/UL (ref 1–4.8)
LYMPHOCYTES NFR BLD: 43.1 % (ref 18–48)
MCH RBC QN AUTO: 28.6 PG (ref 27–31)
MCHC RBC AUTO-ENTMCNC: 32.6 G/DL (ref 32–36)
MCV RBC AUTO: 88 FL (ref 82–98)
MONOCYTES # BLD AUTO: 0.4 K/UL (ref 0.3–1)
MONOCYTES NFR BLD: 7.8 % (ref 4–15)
NEUTROPHILS # BLD AUTO: 2.5 K/UL (ref 1.8–7.7)
NEUTROPHILS NFR BLD: 45.8 % (ref 38–73)
NRBC BLD-RTO: 0 /100 WBC
PHOSPHATE SERPL-MCNC: 4.8 MG/DL (ref 2.7–4.5)
PLATELET # BLD AUTO: 413 K/UL (ref 150–450)
PMV BLD AUTO: 9.1 FL (ref 9.2–12.9)
POCT GLUCOSE: 140 MG/DL (ref 70–110)
POCT GLUCOSE: 180 MG/DL (ref 70–110)
POTASSIUM SERPL-SCNC: 4.2 MMOL/L (ref 3.5–5.1)
RBC # BLD AUTO: 3.98 M/UL (ref 4.6–6.2)
SODIUM SERPL-SCNC: 136 MMOL/L (ref 136–145)
WBC # BLD AUTO: 5.41 K/UL (ref 3.9–12.7)

## 2022-04-06 PROCEDURE — 25000003 PHARM REV CODE 250: Performed by: STUDENT IN AN ORGANIZED HEALTH CARE EDUCATION/TRAINING PROGRAM

## 2022-04-06 PROCEDURE — 25000003 PHARM REV CODE 250: Performed by: NURSE PRACTITIONER

## 2022-04-06 PROCEDURE — 85025 COMPLETE CBC W/AUTO DIFF WBC: CPT | Performed by: STUDENT IN AN ORGANIZED HEALTH CARE EDUCATION/TRAINING PROGRAM

## 2022-04-06 PROCEDURE — 80069 RENAL FUNCTION PANEL: CPT | Performed by: STUDENT IN AN ORGANIZED HEALTH CARE EDUCATION/TRAINING PROGRAM

## 2022-04-06 PROCEDURE — 36415 COLL VENOUS BLD VENIPUNCTURE: CPT | Performed by: STUDENT IN AN ORGANIZED HEALTH CARE EDUCATION/TRAINING PROGRAM

## 2022-04-06 RX ORDER — AMLODIPINE BESYLATE 2.5 MG/1
2.5 TABLET ORAL DAILY
Qty: 30 TABLET | Refills: 11 | Status: SHIPPED | OUTPATIENT
Start: 2022-04-07 | End: 2023-04-07

## 2022-04-06 RX ORDER — CEFPODOXIME PROXETIL 200 MG/1
400 TABLET, FILM COATED ORAL EVERY 12 HOURS
Qty: 16 TABLET | Refills: 0 | Status: SHIPPED | OUTPATIENT
Start: 2022-04-06 | End: 2022-04-06 | Stop reason: HOSPADM

## 2022-04-06 RX ORDER — HYDRALAZINE HYDROCHLORIDE 25 MG/1
25 TABLET, FILM COATED ORAL EVERY 8 HOURS
Qty: 90 TABLET | Refills: 11 | Status: SHIPPED | OUTPATIENT
Start: 2022-04-06 | End: 2023-04-06

## 2022-04-06 RX ORDER — AMOXICILLIN AND CLAVULANATE POTASSIUM 500; 125 MG/1; MG/1
1 TABLET, FILM COATED ORAL 3 TIMES DAILY
Qty: 12 TABLET | Refills: 0 | Status: SHIPPED | OUTPATIENT
Start: 2022-04-06 | End: 2022-04-10

## 2022-04-06 RX ADMIN — AMLODIPINE BESYLATE 2.5 MG: 2.5 TABLET ORAL at 08:04

## 2022-04-06 RX ADMIN — HYDRALAZINE HYDROCHLORIDE 25 MG: 25 TABLET, FILM COATED ORAL at 05:04

## 2022-04-06 RX ADMIN — CEFPODOXIME PROXETIL 400 MG: 200 TABLET, FILM COATED ORAL at 08:04

## 2022-04-06 RX ADMIN — INSULIN DETEMIR 20 UNITS: 100 INJECTION, SOLUTION SUBCUTANEOUS at 08:04
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
## 2022-04-06 NOTE — PROGRESS NOTES
Renal Progress Note    Date of Admission:  3/26/2022 11:08 PM    Length of Stay: 9  Days    Subjective: n/a    Objective:    Current Facility-Administered Medications   Medication    acetaminophen tablet 1,000 mg    amLODIPine tablet 2.5 mg    cefpodoxime tablet 400 mg    dextrose 10% bolus 125 mL    dextrose 10% bolus 250 mL    glucagon (human recombinant) injection 1 mg    glucose chewable tablet 16 g    glucose chewable tablet 24 g    hydrALAZINE injection 10 mg    hydrALAZINE tablet 25 mg    influenza (QUADRIVALENT PF) vaccine 0.5 mL    insulin aspart U-100 pen 1-10 Units    insulin aspart U-100 pen 12 Units    insulin detemir U-100 pen 20 Units    morphine injection 2 mg    ondansetron disintegrating tablet 4 mg    sars-cov-2 (covid-19) (Pfizer COVID-19) 30 mcg/0.3 ml injection 0.3 mL    sodium chloride 0.9% flush 10 mL    traMADoL tablet 50 mg       Vitals:    04/05/22 1659 04/05/22 1947 04/05/22 2312 04/06/22 0541   BP: 125/74 120/79 (!) 140/73 125/75   BP Location: Right arm Right arm Right arm Right arm   Patient Position: Lying Lying Lying Lying   Pulse: 90 109 85 88   Resp: 20 19 19 19   Temp: 97 °F (36.1 °C) 98.6 °F (37 °C) 98 °F (36.7 °C) 99.3 °F (37.4 °C)   TempSrc: Oral Oral Oral Oral   SpO2: 98% 97% 98% 97%   Weight:       Height:           I/O last 3 completed shifts:  In: 480 [P.O.:480]  Out: 1750 [Urine:1750]  No intake/output data recorded.      Physical Exam: n/a      Laboratories:    Recent Labs   Lab 04/06/22  0420   WBC 5.41   RBC 3.98*   HGB 11.4*   HCT 35.0*      MCV 88   MCH 28.6   MCHC 32.6       Recent Labs   Lab 04/06/22  0420   CALCIUM 9.4      K 4.2   CO2 26      BUN 25*   CREATININE 2.3*       No results for input(s): COLORU, CLARITYU, SPECGRAV, PHUR, PROTEINUA, GLUCOSEU, BLOODU, WBCU, RBCU, BACTERIA, MUCUS in the last 24 hours.    Invalid input(s):  BILIRUBINCON    Microbiology Results (last 7 days)     Procedure  Component Value Units Date/Time    Culture, Anaerobe [[id number]]  (Abnormal) Collected: 03/27/22 1354    Order Status: Completed Specimen: Wound from Foot, Right Updated: 04/01/22 1337     Anaerobic Culture PREVOTELLA (B.) BIVIA  Few      Culture, Anaerobe [[id number]] Collected: 03/28/22 1436    Order Status: Completed Specimen: Bone from Toe, Right Foot Updated: 04/01/22 0915     Anaerobic Culture No anaerobes isolated    Narrative:      1. RIGHT FIFTH TOE    Aerobic culture [[id number]] Collected: 03/28/22 1436    Order Status: Completed Specimen: Bone from Toe, Right Foot Updated: 04/01/22 0806     Aerobic Bacterial Culture No growth    Narrative:      1. RIGHT FIFTH TOE    Blood Culture #1 **CANNOT BE ORDERED STAT** [[id number]] Collected: 03/26/22 2349    Order Status: Completed Specimen: Blood from Peripheral, Forearm, Left Updated: 03/31/22 0303     Blood Culture, Routine No Growth after 4 days.     Blood Culture #2 **CANNOT BE ORDERED STAT** [[id number]] Collected: 03/26/22 2337    Order Status: Completed Specimen: Blood from Peripheral, Forearm, Right Updated: 03/31/22 0303     Blood Culture, Routine No Growth after 4 days.     AFB Culture & Smear [[id number]] Collected: 03/28/22 1436    Order Status: Completed Specimen: Bone from Toe, Right Foot Updated: 03/30/22 2127     AFB Culture & Smear Culture in progress     AFB CULTURE STAIN No acid fast bacilli seen.    Narrative:      1. RIGHT FIFTH TOE            Diagnostic Tests: n/a        Assessment:    47 y/o male admitted with:    GABRIELA - non oliguric  HTN  Chronically uncontrolled DM type 2 (HgbA1C 12.5)  DM foot infection s/p I&D  Anemia  Hypoalbuminemia        Plan:    - Discharge in progress  - Pte. Will be seen by Dr. Mcmillan in Clinic this week

## 2022-04-06 NOTE — SUBJECTIVE & OBJECTIVE
Interval History: patient doing well post op and is anxious to go home. sCr remains stable but still elevated. Anticipate discharge home tomorrow pending repeat labs.     Review of Systems   Constitutional:  Positive for activity change and fatigue. Negative for chills and fever.   Respiratory: Negative.     Cardiovascular: Negative.    Gastrointestinal:  Positive for nausea.   Musculoskeletal:  Positive for gait problem and joint swelling.   Skin:  Positive for wound.   Objective:     Vital Signs (Most Recent):  Temp: 98.6 °F (37 °C) (04/05/22 1947)  Pulse: 109 (04/05/22 1947)  Resp: 19 (04/05/22 1947)  BP: 120/79 (04/05/22 1947)  SpO2: 97 % (04/05/22 1947)   Vital Signs (24h Range):  Temp:  [97 °F (36.1 °C)-98.7 °F (37.1 °C)] 98.6 °F (37 °C)  Pulse:  [] 109  Resp:  [16-20] 19  SpO2:  [96 %-99 %] 97 %  BP: (120-155)/(74-92) 120/79     Weight: 86.2 kg (190 lb 1.6 oz)  Body mass index is 23.76 kg/m².    Intake/Output Summary (Last 24 hours) at 4/5/2022 2145  Last data filed at 4/5/2022 1800  Gross per 24 hour   Intake 480 ml   Output 1150 ml   Net -670 ml        Physical Exam  Constitutional:       General: He is not in acute distress.     Appearance: Normal appearance. He is normal weight. He is not ill-appearing.   HENT:      Head: Atraumatic.   Cardiovascular:      Rate and Rhythm: Normal rate and regular rhythm.   Pulmonary:      Effort: Pulmonary effort is normal.      Breath sounds: Normal breath sounds.   Musculoskeletal:      Comments: Right foot dressing intact   Skin:     General: Skin is warm and dry.   Neurological:      Mental Status: He is oriented to person, place, and time. Mental status is at baseline.      Sensory: Sensory deficit present.      Gait: Gait abnormal.       Significant Labs: All pertinent labs within the past 24 hours have been reviewed.    Significant Imaging: I have reviewed all pertinent imaging results/findings within the past 24 hours.

## 2022-04-07 ENCOUNTER — PATIENT OUTREACH (OUTPATIENT)
Dept: ADMINISTRATIVE | Facility: CLINIC | Age: 47
End: 2022-04-07
Payer: MEDICAID

## 2022-04-07 ENCOUNTER — TELEPHONE (OUTPATIENT)
Dept: PODIATRY | Facility: CLINIC | Age: 47
End: 2022-04-07
Payer: MEDICAID

## 2022-04-07 NOTE — PROGRESS NOTES
C3 nurse attempted to contact Ashutosh Jacobsryder for a TCC post hospital discharge follow up call. No answer. Patient number no longer in service. No answer/no voicemail with other number for brother/spouse. The patient does not have a scheduled HOSFU appointment, and the pt does not have an [facility] PCP.

## 2022-04-19 ENCOUNTER — HOSPITAL ENCOUNTER (EMERGENCY)
Facility: HOSPITAL | Age: 47
Discharge: HOME | End: 2022-04-19
Attending: EMERGENCY MEDICINE
Payer: MEDICAID

## 2022-04-19 VITALS
WEIGHT: 190 LBS | SYSTOLIC BLOOD PRESSURE: 138 MMHG | HEART RATE: 100 BPM | TEMPERATURE: 98 F | DIASTOLIC BLOOD PRESSURE: 97 MMHG | OXYGEN SATURATION: 100 % | RESPIRATION RATE: 15 BRPM | HEIGHT: 75 IN | BODY MASS INDEX: 23.62 KG/M2

## 2022-04-19 DIAGNOSIS — Z51.89 ENCOUNTER FOR WOUND RE-CHECK: Primary | ICD-10-CM

## 2022-04-19 PROCEDURE — 99282 EMERGENCY DEPT VISIT SF MDM: CPT

## 2022-04-19 NOTE — ED PROVIDER NOTES
Encounter Date: 4/19/2022    SCRIBE #1 NOTE: I, Lorenza Gamboa, am scribing for, and in the presence of,  STARLA Levy. I have scribed the following portions of the note - Other sections scribed: HPI, ROS.       History     Chief Complaint   Patient presents with    Wound Check     Patient is 46yo male that stated he had surgery on right little toe and was discharged on 4/6/22. Was suppose to se wound care but appts were not set up and when he called them today, they told him to come to the ER to have it checked first.      47 y.o male, with a medical history of DM, HTN, and Herpes, which presents to the ED for a wound check. Pt reports that he underwent surgery to the right 5th digit (preformed by Dr. Martinez) on 3/28/22, after the toe became infected and was discharged on 4/6/22. He states that he was supposed to follow up with wound care, however, the appointments were never made. Pt notes that he called his surgeons office today and was instructed to come into the ED. He reports that he experienced pain to the toe last night and took 4x tablets of Tylenol for treatment. He denies fever, or any other associated symptoms.  Denies fevers.     The history is provided by the patient.     Review of patient's allergies indicates:  No Known Allergies  Past Medical History:   Diagnosis Date    Diabetes mellitus     Essential hypertension, benign 7/18/2013    Herpes      Past Surgical History:   Procedure Laterality Date    DEBRIDEMENT OF FOOT Right 4/1/2022    Procedure: DEBRIDEMENT, FOOT;  Surgeon: Amena Martinez DPM;  Location: [facility] OR;  Service: Podiatry;  Laterality: Right;    DEBRIDEMENT OF FOOT Right 4/4/2022    Procedure: DEBRIDEMENT, FOOT;  Surgeon: Amena Martinez DPM;  Location: [facility] OR;  Service: Podiatry;  Laterality: Right;    INCISION AND DRAINAGE Right 3/28/2022    Procedure: INCISION AND DRAINAGE RIGHT FOOT;  Surgeon: Amena Martinez DPM;  Location: [facility] OR;  Service: Podiatry;   Laterality: Right;     Family History   Problem Relation Age of Onset    Diabetes Mother      Social History     Tobacco Use    Smoking status: Never Smoker    Smokeless tobacco: Never Used   Substance Use Topics    Alcohol use: No    Drug use: No     Review of Systems   Constitutional: Negative for fever.   HENT: Negative for sore throat.    Respiratory: Negative for shortness of breath.    Cardiovascular: Negative for chest pain.   Gastrointestinal: Negative for nausea.   Genitourinary: Negative for dysuria.   Musculoskeletal: Negative for back pain.   Skin: Positive for wound (to the right 5th digit; with pain). Negative for rash.   Neurological: Negative for weakness.   Hematological: Does not bruise/bleed easily.   All other systems reviewed and are negative.    Physical Exam     Initial Vitals [04/19/22 1210]   BP Pulse Resp Temp SpO2   (!) 139/93 108 16 97.7 °F (36.5 °C) 97 %      MAP       --         Physical Exam    Nursing note and vitals reviewed.  Constitutional: He appears well-developed and well-nourished.   HENT:   Head: Normocephalic and atraumatic.   Eyes: Conjunctivae and EOM are normal. Pupils are equal, round, and reactive to light.   Neck:   Normal range of motion.  Cardiovascular: Normal rate, regular rhythm, normal heart sounds and intact distal pulses. Exam reveals no gallop and no friction rub.    No murmur heard.  Pulmonary/Chest: Breath sounds normal. No respiratory distress. He has no wheezes. He has no rhonchi. He has no rales. He exhibits no tenderness.   Musculoskeletal:      Cervical back: Normal range of motion.     Neurological: He is alert and oriented to person, place, and time. He has normal strength. GCS score is 15. GCS eye subscore is 4. GCS verbal subscore is 5. GCS motor subscore is 6.   Skin: Capillary refill takes less than 2 seconds. No abscess noted. No erythema.   Small incision noted to top portion of right foot near the 5th digit with sutures intact and small  opened area to t he lateral aspect of the incision- no drainage or erythema noted       ED Course   Procedures  Labs Reviewed - No data to display       Imaging Results    None          Medications - No data to display  Medical Decision Making:   History:   Old Medical Records: I decided to obtain old medical records.  Old Records Summarized: records from previous admission(s).       <> Summary of Records: Debridement of the right foot completed on 4/4/22 by Dr. Martinez  Initial Assessment:   48 y/o male which presents to the ED for a wound check.  Differential Diagnosis:   Abscess, osteomyelitis, post surgical complication   ED Management:  Pt examined and surgical dressing from 4/4/22 removed. No signs of infection. Edges are well approximated to surgical incision with small opened wound. Aquacel ag and a mepilex was placed to the right foot wound. He has been advised to follow up with the his podiatrist. Patient given strict return precautions and voiced understanding of all discharge instructions. Pt was stable at discharge.               Scribe Attestation:   Scribe #1: I performed the above scribed service and the documentation accurately describes the services I performed. I attest to the accuracy of the note.        ED Course as of 04/19/22 1320   Tue Apr 19, 2022   1213 BP(!): 139/93 [AT]   1214 Temp: 97.7 °F (36.5 °C) [AT]   1214 Temp src: Oral [AT]   1214 Pulse: 108 [AT]   1214 Resp: 16 [AT]   1214 SpO2: 97 % [AT]      ED Course User Index  [AT] STARLA Lucero             Clinical Impression:   Final diagnoses:  [Z51.89] Encounter for wound re-check (Primary)          ED Disposition Condition    Discharge Stable        ED Prescriptions     None        Follow-up Information     Follow up With Specialties Details Why Contact Info    Amena Martinez DPM Podiatry, Wound Care Schedule an appointment as soon as possible for a visit in 2 days  [address] [facility]  [place] [id number] [phone]              I, STARLA Lucero, personally performed the services described in this documentation. All medical record entries made by the scribe were at my direction and in my presence. I have reviewed the chart and agree that the record reflects my personal performance and is accurate and complete.     STARLA Lucero  04/19/22 [id number]

## 2022-04-19 NOTE — ED TRIAGE NOTES
Patient presents to ED c/o Wound Check (Patient is 46yo male that stated he had surgery on right little toe and was discharged on 4/6/22. Was suppose to se wound care but appts were not set up and when he called them today, they told him to come to the ER to have it checked first. )     Denies pain, fever, chills, n/v/d, cough, sob, chest pain, numbness and tingling     AAO x 4.

## 2022-04-20 ENCOUNTER — OFFICE VISIT (OUTPATIENT)
Dept: PODIATRY | Facility: CLINIC | Age: 47
End: 2022-04-20
Payer: MEDICAID

## 2022-04-20 ENCOUNTER — PATIENT OUTREACH (OUTPATIENT)
Dept: EMERGENCY MEDICINE | Facility: HOSPITAL | Age: 47
End: 2022-04-20
Payer: MEDICAID

## 2022-04-20 ENCOUNTER — HOSPITAL ENCOUNTER (OUTPATIENT)
Dept: RADIOLOGY | Facility: HOSPITAL | Age: 47
Discharge: HOME | End: 2022-04-20
Attending: PODIATRIST
Payer: MEDICAID

## 2022-04-20 VITALS — WEIGHT: 190.06 LBS | BODY MASS INDEX: 23.63 KG/M2 | HEIGHT: 75 IN

## 2022-04-20 DIAGNOSIS — L97.522 ULCER OF BOTH FEET WITH FAT LAYER EXPOSED: ICD-10-CM

## 2022-04-20 DIAGNOSIS — L97.512 ULCER OF BOTH FEET WITH FAT LAYER EXPOSED: ICD-10-CM

## 2022-04-20 DIAGNOSIS — E11.65 TYPE 2 DIABETES MELLITUS WITH HYPERGLYCEMIA, WITH LONG-TERM CURRENT USE OF INSULIN: Primary | ICD-10-CM

## 2022-04-20 DIAGNOSIS — Z79.4 TYPE 2 DIABETES MELLITUS WITH HYPERGLYCEMIA, WITH LONG-TERM CURRENT USE OF INSULIN: Primary | ICD-10-CM

## 2022-04-20 PROCEDURE — 1111F DSCHRG MED/CURRENT MED MERGE: CPT | Mod: CPTII,,, | Performed by: PODIATRIST

## 2022-04-20 PROCEDURE — 99214 OFFICE O/P EST MOD 30 MIN: CPT | Mod: PBBFAC,PO | Performed by: PODIATRIST

## 2022-04-20 PROCEDURE — 1159F MED LIST DOCD IN RCRD: CPT | Mod: CPTII,,, | Performed by: PODIATRIST

## 2022-04-20 PROCEDURE — 11042 PR DEBRIDEMENT, SKIN, SUB-Q TISSUE,=<20 SQ CM: ICD-10-PCS | Mod: S$PBB,,, | Performed by: PODIATRIST

## 2022-04-20 PROCEDURE — 73630 X-RAY EXAM OF FOOT: CPT | Mod: 26,LT,, | Performed by: RADIOLOGY

## 2022-04-20 PROCEDURE — 73630 X-RAY EXAM OF FOOT: CPT | Mod: TC,FY,PO,LT

## 2022-04-20 PROCEDURE — 99999 PR PBB SHADOW E&M-EST. PATIENT-LVL IV: CPT | Mod: PBBFAC,,, | Performed by: PODIATRIST

## 2022-04-20 PROCEDURE — 87075 CULTR BACTERIA EXCEPT BLOOD: CPT | Performed by: PODIATRIST

## 2022-04-20 PROCEDURE — 73630 XR FOOT COMPLETE 3 VIEW LEFT: ICD-10-PCS | Mod: 26,LT,, | Performed by: RADIOLOGY

## 2022-04-20 PROCEDURE — 3046F HEMOGLOBIN A1C LEVEL >9.0%: CPT | Mod: CPTII,,, | Performed by: PODIATRIST

## 2022-04-20 PROCEDURE — 3046F PR MOST RECENT HEMOGLOBIN A1C LEVEL > 9.0%: ICD-10-PCS | Mod: CPTII,,, | Performed by: PODIATRIST

## 2022-04-20 PROCEDURE — 87186 SC STD MICRODIL/AGAR DIL: CPT | Performed by: PODIATRIST

## 2022-04-20 PROCEDURE — 3008F BODY MASS INDEX DOCD: CPT | Mod: CPTII,,, | Performed by: PODIATRIST

## 2022-04-20 PROCEDURE — 1159F PR MEDICATION LIST DOCUMENTED IN MEDICAL RECORD: ICD-10-PCS | Mod: CPTII,,, | Performed by: PODIATRIST

## 2022-04-20 PROCEDURE — 99214 PR OFFICE/OUTPT VISIT, EST, LEVL IV, 30-39 MIN: ICD-10-PCS | Mod: 25,S$PBB,, | Performed by: PODIATRIST

## 2022-04-20 PROCEDURE — 11042 DBRDMT SUBQ TIS 1ST 20SQCM/<: CPT | Mod: PBBFAC,PO | Performed by: PODIATRIST

## 2022-04-20 PROCEDURE — 99999 PR PBB SHADOW E&M-EST. PATIENT-LVL IV: ICD-10-PCS | Mod: PBBFAC,,, | Performed by: PODIATRIST

## 2022-04-20 PROCEDURE — 11042 DBRDMT SUBQ TIS 1ST 20SQCM/<: CPT | Mod: S$PBB,,, | Performed by: PODIATRIST

## 2022-04-20 PROCEDURE — 99214 OFFICE O/P EST MOD 30 MIN: CPT | Mod: 25,S$PBB,, | Performed by: PODIATRIST

## 2022-04-20 PROCEDURE — 87077 CULTURE AEROBIC IDENTIFY: CPT | Performed by: PODIATRIST

## 2022-04-20 PROCEDURE — 3008F PR BODY MASS INDEX (BMI) DOCUMENTED: ICD-10-PCS | Mod: CPTII,,, | Performed by: PODIATRIST

## 2022-04-20 PROCEDURE — 87070 CULTURE OTHR SPECIMN AEROBIC: CPT | Performed by: PODIATRIST

## 2022-04-20 PROCEDURE — 1111F PR DISCHARGE MEDS RECONCILED W/ CURRENT OUTPATIENT MED LIST: ICD-10-PCS | Mod: CPTII,,, | Performed by: PODIATRIST

## 2022-04-20 RX ORDER — DOXYCYCLINE 100 MG/1
100 CAPSULE ORAL EVERY 12 HOURS
Qty: 20 CAPSULE | Refills: 0 | Status: SHIPPED | OUTPATIENT
Start: 2022-04-20 | End: 2022-09-29

## 2022-04-21 NOTE — PROGRESS NOTES
Subjective:      Patient ID: Ashutosh Ferrari is a 47 y.o. male.    Chief Complaint: Post-op Evaluation and Diabetes Mellitus    Ashutosh is a 47 y.o. male who presents to the clinic for evaluation and treatment of high risk feet. Ashutosh has a past medical history of Diabetes mellitus, Essential hypertension, benign (7/18/2013), and Herpes.  S/p 2 weeks right foot I&D with subsequent delayed primary closure. Attempted to set up f/u appt however patient did no answer phone. Patient reports dressing has been on since hospital discharge and went to ED yesterday to have dressing changed    CC#2- New onset LEFT plantar foot ulceration, does not know how this started.     PCP: [facility] [place]    Date Last Seen by PCP: none found    Current shoe gear:  Affected Foot: Football and Darco shoe on the affected foot     Unaffected Foot: Tennis shoes        Hemoglobin A1C   Date Value Ref Range Status   03/27/2022 12.5 (H) 4.0 - 5.6 % Final     Comment:     ADA Screening Guidelines:  5.7-6.4%  Consistent with prediabetes  >or=6.5%  Consistent with diabetes    High levels of fetal hemoglobin interfere with the HbA1C  assay. Heterozygous hemoglobin variants (HbS, HgC, etc)do  not significantly interfere with this assay.   However, presence of multiple variants may affect accuracy.     09/19/2021 12.8 (H) 4.0 - 5.6 % Final     Comment:     ADA Screening Guidelines:  5.7-6.4%  Consistent with prediabetes  >or=6.5%  Consistent with diabetes    High levels of fetal hemoglobin interfere with the HbA1C  assay. Heterozygous hemoglobin variants (HbS, HgC, etc)do  not significantly interfere with this assay.   However, presence of multiple variants may affect accuracy.     07/17/2013 14.6 (H) 4.0 - 6.2 % Final       Review of Systems   Constitutional: Negative for chills.   Cardiovascular: Negative for chest pain and claudication.   Respiratory: Negative for cough.    Skin: Positive for color change, dry skin and nail changes.    Musculoskeletal: Positive for joint pain.   Gastrointestinal: Negative for nausea.   Neurological: Positive for paresthesias. Negative for numbness.   Psychiatric/Behavioral: The patient is not nervous/anxious.            Objective:      Physical Exam  Constitutional:       Appearance: He is well-developed.      Comments: Oriented to time, place, and person.   Cardiovascular:      Comments: DP and PT pulses are palpable bilaterally. 3 sec capillary refill time and toes and feet are warm to touch proximally .  There is  hair growth on the feet and toes b/l. There is no edema b/l. No spider veins or varicosities present b/l.     Musculoskeletal:      Comments: Equinus noted b/l ankles with < 10 deg DF noted. MMT 5/5 in DF/PF/Inv/Ev resistance with no reproduction of pain in any direction. Passive range of motion of ankle and pedal joints is painless b/l.     Feet:      Right foot:      Skin integrity: No callus or dry skin.      Left foot:      Skin integrity: No callus or dry skin.   Lymphadenopathy:      Comments: Negative lymphadenopathy bilateral popliteal fossa and tarsal tunnel.   Skin:     Comments:      Neurological:      Mental Status: He is alert.      Comments: Light touch, proprioception, and sharp/dull sensation are all intact bilaterally. Protective threshold with the Arma-Wienstein monofilament is intact bilaterally.    Psychiatric:         Behavior: Behavior is cooperative.       RIGHT foot: S/p right foot I&D with subsequent delayed primary closure sutures intact skin edges well coapted. Ulceration right lateral incision site stable.     Wound 1:  Left plantar foot  Measurement: 0.1cmx0.1cmx0.1cm pre debridement 0.5cmx0.5cmx0.1cm post debridement.  Base: red granular   Periwound skin: HPK  Drainage: purulent  Erythema: mild  Probe: none, no bone exposed                        Assessment:       Encounter Diagnosis   Name Primary?    Ulcer of both feet with fat layer exposed Yes         Plan:        Ashutosh was seen today for post-op evaluation and diabetes mellitus.    Diagnoses and all orders for this visit:    Ulcer of both feet with fat layer exposed  -     CBC auto differential; Future  -     Sedimentation rate; Future  -     C-reactive protein; Future  -     X-Ray Foot Complete Left; Future  -     Ambulatory referral/consult to Wound Clinic; Future  -     Aerobic culture (Specify Source)  -     CULTURE, ANAEROBE    Other orders  -     doxycycline (VIBRAMYCIN) 100 MG Cap; Take 1 capsule (100 mg total) by mouth every 12 (twelve) hours.      I counseled the patient on his conditions, their implications and medical management.        Debridement: With verbal consent, nonviable tissues on the bilateral foot were debrided to subq utilizing a  sterile No. 3 scalpel and forceps. Minimal bleeding controlled with direct pressure  The patient tolerated this well.   Left foot: aerobic,anaerobic culture obtained. Xray left foot ordered. ESR, CRP, CBC ordered. Rx. Doxycyline  Dressings: endoform right, iodosorb left football B/L  Offloading:Foam    Follow-up:Patient is to return to the clinic in 1 week wound care.  for follow-up but should call [facility] immediately if any signs of infection, such as fever, chills, sweats, increased redness or pain.    Short-term goals include maintaining good offloading and minimizing bioburden, promoting granulation and epithelialization to healing.  Long-term goals include keeping the wound healed by good offloading and medical management under the direction of internist.    - Shoe inspection. Diabetic Foot Education. Patient reminded of the importance of good nutrition and blood sugar control to help prevent podiatric complications of diabetes. Patient instructed on proper foot hygeine. We discussed wearing proper shoe gear, daily foot inspections, never walking without protective shoe gear, caution putting sharp instruments to feet     - Discussed DM foot care:  Wear comfortable,  proper fitting shoes. Wash feet daily. Dry well. After drying, apply moisturizer to feet (no lotion to webspaces). Inspect feet daily for skin breaks, blisters, swelling, or redness. Wear cotton socks (preferably white)  Change socks every day. Do NOT walk barefoot. Do NOT use heating pads or warm/hot water soaks

## 2022-04-22 ENCOUNTER — TELEPHONE (OUTPATIENT)
Dept: PODIATRY | Facility: CLINIC | Age: 47
End: 2022-04-22
Payer: MEDICAID

## 2022-04-22 LAB — BACTERIA SPEC AEROBE CULT: ABNORMAL

## 2022-04-22 NOTE — ASSESSMENT & PLAN NOTE
"46M with DM on insulin admitted 3/26 with pain and swelling to R foot and ankle. No systemic symptoms of infection. Mri concerning for OM. S/p INCISION AND DRAINAGE RIGHT FOOT (Right) and Bone biopsy with podiatry on 3/28. Per op note "Bone noted to be hard". Bone culture - NGTD, path neg for osteo. Wound swab from 3/27 grew proteus mirabilis and GBS. qtc 479. ID consulted for "OM right 5th toe  s/p I&D, bone biopsy.     Would treat infection as SSTI and not osteomyelitis (based on negative path/cultures bone)    Recommendations:   - ceftriaxone while inpatient  - on d/c, de-escalate to cefpodoxime 400mg po bid x 14 days from debridement (est end date: 4/10/22).   - wound care as per podiatry    "
"46M with DM on insulin admitted 3/26 with pain and swelling to R foot and ankle. No systemic symptoms of infection. Mri concerning for OM. S/p INCISION AND DRAINAGE RIGHT FOOT (Right) and Bone biopsy with podiatry on 3/28. Per op note "Bone noted to be hard". Bone culture - NGTD, pending. Path pending. Wound swab from 3/27 grew proteus mirabilis and GBS. qtc 479. ID consulted for "OM right 5th toe  s/p I&D, bone biopsy.     Unclear if pt has SSTI or osteomyelitis, awaiting cultures of bone biopsy and path. Now with GABRIELA Cr 1.1 --> 1.7    Recommendations:   - continue cefazolin  - f/u pending bone cultures and path  - renally dosed po levaquin could be an oral option if GABRIELA resolves and he is d/c'd prior to path returning  - if SSTI, 2 weeks abx from debridment, if OM anticipate 6 weeks antibiotics  - weekly ESR, CRP, CMP  - wound care as per podiatry     Follow-up appointment will be arranged by the ID clinic and will be found in the patient's appointments tab.     Prior to discharge, please ensure the patient's follow-up has been scheduled.     If there is still no follow-up scheduled prior to discharge, please send an EPIC message to Lashon Rhodes in Infectious Diseases.            "
- Admitted with pain to right foot and concerns for infection.  Patient underwent x-ray which showed no fracture there was swelling and small amount of air.  He underwent MRI which confirmed likely abscess and concerning findings for osteomyelitis of the 5th toe.  Podiatry was consulted patient underwent bedside debridement on 03/27 with pus expressed.  He then underwent further I and D on 03/28 with bone biopsy as well.  He is currently on broad-spectrum antibiotics vancomycin and Zosyn while we await final cultures.  3/27 cultures growing GBS and few Proteus, change to Cefazolin       
- Admitted with pain to right foot and concerns for infection.  Patient underwent x-ray which showed no fracture there was swelling and small amount of air.  He underwent MRI which confirmed likely abscess and concerning findings for osteomyelitis of the 5th toe.  Podiatry was consulted patient underwent bedside debridement on 03/27 with pus expressed.  He then underwent further I and D on 03/28 with bone biopsy as well.  He is currently on broad-spectrum antibiotics vancomycin and Zosyn while we await final cultures.  3/27 cultures growing GBS and few Proteus, change to Cefazolin   - Pathology does not appear to show any evidence of osteomyelitis  - Cont on IV cefazolin for now given GABRIELA     
- Admitted with pain to right foot and concerns for infection.  Patient underwent x-ray which showed no fracture there was swelling and small amount of air.  He underwent MRI which confirmed likely abscess and concerning findings for osteomyelitis of the 5th toe.  Podiatry was consulted patient underwent bedside debridement on 03/27 with pus expressed.  He then underwent further I and D on 03/28 with bone biopsy as well.  He is currently on broad-spectrum antibiotics vancomycin and Zosyn while we await final cultures.  3/27 cultures growing GBS and few Proteus, change to Cefazolin   - Pathology does not appear to show any evidence of osteomyelitis  - Cont on IV cefazolin for now given GABRIELA  - possible wound closure per Podiatry 4/1    
- Admitted with pain to right foot and concerns for infection.  Patient underwent x-ray which showed no fracture there was swelling and small amount of air.  He underwent MRI which confirmed likely abscess and concerning findings for osteomyelitis of the 5th toe.  Podiatry was consulted patient underwent bedside debridement on 03/27 with pus expressed.  He then underwent further I and D on 03/28 with bone biopsy as well.  He is currently on broad-spectrum antibiotics vancomycin and Zosyn while we await final cultures.  3/27 cultures growing GBS and few Proteus, change to Cefazolin   - Pathology does not appear to show any evidence of osteomyelitis  - Cont on IV cefazolin for now given GABRIELA - can switch to PO at discharge  - Wound closer with Podiatry at later date, possibly Monday if still inpatient    
- Admitted with pain to right foot and concerns for infection.  Patient underwent x-ray which showed no fracture there was swelling and small amount of air.  He underwent MRI which confirmed likely abscess and concerning findings for osteomyelitis of the 5th toe.  Podiatry was consulted patient underwent bedside debridement on 03/27 with pus expressed.  He then underwent further I and D on 03/28 with bone biopsy as well.  He is currently on broad-spectrum antibiotics vancomycin and Zosyn while we await final cultures.  3/27 cultures growing GBS and few Proteus, change to Cefazolin   - Pathology does not appear to show any evidence of osteomyelitis  - Cont on IV cefazolin for now given GABRIELA - can switch to PO at discharge  - Wound closer with Podiatry now okay for d/c on PO per Podiatry standpoint and follow up as outpaitent    
- Admitted with pain to right foot and concerns for infection.  Patient underwent x-ray which showed no fracture there was swelling and small amount of air.  He underwent MRI which confirmed likely abscess and concerning findings for osteomyelitis of the 5th toe.  Podiatry was consulted patient underwent bedside debridement on 03/27 with pus expressed.  He then underwent further I and D on 03/28 with bone biopsy as well.  He is currently on broad-spectrum antibiotics vancomycin and Zosyn while we await final cultures.  3/27 cultures growing GBS and few Proteus, change to Cefazolin   - Pathology does not appear to show any evidence of osteomyelitis  - Cont on IV cefazolin for now given GABRIELA - can switch to PO at discharge  - Wound closer with Podiatry now okay for d/c on PO per Podiatry standpoint and follow up as outpaitent    
- Admitted with pain to right foot and concerns for infection.  Patient underwent x-ray which showed no fracture there was swelling and small amount of air.  He underwent MRI which confirmed likely abscess and concerning findings for osteomyelitis of the 5th toe.  Podiatry was consulted patient underwent bedside debridement on 03/27 with pus expressed.  He then underwent further I and D on 03/28 with bone biopsy as well.  He is currently on broad-spectrum antibiotics vancomycin and Zosyn while we await final cultures.  3/27 cultures growing GBS and few Proteus, change to Cefazolin   - Pathology does not appear to show any evidence of osteomyelitis  - Cont on IV cefazolin for now given GABRIELA - can switch to PO at discharge  - Wound closer with Podiatry tomorrow, d/c on PO per Podiatry standpoint and follow up as outpaitent    
- Admitted with pain to right foot and concerns for infection.  Patient underwent x-ray which showed no fracture there was swelling and small amount of air.  He underwent MRI which confirmed likely abscess and concerning findings for osteomyelitis of the 5th toe.  Podiatry was consulted patient underwent bedside debridement on 03/27 with pus expressed.  He then underwent further I and D on 03/28 with bone biopsy as well.  He is currently on broad-spectrum antibiotics vancomycin and Zosyn while we await final cultures.  3/27 cultures growing GBS and few Proteus, change to Cefazolin   - Pathology does not appear to show any evidence of osteomyelitis  - Cont on IV cefazolin for now given GABRIELA - can switch to PO at discharge  - possible wound closure per Podiatry 4/1 but not able to do this, likely next week if inpatient vs outpatient    
-Patient with acute kidney injury likely d/t Pre-renal azotemia Which is currently stable. Labs reviewed- Renal function/electrolytes with Estimated Creatinine Clearance: 66.3 mL/min (A) (based on SCr of 1.7 mg/dL (H)). according to latest data. Monitor urine output and serial BMP and adjust therapy as needed. Avoid nephrotoxins and renally dose meds for GFR listed above.   - recheck in AM    
-Patient with acute kidney injury likely d/t possibly due to IV antibx (Vanc/Zosyn) Which is currently stable. Labs reviewed- Renal function/electrolytes with CrCl cannot be calculated (Patient's most recent lab result is older than the maximum 7 days allowed.). according to latest data. Monitor urine output and serial BMP and adjust therapy as needed. Avoid nephrotoxins and renally dose meds for GFR listed above.   - Cr now worsening from 1.1 --> 2.5  - suspect IV antibx are culprit (was on vanc/zosyn since admit, changed on 3/29 to cefazolin)  - no contrast given  - urine lytes sent, eos neg  - started on sodium bicarb drip with no improvement over the last few days, now on NS and seems to be heading in right direction  - Nephrology consulted - monitor renal function closely    
-Patient with acute kidney injury likely d/t possibly due to IV antibx (Vanc/Zosyn) Which is currently stable. Labs reviewed- Renal function/electrolytes with Estimated Creatinine Clearance: 45.1 mL/min (A) (based on SCr of 2.5 mg/dL (H)). according to latest data. Monitor urine output and serial BMP and adjust therapy as needed. Avoid nephrotoxins and renally dose meds for GFR listed above.   - Cr now worsening from 1.1 --> 1.7 -->2.4, now improving to 2.3  - suspect IV antibx are culprit (was on vanc/zosyn since admit, changed on 3/29 to cefazolin)  - no contrast given  - urine lytes sent, eos  - started on sodium bicarb drip with no improvement over the last few days  - Nephrology consulted    
-Patient with acute kidney injury likely d/t possibly due to IV antibx (Vanc/Zosyn) Which is currently stable. Labs reviewed- Renal function/electrolytes with Estimated Creatinine Clearance: 45.1 mL/min (A) (based on SCr of 2.5 mg/dL (H)). according to latest data. Monitor urine output and serial BMP and adjust therapy as needed. Avoid nephrotoxins and renally dose meds for GFR listed above.   - Cr now worsening from 1.1 --> 2.5  - suspect IV antibx are culprit (was on vanc/zosyn since admit, changed on 3/29 to cefazolin)  - no contrast given  - urine lytes sent, eos neg  - started on sodium bicarb drip with no improvement over the last few days, now on NS and seems to be heading in right direction  - Nephrology consulted - monitor renal function closely    
-Patient with acute kidney injury likely d/t possibly due to IV antibx (Vanc/Zosyn) Which is currently stable. Labs reviewed- Renal function/electrolytes with Estimated Creatinine Clearance: 46.9 mL/min (A) (based on SCr of 2.4 mg/dL (H)). according to latest data. Monitor urine output and serial BMP and adjust therapy as needed. Avoid nephrotoxins and renally dose meds for GFR listed above.   - Cr now worsening from 1.1 --> 1.7 -->2.4  - suspect IV antibx are culprit (was on vanc/zosyn since admit, changed on 3/29 to cefazolin)  - no contrast given  - started on sodium bicarb    
-Patient with acute kidney injury likely d/t possibly due to IV antibx (Vanc/Zosyn) Which is currently stable. Labs reviewed- Renal function/electrolytes with Estimated Creatinine Clearance: 49 mL/min (A) (based on SCr of 2.3 mg/dL (H)). according to latest data. Monitor urine output and serial BMP and adjust therapy as needed. Avoid nephrotoxins and renally dose meds for GFR listed above.   - Cr now worsening from 1.1 --> 1.7 -->2.4, now improving to 2.3  - suspect IV antibx are culprit (was on vanc/zosyn since admit, changed on 3/29 to cefazolin)  - no contrast given  - started on sodium bicarb drip, stable/slightly improved    
-Patient with acute kidney injury likely d/t possibly due to IV antibx (Vanc/Zosyn) Which is currently stable. Labs reviewed- Renal function/electrolytes with Estimated Creatinine Clearance: 49 mL/min (A) (based on SCr of 2.3 mg/dL (H)). according to latest data. Monitor urine output and serial BMP and adjust therapy as needed. Avoid nephrotoxins and renally dose meds for GFR listed above.   - Cr now worsening from 1.1 --> 2.5  - suspect IV antibx are culprit (was on vanc/zosyn since admit, changed on 3/29 to cefazolin)  - no contrast given  - urine lytes sent, eos neg  - started on sodium bicarb drip with no improvement over the last few days, now on NS and seems to be heading in right direction  - Nephrology consulted    
-Patient with acute kidney injury likely d/t possibly due to IV antibx (Vanc/Zosyn) Which is currently stable. Labs reviewed- Renal function/electrolytes with Estimated Creatinine Clearance: 49 mL/min (A) (based on SCr of 2.3 mg/dL (H)). according to latest data. Monitor urine output and serial BMP and adjust therapy as needed. Avoid nephrotoxins and renally dose meds for GFR listed above.   - Cr now worsening from 1.1 --> 2.5  - suspect IV antibx are culprit (was on vanc/zosyn since admit, changed on 3/29 to cefazolin)  - no contrast given  - urine lytes sent, eos neg  - started on sodium bicarb drip with no improvement over the last few days, now on NS and seems to be heading in right direction  - Nephrology consulted - monitor renal function closely    
Consulted diabetic educator  Accuchecks AcHs with ss insulin  Hypoglycemic protocol  Resume home long acting insulin  Diabetic  Diet  Hemoglobin A1c pending    
Consulted podiatry and wound care  Started on IV abx Zosyn and Vancomycin  Awaiting blood cx   Pain control     
IDSA Moderate diabetic foot infection of the right foot with ulceration plantar aspect sub 5th metatarsal head, with lateral and dorsal sinuses communicating. Thick, malodorous, sanguinopurulent noted, along with signs of cellulitis.     -No urgent/emergent surgical intervention indicated at this time, patient vitals and labs are stable.  -Bedside I&D with drainage of 10-15 mL of thick purulence/sanginous drainage expressed, then copiously irrigated with 1/2 liter normal saline, packed, and dressed with Aquacel AG, kerlix, and ace.   -Plan for surgical Incision and drainage tentatively 3/28 with Dr. Martinez   -Case requested   -NPO at midnight    -MRI ordered   -Podiatry will follow   
Lab Results   Component Value Date    HGBA1C 12.5 (H) 03/27/2022   He admits to 's at home  -300's -  Increase basal and prandial SSI-continue adjustment for tight control  
Lab Results   Component Value Date    HGBA1C 12.5 (H) 03/27/2022   He admits to 's at home  -300's -  Increase basal and prandial SSI-continue adjustment for tight control  - states he does not take prandial at home  
Patient states he takes nothing for his BP. He changed his diet and started exercising.  Likely elevated due to pain    
Patient states he takes nothing for his BP. He changed his diet and started exercising.  Likely elevated due to pain  On Amlodipine, would benefit from ACE or ARB given diabetes but will hold due to GABRIELA    
Patient states he takes nothing for his BP. He changed his diet and started exercising.  Likely elevated due to pain  On Amlodipine, would benefit from ACE or ARB given diabetes but will hold due to GABRIELA  Start hydralazine for now    
Patient states he takes nothing for his BP. He changed his diet and started exercising.  Likely elevated due to pain  On Amlodipine, would benefit from ACE or ARB given diabetes but will hold due to today's GABRIELA    
Patient states he takes nothing for his BP. He changed his diet and started exercising.  Likely elevated due to pain\  Start amlodipine    
Right ankle X ray no acute displaced fracture Right foot x ray no fracture but does show soft tissue swelling with small amount of tissue air involving 5th toe.Right ventral lateral foot wound and abcess? Erythema and edema extending up dorsal foot and lateral malleolus. Able to wiggle toes. Numbness on exam.   Podiatry following   -3/27/22Bedside I&D with drainage of 10-15 mL of thick purulence/sanginous drainage expressed, then copiously irrigated with 1/2 liter normal saline, packed, and dressed with Aquacel AG, kerlix, and ace.   -Follow up wound culture  -Continue IV zosyn, vancomycin.  -MRI right forefoot   -To OR  This afternoon for I&D   -Hold lovenox     
Abdomen soft, nontender, nondistended, bowel sounds present in all 4 quadrants.
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
## 2022-04-25 LAB — BACTERIA SPEC ANAEROBE CULT: NORMAL

## 2022-04-26 LAB — FUNGUS SPEC CULT: NORMAL

## 2022-04-27 ENCOUNTER — OFFICE VISIT (OUTPATIENT)
Dept: PODIATRY | Facility: CLINIC | Age: 47
End: 2022-04-27
Payer: MEDICAID

## 2022-04-27 ENCOUNTER — PATIENT OUTREACH (OUTPATIENT)
Dept: EMERGENCY MEDICINE | Facility: HOSPITAL | Age: 47
End: 2022-04-27
Payer: MEDICAID

## 2022-04-27 VITALS — HEIGHT: 75 IN | BODY MASS INDEX: 23.63 KG/M2 | WEIGHT: 190.06 LBS

## 2022-04-27 DIAGNOSIS — L97.512 ULCER OF BOTH FEET WITH FAT LAYER EXPOSED: Primary | ICD-10-CM

## 2022-04-27 DIAGNOSIS — L97.522 ULCER OF BOTH FEET WITH FAT LAYER EXPOSED: Primary | ICD-10-CM

## 2022-04-27 DIAGNOSIS — Z79.4 TYPE 2 DIABETES MELLITUS WITH HYPERGLYCEMIA, WITH LONG-TERM CURRENT USE OF INSULIN: ICD-10-CM

## 2022-04-27 DIAGNOSIS — E11.65 TYPE 2 DIABETES MELLITUS WITH HYPERGLYCEMIA, WITH LONG-TERM CURRENT USE OF INSULIN: ICD-10-CM

## 2022-04-27 PROCEDURE — 3046F PR MOST RECENT HEMOGLOBIN A1C LEVEL > 9.0%: ICD-10-PCS | Mod: CPTII,,, | Performed by: PODIATRIST

## 2022-04-27 PROCEDURE — 11042 DBRDMT SUBQ TIS 1ST 20SQCM/<: CPT | Mod: S$PBB,,, | Performed by: PODIATRIST

## 2022-04-27 PROCEDURE — 99999 PR PBB SHADOW E&M-EST. PATIENT-LVL III: ICD-10-PCS | Mod: PBBFAC,,, | Performed by: PODIATRIST

## 2022-04-27 PROCEDURE — 11042 DBRDMT SUBQ TIS 1ST 20SQCM/<: CPT | Mod: PBBFAC,PO | Performed by: PODIATRIST

## 2022-04-27 PROCEDURE — 1159F PR MEDICATION LIST DOCUMENTED IN MEDICAL RECORD: ICD-10-PCS | Mod: CPTII,,, | Performed by: PODIATRIST

## 2022-04-27 PROCEDURE — 3008F PR BODY MASS INDEX (BMI) DOCUMENTED: ICD-10-PCS | Mod: CPTII,,, | Performed by: PODIATRIST

## 2022-04-27 PROCEDURE — 1159F MED LIST DOCD IN RCRD: CPT | Mod: CPTII,,, | Performed by: PODIATRIST

## 2022-04-27 PROCEDURE — 99499 UNLISTED E&M SERVICE: CPT | Mod: S$PBB,,, | Performed by: PODIATRIST

## 2022-04-27 PROCEDURE — 99499 NO LOS: ICD-10-PCS | Mod: S$PBB,,, | Performed by: PODIATRIST

## 2022-04-27 PROCEDURE — 99213 OFFICE O/P EST LOW 20 MIN: CPT | Mod: PBBFAC,PO | Performed by: PODIATRIST

## 2022-04-27 PROCEDURE — 99999 PR PBB SHADOW E&M-EST. PATIENT-LVL III: CPT | Mod: PBBFAC,,, | Performed by: PODIATRIST

## 2022-04-27 PROCEDURE — 3008F BODY MASS INDEX DOCD: CPT | Mod: CPTII,,, | Performed by: PODIATRIST

## 2022-04-27 PROCEDURE — 11042 PR DEBRIDEMENT, SKIN, SUB-Q TISSUE,=<20 SQ CM: ICD-10-PCS | Mod: S$PBB,,, | Performed by: PODIATRIST

## 2022-04-27 PROCEDURE — 3046F HEMOGLOBIN A1C LEVEL >9.0%: CPT | Mod: CPTII,,, | Performed by: PODIATRIST

## 2022-04-27 NOTE — PROGRESS NOTES
Subjective:      Patient ID: Ashutosh Ferrari is a 47 y.o. male.    Chief Complaint: Wound Check (B/l) and Diabetes Mellitus    Ashutosh is a 47 y.o. male who presents to the clinic for evaluation and treatment of high risk feet. Ashutosh has a past medical history of Diabetes mellitus, Essential hypertension, benign (7/18/2013), and Herpes.  S/p 2 weeks right foot I&D with subsequent delayed primary closure. Attempted to set up f/u appt however patient did no answer phone. Patient reports dressing has been on since hospital discharge and went to ED yesterday to have dressing changed    CC#2- New onset LEFT plantar foot ulceration, does not know how this started.     4/27/22: F/u B/L foot ulceration. Presents with football dressing intact B/L no issues.     PCP: [facility]    Date Last Seen by PCP: none found    Current shoe gear:  Affected Foot: Football and Darco shoe on the affected foot     Unaffected Foot: Tennis shoes        Hemoglobin A1C   Date Value Ref Range Status   03/27/2022 12.5 (H) 4.0 - 5.6 % Final     Comment:     ADA Screening Guidelines:  5.7-6.4%  Consistent with prediabetes  >or=6.5%  Consistent with diabetes    High levels of fetal hemoglobin interfere with the HbA1C  assay. Heterozygous hemoglobin variants (HbS, HgC, etc)do  not significantly interfere with this assay.   However, presence of multiple variants may affect accuracy.     09/19/2021 12.8 (H) 4.0 - 5.6 % Final     Comment:     ADA Screening Guidelines:  5.7-6.4%  Consistent with prediabetes  >or=6.5%  Consistent with diabetes    High levels of fetal hemoglobin interfere with the HbA1C  assay. Heterozygous hemoglobin variants (HbS, HgC, etc)do  not significantly interfere with this assay.   However, presence of multiple variants may affect accuracy.     07/17/2013 14.6 (H) 4.0 - 6.2 % Final       Review of Systems   Constitutional: Negative for chills.   Cardiovascular: Negative for chest pain and claudication.   Respiratory:  Negative for cough.    Skin: Positive for color change, dry skin and nail changes.   Musculoskeletal: Positive for joint pain.   Gastrointestinal: Negative for nausea.   Neurological: Positive for paresthesias. Negative for numbness.   Psychiatric/Behavioral: The patient is not nervous/anxious.            Objective:      Physical Exam  Constitutional:       Appearance: He is well-developed.      Comments: Oriented to time, place, and person.   Cardiovascular:      Comments: DP and PT pulses are palpable bilaterally. 3 sec capillary refill time and toes and feet are warm to touch proximally .  There is  hair growth on the feet and toes b/l. There is no edema b/l. No spider veins or varicosities present b/l.     Musculoskeletal:      Comments: Equinus noted b/l ankles with < 10 deg DF noted. MMT 5/5 in DF/PF/Inv/Ev resistance with no reproduction of pain in any direction. Passive range of motion of ankle and pedal joints is painless b/l.     Feet:      Right foot:      Skin integrity: No callus or dry skin.      Left foot:      Skin integrity: No callus or dry skin.   Lymphadenopathy:      Comments: Negative lymphadenopathy bilateral popliteal fossa and tarsal tunnel.   Skin:     Comments:      Neurological:      Mental Status: He is alert.      Comments: Light touch, proprioception, and sharp/dull sensation are all intact bilaterally. Protective threshold with the [place]-Wienstein monofilament is intact bilaterally.    Psychiatric:         Behavior: Behavior is cooperative.       RIGHT foot: S/p right foot I&D with subsequent delayed primary closure sutures intact skin edges well coapted. Ulceration right lateral incision site stable.     Wound 1:  Left plantar foot  Measurement: 0.1cmx0.1cmx0.1cm pre debridement 0.3cmx0.3cmx0.1cm post debridement.  Base: red granular   Periwound skin: HPK  Drainage: purulent  Erythema: mild  Probe: none, no bone exposed                        Assessment:       Encounter Diagnoses    Name Primary?    Ulcer of both feet with fat layer exposed Yes    Type 2 diabetes mellitus with hyperglycemia, with long-term current use of insulin          Plan:       Ashutosh was seen today for wound check and diabetes mellitus.    Diagnoses and all orders for this visit:    Ulcer of both feet with fat layer exposed    Type 2 diabetes mellitus with hyperglycemia, with long-term current use of insulin      I counseled the patient on his conditions, their implications and medical management.        Debridement: With verbal consent, nonviable tissues on the bilateral foot were debrided to subq utilizing a  sterile No. 3 scalpel and forceps. Minimal bleeding controlled with direct pressure  The patient tolerated this well.   Rx. Doxycyline instructed patient to complete course.     Dressings: endoform right, iodosorb left football B/L  Offloading:Foam    Follow-up:Patient is to return to the clinic in 1 week wound care.  for follow-up but should call [facility] immediately if any signs of infection, such as fever, chills, sweats, increased redness or pain.    Short-term goals include maintaining good offloading and minimizing bioburden, promoting granulation and epithelialization to healing.  Long-term goals include keeping the wound healed by good offloading and medical management under the direction of internist.    - Shoe inspection. Diabetic Foot Education. Patient reminded of the importance of good nutrition and blood sugar control to help prevent podiatric complications of diabetes. Patient instructed on proper foot hygeine. We discussed wearing proper shoe gear, daily foot inspections, never walking without protective shoe gear, caution putting sharp instruments to feet     - Discussed DM foot care:  Wear comfortable, proper fitting shoes. Wash feet daily. Dry well. After drying, apply moisturizer to feet (no lotion to webspaces). Inspect feet daily for skin breaks, blisters, swelling, or redness. Wear cotton  socks (preferably white)  Change socks every day. Do NOT walk barefoot. Do NOT use heating pads or warm/hot water soaks

## 2022-05-02 ENCOUNTER — PATIENT OUTREACH (OUTPATIENT)
Dept: EMERGENCY MEDICINE | Facility: HOSPITAL | Age: 47
End: 2022-05-02
Payer: MEDICAID

## 2022-05-03 ENCOUNTER — HOSPITAL ENCOUNTER (OUTPATIENT)
Dept: WOUND CARE | Facility: HOSPITAL | Age: 47
Discharge: HOME | End: 2022-05-03
Attending: PODIATRIST
Payer: MEDICAID

## 2022-05-03 VITALS
WEIGHT: 195 LBS | RESPIRATION RATE: 18 BRPM | HEIGHT: 76 IN | BODY MASS INDEX: 23.75 KG/M2 | SYSTOLIC BLOOD PRESSURE: 128 MMHG | DIASTOLIC BLOOD PRESSURE: 77 MMHG | TEMPERATURE: 99 F | HEART RATE: 118 BPM

## 2022-05-03 DIAGNOSIS — L97.512 ULCER OF BOTH FEET WITH FAT LAYER EXPOSED: ICD-10-CM

## 2022-05-03 DIAGNOSIS — L97.522 ULCER OF BOTH FEET WITH FAT LAYER EXPOSED: ICD-10-CM

## 2022-05-03 DIAGNOSIS — E11.621 DIABETIC FOOT ULCER: Primary | ICD-10-CM

## 2022-05-03 DIAGNOSIS — L97.509 DIABETIC FOOT ULCER: Primary | ICD-10-CM

## 2022-05-03 PROCEDURE — 99215 OFFICE O/P EST HI 40 MIN: CPT | Performed by: FAMILY MEDICINE

## 2022-05-03 PROCEDURE — 99203 OFFICE O/P NEW LOW 30 MIN: CPT | Mod: ,,, | Performed by: FAMILY MEDICINE

## 2022-05-03 PROCEDURE — 99203 PR OFFICE/OUTPT VISIT, NEW, LEVL III, 30-44 MIN: ICD-10-PCS | Mod: ,,, | Performed by: FAMILY MEDICINE

## 2022-05-03 NOTE — PROGRESS NOTES
[facility]                Progress Note    Subjective:       Patient ID: Ashutosh Ferrari is a 47 y.o. male.    Chief Complaint: Non-healing Wound    Pt arrived to [facility] ambulated without any issues. Pt denies any fever, chills, or flu-like symptoms; denies pain/discomfort. Being sent by podiatry for evaluation; s/p I&D of rt foot on 3/28. Noted to have other areas on Rt foot & Lt foot but no open wounds noted. Pt will return to [facility] in 1wk and will f/u with Dr. Martinez on Fridays.     MD note:  Patient following up today s/p I&D.  He reports to have been keeping feet bandaged, clean, and dry.  No new concerns.  No fevers, chills, or sweats.  No pain in feet at the moment, but states he does get significant pain at bedtime.       Review of Systems   Constitutional: Negative.    HENT: Negative.    Eyes: Negative.    Respiratory: Negative.    Cardiovascular: Negative.    Gastrointestinal: Negative.    Skin: Positive for wound.   Psychiatric/Behavioral: Negative.          Objective:        Physical Exam  Constitutional:       Appearance: Normal appearance.   HENT:      Head: Normocephalic and atraumatic.      Right Ear: External ear normal.      Left Ear: External ear normal.      Mouth/Throat:      Mouth: Mucous membranes are moist.      Pharynx: Oropharynx is clear.   Eyes:      Extraocular Movements: Extraocular movements intact.      Conjunctiva/sclera: Conjunctivae normal.      Pupils: Pupils are equal, round, and reactive to light.   Cardiovascular:      Rate and Rhythm: Normal rate and regular rhythm.   Pulmonary:      Effort: Pulmonary effort is normal. No respiratory distress.   Abdominal:      General: There is no distension.      Palpations: Abdomen is soft.   Skin:     General: Skin is warm and dry.      Comments: +small DFU at present without slough or maceration; callous to bilateral plantar surface   Neurological:      Mental Status: He is alert and oriented to person,  place, and time. Mental status is at baseline.         Vitals:    05/03/22 1320   BP: 128/77   Pulse: (!) 118   Resp: 18   Temp: 98.8 °F (37.1 °C)       Assessment:           ICD-10-CM ICD-9-CM   1. Diabetic foot wound and Cellulitis  E11.621 250.80    L97.509 707.15   2. Ulcer of both feet with fat layer exposed  L97.512 707.15    L97.522             Wound 05/03/22 1333 Diabetic Ulcer Right lateral;dorsal Foot (Active)   05/03/22 1333    Pre-existing: Yes   Primary Wound Type: Diabetic ulc   Side: Right   Orientation: lateral;dorsal   Location: Foot   Wound Number:    Ankle-Brachial Index:    Pulses:    Removal Indication and Assessment:    Wound Outcome:    (Retired) Wound Type:    (Retired) Wound Length (cm):    (Retired) Wound Width (cm):    (Retired) Depth (cm):    Wound Description (Comments):    Removal Indications:    Wound Image   05/03/22 1300   Wound WDL ex 05/03/22 1300   Dressing Appearance Dried drainage 05/03/22 1300   Drainage Amount Small 05/03/22 1300   Drainage Characteristics/Odor Serosanguineous 05/03/22 1300   Appearance Pink;Red 05/03/22 1300   Tissue loss description Partial thickness 05/03/22 1300   Black (%), Wound Tissue Color 0 % 05/03/22 1300   Red (%), Wound Tissue Color 100 % 05/03/22 1300   Yellow (%), Wound Tissue Color 0 % 05/03/22 1300   Periwound Area Intact 05/03/22 1300   Wound Edges Open 05/03/22 1300   Wound Length (cm) 0.3 cm 05/03/22 1300   Wound Width (cm) 0.3 cm 05/03/22 1300   Wound Depth (cm) 0.1 cm 05/03/22 1300   Wound Volume (cm^3) 0.009 cm^3 05/03/22 1300   Wound Surface Area (cm^2) 0.09 cm^2 05/03/22 1300   Tunneling (depth (cm)/location) 0 05/03/22 1300   Undermining (depth (cm)/location) 0 05/03/22 1300   Care Cleansed with:;Sterile normal saline 05/03/22 1300   Dressing Applied 05/03/22 1300   Periwound Care Skin barrier film applied 05/03/22 1300   Off Loading Football dressing;Off loading shoe 05/03/22 1300           Plan:                Orders Placed This  Encounter   Procedures    Ambulatory referral/consult to Wound Clinic     Standing Status:   Standing     Number of Occurrences:   1     Referral Priority:   Routine     Referral Type:   Consultation     Referral Reason:   Specialty Services Required     Requested Specialty:   Wound Care     Number of Visits Requested:   1    Change dressing     Clean wound with NS. Cavilon to periwound. Endoform to wound bed, foam. Bilateral football drsg (cast padding x3, coban x1) with darco's. Pt will return to [facility] in 1 wk on Friday 5/13/22        Follow up in about 10 days (around 5/13/2022).

## 2022-05-09 ENCOUNTER — PATIENT OUTREACH (OUTPATIENT)
Dept: EMERGENCY MEDICINE | Facility: HOSPITAL | Age: 47
End: 2022-05-09
Payer: MEDICAID

## 2022-05-11 ENCOUNTER — OFFICE VISIT (OUTPATIENT)
Dept: INFECTIOUS DISEASES | Facility: CLINIC | Age: 47
End: 2022-05-11
Payer: MEDICAID

## 2022-05-11 VITALS
HEIGHT: 76 IN | SYSTOLIC BLOOD PRESSURE: 151 MMHG | HEART RATE: 101 BPM | DIASTOLIC BLOOD PRESSURE: 95 MMHG | WEIGHT: 195.13 LBS | BODY MASS INDEX: 23.76 KG/M2 | OXYGEN SATURATION: 99 %

## 2022-05-11 DIAGNOSIS — E11.621 DIABETIC FOOT ULCER ASSOCIATED WITH TYPE 2 DIABETES MELLITUS, UNSPECIFIED LATERALITY, UNSPECIFIED PART OF FOOT, UNSPECIFIED ULCER STAGE: Primary | ICD-10-CM

## 2022-05-11 DIAGNOSIS — L97.509 DIABETIC FOOT ULCER ASSOCIATED WITH TYPE 2 DIABETES MELLITUS, UNSPECIFIED LATERALITY, UNSPECIFIED PART OF FOOT, UNSPECIFIED ULCER STAGE: Primary | ICD-10-CM

## 2022-05-11 PROCEDURE — 3080F PR MOST RECENT DIASTOLIC BLOOD PRESSURE >= 90 MM HG: ICD-10-PCS | Mod: CPTII,,, | Performed by: INTERNAL MEDICINE

## 2022-05-11 PROCEDURE — 3046F HEMOGLOBIN A1C LEVEL >9.0%: CPT | Mod: CPTII,,, | Performed by: INTERNAL MEDICINE

## 2022-05-11 PROCEDURE — 99213 OFFICE O/P EST LOW 20 MIN: CPT | Mod: PBBFAC

## 2022-05-11 PROCEDURE — 3080F DIAST BP >= 90 MM HG: CPT | Mod: CPTII,,, | Performed by: INTERNAL MEDICINE

## 2022-05-11 PROCEDURE — 3008F PR BODY MASS INDEX (BMI) DOCUMENTED: ICD-10-PCS | Mod: CPTII,,, | Performed by: INTERNAL MEDICINE

## 2022-05-11 PROCEDURE — 99214 PR OFFICE/OUTPT VISIT, EST, LEVL IV, 30-39 MIN: ICD-10-PCS | Mod: S$PBB,,, | Performed by: INTERNAL MEDICINE

## 2022-05-11 PROCEDURE — 3008F BODY MASS INDEX DOCD: CPT | Mod: CPTII,,, | Performed by: INTERNAL MEDICINE

## 2022-05-11 PROCEDURE — 3077F SYST BP >= 140 MM HG: CPT | Mod: CPTII,,, | Performed by: INTERNAL MEDICINE

## 2022-05-11 PROCEDURE — 3046F PR MOST RECENT HEMOGLOBIN A1C LEVEL > 9.0%: ICD-10-PCS | Mod: CPTII,,, | Performed by: INTERNAL MEDICINE

## 2022-05-11 PROCEDURE — 3077F PR MOST RECENT SYSTOLIC BLOOD PRESSURE >= 140 MM HG: ICD-10-PCS | Mod: CPTII,,, | Performed by: INTERNAL MEDICINE

## 2022-05-11 PROCEDURE — 99999 PR PBB SHADOW E&M-EST. PATIENT-LVL III: ICD-10-PCS | Mod: PBBFAC,,,

## 2022-05-11 PROCEDURE — 99999 PR PBB SHADOW E&M-EST. PATIENT-LVL III: CPT | Mod: PBBFAC,,,

## 2022-05-11 PROCEDURE — 99214 OFFICE O/P EST MOD 30 MIN: CPT | Mod: S$PBB,,, | Performed by: INTERNAL MEDICINE
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
## 2022-05-13 ENCOUNTER — HOSPITAL ENCOUNTER (OUTPATIENT)
Dept: WOUND CARE | Facility: HOSPITAL | Age: 47
Discharge: HOME | End: 2022-05-13
Attending: PODIATRIST
Payer: MEDICAID

## 2022-05-13 VITALS — SYSTOLIC BLOOD PRESSURE: 157 MMHG | TEMPERATURE: 98 F | HEART RATE: 105 BPM | DIASTOLIC BLOOD PRESSURE: 91 MMHG

## 2022-05-13 DIAGNOSIS — E11.621 DIABETIC FOOT ULCER: ICD-10-CM

## 2022-05-13 DIAGNOSIS — L97.522 FOOT ULCERATION, LEFT, WITH FAT LAYER EXPOSED: Primary | ICD-10-CM

## 2022-05-13 DIAGNOSIS — L97.509 DIABETIC FOOT ULCER: ICD-10-CM

## 2022-05-13 PROCEDURE — 11042 WOUND DEBRIDEMENT: ICD-10-PCS | Mod: ,,, | Performed by: PODIATRIST

## 2022-05-13 PROCEDURE — 99213 OFFICE O/P EST LOW 20 MIN: CPT | Performed by: PODIATRIST

## 2022-05-13 PROCEDURE — 99499 NO LOS: ICD-10-PCS | Mod: ,,, | Performed by: PODIATRIST

## 2022-05-13 PROCEDURE — 11042 DBRDMT SUBQ TIS 1ST 20SQCM/<: CPT | Mod: ,,, | Performed by: PODIATRIST

## 2022-05-13 PROCEDURE — 99499 UNLISTED E&M SERVICE: CPT | Mod: ,,, | Performed by: PODIATRIST

## 2022-05-13 PROCEDURE — 11042 DBRDMT SUBQ TIS 1ST 20SQCM/<: CPT

## 2022-05-13 NOTE — PROGRESS NOTES
[facility]                Progress Note    Subjective:       Patient ID: Ashutosh Ferrari is a 47 y.o. male.    Chief Complaint: No chief complaint on file.    Follow up wound care visit. Patient ambulated to exam room unaided with prescribed bilateral Darco shoes on feet. No c/o pain at present. Dressing's intact with no drainage noted. Right foot wound measuring smaller in (0.2 cm) length and (0.2 cm) width, pinhole sized.     Wound care done as per order, RTC in 1 week      Review of Systems   Constitutional: Positive for activity change.   Respiratory: Negative for shortness of breath.    Cardiovascular: Negative for chest pain and leg swelling.   Gastrointestinal: Negative for nausea and vomiting.   Musculoskeletal: Positive for arthralgias.   Skin: Positive for wound.   Neurological: Positive for numbness. Negative for weakness.         Objective:        Physical Exam  Constitutional:       Appearance: He is well-developed.      Comments: Oriented to time, place, and person.   Cardiovascular:      Comments: DP and PT pulses are palpable bilaterally. 3 sec capillary refill time and toes and feet are warm to touch proximally .  There is  hair growth on the feet and toes b/l. There is no edema b/l. No spider veins or varicosities present b/l.     Musculoskeletal:      Comments: Equinus noted b/l ankles with < 10 deg DF noted. MMT 5/5 in DF/PF/Inv/Ev resistance with no reproduction of pain in any direction. Passive range of motion of ankle and pedal joints is painless b/l.     Feet:      Right foot:      Skin integrity: No callus or dry skin.      Left foot:      Skin integrity: No callus or dry skin.   Lymphadenopathy:      Comments: Negative lymphadenopathy bilateral popliteal fossa and tarsal tunnel.   Skin:     Comments: See procedure note      Neurological:      Mental Status: He is alert.      Comments: Light touch, proprioception, and sharp/dull sensation are all  intact bilaterally. Protective threshold with the [place]Wienstein monofilament is intact bilaterally.    Psychiatric:         Behavior: Behavior is cooperative.         Vitals:    05/13/22 1322   BP: (!) 157/91   Pulse: 105   Temp: 97.8 °F (36.6 °C)       Assessment:           ICD-10-CM ICD-9-CM   1. Diabetic foot wound and Cellulitis  E11.621 250.80    L97.509 707.15            Wound 05/03/22 1333 Diabetic Ulcer Right lateral;dorsal Foot (Active)   05/03/22 1333    Pre-existing: Yes   Primary Wound Type: Diabetic ulc   Side: Right   Orientation: lateral;dorsal   Location: Foot   Wound Number:    Ankle-Brachial Index:    Pulses:    Removal Indication and Assessment:    Wound Outcome:    (Retired) Wound Type:    (Retired) Wound Length (cm):    (Retired) Wound Width (cm):    (Retired) Depth (cm):    Wound Description (Comments):    Removal Indications:    Wound Image   05/13/22 1300   Wound WDL ex 05/13/22 1300   Dressing Appearance Intact;Dry 05/13/22 1300   Drainage Amount None 05/13/22 1300   Appearance Epithelialization 05/13/22 1300   Tissue loss description Partial thickness 05/13/22 1300   Red (%), Wound Tissue Color 100 % 05/13/22 1300   Periwound Area Dry 05/13/22 1300   Wound Edges Callused 05/13/22 1300   Wound Length (cm) 0.1 cm 05/13/22 1300   Wound Width (cm) 0.1 cm 05/13/22 1300   Wound Depth (cm) 0.1 cm 05/13/22 1300   Wound Volume (cm^3) 0.001 cm^3 05/13/22 1300   Wound Surface Area (cm^2) 0.01 cm^2 05/13/22 1300   Care Cleansed with:;Antimicrobial agent;Sterile normal saline 05/13/22 1300   Dressing Changed 05/13/22 1300   Periwound Care Skin barrier film applied 05/13/22 1300   Off Loading Football dressing;Off loading shoe 05/13/22 1300   Dressing Change Due 05/20/22 05/13/22 1300           Plan:            Debridement: see procedure note     Dressings: per above  Offloading:Foam    Follow-up:Patient is to return to the clinic in 1 week  for follow-up but should call [facility] immediately if any  signs of infection, such as fever, chills, sweats, increased redness or pain.    Short-term goals include maintaining good offloading and minimizing bioburden, promoting granulation and epithelialization to healing.  Long-term goals include keeping the wound healed by good offloading and medical management under the direction of internist.        Orders Placed This Encounter   Procedures    Change dressing     Clean wound with NS. Cavilon to periwound.   Right Foot: Ni to wound bed.   Left Foot: Betadine Iodine to wound bed.   Bilateral Feet: Zeb foam secured with Medipore tape. Football drsg (cast padding x3, coban x1) with darco's. Pt will [facility] in 1 wk on Friday 5/20/22        Follow up in about 1 week (around 5/20/2022).

## 2022-05-16 NOTE — PROCEDURES
Wound Debridement    Date/Time: 5/13/2022 1:00 PM  Performed by: Amena Martinez DPM  Authorized by: Amena Martinez DPM     Consent Done?:  Yes (Written)    Wound Details:    Location:  Right foot    Location:  Right Midfoot    Type of Debridement:  Excisional       Length (cm):  0.3       Area (sq cm):  0.09       Width (cm):  0.3       Percent Debrided (%):  100       Depth (cm):  0.1       Total Area Debrided (sq cm):  0.09    Depth of debridement:  Subcutaneous tissue    Tissue debrided:  Dermis    Devitalized tissue debrided:  Biofilm and Fibrin    Instruments:  Curette    Bleeding:  Minimal  Hemostasis Achieved: Yes    Method Used:  Pressure  Patient tolerance:  Patient tolerated the procedure well with no immediate complications

## 2022-05-17 LAB
ACID FAST MOD KINY STN SPEC: NORMAL
MYCOBACTERIUM SPEC QL CULT: NORMAL

## 2022-05-18 ENCOUNTER — PATIENT OUTREACH (OUTPATIENT)
Dept: EMERGENCY MEDICINE | Facility: HOSPITAL | Age: 47
End: 2022-05-18
Payer: MEDICAID

## 2022-05-20 ENCOUNTER — HOSPITAL ENCOUNTER (OUTPATIENT)
Dept: WOUND CARE | Facility: HOSPITAL | Age: 47
Discharge: HOME | End: 2022-05-20
Attending: PODIATRIST
Payer: MEDICAID

## 2022-05-20 VITALS — SYSTOLIC BLOOD PRESSURE: 148 MMHG | HEART RATE: 78 BPM | TEMPERATURE: 98 F | DIASTOLIC BLOOD PRESSURE: 86 MMHG

## 2022-05-20 DIAGNOSIS — E11.65 TYPE 2 DIABETES MELLITUS WITH HYPERGLYCEMIA, WITH LONG-TERM CURRENT USE OF INSULIN: Primary | ICD-10-CM

## 2022-05-20 DIAGNOSIS — Z79.4 TYPE 2 DIABETES MELLITUS WITH HYPERGLYCEMIA, WITH LONG-TERM CURRENT USE OF INSULIN: Primary | ICD-10-CM

## 2022-05-20 DIAGNOSIS — E11.621 DIABETIC FOOT ULCER: ICD-10-CM

## 2022-05-20 DIAGNOSIS — L97.509 DIABETIC FOOT ULCER: ICD-10-CM

## 2022-05-20 PROCEDURE — 99214 PR OFFICE/OUTPT VISIT, EST, LEVL IV, 30-39 MIN: ICD-10-PCS | Mod: ,,, | Performed by: PODIATRIST

## 2022-05-20 PROCEDURE — 99214 OFFICE O/P EST MOD 30 MIN: CPT | Mod: ,,, | Performed by: PODIATRIST

## 2022-05-20 PROCEDURE — 99212 OFFICE O/P EST SF 10 MIN: CPT

## 2022-05-20 NOTE — PROGRESS NOTES
[facility]                Progress Note    Subjective:       Patient ID: Ashutosh Ferrari is a 47 y.o. male.    Chief Complaint: No chief complaint on file.    Follow up wound care visit. Patient ambulated to exam room unaided with prescribed bilateral Darco shoes on feet. No c/o pain at present. Dressing's intact with no drainage noted. Right foot wound appears healed.      Patient given after care instructions, recommended shoe list and creams with directions for foot care.         Review of Systems   Constitutional: Positive for activity change.   Respiratory: Negative for shortness of breath.    Cardiovascular: Negative for chest pain and leg swelling.   Gastrointestinal: Negative for nausea and vomiting.   Musculoskeletal: Positive for arthralgias.   Skin: Positive for wound.   Neurological: Positive for numbness. Negative for weakness.         Objective:        Physical Exam  Constitutional:       Appearance: He is well-developed.      Comments: Oriented to time, place, and person.   Cardiovascular:      Comments: DP and PT pulses are palpable bilaterally. 3 sec capillary refill time and toes and feet are warm to touch proximally .  There is  hair growth on the feet and toes b/l. There is no edema b/l. No spider veins or varicosities present b/l.     Musculoskeletal:      Comments: Equinus noted b/l ankles with < 10 deg DF noted. MMT 5/5 in DF/PF/Inv/Ev resistance with no reproduction of pain in any direction. Passive range of motion of ankle and pedal joints is painless b/l.     Feet:      Right foot:      Skin integrity: No callus or dry skin.      Left foot:      Skin integrity: No callus or dry skin.   Lymphadenopathy:      Comments: Negative lymphadenopathy bilateral popliteal fossa and tarsal tunnel.   Skin:     Comments: B/L wounds healed with fragile epithelium.    Neurological:      Mental Status: He is alert.      Comments: Light touch, proprioception, and  sharp/dull sensation are all intact bilaterally. Protective threshold with the [place]-Wienstein monofilament is intact bilaterally.    Psychiatric:         Behavior: Behavior is cooperative.         Vitals:    05/20/22 1305   BP: (!) 148/86   Pulse: 78   Temp: 97.7 °F (36.5 °C)       Assessment:           ICD-10-CM ICD-9-CM   1. Type 2 diabetes mellitus with hyperglycemia, with long-term current use of insulin  E11.65 250.00    Z79.4 790.29     V58.67   2. Diabetic foot wound and Cellulitis  E11.621 250.80    L97.509 707.15       [REMOVED]      Wound 05/03/22 1333 Diabetic Ulcer Right lateral;dorsal Foot (Removed)   05/03/22 1333    Pre-existing: Yes   Primary Wound Type: Diabetic ulc   Side: Right   Orientation: lateral;dorsal   Location: Foot   Wound Number:    Ankle-Brachial Index:    Pulses:    Removal Indication and Assessment:    Wound Outcome: Healed   (Retired) Wound Type:    (Retired) Wound Length (cm):    (Retired) Wound Width (cm):    (Retired) Depth (cm):    Wound Description (Comments):    Removal Indications:    Removed 05/20/22    Wound Image   05/20/22 1300   Wound WDL WDL 05/20/22 1300   Dressing Appearance Intact;Dry 05/20/22 1300   Drainage Amount None 05/20/22 1300   Appearance Closed/resurfaced 05/20/22 1300   Wound Length (cm) 0 cm 05/20/22 1300   Wound Width (cm) 0 cm 05/20/22 1300   Wound Depth (cm) 0 cm 05/20/22 1300   Wound Volume (cm^3) 0 cm^3 05/20/22 1300   Wound Surface Area (cm^2) 0 cm^2 05/20/22 1300   Tunneling (depth (cm)/location) 0 05/20/22 1300   Undermining (depth (cm)/location) 0 05/20/22 1300   Care Cleansed with:;Antimicrobial agent;Sterile normal saline 05/20/22 1300   Dressing Removed 05/20/22 1300           Plan:            Wounds healed B/L feet with fragile epithelium    With the patient's verbal consent a sterile #15 scalpel was used to trim the hyperkeratotic lesion described above B/L feet.     - Shoe inspection. Diabetic Foot Education. Patient reminded of the  importance of good nutrition and blood sugar control to help prevent podiatric complications of diabetes. Patient instructed on proper foot hygeine. We discussed wearing proper shoe gear, daily foot inspections, never walking without protective shoe gear, caution putting sharp instruments to feet     - Discussed DM foot care:  Wear comfortable, proper fitting shoes. Wash feet daily. Dry well. After drying, apply moisturizer to feet (no lotion to webspaces). Inspect feet daily for skin breaks, blisters, swelling, or redness. Wear cotton socks (preferably white)  Change socks every day. Do NOT walk barefoot. Do NOT use heating pads or warm/hot water soaks     Rx diabetic shoes with custom molded inserts to be worn at all times while ambulating. Prescription provided with list of local retailers.     F/u 9 weeks.     Orders Placed This Encounter   Procedures    DIABETIC SHOES FOR HOME USE     Primary and Secondary Diagnosis are necessary for patients to qualify for footwear, and documentation of the above diagnoses must be substantiated in the medical record.    I certify that I am the primary physician treating this patient for diabetes mellitus.  This order for diabetic footwear is medically necessary to protect their diabetic feet from breakdown.    I certify that this patient is under my direct care in a comprehensive plan for treatment of Diabetes Mellitus.     Order Specific Question:   Height:     Answer:   6'4     Order Specific Question:   Weight:     Answer:   250     Order Specific Question:   Start date:     Answer:   5/20/2022     Order Specific Question:   Length of need (1-99 months):     Answer:   12     Order Specific Question:   Prognosis:     Answer:   Good     Order Specific Question:   Type of diabetic shoes:     Answer:   Depth Shoe ()     Order Specific Question:   Type of inserts:     Answer:   Heat Molded Inserts ()     Order Specific Question:   Insert Qty:     Answer:   3     Order  Specific Question:   Primary diagnosis condition:     Answer:   Diabetes Mellitus Type II with neuro- E11.40     Order Specific Question:   Secondary Diagnosis Qualifying conditions:     Answer:   Foot Deformity     Order Specific Question:   Foot deformity:     Answer:   Toes (M20.59)        Follow up if symptoms worsen or fail to improve, for wound care.

## 2022-07-20 NOTE — PLAN OF CARE
04/06/22 1304   Final Note   Assessment Type Final Discharge Note   Anticipated Discharge Disposition Home   Hospital Resources/Appts/Education Provided Provided patient/caregiver with written discharge plan information   Post-Acute Status   Post-Acute Authorization Other   Other Status No Post-Acute Service Needs   Discharge Delays None known at this time   Secure message sent to nurse, Ayla notifying her that alL CM needs have been met.  Waiting for Dr Martinez's staff to call patient with appointment day and time.   "Pt calling in with  regarding US results on 7/19/22. Per results note: \"Asymptomatic simple cysts less than 5 cm in premenopausal women do not require follow up as a general rule, but clinical correlation is recommended.\" Pt states she is feeling \"really uncomfortable\" and is requesting follow up. Please advise.     Karrie READ RN    "

## 2022-09-29 ENCOUNTER — OFFICE VISIT (OUTPATIENT)
Dept: PODIATRY | Facility: CLINIC | Age: 47
End: 2022-09-29
Payer: MEDICAID

## 2022-09-29 ENCOUNTER — HOSPITAL ENCOUNTER (OUTPATIENT)
Dept: RADIOLOGY | Facility: HOSPITAL | Age: 47
Discharge: HOME | End: 2022-09-29
Attending: PODIATRIST
Payer: MEDICAID

## 2022-09-29 VITALS — BODY MASS INDEX: 23.76 KG/M2 | HEIGHT: 76 IN | WEIGHT: 195.13 LBS

## 2022-09-29 DIAGNOSIS — E08.621 DIABETIC ULCER OF OTHER PART OF RIGHT FOOT ASSOCIATED WITH DIABETES MELLITUS DUE TO UNDERLYING CONDITION, WITH FAT LAYER EXPOSED: ICD-10-CM

## 2022-09-29 DIAGNOSIS — E11.65 TYPE 2 DIABETES MELLITUS WITH HYPERGLYCEMIA, WITH LONG-TERM CURRENT USE OF INSULIN: Primary | ICD-10-CM

## 2022-09-29 DIAGNOSIS — L97.512 DIABETIC ULCER OF OTHER PART OF RIGHT FOOT ASSOCIATED WITH DIABETES MELLITUS DUE TO UNDERLYING CONDITION, WITH FAT LAYER EXPOSED: ICD-10-CM

## 2022-09-29 DIAGNOSIS — L84 PRE-ULCERATIVE CALLUSES: ICD-10-CM

## 2022-09-29 DIAGNOSIS — Z79.4 TYPE 2 DIABETES MELLITUS WITH HYPERGLYCEMIA, WITH LONG-TERM CURRENT USE OF INSULIN: Primary | ICD-10-CM

## 2022-09-29 PROCEDURE — 87186 SC STD MICRODIL/AGAR DIL: CPT | Mod: 59 | Performed by: PODIATRIST

## 2022-09-29 PROCEDURE — 73630 X-RAY EXAM OF FOOT: CPT | Mod: TC,FY,PO,RT

## 2022-09-29 PROCEDURE — 1159F PR MEDICATION LIST DOCUMENTED IN MEDICAL RECORD: ICD-10-PCS | Mod: CPTII,,, | Performed by: PODIATRIST

## 2022-09-29 PROCEDURE — 1160F PR REVIEW ALL MEDS BY PRESCRIBER/CLIN PHARMACIST DOCUMENTED: ICD-10-PCS | Mod: CPTII,,, | Performed by: PODIATRIST

## 2022-09-29 PROCEDURE — 99214 OFFICE O/P EST MOD 30 MIN: CPT | Mod: 25,S$PBB,, | Performed by: PODIATRIST

## 2022-09-29 PROCEDURE — 73630 XR FOOT COMPLETE 3 VIEW RIGHT: ICD-10-PCS | Mod: 26,RT,, | Performed by: RADIOLOGY

## 2022-09-29 PROCEDURE — 11042 DBRDMT SUBQ TIS 1ST 20SQCM/<: CPT | Mod: PBBFAC,PO | Performed by: PODIATRIST

## 2022-09-29 PROCEDURE — 99999 PR PBB SHADOW E&M-EST. PATIENT-LVL IV: CPT | Mod: PBBFAC,,, | Performed by: PODIATRIST

## 2022-09-29 PROCEDURE — 99214 PR OFFICE/OUTPT VISIT, EST, LEVL IV, 30-39 MIN: ICD-10-PCS | Mod: 25,S$PBB,, | Performed by: PODIATRIST

## 2022-09-29 PROCEDURE — 99214 OFFICE O/P EST MOD 30 MIN: CPT | Mod: PBBFAC,PO,25 | Performed by: PODIATRIST

## 2022-09-29 PROCEDURE — 87077 CULTURE AEROBIC IDENTIFY: CPT | Mod: 59 | Performed by: PODIATRIST

## 2022-09-29 PROCEDURE — 3046F HEMOGLOBIN A1C LEVEL >9.0%: CPT | Mod: CPTII,,, | Performed by: PODIATRIST

## 2022-09-29 PROCEDURE — 1159F MED LIST DOCD IN RCRD: CPT | Mod: CPTII,,, | Performed by: PODIATRIST

## 2022-09-29 PROCEDURE — 3046F PR MOST RECENT HEMOGLOBIN A1C LEVEL > 9.0%: ICD-10-PCS | Mod: CPTII,,, | Performed by: PODIATRIST

## 2022-09-29 PROCEDURE — 87070 CULTURE OTHR SPECIMN AEROBIC: CPT | Performed by: PODIATRIST

## 2022-09-29 PROCEDURE — 3008F PR BODY MASS INDEX (BMI) DOCUMENTED: ICD-10-PCS | Mod: CPTII,,, | Performed by: PODIATRIST

## 2022-09-29 PROCEDURE — 73630 X-RAY EXAM OF FOOT: CPT | Mod: 26,RT,, | Performed by: RADIOLOGY

## 2022-09-29 PROCEDURE — 1160F RVW MEDS BY RX/DR IN RCRD: CPT | Mod: CPTII,,, | Performed by: PODIATRIST

## 2022-09-29 PROCEDURE — 3008F BODY MASS INDEX DOCD: CPT | Mod: CPTII,,, | Performed by: PODIATRIST

## 2022-09-29 PROCEDURE — 87075 CULTR BACTERIA EXCEPT BLOOD: CPT | Performed by: PODIATRIST

## 2022-09-29 PROCEDURE — 11042 WOUND DEBRIDEMENT: ICD-10-PCS | Mod: S$PBB,,, | Performed by: PODIATRIST

## 2022-09-29 PROCEDURE — 99999 PR PBB SHADOW E&M-EST. PATIENT-LVL IV: ICD-10-PCS | Mod: PBBFAC,,, | Performed by: PODIATRIST

## 2022-09-29 RX ORDER — DOXYCYCLINE HYCLATE 100 MG
100 TABLET ORAL 2 TIMES DAILY
Qty: 20 TABLET | Refills: 0 | Status: SHIPPED | OUTPATIENT
Start: 2022-09-29 | End: 2022-10-04 | Stop reason: ALTCHOICE

## 2022-09-29 RX ORDER — TOBRAMYCIN 3 MG/ML
1 SOLUTION/ DROPS OPHTHALMIC 2 TIMES DAILY
Qty: 5 ML | Refills: 2 | Status: SHIPPED | OUTPATIENT
Start: 2022-09-29

## 2022-09-29 NOTE — PROGRESS NOTES
Subjective:      Patient ID: Ashutosh Ferrari is a 47 y.o. male.    Chief Complaint: Wound Check (Right foot) and Diabetes Mellitus      Ashutosh is a 47 y.o. male who presents to the clinic for evaluation and treatment of high risk feet. Ashutosh has a past medical history of Diabetes mellitus, Essential hypertension, benign (7/18/2013), and Herpes.  S/p 2 weeks right foot I&D with subsequent delayed primary closure. Attempted to set up f/u appt however patient did no answer phone. Patient reports dressing has been on since hospital discharge and went to ED yesterday to have dressing changed    CC#2- New onset LEFT plantar foot ulceration, does not know how this started.     4/27/22: F/u B/L foot ulceration. Presents with football dressing intact B/L no issues.     9/29/2022 presents to the podiatry clinic for care of suspected re ulceration, right foot.  Location: sub 5th MTPJ Onset of the symptoms was several weeks ago. Precipitating event: increased activity. He has been doing P90x in rosina tennis shoes. History of injury: no Current symptoms include: swelling. Signs of infection denies nausea, vomiting, fever, chills.   Symptoms have progressed to a point and plateaued.     Previously seen by my colleague, new to me.    Chief Complaint   Patient presents with    Wound Check     Right foot    Diabetes Mellitus       Hemoglobin A1C   Date Value Ref Range Status   03/27/2022 12.5 (H) 4.0 - 5.6 % Final     Comment:     ADA Screening Guidelines:  5.7-6.4%  Consistent with prediabetes  >or=6.5%  Consistent with diabetes    High levels of fetal hemoglobin interfere with the HbA1C  assay. Heterozygous hemoglobin variants (HbS, HgC, etc)do  not significantly interfere with this assay.   However, presence of multiple variants may affect accuracy.     09/19/2021 12.8 (H) 4.0 - 5.6 % Final     Comment:     ADA Screening Guidelines:  5.7-6.4%  Consistent with prediabetes  >or=6.5%  Consistent with diabetes    High levels of fetal  "hemoglobin interfere with the HbA1C  assay. Heterozygous hemoglobin variants (HbS, HgC, etc)do  not significantly interfere with this assay.   However, presence of multiple variants may affect accuracy.     07/17/2013 14.6 (H) 4.0 - 6.2 % Final         Patient Active Problem List   Diagnosis    Diabetes mellitus, new onset    HHNC (hyperglycemic hyperosmolar nonketotic coma)    Volume depletion, unspecified    GABRIELA (acute kidney injury)    DM type 2, uncontrolled, with renal complications    Abscess, scalp    Dehydration, moderate    Hypophosphatasia    Hypomagnesemia    Hypokalemia    HTN (hypertension)    Pneumonia due to COVID-19 virus    Hyperkalemia    Leukopenia    Increased anion gap metabolic acidosis    Type 2 diabetes mellitus with hyperglycemia, with long-term current use of insulin    Diabetic foot wound and Cellulitis    Encounter for wound re-check       Current Outpatient Medications on File Prior to Visit   Medication Sig Dispense Refill    amLODIPine (NORVASC) 2.5 MG tablet Take 1 tablet (2.5 mg total) by mouth once daily. 30 tablet 11    blood sugar diagnostic Strp 1 strip by Misc.(Non-Drug; Combo Route) route 2 (two) times daily with meals. 100 each 11    hydrALAZINE (APRESOLINE) 25 MG tablet Take 1 tablet (25 mg total) by mouth every 8 (eight) hours. 90 tablet 11    insulin detemir U-100 (LEVEMIR) 100 unit/mL injection Inject 40 Units into the skin every evening.      lancets 33 gauge Misc 1 each by Misc.(Non-Drug; Combo Route) route 2 (two) times daily with meals. 100 each 11    multivitamin (THERAGRAN) per tablet Take 1 tablet by mouth once daily.       pen needle, diabetic 32 gauge x 1/4" Ndle 1 each by Misc.(Non-Drug; Combo Route) route 2 (two) times daily with meals. 100 each 11    pulse oximeter (PULSE OXIMETER) device by Apply Externally route 2 (two) times a day. Use twice daily at 8 AM and 3 PM and record the value in [employer] [place] as directed. 1 each 0    albuterol (PROVENTIL/VENTOLIN HFA) 90 " mcg/actuation inhaler Inhale 2 puffs into the lungs 2 (two) times daily. Rescue 8.5 g 0    insulin aspart U-100 (NOVOLOG) 100 unit/mL (3 mL) InPn pen Inject 6 Units into the skin 3 (three) times daily. 15 mL 11    lancing device Misc 1 Device by Misc.(Non-Drug; Combo Route) route 2 (two) times daily with meals. 1 each 0     No current facility-administered medications on file prior to visit.       Review of patient's allergies indicates:  No Known Allergies    Past Surgical History:   Procedure Laterality Date    DEBRIDEMENT OF FOOT Right 4/1/2022    Procedure: DEBRIDEMENT, FOOT;  Surgeon: Amena Martinez DPM;  Location: [facility] OR;  Service: Podiatry;  Laterality: Right;    DEBRIDEMENT OF FOOT Right 4/4/2022    Procedure: DEBRIDEMENT, FOOT;  Surgeon: Amena Martinez DPM;  Location: [facility] OR;  Service: Podiatry;  Laterality: Right;    INCISION AND DRAINAGE Right 3/28/2022    Procedure: INCISION AND DRAINAGE RIGHT FOOT;  Surgeon: Amena Martinez DPM;  Location: [facility] OR;  Service: Podiatry;  Laterality: Right;       Family History   Problem Relation Age of Onset    Diabetes Mother        Social History     Socioeconomic History    Marital status: Single   Occupational History    Occupation: Unemployed   Tobacco Use    Smoking status: Never    Smokeless tobacco: Never   Substance and Sexual Activity    Alcohol use: No    Drug use: No    Sexual activity: Yes     Partners: Female     Birth control/protection: None, Condom     Social Determinants of Health     Financial Resource Strain: Low Risk     Difficulty of Paying Living Expenses: Not very hard   Food Insecurity: No Food Insecurity    Worried About Running Out of Food in the Last Year: Never true    Ran Out of Food in the Last Year: Never true   Transportation Needs: No Transportation Needs    Lack of Transportation (Medical): No    Lack of Transportation (Non-Medical): No   Physical Activity: Inactive    Days of Exercise per Week: 0 days    Minutes of Exercise per  "Session: 0 min   Stress: No Stress Concern Present    Feeling of Stress : Not at all   Social Connections: Socially Isolated    Frequency of Communication with Friends and Family: More than three times a week    Frequency of Social Gatherings with Friends and Family: More than three times a week    Attends Mu-ism Services: Never    Active Member of Clubs or Organizations: No    Attends Club or Organization Meetings: Never    Marital Status: Never    Housing Stability: Low Risk     Unable to Pay for Housing in the Last Year: No    Number of Places Lived in the Last Year: 1    Unstable Housing in the Last Year: No       Review of Systems   Constitutional: Negative for chills.   Cardiovascular:  Negative for chest pain and claudication.   Respiratory:  Negative for cough.    Skin:  Positive for color change, dry skin, nail changes and poor wound healing.   Musculoskeletal:  Positive for joint pain and myalgias.   Gastrointestinal:  Negative for nausea.   Neurological:  Positive for paresthesias. Negative for numbness.   Psychiatric/Behavioral:  The patient is not nervous/anxious.          Objective:      Vitals:    09/29/22 1359   Weight: 88.5 kg (195 lb 1.7 oz)   Height: 6' 4" (1.93 m)   PainSc:   2       Physical Exam  Constitutional:       Appearance: He is well-developed.      Comments: Oriented to time, place, and person.   Cardiovascular:      Comments: DP and PT pulses are palpable bilaterally. 3 sec capillary refill time and toes and feet are warm to touch proximally .  There is  hair growth on the feet and toes b/l. There is no edema b/l. No spider veins or varicosities present b/l.     Musculoskeletal:      Comments: Equinus noted b/l ankles with < 10 deg DF noted. MMT 5/5 in DF/PF/Inv/Ev resistance with no reproduction of pain in any direction. Passive range of motion of ankle and pedal joints is painless b/l.     Lymphadenopathy:      Comments: Negative lymphadenopathy bilateral popliteal fossa " and tarsal tunnel.   Skin:     Findings: Wound (see below) present.      Comments:      Neurological:      Mental Status: He is alert.      Comments: Light touch, proprioception, and sharp/dull sensation are all intact bilaterally. Protective threshold with the [place]-Wienstein monofilament is intact bilaterally.    Psychiatric:         Behavior: Behavior is cooperative.       9/29/22    right      Ulcer location: sub 5th MTPJ of the right foot  Measurements : 1.3x2.5x0.2 cm   Signs of infection:local edema, erythema, malodor  Drainage: Sero-Sanguinous  Purulence: no  Crepitus/fluctuance: no  Periwound: Reddened, Macerated, Calloused  Base: Granular/Healthy  Depth: subcutaneous tissue  Probe to bone: no        Left  Preulcerative lesion sub 5th MTPJ                  Assessment:       Encounter Diagnoses   Name Primary?    Type 2 diabetes mellitus with hyperglycemia, with long-term current use of insulin Yes    Diabetic ulcer of other part of right foot associated with diabetes mellitus due to underlying condition, with fat layer exposed     Pre-ulcerative calluses          Plan:     Problem List Items Addressed This Visit       Type 2 diabetes mellitus with hyperglycemia, with long-term current use of insulin - Primary    Diabetic foot wound and Cellulitis    Relevant Orders    Aerobic culture (Completed)    Culture, Anaerobic (Completed)    X-Ray Foot Complete Right (Completed)    Wound Debridement     Other Visit Diagnoses       Pre-ulcerative calluses               Orders Placed This Encounter    Wound Debridement    Aerobic culture    Culture, Anaerobic    X-Ray Foot Complete Right    tobramycin sulfate 0.3% (TOBREX) 0.3 % ophthalmic solution    doxycycline (VIBRA-TABS) 100 MG tablet       I counseled the patient on his conditions, their implications and medical management.      Greater than 50% of this visit spent on counseling and coordination of care.    Education about the diabetic foot, neuropathy, and  prevention of limb loss.    Discussed wound healing cycle, skin integrity, ways to care for skin.Counseled patient on the effects of  high blood glucose on healing. He verbalizes understanding that it can increase the chances of delayed healing and this prolonged exposure leads to infection or progression of infection which subsequently can result in loss of limb.    Adequate vitamin supplementation, protein intake, and hydration - discussed with patient    Imaging ordered to rule out gas in the soft tissues or bone involvement.      The wound is cleansed of foreign material as much as possible and the base inspected for bone or abscess. Base is granular and without bone nor joint exposure. Aerobic and anerobic cultures swabs taken    Doxycycline ppx    Debridement: procedure note at end  Dressings: iodosorb  Offloading:football and darco shoe    Patient states that he is leaving the country early next week for 15 days for his fathers snf celebration     Detailed home care instructions dispensed.  Tobrex BID    Follow-up: 1-3 weeks with Dr. Martinez, but should call [facility] immediately if any signs of infection, such as fever, chills, sweats, increased redness or pain.    Short-term goals include maintaining good offloading and minimizing bioburden, promoting granulation and epithelialization to healing.  Long-term goals include keeping the wound healed by good offloading and medical management under the direction of internist.    Shoe inspection. Diabetic Foot Education. Patient reminded of the importance of good nutrition and blood sugar control to help prevent podiatric complications of diabetes. Patient instructed on proper foot hygeine. We discussed wearing proper shoe gear, daily foot inspections, never walking without protective shoe gear, never putting sharp instruments to feet.          Wound Debridement    Date/Time: 9/29/2022 2:15 PM  Performed by: eSrina Morales DPM  Authorized by: Serina Morales DPM      Local anesthesia used?: No      Wound Details:    Location:  Right foot    Location:  Right 5th Metatarsal Head    Type of Debridement:  Excisional       Length (cm):  1.3       Area (sq cm):  3.25       Width (cm):  2.5       Percent Debrided (%):  100       Depth (cm):  0.2       Total Area Debrided (sq cm):  3.25    Depth of debridement:  Subcutaneous tissue    Tissue debrided:  Dermis, Epidermis and Subcutaneous    Devitalized tissue debrided:  Callus and Fibrin    Instruments:  Curette    Bleeding:  Minimal  Hemostasis Achieved: Yes    Method Used:  Pressure  Patient tolerance:  Patient tolerated the procedure well with no immediate complications

## 2022-10-03 ENCOUNTER — OFFICE VISIT (OUTPATIENT)
Dept: PODIATRY | Facility: CLINIC | Age: 47
End: 2022-10-03
Payer: MEDICAID

## 2022-10-03 VITALS — HEIGHT: 76 IN | WEIGHT: 195.13 LBS | BODY MASS INDEX: 23.76 KG/M2

## 2022-10-03 DIAGNOSIS — E11.42 TYPE 2 DIABETES MELLITUS WITH DIABETIC POLYNEUROPATHY, WITHOUT LONG-TERM CURRENT USE OF INSULIN: ICD-10-CM

## 2022-10-03 DIAGNOSIS — L97.512 ULCERATED, FOOT, RIGHT, WITH FAT LAYER EXPOSED: Primary | ICD-10-CM

## 2022-10-03 DIAGNOSIS — L97.509 DIABETIC FOOT ULCER ASSOCIATED WITH TYPE 2 DIABETES MELLITUS, UNSPECIFIED LATERALITY, UNSPECIFIED PART OF FOOT, UNSPECIFIED ULCER STAGE: ICD-10-CM

## 2022-10-03 DIAGNOSIS — E11.621: ICD-10-CM

## 2022-10-03 DIAGNOSIS — E11.621 DIABETIC FOOT ULCER ASSOCIATED WITH TYPE 2 DIABETES MELLITUS, UNSPECIFIED LATERALITY, UNSPECIFIED PART OF FOOT, UNSPECIFIED ULCER STAGE: ICD-10-CM

## 2022-10-03 DIAGNOSIS — L97.505: ICD-10-CM

## 2022-10-03 PROCEDURE — 3008F PR BODY MASS INDEX (BMI) DOCUMENTED: ICD-10-PCS | Mod: CPTII,,, | Performed by: PODIATRIST

## 2022-10-03 PROCEDURE — 1160F RVW MEDS BY RX/DR IN RCRD: CPT | Mod: CPTII,,, | Performed by: PODIATRIST

## 2022-10-03 PROCEDURE — 99213 OFFICE O/P EST LOW 20 MIN: CPT | Mod: PBBFAC,PO,25 | Performed by: PODIATRIST

## 2022-10-03 PROCEDURE — 1160F PR REVIEW ALL MEDS BY PRESCRIBER/CLIN PHARMACIST DOCUMENTED: ICD-10-PCS | Mod: CPTII,,, | Performed by: PODIATRIST

## 2022-10-03 PROCEDURE — 3046F PR MOST RECENT HEMOGLOBIN A1C LEVEL > 9.0%: ICD-10-PCS | Mod: CPTII,,, | Performed by: PODIATRIST

## 2022-10-03 PROCEDURE — 1159F PR MEDICATION LIST DOCUMENTED IN MEDICAL RECORD: ICD-10-PCS | Mod: CPTII,,, | Performed by: PODIATRIST

## 2022-10-03 PROCEDURE — 11042 WOUND DEBRIDEMENT: ICD-10-PCS | Mod: S$PBB,,, | Performed by: PODIATRIST

## 2022-10-03 PROCEDURE — 3046F HEMOGLOBIN A1C LEVEL >9.0%: CPT | Mod: CPTII,,, | Performed by: PODIATRIST

## 2022-10-03 PROCEDURE — 99213 OFFICE O/P EST LOW 20 MIN: CPT | Mod: 25,S$PBB,, | Performed by: PODIATRIST

## 2022-10-03 PROCEDURE — 1159F MED LIST DOCD IN RCRD: CPT | Mod: CPTII,,, | Performed by: PODIATRIST

## 2022-10-03 PROCEDURE — 11042 DBRDMT SUBQ TIS 1ST 20SQCM/<: CPT | Mod: PBBFAC,PO | Performed by: PODIATRIST

## 2022-10-03 PROCEDURE — 99213 PR OFFICE/OUTPT VISIT, EST, LEVL III, 20-29 MIN: ICD-10-PCS | Mod: 25,S$PBB,, | Performed by: PODIATRIST

## 2022-10-03 PROCEDURE — 99999 PR PBB SHADOW E&M-EST. PATIENT-LVL III: ICD-10-PCS | Mod: PBBFAC,,, | Performed by: PODIATRIST

## 2022-10-03 PROCEDURE — 3008F BODY MASS INDEX DOCD: CPT | Mod: CPTII,,, | Performed by: PODIATRIST

## 2022-10-03 PROCEDURE — 99999 PR PBB SHADOW E&M-EST. PATIENT-LVL III: CPT | Mod: PBBFAC,,, | Performed by: PODIATRIST

## 2022-10-03 NOTE — PROGRESS NOTES
Subjective:      Patient ID: Ashutosh Ferrari is a 47 y.o. male.    Chief Complaint: Foot Ulcer    Open wound right foot.  Unknown onset, unchanged since discovery two days ago.  Aggravated with increased pressure/ambulationl  no prior medical treatement.  No self treatment.  Denies trauma, surgery right foot.    Review of Systems   Constitutional: Negative for chills, diaphoresis, fever, malaise/fatigue and night sweats.   Cardiovascular:  Negative for claudication, cyanosis, leg swelling and syncope.   Skin:  Positive for poor wound healing. Negative for color change, dry skin, nail changes, rash, suspicious lesions and unusual hair distribution.   Musculoskeletal:  Negative for falls, joint pain, joint swelling, muscle cramps, muscle weakness and stiffness.   Gastrointestinal:  Negative for constipation, diarrhea, nausea and vomiting.   Neurological:  Negative for brief paralysis, disturbances in coordination, focal weakness, numbness, paresthesias, sensory change and tremors.         Objective:      Physical Exam  Constitutional:       General: He is not in acute distress.     Appearance: He is well-developed. He is not diaphoretic.   Cardiovascular:      Pulses:           Popliteal pulses are 2+ on the right side and 2+ on the left side.        Dorsalis pedis pulses are 2+ on the right side and 2+ on the left side.        Posterior tibial pulses are 2+ on the right side and 2+ on the left side.      Comments: Capillary refill 3 seconds all toes/distal feet, all toes/both feet warm to touch.      Negative lymphadenopathy bilateral popliteal fossa and tarsal tunnel.      Negavie lower extremity edema bilateral.    Musculoskeletal:      Right ankle: No swelling, deformity, ecchymosis or lacerations. Normal range of motion. Normal pulse.      Right Achilles Tendon: Normal. No defects. Washington's test negative.   Lymphadenopathy:      Lower Body: No right inguinal adenopathy. No left inguinal adenopathy.      Comments:  Negative lymphadenopathy bilateral popliteal fossa and tarsal tunnel.    Negative lymphangitic streaking bilateral feet/ankles/legs.   Skin:     General: Skin is warm and dry.      Capillary Refill: Capillary refill takes 2 to 3 seconds.      Coloration: Skin is not pale.      Findings: No abrasion, bruising, burn, ecchymosis, erythema, laceration, lesion or rash.      Nails: There is no clubbing.      Comments: Wound:  plantar right 5th mtpj    Size:    Pre:[id number]  Post: [id number]    Base:  Granular, pink with moderate serous/serosanaguinous drainage only.  No pus, tracking, fluctuance, malodor, or cardinal signs infection.    Borders:  Hyperkeratotic, debriding to flat, pink, blanchable skin edges without undermining.       Otherwise, Skin is normal age and health appropriate color, turgor, texture, and temperature bilateral lower extremities without ulceration, hyperpigmentation, discoloration, masses nodules or cords palpated.  No ecchymosis, erythema, edema, or cardinal signs of infection bilateral lower extremities.       Neurological:      Mental Status: He is alert and oriented to person, place, and time.      Sensory: Sensory deficit present.      Motor: No tremor, atrophy or abnormal muscle tone.      Gait: Gait normal.      Comments: Diminished/loss of protective sensation all toes bilateral to 10 gram monofilament.     Psychiatric:         Behavior: Behavior is cooperative.           Assessment:       Encounter Diagnoses   Name Primary?    Diabetic foot ulcer associated with type 2 diabetes mellitus, unspecified laterality, unspecified part of foot, unspecified ulcer stage     Diabetic ulcer of foot associated with type 2 diabetes mellitus, with muscle involvement without evidence of necrosis, unspecified laterality, unspecified part of foot     Ulcerated, foot, right, with fat layer exposed Yes    Type 2 diabetes mellitus with diabetic polyneuropathy, without long-term current use of insulin           Plan:       Ashutosh was seen today for foot ulcer.    Diagnoses and all orders for this visit:    Ulcerated, foot, right, with fat layer exposed    Diabetic foot ulcer associated with type 2 diabetes mellitus, unspecified laterality, unspecified part of foot, unspecified ulcer stage  -     Ambulatory referral/consult to Podiatry    Diabetic ulcer of foot associated with type 2 diabetes mellitus, with muscle involvement without evidence of necrosis, unspecified laterality, unspecified part of foot  -     Ambulatory referral/consult to Podiatry    Type 2 diabetes mellitus with diabetic polyneuropathy, without long-term current use of insulin      I counseled the patient on his conditions, their implications and medical management.        Debride wound right foot.    Xrays right foot    Dressed wound right with ceci foam, football.    Ambulate minimally in sx shoe right, casual shoe left.    Adequate vitamin supplementation, protein intake, and hydration - discussed with patient.              Follow up in about 1 week (around 10/10/2022).

## 2022-10-03 NOTE — PROCEDURES
"Wound Debridement    Date/Time: 10/3/2022 11:36 AM  Performed by: Chava Echeverria DPM  Authorized by: Chava Echeverria DPM     Time out: Immediately prior to procedure a "time out" was called to verify the correct patient, procedure, equipment, support staff and site/side marked as required.    Consent Done?:  Yes (Verbal)  Local anesthesia used?: No      Wound Details:    Location:  Right foot    Location:  Right 5th Metatarsal Head    Type of Debridement:  Excisional       Length (cm):  0.8       Area (sq cm):  0.64       Width (cm):  0.8       Percent Debrided (%):  100       Depth (cm):  0.2       Total Area Debrided (sq cm):  0.64    Depth of debridement:  Subcutaneous tissue    Tissue debrided:  Dermis, Epidermis and Subcutaneous    Devitalized tissue debrided:  Biofilm and Callus    Instruments:  Blade    Bleeding:  Minimal  Hemostasis Achieved: Yes    Method Used:  Pressure  Patient tolerance:  Patient tolerated the procedure well with no immediate complications  "

## 2022-10-04 ENCOUNTER — TELEPHONE (OUTPATIENT)
Dept: PODIATRY | Facility: CLINIC | Age: 47
End: 2022-10-04
Payer: MEDICAID

## 2022-10-04 DIAGNOSIS — L97.505: ICD-10-CM

## 2022-10-04 DIAGNOSIS — E11.621: ICD-10-CM

## 2022-10-04 DIAGNOSIS — E11.621 DIABETIC FOOT ULCER ASSOCIATED WITH TYPE 2 DIABETES MELLITUS, UNSPECIFIED LATERALITY, UNSPECIFIED PART OF FOOT, UNSPECIFIED ULCER STAGE: Primary | ICD-10-CM

## 2022-10-04 DIAGNOSIS — L97.509 DIABETIC FOOT ULCER ASSOCIATED WITH TYPE 2 DIABETES MELLITUS, UNSPECIFIED LATERALITY, UNSPECIFIED PART OF FOOT, UNSPECIFIED ULCER STAGE: Primary | ICD-10-CM

## 2022-10-04 LAB
BACTERIA SPEC AEROBE CULT: ABNORMAL
BACTERIA SPEC AEROBE CULT: ABNORMAL
BACTERIA SPEC ANAEROBE CULT: NORMAL

## 2022-10-04 RX ORDER — SULFAMETHOXAZOLE AND TRIMETHOPRIM 400; 80 MG/1; MG/1
1 TABLET ORAL 2 TIMES DAILY
Qty: 20 TABLET | Refills: 0 | Status: SHIPPED | OUTPATIENT
Start: 2022-10-04 | End: 2022-10-14

## 2022-10-04 NOTE — TELEPHONE ENCOUNTER
Called the pt no answer, left a detailed vm with results and rx instructions.    Kaitlin WALL    ----- Message from Serina Morales DPM sent at 10/4/2022  8:59 AM CDT -----  Culture result positive for bacteria highy resistant to most antibiotics.  Rx for a stronger antibiotic, Bactrim 2 times per day for 10 days sent to his pharmacy.  Please drink water on this medication

## 2022-11-04 ENCOUNTER — TELEPHONE (OUTPATIENT)
Dept: PODIATRY | Facility: CLINIC | Age: 47
End: 2022-11-04
Payer: MEDICAID

## 2022-11-04 NOTE — TELEPHONE ENCOUNTER
Left message for Nickolas with [facility] to give a callback.      ----- Message from Edda Barr sent at 11/4/2022  2:49 PM CDT -----  Type: Patient Call Back    Who called:nickolas with [facility] [phone]    What is the request in detail:calling to speak to nurse in regards to getting appt for wound care. Call nickolas    Can the clinic reply by [facility]?    Would the patient rather a call back or a response via [facility]? call    Best call back number:  Additional Information:

## 2022-11-28 ENCOUNTER — TELEPHONE (OUTPATIENT)
Dept: WOUND CARE | Facility: HOSPITAL | Age: 47
End: 2022-11-28
Payer: MEDICAID

## 2022-11-28 NOTE — TELEPHONE ENCOUNTER
----- Message from Clara Almaraz MA sent at 11/21/2022  1:56 PM CST -----  Regarding: FW: Patient Call Back    ----- Message -----  From: Amena Martinez DPM  Sent: 11/21/2022  12:23 PM CST  To: [facility] Clinical Support Staff  Subject: FW: Patient Call Back                              ----- Message -----  From: Corrie Vences  Sent: 11/21/2022  12:14 PM CST  To: [facility] Staff  Subject: Patient Call Back                                .Type: Patient Call Back    Who called: self     What is the request in detail:  needs to schedule wound care appt needs a Friday at 2pm please call     Can the clinic reply by [facility]?    Would the patient rather a call back or a response via [facility]?  Call     Best call back number: .[phone]

## 2022-12-09 ENCOUNTER — HOSPITAL ENCOUNTER (OUTPATIENT)
Dept: WOUND CARE | Facility: HOSPITAL | Age: 47
Discharge: HOME | End: 2022-12-09
Attending: PODIATRIST
Payer: MEDICAID

## 2022-12-09 VITALS
SYSTOLIC BLOOD PRESSURE: 167 MMHG | TEMPERATURE: 98 F | DIASTOLIC BLOOD PRESSURE: 94 MMHG | RESPIRATION RATE: 20 BRPM | HEART RATE: 93 BPM

## 2022-12-09 DIAGNOSIS — E11.621 DIABETIC FOOT ULCER: ICD-10-CM

## 2022-12-09 DIAGNOSIS — L97.509 DIABETIC FOOT ULCER: ICD-10-CM

## 2022-12-09 DIAGNOSIS — Z51.89 ENCOUNTER FOR WOUND RE-CHECK: Primary | ICD-10-CM

## 2022-12-09 PROCEDURE — 99499 NO LOS: ICD-10-PCS | Mod: ,,, | Performed by: PODIATRIST

## 2022-12-09 PROCEDURE — 11042 WOUND DEBRIDEMENT: ICD-10-PCS | Mod: ,,, | Performed by: PODIATRIST

## 2022-12-09 PROCEDURE — 11042 DBRDMT SUBQ TIS 1ST 20SQCM/<: CPT | Mod: ,,, | Performed by: PODIATRIST

## 2022-12-09 PROCEDURE — 99499 UNLISTED E&M SERVICE: CPT | Mod: ,,, | Performed by: PODIATRIST

## 2022-12-09 PROCEDURE — 11042 DBRDMT SUBQ TIS 1ST 20SQCM/<: CPT | Performed by: PODIATRIST

## 2022-12-09 NOTE — PROGRESS NOTES
"[facility]                Progress Note    Subjective:       Patient ID: Ashutosh Ferrari is a 47 y.o. male.    Chief Complaint: Wound Check    Readmit to [facility]. Patient ambulatory to exam room wearing bilateral tennis shoes, no difficulty noted. Patient denies pain or discomfort. Patient states he noticed callus to bilateral plantar feet but states the right foot wound is "opening again". Patient applied bandage to bilateral feet, no drainage noted to left foot and small amount of drainage noted to right foot. Wound care done per order. RTC in one week.    Review of Systems   Constitutional:  Positive for activity change.   Respiratory:  Negative for shortness of breath.    Cardiovascular:  Negative for chest pain and leg swelling.   Gastrointestinal:  Negative for nausea and vomiting.   Musculoskeletal:  Positive for arthralgias.   Neurological:  Positive for numbness. Negative for weakness.       Objective:        Physical Exam  Constitutional:       Appearance: He is well-developed.      Comments: Oriented to time, place, and person.   Cardiovascular:      Comments: DP and PT pulses are palpable bilaterally. 3 sec capillary refill time and toes and feet are warm to touch proximally .  There is  hair growth on the feet and toes b/l. There is no edema b/l. No spider veins or varicosities present b/l.     Musculoskeletal:      Comments: Equinus noted b/l ankles with < 10 deg DF noted. MMT 5/5 in DF/PF/Inv/Ev resistance with no reproduction of pain in any direction. Passive range of motion of ankle and pedal joints is painless b/l.     Feet:      Right foot:      Skin integrity: No callus or dry skin.      Left foot:      Skin integrity: No callus or dry skin.   Lymphadenopathy:      Comments: Negative lymphadenopathy bilateral popliteal fossa and tarsal tunnel.   Skin:     Comments: See wound description      Neurological:      Mental Status: He is alert.      Comments: Light " touch, proprioception, and sharp/dull sensation are all intact bilaterally. Protective threshold with the [place]-Wienstein monofilament is intact bilaterally.    Psychiatric:         Behavior: Behavior is cooperative.       Vitals:    12/09/22 1356   BP: (!) 167/94   Pulse: 93   Resp: 20   Temp: 98.1 °F (36.7 °C)       Assessment:           ICD-10-CM ICD-9-CM   1. Encounter for wound re-check  Z51.89 V58.89   2. Diabetic foot wound and Cellulitis  E11.621 250.80    L97.509 707.15            Altered Skin Integrity 12/09/22 1411 Right lateral;plantar Foot Diabetic Ulcer (Active)   12/09/22 1411   Altered Skin Integrity Present on Admission: yes   Side: Right   Orientation: lateral;plantar   Location: Foot   Wound Number:    Is this injury device related?:    Primary Wound Type: Diabetic ulc   Description of Altered Skin Integrity:    Ankle-Brachial Index:    Pulses:    Removal Indication and Assessment:    Wound Outcome:    (Retired) Wound Length (cm):    (Retired) Wound Width (cm):    (Retired) Depth (cm):    Wound Description (Comments):    Removal Indications:    Wound Image    12/09/22 1414   Description of Altered Skin Integrity Partial thickness tissue loss. Shallow open ulcer with a red or pink wound bed, without slough. Intact or Open/Ruptured Serum-filled blister. 12/09/22 1414   Dressing Appearance Intact;Moist drainage 12/09/22 1414   Drainage Amount Small 12/09/22 1414   Drainage Characteristics/Odor Serosanguineous 12/09/22 1414   Appearance Red 12/09/22 1414   Tissue loss description Partial thickness 12/09/22 1414   Black (%), Wound Tissue Color 0 % 12/09/22 1414   Red (%), Wound Tissue Color 100 % 12/09/22 1414   Yellow (%), Wound Tissue Color 0 % 12/09/22 1414   Periwound Area Pale white 12/09/22 1414   Wound Edges Callused;Defined 12/09/22 1414   Wound Length (cm) 0.2 cm 12/09/22 1414   Wound Width (cm) 0.3 cm 12/09/22 1414   Wound Depth (cm) 0.1 cm 12/09/22 1414   Wound Volume (cm^3) 0.006 cm^3  12/09/22 1414   Wound Surface Area (cm^2) 0.06 cm^2 12/09/22 1414   Care Cleansed with:;Soap and water;Sterile normal saline 12/09/22 1414   Dressing Applied 12/09/22 1414   Periwound Care Moisture barrier applied 12/09/22 1414   Off Loading Football dressing;Off loading shoe 12/09/22 1414   Dressing Change Due 12/16/22 12/09/22 1414            Altered Skin Integrity 12/09/22 1413 Left lateral;plantar Foot Diabetic Ulcer (Active)   12/09/22 1413   Altered Skin Integrity Present on Admission:    Side: Left   Orientation: lateral;plantar   Location: Foot   Wound Number:    Is this injury device related?:    Primary Wound Type: Diabetic ulc   Description of Altered Skin Integrity:    Ankle-Brachial Index:    Pulses:    Removal Indication and Assessment:    Wound Outcome:    (Retired) Wound Length (cm):    (Retired) Wound Width (cm):    (Retired) Depth (cm):    Wound Description (Comments):    Removal Indications:    Wound Image    12/09/22 1414   Description of Altered Skin Integrity Intact skin with non-blanchable redness of localized area 12/09/22 1414   Dressing Appearance Intact;Dry 12/09/22 1414   Drainage Amount None 12/09/22 1414   Appearance Epithelialization 12/09/22 1414   Tissue loss description Not applicable 12/09/22 1414   Wound Length (cm) 0 cm 12/09/22 1414   Wound Width (cm) 0 cm 12/09/22 1414   Wound Depth (cm) 0 cm 12/09/22 1414   Wound Volume (cm^3) 0 cm^3 12/09/22 1414   Wound Surface Area (cm^2) 0 cm^2 12/09/22 1414   Care Cleansed with:;Soap and water;Sterile normal saline 12/09/22 1414           Plan:            Debridement: See procedure note   Dressings: per above  Offloading:Foam    Follow-up:Patient is to return to the clinic in 1 week  for follow-up but [facility] if any signs of infection, such as fever, chills, sweats, increased redness or pain.    Short-term goals include maintaining good offloading and minimizing bioburden, promoting granulation and  epithelialization to healing.  Long-term goals include keeping the wound healed by good offloading and medical management under the direction of internist.    Tissue pathology and/or culture taken     [] Yes      [x] No  Sharp debridement performed                   [x] Yes       [] No  Labs ordered     [] Yes       [x] No  Imaging ordered    [] Yes      [x] No    Orders Placed This Encounter   Procedures    Change dressing     Clean with NS. Gentian violet to periwound. U-shape pad. Endoform to wound bed. Zeb foam long x2. Football dressing (cast padding x3). Darco shoe. Left foot: betadine and mepilex foam border.          Follow up in about 1 week (around 12/16/2022) for MD wound care visit.

## 2022-12-12 NOTE — PROCEDURES
Wound Debridement    Date/Time: 12/9/2022 1:51 PM  Performed by: Amena Martinez DPM  Authorized by: Amena Martinez DPM     Consent Done?:  Yes (Written)    Wound Details:    Location:  Right foot    Location:  Right Plantar    Type of Debridement:  Excisional       Length (cm):  0.2       Area (sq cm):  0.06       Width (cm):  0.3       Percent Debrided (%):  100       Depth (cm):  0.1       Total Area Debrided (sq cm):  0.06    Depth of debridement:  Subcutaneous tissue    Tissue debrided:  Dermis    Devitalized tissue debrided:  Biofilm, Fibrin and Exudate    Instruments:  Curette    Bleeding:  Minimal  Hemostasis Achieved: Yes    Method Used:  Pressure  Patient tolerance:  Patient tolerated the procedure well with no immediate complications

## 2022-12-16 ENCOUNTER — HOSPITAL ENCOUNTER (OUTPATIENT)
Dept: WOUND CARE | Facility: HOSPITAL | Age: 47
Discharge: HOME | End: 2022-12-16
Attending: PODIATRIST
Payer: MEDICAID

## 2022-12-16 VITALS — SYSTOLIC BLOOD PRESSURE: 162 MMHG | DIASTOLIC BLOOD PRESSURE: 108 MMHG | TEMPERATURE: 98 F | HEART RATE: 95 BPM

## 2022-12-16 DIAGNOSIS — E11.621 DIABETIC FOOT ULCER: ICD-10-CM

## 2022-12-16 DIAGNOSIS — L97.509 DIABETIC FOOT ULCER: ICD-10-CM

## 2022-12-16 DIAGNOSIS — L97.411 DIABETIC ULCER OF RIGHT MIDFOOT ASSOCIATED WITH TYPE 2 DIABETES MELLITUS, LIMITED TO BREAKDOWN OF SKIN: Primary | ICD-10-CM

## 2022-12-16 DIAGNOSIS — E11.621 DIABETIC ULCER OF RIGHT MIDFOOT ASSOCIATED WITH TYPE 2 DIABETES MELLITUS, LIMITED TO BREAKDOWN OF SKIN: Primary | ICD-10-CM

## 2022-12-16 PROCEDURE — 99499 NO LOS: ICD-10-PCS | Mod: ,,, | Performed by: PODIATRIST

## 2022-12-16 PROCEDURE — 99499 UNLISTED E&M SERVICE: CPT | Mod: ,,, | Performed by: PODIATRIST

## 2022-12-16 PROCEDURE — 11042 DBRDMT SUBQ TIS 1ST 20SQCM/<: CPT | Performed by: PODIATRIST

## 2022-12-16 PROCEDURE — 11042 DBRDMT SUBQ TIS 1ST 20SQCM/<: CPT | Mod: ,,, | Performed by: PODIATRIST

## 2022-12-16 PROCEDURE — 11042 WOUND DEBRIDEMENT: ICD-10-PCS | Mod: ,,, | Performed by: PODIATRIST

## 2022-12-16 NOTE — PROGRESS NOTES
[facility]                Progress Note    Subjective:       Patient ID: Ashutosh Ferrari is a 47 y.o. male.    Chief Complaint: Wound Check    F/u wound care visit. Patient ambulatory to exam room with no difficulty noted. Patient denies pain or discomfort at present. Wound dressing to right foot intact with scant drainage noted. Wound to right plantar foot improving, measuring smaller. Wound dressing to left plantar foot intact with no drainage noted and wound healed. Wound care done per order. RTC in one week.    Review of Systems   Constitutional:  Positive for activity change.   Respiratory:  Negative for shortness of breath.    Cardiovascular:  Negative for chest pain and leg swelling.   Gastrointestinal:  Negative for nausea and vomiting.   Musculoskeletal:  Positive for arthralgias.   Skin:  Positive for wound.   Neurological:  Positive for numbness. Negative for weakness.       Objective:        Physical Exam  Constitutional:       Appearance: He is well-developed.      Comments: Oriented to time, place, and person.   Cardiovascular:      Comments: DP and PT pulses are palpable bilaterally. 3 sec capillary refill time and toes and feet are warm to touch proximally .  There is  hair growth on the feet and toes b/l. There is no edema b/l. No spider veins or varicosities present b/l.     Musculoskeletal:      Comments: Equinus noted b/l ankles with < 10 deg DF noted. MMT 5/5 in DF/PF/Inv/Ev resistance with no reproduction of pain in any direction. Passive range of motion of ankle and pedal joints is painless b/l.     Feet:      Right foot:      Skin integrity: No callus or dry skin.      Left foot:      Skin integrity: No callus or dry skin.   Lymphadenopathy:      Comments: Negative lymphadenopathy bilateral popliteal fossa and tarsal tunnel.   Skin:     Comments: See wound description      Neurological:      Mental Status: He is alert.      Comments: Light touch,  proprioception, and sharp/dull sensation are all intact bilaterally. Protective threshold with the [place]-Wienstein monofilament is intact bilaterally.    Psychiatric:         Behavior: Behavior is cooperative.       Vitals:    12/16/22 1348   BP: (!) 162/108   Pulse: 95   Temp: 98.4 °F (36.9 °C)       Assessment:           ICD-10-CM ICD-9-CM   1. Diabetic foot wound and Cellulitis  E11.621 250.80    L97.509 707.15            Altered Skin Integrity 12/09/22 1411 Right lateral;plantar Foot Diabetic Ulcer (Active)   12/09/22 1411   Altered Skin Integrity Present on Admission: yes   Side: Right   Orientation: lateral;plantar   Location: Foot   Wound Number:    Is this injury device related?:    Primary Wound Type: Diabetic ulc   Description of Altered Skin Integrity:    Ankle-Brachial Index:    Pulses:    Removal Indication and Assessment:    Wound Outcome:    (Retired) Wound Length (cm):    (Retired) Wound Width (cm):    (Retired) Depth (cm):    Wound Description (Comments):    Removal Indications:    Wound Image    12/16/22 1337   Description of Altered Skin Integrity Partial thickness tissue loss. Shallow open ulcer with a red or pink wound bed, without slough. Intact or Open/Ruptured Serum-filled blister. 12/16/22 1337   Dressing Appearance Intact;Dried drainage 12/16/22 1337   Drainage Amount Scant 12/16/22 1337   Drainage Characteristics/Odor Serosanguineous 12/16/22 1337   Appearance Red 12/16/22 1337   Tissue loss description Partial thickness 12/16/22 1337   Black (%), Wound Tissue Color 0 % 12/16/22 1337   Red (%), Wound Tissue Color 100 % 12/16/22 1337   Yellow (%), Wound Tissue Color 0 % 12/16/22 1337   Periwound Area Dry;Intact 12/16/22 1337   Wound Edges Callused 12/16/22 1337   Wound Length (cm) 0.1 cm 12/16/22 1337   Wound Width (cm) 0.1 cm 12/16/22 1337   Wound Depth (cm) 0.1 cm 12/16/22 1337   Wound Volume (cm^3) 0.001 cm^3 12/16/22 1337   Wound Surface Area (cm^2) 0.01 cm^2 12/16/22 1337   Care  Cleansed with:;Soap and water;Sterile normal saline 12/16/22 1337   Dressing Changed 12/16/22 1337   Periwound Care Skin barrier film applied 12/16/22 1337   Off Loading Football dressing;Off loading shoe 12/16/22 1337   Dressing Change Due 12/23/22 12/16/22 1337       [REMOVED]      Altered Skin Integrity 12/09/22 1413 Left lateral;plantar Foot Diabetic Ulcer (Removed)   12/09/22 1413   Altered Skin Integrity Present on Admission:    Side: Left   Orientation: lateral;plantar   Location: Foot   Wound Number:    Is this injury device related?:    Primary Wound Type: Diabetic ulc   Description of Altered Skin Integrity:    Ankle-Brachial Index:    Pulses:    Removal Indication and Assessment:    Wound Outcome: Healed   (Retired) Wound Length (cm):    (Retired) Wound Width (cm):    (Retired) Depth (cm):    Wound Description (Comments):    Removal Indications:    Removed 12/16/22 1419   Wound Image   12/16/22 1337   Description of Altered Skin Integrity Intact skin with non-blanchable redness of localized area 12/16/22 1337   Dressing Appearance Intact;Dry 12/16/22 1337   Drainage Amount None 12/16/22 1337   Appearance Epithelialization 12/16/22 1337   Tissue loss description Not applicable 12/16/22 1337   Periwound Area Intact;Dry 12/16/22 1337   Wound Edges Rolled/closed 12/16/22 1337   Wound Length (cm) 0 cm 12/16/22 1337   Wound Width (cm) 0 cm 12/16/22 1337   Wound Depth (cm) 0 cm 12/16/22 1337   Wound Volume (cm^3) 0 cm^3 12/16/22 1337   Wound Surface Area (cm^2) 0 cm^2 12/16/22 1337   Care Cleansed with:;Soap and water;Sterile normal saline 12/16/22 1337           Plan:            Debridement: See procedure note     Dressings: per above  Offloading:Foam    Follow-up:Patient is to return to the clinic in 1 week  for follow-up but should call [facility] immediately if any signs of infection, such as fever, chills, sweats, increased redness or pain.    Short-term goals include maintaining good offloading and minimizing  bioburden, promoting granulation and epithelialization to healing.  Long-term goals include keeping the wound healed by good offloading and medical management under the direction of internist.        Tissue pathology and/or culture taken     [] Yes      [x] No  Sharp debridement performed                   [x] Yes       [] No  Labs ordered     [] Yes       [x] No  Imaging ordered    [] Yes      [x] No    Orders Placed This Encounter   Procedures    Change dressing     Clean with NS. Cavilon to periwound. Ni to wound bed. U-shape pad. Zeb foam long x2. Football dressing (cast padding x3, coban). Darco.        Follow up in about 1 week (around 12/23/2022) for MD wound care visit.

## 2022-12-17 NOTE — PROCEDURES
Wound Debridement    Date/Time: 12/16/2022 12:18 PM  Performed by: Amena Martinez DPM  Authorized by: Amena Martinez DPM     Consent Done?:  Yes (Written)    Wound Details:    Location:  Right foot    Location:  Right Plantar    Type of Debridement:  Excisional       Length (cm):  0.1       Area (sq cm):  0.01       Width (cm):  0.1       Percent Debrided (%):  100       Depth (cm):  0.1       Total Area Debrided (sq cm):  0.01    Depth of debridement:  Subcutaneous tissue    Tissue debrided:  Dermis    Devitalized tissue debrided:  Biofilm and Callus    Instruments:  Curette    Bleeding:  Minimal  Hemostasis Achieved: Yes    Method Used:  Pressure  Patient tolerance:  Patient tolerated the procedure well with no immediate complications

## 2022-12-23 ENCOUNTER — HOSPITAL ENCOUNTER (OUTPATIENT)
Dept: WOUND CARE | Facility: HOSPITAL | Age: 47
Discharge: HOME | End: 2022-12-23
Attending: PODIATRIST
Payer: MEDICAID

## 2022-12-23 VITALS — SYSTOLIC BLOOD PRESSURE: 164 MMHG | DIASTOLIC BLOOD PRESSURE: 101 MMHG | TEMPERATURE: 98 F | HEART RATE: 76 BPM

## 2022-12-23 DIAGNOSIS — E11.621 DIABETIC ULCER OF RIGHT MIDFOOT ASSOCIATED WITH TYPE 2 DIABETES MELLITUS, LIMITED TO BREAKDOWN OF SKIN: Primary | ICD-10-CM

## 2022-12-23 DIAGNOSIS — S90.211A SUBUNGUAL HEMATOMA OF GREAT TOE OF RIGHT FOOT, INITIAL ENCOUNTER: ICD-10-CM

## 2022-12-23 DIAGNOSIS — L97.411 DIABETIC ULCER OF RIGHT MIDFOOT ASSOCIATED WITH TYPE 2 DIABETES MELLITUS, LIMITED TO BREAKDOWN OF SKIN: Primary | ICD-10-CM

## 2022-12-23 PROCEDURE — 99213 OFFICE O/P EST LOW 20 MIN: CPT | Mod: 25,,, | Performed by: PODIATRIST

## 2022-12-23 PROCEDURE — 11730 AVULSION NAIL PLATE SIMPLE 1: CPT

## 2022-12-23 PROCEDURE — 99213 PR OFFICE/OUTPT VISIT, EST, LEVL III, 20-29 MIN: ICD-10-PCS | Mod: 25,,, | Performed by: PODIATRIST

## 2022-12-23 PROCEDURE — 99213 OFFICE O/P EST LOW 20 MIN: CPT

## 2022-12-23 PROCEDURE — 11730 AVULSION NAIL PLATE SIMPLE 1: CPT | Mod: ,,, | Performed by: PODIATRIST

## 2022-12-23 PROCEDURE — 11730 NAIL REMOVAL: ICD-10-PCS | Mod: ,,, | Performed by: PODIATRIST

## 2022-12-23 NOTE — PROGRESS NOTES
[facility]                Progress Note    Subjective:       Patient ID: Ashutosh Ferrari is a 47 y.o. male.    Chief Complaint: Wound Check    Follow up visit. Pt arrived ambulatory, unaccompanied. Denies any pain of discomfort.     Review of Systems   Constitutional:  Positive for activity change.   Respiratory:  Negative for shortness of breath.    Cardiovascular:  Negative for chest pain and leg swelling.   Gastrointestinal:  Negative for nausea and vomiting.   Musculoskeletal:  Positive for arthralgias.   Skin:  Positive for wound.   Neurological:  Positive for numbness. Negative for weakness.       Objective:        Physical Exam  Constitutional:       Appearance: He is well-developed.      Comments: Oriented to time, place, and person.   Cardiovascular:      Comments: DP and PT pulses are palpable bilaterally. 3 sec capillary refill time and toes and feet are warm to touch proximally .  There is  hair growth on the feet and toes b/l. There is no edema b/l. No spider veins or varicosities present b/l.     Musculoskeletal:      Comments: Equinus noted b/l ankles with < 10 deg DF noted. MMT 5/5 in DF/PF/Inv/Ev resistance with no reproduction of pain in any direction. Passive range of motion of ankle and pedal joints is painless b/l.     Feet:      Right foot:      Skin integrity: No callus or dry skin.      Left foot:      Skin integrity: No callus or dry skin.   Lymphadenopathy:      Comments: Negative lymphadenopathy bilateral popliteal fossa and tarsal tunnel.   Skin:     Comments: See wound description      Neurological:      Mental Status: He is alert.      Comments: Light touch, proprioception, and sharp/dull sensation are all intact bilaterally. Protective threshold with the [place]-Wienstein monofilament is intact bilaterally.    Psychiatric:         Behavior: Behavior is cooperative.       Vitals:    12/23/22 1301   BP: (!) 164/101   Pulse: 76   Temp: 97.7 °F  (36.5 °C)       Assessment:           ICD-10-CM ICD-9-CM   1. Diabetic ulcer of right midfoot associated with type 2 diabetes mellitus, limited to breakdown of skin  E11.621 250.80    L97.411 707.14   2. Subungual hematoma of great toe of right foot, initial encounter  S90.211A 924.3       [REMOVED]      Altered Skin Integrity 12/23/22 1345 Right dorsal Toe, first (Removed)   12/23/22 1345   Altered Skin Integrity Present on Admission: yes   Side: Right   Orientation: dorsal   Location: Toe, first   Wound Number:    Is this injury device related?:    Primary Wound Type:    Description of Altered Skin Integrity:    Ankle-Brachial Index:    Pulses:    Removal Indication and Assessment:    Wound Outcome: Healed   (Retired) Wound Length (cm):    (Retired) Wound Width (cm):    (Retired) Depth (cm):    Wound Description (Comments):    Removal Indications:    Removed 12/30/22 1334   Red (%), Wound Tissue Color 100 % 12/23/22 1346   Periwound Area Intact;Dry 12/23/22 1346   Care Cleansed with:;Antimicrobial agent;Sterile normal saline 12/23/22 1346   Dressing Applied 12/23/22 1346   Off Loading Football dressing;Off loading shoe 12/23/22 1346   Dressing Change Due 12/30/22 12/23/22 1346           Plan:            Original wound appears to be closed. Pt noted with blister under right great toe. Nail avulsion performed.      Nail Removal    Date/Time: 12/23/2022 12:51 PM  Performed by: Serina Morales DPM  Authorized by: Serina Morales DPM   Location: right foot  Location details: right big toe  Amount removed: complete  Wedge excision of skin of nail fold: no  Nail bed sutured: no  Nail matrix removed: none  Removed nail replaced and anchored: no  Patient tolerance: Patient tolerated the procedure well with no immediate complications         Pt to follow up next week, 12/30/22.    Tissue pathology and/or culture taken     [] Yes      [x] No  Sharp debridement performed                   [] Yes       [x] No  Labs  ordered     [] Yes       [x] No  Imaging ordered    [] Yes      [x] No    Orders Placed This Encounter   Procedures    Nail Removal     This order was created via procedure documentation     Standing Status:   Standing     Number of Occurrences:   1    Change dressing    Change dressing     Cleanse with Vashe. Apply iodosorb, Football x 2 cast padding. Darco Shoe.        Follow up in about 1 week (around 12/30/2022) for wound care.

## 2022-12-23 NOTE — PATIENT INSTRUCTIONS
Wound has healed well with no signs of continued skin breakdown noted. Ok to convert to normal shoe gear. Skin is still delicate therefore patient must be diligent in avoiding excessive pressure and making sure there is adequate support and padding in shoe gear.

## 2022-12-30 ENCOUNTER — HOSPITAL ENCOUNTER (OUTPATIENT)
Dept: WOUND CARE | Facility: HOSPITAL | Age: 47
Discharge: HOME | End: 2022-12-30
Attending: PODIATRIST
Payer: MEDICAID

## 2022-12-30 VITALS — HEART RATE: 78 BPM | SYSTOLIC BLOOD PRESSURE: 144 MMHG | DIASTOLIC BLOOD PRESSURE: 88 MMHG | TEMPERATURE: 98 F

## 2022-12-30 DIAGNOSIS — E11.65 TYPE 2 DIABETES MELLITUS WITH HYPERGLYCEMIA, WITH LONG-TERM CURRENT USE OF INSULIN: Primary | ICD-10-CM

## 2022-12-30 DIAGNOSIS — Z79.4 TYPE 2 DIABETES MELLITUS WITH HYPERGLYCEMIA, WITH LONG-TERM CURRENT USE OF INSULIN: Primary | ICD-10-CM

## 2022-12-30 DIAGNOSIS — L97.512 DIABETIC ULCER OF OTHER PART OF RIGHT FOOT ASSOCIATED WITH DIABETES MELLITUS DUE TO UNDERLYING CONDITION, WITH FAT LAYER EXPOSED: ICD-10-CM

## 2022-12-30 DIAGNOSIS — E08.621 DIABETIC ULCER OF OTHER PART OF RIGHT FOOT ASSOCIATED WITH DIABETES MELLITUS DUE TO UNDERLYING CONDITION, WITH FAT LAYER EXPOSED: ICD-10-CM

## 2022-12-30 PROCEDURE — 99213 OFFICE O/P EST LOW 20 MIN: CPT | Performed by: PODIATRIST

## 2022-12-30 PROCEDURE — 99214 OFFICE O/P EST MOD 30 MIN: CPT | Mod: ,,, | Performed by: PODIATRIST

## 2022-12-30 PROCEDURE — 99214 PR OFFICE/OUTPT VISIT, EST, LEVL IV, 30-39 MIN: ICD-10-PCS | Mod: ,,, | Performed by: PODIATRIST

## 2022-12-30 NOTE — PROGRESS NOTES
[facility]                Progress Note    Subjective:       Patient ID: Ashutosh Ferrari is a 47 y.o. male.    Chief Complaint: Wound Check    Walked to clinic unaided carrying shoe. Foot wound healed last week. Dorsal toe healed where great toe nail was removed. Will paint with betadine and change dsg for a week then place bandaid to site. D/C from wound care and follow up in 2 weeks at [facility]. Prescription for DM shoes given to pt.     Wound Check    Review of Systems   Constitutional:  Positive for activity change.   Respiratory:  Negative for shortness of breath.    Cardiovascular:  Negative for chest pain and leg swelling.   Gastrointestinal:  Negative for nausea and vomiting.   Musculoskeletal:  Positive for arthralgias.   Neurological:  Positive for numbness. Negative for weakness.       Objective:        Physical Exam  Constitutional:       Appearance: He is well-developed.      Comments: Oriented to time, place, and person.   Cardiovascular:      Comments: DP and PT pulses are palpable bilaterally. 3 sec capillary refill time and toes and feet are warm to touch proximally .  There is  hair growth on the feet and toes b/l. There is no edema b/l. No spider veins or varicosities present b/l.     Musculoskeletal:      Comments: Equinus noted b/l ankles with < 10 deg DF noted. MMT 5/5 in DF/PF/Inv/Ev resistance with no reproduction of pain in any direction. Passive range of motion of ankle and pedal joints is painless b/l.     Feet:      Right foot:      Skin integrity: No callus or dry skin.      Left foot:      Skin integrity: No callus or dry skin.   Lymphadenopathy:      Comments: Negative lymphadenopathy bilateral popliteal fossa and tarsal tunnel.   Skin:     Comments: Right foot healed with fragile epithelium.      Neurological:      Mental Status: He is alert.      Comments: Light touch, proprioception, and sharp/dull sensation are all intact bilaterally.  Protective threshold with the [place]-Wienstein monofilament is intact bilaterally.    Psychiatric:         Behavior: Behavior is cooperative.       Vitals:    12/30/22 1322   BP: (!) 144/88   Pulse: 78   Temp: 97.7 °F (36.5 °C)       Assessment:           ICD-10-CM ICD-9-CM   1. Type 2 diabetes mellitus with hyperglycemia, with long-term current use of insulin  E11.65 250.00    Z79.4 790.29     V58.67   2. Diabetic ulcer of other part of right foot associated with diabetes mellitus due to underlying condition, with fat layer exposed  E08.621 249.80    L97.512 707.15       [REMOVED]      Altered Skin Integrity 12/23/22 1345 Right dorsal Toe, first (Removed)   12/23/22 1345   Altered Skin Integrity Present on Admission: yes   Side: Right   Orientation: dorsal   Location: Toe, first   Wound Number:    Is this injury device related?:    Primary Wound Type:    Description of Altered Skin Integrity:    Ankle-Brachial Index:    Pulses:    Removal Indication and Assessment:    Wound Outcome: Healed   (Retired) Wound Length (cm):    (Retired) Wound Width (cm):    (Retired) Depth (cm):    Wound Description (Comments):    Removal Indications:    Removed 12/30/22 1334   Wound Image   12/30/22 1332   Dressing Appearance Dry;Intact 12/30/22 1332   Drainage Amount None 12/30/22 1332   Appearance Maroon 12/30/22 1332   Wound Length (cm) 0 cm 12/30/22 1332   Wound Width (cm) 0 cm 12/30/22 1332   Wound Depth (cm) 0 cm 12/30/22 1332   Wound Volume (cm^3) 0 cm^3 12/30/22 1332   Wound Surface Area (cm^2) 0 cm^2 12/30/22 1332   Care Cleansed with:;Antimicrobial agent;Sterile normal saline 12/30/22 1332   Dressing Changed 12/30/22 1332           Plan:          Right plantar foot healed. Ok to shower do not soak.     - Shoe inspection. Diabetic Foot Education. Patient reminded of the importance of good nutrition and blood sugar control to help prevent podiatric complications of diabetes. Patient instructed on proper foot hygeine. We  discussed wearing proper shoe gear, daily foot inspections, never walking without protective shoe gear, caution putting sharp instruments to feet     - Discussed DM foot care:  Wear comfortable, proper fitting shoes. Wash feet daily. Dry well. After drying, apply moisturizer to feet (no lotion to webspaces). Inspect feet daily for skin breaks, blisters, swelling, or redness. Wear cotton socks (preferably white)  Change socks every day. Do NOT walk barefoot. Do NOT use heating pads or warm/hot water soaks     Rx diabetic shoes with custom molded inserts to be worn at all times while ambulating. Prescription provided with list of local retailers.     Tissue pathology and/or culture taken     [] Yes      [x] No  Sharp debridement performed                   [] Yes       [x] No  Labs ordered     [] Yes       [x] No  Imaging ordered    [] Yes      [x] No    Orders Placed This Encounter   Procedures    DIABETIC SHOES FOR HOME USE     Primary and Secondary Diagnosis are necessary for patients to qualify for footwear, and documentation of the above diagnoses must be substantiated in the medical record.    I certify that I am the primary physician treating this patient for diabetes mellitus.  This order for diabetic footwear is medically necessary to protect their diabetic feet from breakdown.    I certify that this patient is under my direct care in a comprehensive plan for treatment of Diabetes Mellitus.     Order Specific Question:   Height:     Answer:   6'4     Order Specific Question:   Weight:     Answer:   195     Order Specific Question:   Start date:     Answer:   12/30/2022     Order Specific Question:   Length of need (1-99 months):     Answer:   12     Order Specific Question:   Prognosis:     Answer:   Good     Order Specific Question:   Type of diabetic shoes:     Answer:   Depth Shoe ()     Order Specific Question:   Type of inserts:     Answer:   Heat Molded Inserts ()     Order Specific Question:    Insert Qty:     Answer:   3     Order Specific Question:   Primary diagnosis condition:     Answer:   Diabetes Mellitus Type II with neuro- E11.40     Order Specific Question:   Secondary Diagnosis Qualifying conditions:     Answer:   Foot Deformity     Order Specific Question:   Foot deformity:     Answer:   Toes (M20.59)        Follow up if symptoms worsen or fail to improve.

## 2023-01-12 ENCOUNTER — OFFICE VISIT (OUTPATIENT)
Dept: PODIATRY | Facility: CLINIC | Age: 48
End: 2023-01-12
Payer: MEDICAID

## 2023-01-12 VITALS — WEIGHT: 195.13 LBS | HEIGHT: 76 IN | BODY MASS INDEX: 23.76 KG/M2

## 2023-01-12 DIAGNOSIS — Z87.2 HEALED FOOT ULCER: ICD-10-CM

## 2023-01-12 DIAGNOSIS — E11.42 TYPE 2 DIABETES MELLITUS WITH DIABETIC POLYNEUROPATHY, WITHOUT LONG-TERM CURRENT USE OF INSULIN: Primary | ICD-10-CM

## 2023-01-12 DIAGNOSIS — M20.40 HAMMER TOE, UNSPECIFIED LATERALITY: ICD-10-CM

## 2023-01-12 PROCEDURE — 99999 PR PBB SHADOW E&M-EST. PATIENT-LVL III: ICD-10-PCS | Mod: PBBFAC,,, | Performed by: PODIATRIST

## 2023-01-12 PROCEDURE — 3008F PR BODY MASS INDEX (BMI) DOCUMENTED: ICD-10-PCS | Mod: CPTII,,, | Performed by: PODIATRIST

## 2023-01-12 PROCEDURE — 1159F MED LIST DOCD IN RCRD: CPT | Mod: CPTII,,, | Performed by: PODIATRIST

## 2023-01-12 PROCEDURE — 99214 OFFICE O/P EST MOD 30 MIN: CPT | Mod: S$PBB,,, | Performed by: PODIATRIST

## 2023-01-12 PROCEDURE — 1159F PR MEDICATION LIST DOCUMENTED IN MEDICAL RECORD: ICD-10-PCS | Mod: CPTII,,, | Performed by: PODIATRIST

## 2023-01-12 PROCEDURE — 99999 PR PBB SHADOW E&M-EST. PATIENT-LVL III: CPT | Mod: PBBFAC,,, | Performed by: PODIATRIST

## 2023-01-12 PROCEDURE — 99214 PR OFFICE/OUTPT VISIT, EST, LEVL IV, 30-39 MIN: ICD-10-PCS | Mod: S$PBB,,, | Performed by: PODIATRIST

## 2023-01-12 PROCEDURE — 3008F BODY MASS INDEX DOCD: CPT | Mod: CPTII,,, | Performed by: PODIATRIST

## 2023-01-12 PROCEDURE — 99213 OFFICE O/P EST LOW 20 MIN: CPT | Mod: PBBFAC,PO | Performed by: PODIATRIST

## 2023-01-14 NOTE — PROGRESS NOTES
Subjective:      Patient ID: Ashutosh Ferrari is a 47 y.o. male.    Chief Complaint: Diabetes Mellitus and Follow-up    Ashutosh is a 47 y.o. male who presents to the clinic upon referral from Dr. Maida fernando. provider found  for evaluation and treatment of diabetic feet. Ashutosh has a past medical history of Diabetes mellitus, Diabetes mellitus, type 2, Essential hypertension, benign (07/18/2013), and Herpes. Presents for diabetic foot risk assessment. Right plantar foot healed.       PCP: [facility]    Date Last Seen by PCP: none found    Current shoe gear: Tennis shoes    Hemoglobin A1C   Date Value Ref Range Status   03/27/2022 12.5 (H) 4.0 - 5.6 % Final     Comment:     ADA Screening Guidelines:  5.7-6.4%  Consistent with prediabetes  >or=6.5%  Consistent with diabetes    High levels of fetal hemoglobin interfere with the HbA1C  assay. Heterozygous hemoglobin variants (HbS, HgC, etc)do  not significantly interfere with this assay.   However, presence of multiple variants may affect accuracy.     09/19/2021 12.8 (H) 4.0 - 5.6 % Final     Comment:     ADA Screening Guidelines:  5.7-6.4%  Consistent with prediabetes  >or=6.5%  Consistent with diabetes    High levels of fetal hemoglobin interfere with the HbA1C  assay. Heterozygous hemoglobin variants (HbS, HgC, etc)do  not significantly interfere with this assay.   However, presence of multiple variants may affect accuracy.     07/17/2013 14.6 (H) 4.0 - 6.2 % Final         Review of Systems   Constitutional: Negative for chills.   Cardiovascular:  Negative for chest pain and claudication.   Respiratory:  Negative for cough.    Skin:  Positive for color change, dry skin and nail changes.   Musculoskeletal:  Positive for joint pain.   Gastrointestinal:  Negative for nausea.   Neurological:  Positive for paresthesias. Negative for numbness.   Psychiatric/Behavioral:  The patient is not nervous/anxious.          Objective:      Physical Exam  Constitutional:        Appearance: He is well-developed.      Comments: Oriented to time, place, and person.   Cardiovascular:      Comments: DP and PT pulses are palpable bilaterally. 3 sec capillary refill time and toes and feet are warm to touch proximally .  There is  hair growth on the feet and toes b/l. There is no edema b/l. No spider veins or varicosities present b/l.     Musculoskeletal:      Comments: Equinus noted b/l ankles with < 10 deg DF noted. MMT 5/5 in DF/PF/Inv/Ev resistance with no reproduction of pain in any direction. Passive range of motion of ankle and pedal joints is painless b/l.    Right plantar foot healed.    Feet:      Right foot:      Skin integrity: No callus or dry skin.      Left foot:      Skin integrity: No callus or dry skin.   Lymphadenopathy:      Comments: Negative lymphadenopathy bilateral popliteal fossa and tarsal tunnel.   Skin:     Comments: No open lesions, lacerations or wounds noted.Interdigital spaces clean, dry and intact b/l. No erythema noted to b/l foot.    S/p left hallux nail avulsion     Neurological:      Mental Status: He is alert.      Comments: Light touch, proprioception, and sharp/dull sensation are all intact bilaterally. Protective threshold with the [place]-Wienstein monofilament is intact bilaterally.    Psychiatric:         Behavior: Behavior is cooperative.             Assessment:       Encounter Diagnoses   Name Primary?    Type 2 diabetes mellitus with diabetic polyneuropathy, without long-term current use of insulin Yes    Hammer toe, unspecified laterality     Healed foot ulcer          Plan:       Ashutosh was seen today for diabetes mellitus and follow-up.    Diagnoses and all orders for this visit:    Type 2 diabetes mellitus with diabetic polyneuropathy, without long-term current use of insulin  -     DIABETIC SHOES FOR HOME USE    Hammer toe, unspecified laterality  -     DIABETIC SHOES FOR HOME USE    Healed foot ulcer      I counseled the patient on his conditions,  their implications and medical management.    Right foot ulceration healed.     - Shoe inspection. Diabetic Foot Education. Patient reminded of the importance of good nutrition and blood sugar control to help prevent podiatric complications of diabetes. Patient instructed on proper foot hygeine. We discussed wearing proper shoe gear, daily foot inspections, never walking without protective shoe gear, caution putting sharp instruments to feet     - Discussed DM foot care:  Wear comfortable, proper fitting shoes. Wash feet daily. Dry well. After drying, apply moisturizer to feet (no lotion to webspaces). Inspect feet daily for skin breaks, blisters, swelling, or redness. Wear cotton socks (preferably white)  Change socks every day. Do NOT walk barefoot. Do NOT use heating pads or warm/hot water soaks     Rx diabetic shoes with custom molded inserts to be worn at all times while ambulating. Prescription provided with list of local retailers.

## 2023-05-01 ENCOUNTER — HOSPITAL ENCOUNTER (OUTPATIENT)
Dept: RADIOLOGY | Facility: HOSPITAL | Age: 48
Discharge: HOME | End: 2023-05-01
Attending: PODIATRIST
Payer: MEDICAID

## 2023-05-01 ENCOUNTER — OFFICE VISIT (OUTPATIENT)
Dept: PODIATRY | Facility: CLINIC | Age: 48
End: 2023-05-01
Payer: MEDICAID

## 2023-05-01 ENCOUNTER — TELEPHONE (OUTPATIENT)
Dept: PODIATRY | Facility: CLINIC | Age: 48
End: 2023-05-01

## 2023-05-01 VITALS — BODY MASS INDEX: 23.76 KG/M2 | WEIGHT: 195.13 LBS | HEIGHT: 76 IN

## 2023-05-01 DIAGNOSIS — E11.621 DIABETIC FOOT ULCER ASSOCIATED WITH TYPE 2 DIABETES MELLITUS, UNSPECIFIED LATERALITY, UNSPECIFIED PART OF FOOT, UNSPECIFIED ULCER STAGE: ICD-10-CM

## 2023-05-01 DIAGNOSIS — E11.621 DIABETIC FOOT ULCER ASSOCIATED WITH TYPE 2 DIABETES MELLITUS, UNSPECIFIED LATERALITY, UNSPECIFIED PART OF FOOT, UNSPECIFIED ULCER STAGE: Primary | ICD-10-CM

## 2023-05-01 DIAGNOSIS — L97.509 DIABETIC FOOT ULCER ASSOCIATED WITH TYPE 2 DIABETES MELLITUS, UNSPECIFIED LATERALITY, UNSPECIFIED PART OF FOOT, UNSPECIFIED ULCER STAGE: Primary | ICD-10-CM

## 2023-05-01 DIAGNOSIS — L97.509 DIABETIC FOOT ULCER ASSOCIATED WITH TYPE 2 DIABETES MELLITUS, UNSPECIFIED LATERALITY, UNSPECIFIED PART OF FOOT, UNSPECIFIED ULCER STAGE: ICD-10-CM

## 2023-05-01 PROCEDURE — 87075 CULTR BACTERIA EXCEPT BLOOD: CPT | Performed by: PODIATRIST

## 2023-05-01 PROCEDURE — 1159F MED LIST DOCD IN RCRD: CPT | Mod: CPTII,,, | Performed by: PODIATRIST

## 2023-05-01 PROCEDURE — 99214 PR OFFICE/OUTPT VISIT, EST, LEVL IV, 30-39 MIN: ICD-10-PCS | Mod: S$PBB,,, | Performed by: PODIATRIST

## 2023-05-01 PROCEDURE — 73630 XR FOOT COMPLETE 3 VIEW RIGHT: ICD-10-PCS | Mod: 26,RT,, | Performed by: RADIOLOGY

## 2023-05-01 PROCEDURE — 3008F BODY MASS INDEX DOCD: CPT | Mod: CPTII,,, | Performed by: PODIATRIST

## 2023-05-01 PROCEDURE — 99213 OFFICE O/P EST LOW 20 MIN: CPT | Mod: PBBFAC,PO | Performed by: PODIATRIST

## 2023-05-01 PROCEDURE — 73630 X-RAY EXAM OF FOOT: CPT | Mod: 26,RT,, | Performed by: RADIOLOGY

## 2023-05-01 PROCEDURE — 99999 PR PBB SHADOW E&M-EST. PATIENT-LVL III: CPT | Mod: PBBFAC,,, | Performed by: PODIATRIST

## 2023-05-01 PROCEDURE — 87070 CULTURE OTHR SPECIMN AEROBIC: CPT | Performed by: PODIATRIST

## 2023-05-01 PROCEDURE — 99999 PR PBB SHADOW E&M-EST. PATIENT-LVL III: ICD-10-PCS | Mod: PBBFAC,,, | Performed by: PODIATRIST

## 2023-05-01 PROCEDURE — 73630 X-RAY EXAM OF FOOT: CPT | Mod: TC,FY,PO,RT

## 2023-05-01 PROCEDURE — 3008F PR BODY MASS INDEX (BMI) DOCUMENTED: ICD-10-PCS | Mod: CPTII,,, | Performed by: PODIATRIST

## 2023-05-01 PROCEDURE — 99214 OFFICE O/P EST MOD 30 MIN: CPT | Mod: S$PBB,,, | Performed by: PODIATRIST

## 2023-05-01 PROCEDURE — 87076 CULTURE ANAEROBE IDENT EACH: CPT | Performed by: PODIATRIST

## 2023-05-01 PROCEDURE — 1159F PR MEDICATION LIST DOCUMENTED IN MEDICAL RECORD: ICD-10-PCS | Mod: CPTII,,, | Performed by: PODIATRIST

## 2023-05-01 RX ORDER — DOXYCYCLINE 100 MG/1
100 CAPSULE ORAL EVERY 12 HOURS
Qty: 20 CAPSULE | Refills: 0 | Status: SHIPPED | OUTPATIENT
Start: 2023-05-01
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
## 2023-05-02 ENCOUNTER — TELEPHONE (OUTPATIENT)
Dept: PODIATRY | Facility: CLINIC | Age: 48
End: 2023-05-02
Payer: MEDICAID

## 2023-05-03 ENCOUNTER — TELEPHONE (OUTPATIENT)
Dept: PODIATRY | Facility: CLINIC | Age: 48
End: 2023-05-03
Payer: MEDICAID

## 2023-05-03 LAB — BACTERIA SPEC AEROBE CULT: NORMAL

## 2023-05-05 LAB — BACTERIA SPEC ANAEROBE CULT: ABNORMAL

## 2023-05-09 ENCOUNTER — TELEPHONE (OUTPATIENT)
Dept: WOUND CARE | Facility: HOSPITAL | Age: 48
End: 2023-05-09
Payer: MEDICAID

## 2023-05-09 NOTE — TELEPHONE ENCOUNTER
Patient is a no call/no show for 1 pm appt. Called patient who stated that he thought the appt. was for 2 pm and that he was on his way. Spoke with Dr. Flores who stated that he cannot accommodate any more patients because the schedule was full. Informed patient and appt was rescheduled to Thursday with Dr. Fonseca per patient request.

## 2023-05-18 ENCOUNTER — HOSPITAL ENCOUNTER (OUTPATIENT)
Dept: WOUND CARE | Facility: HOSPITAL | Age: 48
Discharge: HOME | End: 2023-05-18
Attending: PODIATRIST
Payer: MEDICAID

## 2023-05-18 VITALS — HEART RATE: 90 BPM | TEMPERATURE: 98 F | DIASTOLIC BLOOD PRESSURE: 90 MMHG | SYSTOLIC BLOOD PRESSURE: 168 MMHG

## 2023-05-18 DIAGNOSIS — E11.621 DIABETIC FOOT ULCER: ICD-10-CM

## 2023-05-18 DIAGNOSIS — L97.509 DIABETIC FOOT ULCER: ICD-10-CM

## 2023-05-18 DIAGNOSIS — E11.621 DIABETIC ULCER OF RIGHT MIDFOOT ASSOCIATED WITH TYPE 2 DIABETES MELLITUS, WITH FAT LAYER EXPOSED: Primary | ICD-10-CM

## 2023-05-18 DIAGNOSIS — L97.412 DIABETIC ULCER OF RIGHT MIDFOOT ASSOCIATED WITH TYPE 2 DIABETES MELLITUS, WITH FAT LAYER EXPOSED: Primary | ICD-10-CM

## 2023-05-18 PROCEDURE — 11042 WOUND DEBRIDEMENT: ICD-10-PCS | Mod: ,,, | Performed by: FAMILY MEDICINE

## 2023-05-18 PROCEDURE — 99214 PR OFFICE/OUTPT VISIT, EST, LEVL IV, 30-39 MIN: ICD-10-PCS | Mod: 25,,, | Performed by: FAMILY MEDICINE

## 2023-05-18 PROCEDURE — 99214 OFFICE O/P EST MOD 30 MIN: CPT | Mod: 25,,, | Performed by: FAMILY MEDICINE

## 2023-05-18 PROCEDURE — 11042 DBRDMT SUBQ TIS 1ST 20SQCM/<: CPT | Mod: ,,, | Performed by: FAMILY MEDICINE

## 2023-05-18 PROCEDURE — 11042 DBRDMT SUBQ TIS 1ST 20SQCM/<: CPT | Performed by: FAMILY MEDICINE

## 2023-05-18 NOTE — PROGRESS NOTES
"[facility]                Progress Note    Subjective:       Patient ID: Ashutosh Ferrari is a 48 y.o. male.    Chief Complaint: Wound Check    Walked to clinic unaided. States wound started in same plantar spot as previously. Wound debrided.  Printed AVS given    Wound Check    Review of Systems   Constitutional: Negative.    HENT: Negative.     Eyes: Negative.    Respiratory: Negative.     Cardiovascular: Negative.    Musculoskeletal: Negative.    Skin:  Positive for wound.   Neurological: Negative.    Hematological: Negative.    All other systems reviewed and are negative.      Objective:        Physical Exam  Vitals reviewed.   Constitutional:       Appearance: Normal appearance. He is well-developed.   HENT:      Head: Normocephalic and atraumatic.   Eyes:      Extraocular Movements: Extraocular movements intact.      Conjunctiva/sclera: Conjunctivae normal.      Pupils: Pupils are equal, round, and reactive to light.   Musculoskeletal:      Cervical back: Normal range of motion.   Skin:     General: Skin is warm and dry.      Comments: See wound description    Neurological:      Mental Status: He is alert and oriented to person, place, and time.   Psychiatric:         Behavior: Behavior normal.       Vitals:    05/18/23 1340   BP: (!) 168/90   Pulse: 90   Temp: 97.9 °F (36.6 °C)     Wound Debridement    Date/Time: 5/18/2023 1:00 PM  Performed by: Denise Fonseca MD  Authorized by: Denise Fonseca MD     Time out: Immediately prior to procedure a "time out" was called to verify the correct patient, procedure, equipment, support staff and site/side marked as required.    Consent Done?:  Yes (Written)    Preparation: Patient was prepped and draped in usual sterile fashion    Local anesthesia used?: Yes    Local anesthetic:  Topical anesthetic    Type of Debridement:  Excisional       Length (cm):  1.2       Area (sq cm):  1.08       Width (cm):  0.9       Percent " Debrided (%):  100       Depth (cm):  0.1       Total Area Debrided (sq cm):  1.08    Depth of debridement:  Subcutaneous tissue    Devitalized tissue debrided:  Slough    Instruments:  Curette  Bleeding:  Minimal  Hemostasis Achieved: Yes  Method Used:  Pressure  Patient tolerance:  Patient tolerated the procedure well with no immediate complications    Assessment:           ICD-10-CM ICD-9-CM   1. Diabetic ulcer of right midfoot associated with type 2 diabetes mellitus, with fat layer exposed  E11.621 250.80    L97.412 707.14   2. Diabetic foot wound and Cellulitis  E11.621 250.80    L97.509 707.15            Altered Skin Integrity 05/18/23 1338 Right plantar Foot (Active)   05/18/23 1338   Altered Skin Integrity Present on Admission - Did Patient arrive to the hospital with altered skin?: yes   Side: Right   Orientation: plantar   Location: Foot   Wound Number:    Is this injury device related?:    Primary Wound Type:    Description of Altered Skin Integrity:    Ankle-Brachial Index:    Pulses:    Removal Indication and Assessment:    Wound Outcome:    (Retired) Wound Length (cm):    (Retired) Wound Width (cm):    (Retired) Depth (cm):    Wound Description (Comments):    Removal Indications:    Wound Image    05/18/23 1338   Description of Altered Skin Integrity Partial thickness tissue loss. Shallow open ulcer with a red or pink wound bed, without slough. Intact or Open/Ruptured Serum-filled blister. 05/18/23 1338   Dressing Appearance Open to air 05/18/23 1338   Drainage Amount None 05/18/23 1338   Appearance Pink 05/18/23 1338   Tissue loss description Partial thickness 05/18/23 1338   Red (%), Wound Tissue Color 100 % 05/18/23 1338   Periwound Area Intact;Dry 05/18/23 1338   Wound Edges Defined 05/18/23 1338   Wound Length (cm) 1.2 cm 05/18/23 1338   Wound Width (cm) 0.9 cm 05/18/23 1338   Wound Depth (cm) 0.1 cm 05/18/23 1338   Wound Volume (cm^3) 0.108 cm^3 05/18/23 1338   Wound Surface Area (cm^2) 1.08  cm^2 05/18/23 1338   Care Cleansed with:;Antimicrobial agent;Sterile normal saline 05/18/23 1338   Dressing Applied 05/18/23 1338   Off Loading Football dressing;Off loading shoe 05/18/23 1338   Dressing Change Due 05/25/23 05/18/23 1338           Plan:          Debridement not needed and Not Done today.   Continue off-loading / leg elevation   Continue eating protein   Wound healed   Continue to follow current medication regimen as per pcp   Call for any questions / concerns.       Tissue pathology and/or culture taken     [] Yes      [x] No  Sharp debridement performed                   [x] Yes       [] No  Labs ordered     [] Yes       [x] No  Imaging ordered    [] Yes      [] No    Orders Placed This Encounter   Procedures    Debridement     This order was created via procedure documentation     Standing Status:   Standing     Number of Occurrences:   1    Ambulatory referral/consult to Wound Clinic     Standing Status:   Standing     Number of Occurrences:   1     Referral Priority:   Routine     Referral Type:   Consultation     Referral Reason:   Specialty Services Required     Requested Specialty:   Wound Care     Number of Visits Requested:   1    Change dressing     Ni  Hydrafera Blue Transfer  Three cast padding football Senia Maoy        Follow up in about 1 week (around 5/25/2023) for closest appointment slot 29th usually Thursday.

## 2023-05-29 ENCOUNTER — HOSPITAL ENCOUNTER (OUTPATIENT)
Dept: WOUND CARE | Facility: HOSPITAL | Age: 48
Discharge: HOME | End: 2023-05-29
Attending: INTERNAL MEDICINE
Payer: MEDICAID

## 2023-05-29 VITALS — HEART RATE: 96 BPM | DIASTOLIC BLOOD PRESSURE: 86 MMHG | TEMPERATURE: 99 F | SYSTOLIC BLOOD PRESSURE: 149 MMHG

## 2023-05-29 PROCEDURE — 99212 OFFICE O/P EST SF 10 MIN: CPT | Performed by: INTERNAL MEDICINE

## 2023-05-29 PROCEDURE — 99213 OFFICE O/P EST LOW 20 MIN: CPT | Mod: ,,, | Performed by: INTERNAL MEDICINE

## 2023-05-29 PROCEDURE — 99213 PR OFFICE/OUTPT VISIT, EST, LEVL III, 20-29 MIN: ICD-10-PCS | Mod: ,,, | Performed by: INTERNAL MEDICINE

## 2023-05-29 NOTE — PROGRESS NOTES
[facility]                Progress Note    Subjective:       Patient ID: Ashutosh Ferrari is a 48 y.o. male.    Chief Complaint: Wound Check    Walked to unit with dsg dry and intact. Wound healed will follow up prn [place] area daily D/C from wound care.    No foot pain or drainage football dressing has remained dry and in place since last visit in wound care reports home blood sugars consistently less than 100    Wound Check    Review of Systems      Objective:        Physical Exam  Constitutional:       General: He is not in acute distress.  Cardiovascular:      Pulses: Normal pulses.   Pulmonary:      Effort: Pulmonary effort is normal. No respiratory distress.   Musculoskeletal:      Right lower leg: No edema.      Left lower leg: No edema.   Skin:     Comments: Right plantar fifth PIP head shows area of firm callous with intact skin centrally no fluctuacne or expressible discharge   Neurological:      Mental Status: He is alert.       Vitals:    05/29/23 1418   BP: (!) 149/86   Pulse: 96   Temp: 98.7 °F (37.1 °C)       Assessment:         No diagnosis found.    [REMOVED]      Altered Skin Integrity 05/18/23 1338 Right plantar Foot (Removed)   05/18/23 1338   Altered Skin Integrity Present on Admission - Did Patient arrive to the hospital with altered skin?: yes   Side: Right   Orientation: plantar   Location: Foot   Wound Number:    Is this injury device related?:    Primary Wound Type:    Description of Altered Skin Integrity:    Ankle-Brachial Index:    Pulses:    Removal Indication and Assessment:    Wound Outcome: Healed   (Retired) Wound Length (cm):    (Retired) Wound Width (cm):    (Retired) Depth (cm):    Wound Description (Comments):    Removal Indications:    Removed 05/29/23 1444   Wound Image   05/29/23 1438   Dressing Appearance Dry;Intact 05/29/23 1438   Drainage Amount None 05/29/23 1438   Drainage Characteristics/Odor Clear 05/29/23 1438   Tissue loss  description Partial thickness 05/29/23 1438   Periwound Area Dry 05/29/23 1438   Wound Length (cm) 0 cm 05/29/23 1438   Wound Width (cm) 0 cm 05/29/23 1438   Wound Depth (cm) 0 cm 05/29/23 1438   Wound Volume (cm^3) 0 cm^3 05/29/23 1438   Wound Surface Area (cm^2) 0 cm^2 05/29/23 1438   Care Cleansed with:;Antimicrobial agent;Sterile normal saline 05/29/23 1438   Dressing Changed 05/29/23 1438   Dressing Change Due 06/05/23 05/29/23 1438           Plan:          Wound closed no further offloading dressing needed stressed importance of daily visual inspection of feet and use of moisturizing cream. Discharge from wound care clinic to follow up with PCP for DM management    Tissue pathology and/or culture taken     [] Yes      [x] No  Sharp debridement performed                   [] Yes       [x] No  Labs ordered     [] Yes       [x] No  Imaging ordered    [] Yes      [x] No    No orders of the defined types were placed in this encounter.       Follow up if symptoms worsen or fail to improve.

## 2023-05-29 NOTE — PROGRESS NOTES
[facility]                Progress Note    Subjective:       Patient ID: Ashutosh Ferrari is a 48 y.o. male.    Chief Complaint: Wound Check    Walked to clinic wearing Darco and brought other New Balance shoe with him. Wound healed. States has controll over blood sugar and is eating well. Some callus built up around wound will moisturize daily. No need to follow up as wound healed.    Wound Check    Review of Systems      Objective:        Physical Exam    Vitals:    05/29/23 1418   BP: (!) 149/86   Pulse: 96   Temp: 98.7 °F (37.1 °C)       Assessment:         No diagnosis found.         Altered Skin Integrity 05/18/23 1338 Right plantar Foot (Active)   05/18/23 1338   Altered Skin Integrity Present on Admission - Did Patient arrive to the hospital with altered skin?: yes   Side: Right   Orientation: plantar   Location: Foot   Wound Number:    Is this injury device related?:    Primary Wound Type:    Description of Altered Skin Integrity:    Ankle-Brachial Index:    Pulses:    Removal Indication and Assessment:    Wound Outcome:    (Retired) Wound Length (cm):    (Retired) Wound Width (cm):    (Retired) Depth (cm):    Wound Description (Comments):    Removal Indications:    Wound Image   05/29/23 1438   Dressing Appearance Dry;Intact 05/29/23 1438   Drainage Amount None 05/29/23 1438   Drainage Characteristics/Odor Clear 05/29/23 1438   Tissue loss description Partial thickness 05/29/23 1438   Periwound Area Dry 05/29/23 1438   Wound Length (cm) 0 cm 05/29/23 1438   Wound Width (cm) 0 cm 05/29/23 1438   Wound Depth (cm) 0 cm 05/29/23 1438   Wound Volume (cm^3) 0 cm^3 05/29/23 1438   Wound Surface Area (cm^2) 0 cm^2 05/29/23 1438   Care Cleansed with:;Antimicrobial agent;Sterile normal saline 05/29/23 1438   Dressing Changed 05/29/23 1438   Dressing Change Due 06/05/23 05/29/23 1438           Plan:              Tissue pathology and/or culture taken     [] Yes      [x]  No  Sharp debridement performed                   [] Yes       [x] No  Labs ordered     [] Yes       [x] No  Imaging ordered    [] Yes      [x] No    No orders of the defined types were placed in this encounter.       Follow up if symptoms worsen or fail to improve.

## 2024-01-31 NOTE — PATIENT INSTRUCTIONS
The following guidelines will help you care for your wound at home:  Once a day beginning in 5 days:  Clean the feet separate from your body with warm running water and antibacterial soap such as dial soap or saline wound spray  Clean any remaining crust away with soap and water using a cotton-tipped applicator. And DRY completely dry  Apply tobrex antibiotic drops (1-2 drops) to the wound twice daily.  Cover with a breathable bandage or until the exposed nail bed is dry and there is no more drainage.  Change the dressing daily, or whenever it becomes wet or dirty.  If you were prescribed antibiotics, take them as directed until they are all gone.        Recommend lotions: eucerin, eucerin for diabetics, aquaphor, A&D ointment, gold bond for diabetics, sween, Loudoun's Bees all purpose baby ointment,  urea 40 with aloe (found on amazon.com)    Shoe recommendations: (try [employer] [email or web address] , [employer], or [email or web address] for discounted prices) you can visit [employer]SW shoes in [place]  or [facility] in the [facility] (there are also several shoe brand outlets in the [facility])    Asics (GT 2000 or gel foundations), new balance stability type shoes (such as the 940 series), sudhakar (stabil c3),  Chadwick (GTS or Beast or transcend), Rhonda mckeon Solomon  (tennis shoe)    Rebekah Cruz (women) Mary Ellen&Bayron (men), clarks, denis, aerosoles, naturalizers, SAS, ecco, pearl cazares rockports (dress shoes)    Evette, salvador, fitjann, propet (sandals)  Nike comfort thong sandals, crocs, propet (house shoes)       [Cervical Pap Smear] : cervical Pap smear [Liquid Base] : liquid base [Tolerated Well] : the patient tolerated the procedure well [No Complications] : there were no complications

## 2024-04-05 ENCOUNTER — HOSPITAL ENCOUNTER (INPATIENT)
Facility: HOSPITAL | Age: 49
LOS: 1 days | Discharge: HOME HEALTH | DRG: 638 | End: 2024-04-09
Attending: EMERGENCY MEDICINE | Admitting: INTERNAL MEDICINE

## 2024-04-05 DIAGNOSIS — R07.9 CHEST PAIN: ICD-10-CM

## 2024-04-05 DIAGNOSIS — L97.509 DIABETIC FOOT ULCER: Primary | ICD-10-CM

## 2024-04-05 DIAGNOSIS — L03.119 CELLULITIS OF FOOT: ICD-10-CM

## 2024-04-05 DIAGNOSIS — L08.9 DIABETIC INFECTION OF RIGHT FOOT: ICD-10-CM

## 2024-04-05 DIAGNOSIS — E11.628 DIABETIC INFECTION OF RIGHT FOOT: ICD-10-CM

## 2024-04-05 DIAGNOSIS — M86.9 OSTEOMYELITIS OF RIGHT FOOT, UNSPECIFIED TYPE: ICD-10-CM

## 2024-04-05 DIAGNOSIS — E11.621 DIABETIC FOOT ULCER: Primary | ICD-10-CM

## 2024-04-05 PROBLEM — Z86.16 PERSONAL HISTORY OF COVID-19: Status: ACTIVE | Noted: 2022-04-06

## 2024-04-05 PROBLEM — D64.9 ANEMIA: Status: ACTIVE | Noted: 2022-04-06

## 2024-04-05 PROBLEM — N17.9 ACUTE KIDNEY FAILURE: Chronic | Status: ACTIVE | Noted: 2022-04-06

## 2024-04-05 PROBLEM — B00.9 HERPES SIMPLEX: Status: ACTIVE | Noted: 2022-04-06

## 2024-04-05 LAB
ALBUMIN SERPL BCP-MCNC: 3.4 G/DL (ref 3.5–5.2)
ALP SERPL-CCNC: 121 U/L (ref 55–135)
ALT SERPL W/O P-5'-P-CCNC: 10 U/L (ref 10–44)
ANION GAP SERPL CALC-SCNC: 10 MMOL/L (ref 8–16)
AST SERPL-CCNC: 13 U/L (ref 10–40)
BASOPHILS # BLD AUTO: 0.03 K/UL (ref 0–0.2)
BASOPHILS NFR BLD: 0.5 % (ref 0–1.9)
BILIRUB SERPL-MCNC: 0.4 MG/DL (ref 0.1–1)
BUN SERPL-MCNC: 16 MG/DL (ref 6–20)
CALCIUM SERPL-MCNC: 9.9 MG/DL (ref 8.7–10.5)
CHLORIDE SERPL-SCNC: 98 MMOL/L (ref 95–110)
CO2 SERPL-SCNC: 24 MMOL/L (ref 23–29)
CREAT SERPL-MCNC: 1.6 MG/DL (ref 0.5–1.4)
CRP SERPL-MCNC: 154.6 MG/L (ref 0–8.2)
DIFFERENTIAL METHOD BLD: ABNORMAL
EOSINOPHIL # BLD AUTO: 0.1 K/UL (ref 0–0.5)
EOSINOPHIL NFR BLD: 1.2 % (ref 0–8)
ERYTHROCYTE [DISTWIDTH] IN BLOOD BY AUTOMATED COUNT: 11.8 % (ref 11.5–14.5)
ERYTHROCYTE [SEDIMENTATION RATE] IN BLOOD BY PHOTOMETRIC METHOD: 105 MM/HR (ref 0–23)
EST. GFR  (NO RACE VARIABLE): 53.5 ML/MIN/1.73 M^2
GLUCOSE SERPL-MCNC: 419 MG/DL (ref 70–110)
HCT VFR BLD AUTO: 36.8 % (ref 40–54)
HCV AB SERPL QL IA: NORMAL
HGB BLD-MCNC: 12.4 G/DL (ref 14–18)
HIV 1+2 AB+HIV1 P24 AG SERPL QL IA: NORMAL
IMM GRANULOCYTES # BLD AUTO: 0.01 K/UL (ref 0–0.04)
IMM GRANULOCYTES NFR BLD AUTO: 0.2 % (ref 0–0.5)
LYMPHOCYTES # BLD AUTO: 2.2 K/UL (ref 1–4.8)
LYMPHOCYTES NFR BLD: 34 % (ref 18–48)
MCH RBC QN AUTO: 29.7 PG (ref 27–31)
MCHC RBC AUTO-ENTMCNC: 33.7 G/DL (ref 32–36)
MCV RBC AUTO: 88 FL (ref 82–98)
MONOCYTES # BLD AUTO: 0.5 K/UL (ref 0.3–1)
MONOCYTES NFR BLD: 7.6 % (ref 4–15)
NEUTROPHILS # BLD AUTO: 3.6 K/UL (ref 1.8–7.7)
NEUTROPHILS NFR BLD: 56.5 % (ref 38–73)
NRBC BLD-RTO: 0 /100 WBC
PLATELET # BLD AUTO: 268 K/UL (ref 150–450)
PMV BLD AUTO: 10 FL (ref 9.2–12.9)
POCT GLUCOSE: 398 MG/DL (ref 70–110)
POTASSIUM SERPL-SCNC: 3.9 MMOL/L (ref 3.5–5.1)
PROT SERPL-MCNC: 8.3 G/DL (ref 6–8.4)
RBC # BLD AUTO: 4.18 M/UL (ref 4.6–6.2)
SODIUM SERPL-SCNC: 132 MMOL/L (ref 136–145)
WBC # BLD AUTO: 6.41 K/UL (ref 3.9–12.7)

## 2024-04-05 PROCEDURE — 87075 CULTR BACTERIA EXCEPT BLOOD: CPT | Performed by: PHYSICIAN ASSISTANT

## 2024-04-05 PROCEDURE — 85025 COMPLETE CBC W/AUTO DIFF WBC: CPT | Performed by: PHYSICIAN ASSISTANT

## 2024-04-05 PROCEDURE — 96368 THER/DIAG CONCURRENT INF: CPT

## 2024-04-05 PROCEDURE — 25000003 PHARM REV CODE 250: Performed by: PHYSICIAN ASSISTANT

## 2024-04-05 PROCEDURE — 87076 CULTURE ANAEROBE IDENT EACH: CPT | Performed by: PHYSICIAN ASSISTANT

## 2024-04-05 PROCEDURE — 63600175 PHARM REV CODE 636 W HCPCS: Performed by: PHYSICIAN ASSISTANT

## 2024-04-05 PROCEDURE — 85652 RBC SED RATE AUTOMATED: CPT | Performed by: PHYSICIAN ASSISTANT

## 2024-04-05 PROCEDURE — 80053 COMPREHEN METABOLIC PANEL: CPT | Performed by: PHYSICIAN ASSISTANT

## 2024-04-05 PROCEDURE — G0378 HOSPITAL OBSERVATION PER HR: HCPCS

## 2024-04-05 PROCEDURE — 87070 CULTURE OTHR SPECIMN AEROBIC: CPT | Performed by: PHYSICIAN ASSISTANT

## 2024-04-05 PROCEDURE — 87389 HIV-1 AG W/HIV-1&-2 AB AG IA: CPT | Performed by: PHYSICIAN ASSISTANT

## 2024-04-05 PROCEDURE — 87147 CULTURE TYPE IMMUNOLOGIC: CPT | Performed by: PHYSICIAN ASSISTANT

## 2024-04-05 PROCEDURE — 87040 BLOOD CULTURE FOR BACTERIA: CPT | Performed by: PHYSICIAN ASSISTANT

## 2024-04-05 PROCEDURE — 86140 C-REACTIVE PROTEIN: CPT | Performed by: PHYSICIAN ASSISTANT

## 2024-04-05 PROCEDURE — 96366 THER/PROPH/DIAG IV INF ADDON: CPT

## 2024-04-05 PROCEDURE — 82962 GLUCOSE BLOOD TEST: CPT

## 2024-04-05 PROCEDURE — 96365 THER/PROPH/DIAG IV INF INIT: CPT

## 2024-04-05 PROCEDURE — 86803 HEPATITIS C AB TEST: CPT | Performed by: PHYSICIAN ASSISTANT

## 2024-04-05 RX ORDER — IBUPROFEN 200 MG
24 TABLET ORAL
Status: DISCONTINUED | OUTPATIENT
Start: 2024-04-05 | End: 2024-04-09 | Stop reason: HOSPADM

## 2024-04-05 RX ORDER — SODIUM CHLORIDE 0.9 % (FLUSH) 0.9 %
10 SYRINGE (ML) INJECTION
Status: DISCONTINUED | OUTPATIENT
Start: 2024-04-05 | End: 2024-04-09 | Stop reason: HOSPADM

## 2024-04-05 RX ORDER — TALC
6 POWDER (GRAM) TOPICAL NIGHTLY PRN
Status: DISCONTINUED | OUTPATIENT
Start: 2024-04-05 | End: 2024-04-09 | Stop reason: HOSPADM

## 2024-04-05 RX ORDER — HEPARIN SODIUM 5000 [USP'U]/ML
5000 INJECTION, SOLUTION INTRAVENOUS; SUBCUTANEOUS EVERY 8 HOURS
Status: DISCONTINUED | OUTPATIENT
Start: 2024-04-06 | End: 2024-04-09 | Stop reason: HOSPADM

## 2024-04-05 RX ORDER — INSULIN ASPART 100 [IU]/ML
0-5 INJECTION, SOLUTION INTRAVENOUS; SUBCUTANEOUS
Status: DISCONTINUED | OUTPATIENT
Start: 2024-04-05 | End: 2024-04-09 | Stop reason: HOSPADM

## 2024-04-05 RX ORDER — IPRATROPIUM BROMIDE AND ALBUTEROL SULFATE 2.5; .5 MG/3ML; MG/3ML
3 SOLUTION RESPIRATORY (INHALATION) EVERY 4 HOURS PRN
Status: DISCONTINUED | OUTPATIENT
Start: 2024-04-05 | End: 2024-04-09 | Stop reason: HOSPADM

## 2024-04-05 RX ORDER — POLYETHYLENE GLYCOL 3350 17 G/17G
17 POWDER, FOR SOLUTION ORAL DAILY PRN
Status: DISCONTINUED | OUTPATIENT
Start: 2024-04-05 | End: 2024-04-09 | Stop reason: HOSPADM

## 2024-04-05 RX ORDER — BISACODYL 10 MG/1
10 SUPPOSITORY RECTAL DAILY PRN
Status: DISCONTINUED | OUTPATIENT
Start: 2024-04-05 | End: 2024-04-09 | Stop reason: HOSPADM

## 2024-04-05 RX ORDER — IBUPROFEN 200 MG
16 TABLET ORAL
Status: DISCONTINUED | OUTPATIENT
Start: 2024-04-05 | End: 2024-04-09 | Stop reason: HOSPADM

## 2024-04-05 RX ORDER — GLUCAGON 1 MG
1 KIT INJECTION
Status: DISCONTINUED | OUTPATIENT
Start: 2024-04-05 | End: 2024-04-09 | Stop reason: HOSPADM

## 2024-04-05 RX ORDER — PROCHLORPERAZINE EDISYLATE 5 MG/ML
5 INJECTION INTRAMUSCULAR; INTRAVENOUS EVERY 6 HOURS PRN
Status: DISCONTINUED | OUTPATIENT
Start: 2024-04-05 | End: 2024-04-09 | Stop reason: HOSPADM

## 2024-04-05 RX ORDER — ACETAMINOPHEN 325 MG/1
650 TABLET ORAL EVERY 4 HOURS PRN
Status: DISCONTINUED | OUTPATIENT
Start: 2024-04-05 | End: 2024-04-09 | Stop reason: HOSPADM

## 2024-04-05 RX ORDER — ONDANSETRON HYDROCHLORIDE 2 MG/ML
4 INJECTION, SOLUTION INTRAVENOUS EVERY 8 HOURS PRN
Status: DISCONTINUED | OUTPATIENT
Start: 2024-04-05 | End: 2024-04-09 | Stop reason: HOSPADM

## 2024-04-05 RX ORDER — INSULIN ASPART 100 [IU]/ML
6 INJECTION, SOLUTION INTRAVENOUS; SUBCUTANEOUS
Status: DISCONTINUED | OUTPATIENT
Start: 2024-04-05 | End: 2024-04-06

## 2024-04-05 RX ADMIN — CEFTRIAXONE 1 G: 1 INJECTION, POWDER, FOR SOLUTION INTRAMUSCULAR; INTRAVENOUS at 10:04

## 2024-04-05 RX ADMIN — VANCOMYCIN HYDROCHLORIDE 1750 MG: 500 INJECTION, POWDER, LYOPHILIZED, FOR SOLUTION INTRAVENOUS at 10:04

## 2024-04-05 RX ADMIN — SODIUM CHLORIDE 1000 ML: 9 INJECTION, SOLUTION INTRAVENOUS at 10:04

## 2024-04-05 NOTE — Clinical Note
Diagnosis: Diabetic foot ulcer [009423]   Future Attending Provider: CARSON BROWN [[id number]]   Is the patient being sent to ED Observation?: No

## 2024-04-06 PROBLEM — M86.9 OSTEOMYELITIS: Status: ACTIVE | Noted: 2024-04-06

## 2024-04-06 LAB
ANION GAP SERPL CALC-SCNC: 6 MMOL/L (ref 8–16)
BASOPHILS # BLD AUTO: 0.02 K/UL (ref 0–0.2)
BASOPHILS NFR BLD: 0.4 % (ref 0–1.9)
BUN SERPL-MCNC: 13 MG/DL (ref 6–20)
CALCIUM SERPL-MCNC: 9 MG/DL (ref 8.7–10.5)
CHLORIDE SERPL-SCNC: 103 MMOL/L (ref 95–110)
CO2 SERPL-SCNC: 24 MMOL/L (ref 23–29)
CREAT SERPL-MCNC: 1.2 MG/DL (ref 0.5–1.4)
DIFFERENTIAL METHOD BLD: ABNORMAL
EOSINOPHIL # BLD AUTO: 0.1 K/UL (ref 0–0.5)
EOSINOPHIL NFR BLD: 1.8 % (ref 0–8)
ERYTHROCYTE [DISTWIDTH] IN BLOOD BY AUTOMATED COUNT: 11.7 % (ref 11.5–14.5)
EST. GFR  (NO RACE VARIABLE): >60 ML/MIN/1.73 M^2
ESTIMATED AVG GLUCOSE: 306 MG/DL (ref 68–131)
GLUCOSE SERPL-MCNC: 331 MG/DL (ref 70–110)
GRAM STN SPEC: NORMAL
GRAM STN SPEC: NORMAL
HBA1C MFR BLD: 12.3 % (ref 4–5.6)
HCT VFR BLD AUTO: 31.9 % (ref 40–54)
HGB BLD-MCNC: 10.7 G/DL (ref 14–18)
IMM GRANULOCYTES # BLD AUTO: 0.02 K/UL (ref 0–0.04)
IMM GRANULOCYTES NFR BLD AUTO: 0.4 % (ref 0–0.5)
LYMPHOCYTES # BLD AUTO: 2.3 K/UL (ref 1–4.8)
LYMPHOCYTES NFR BLD: 41.9 % (ref 18–48)
MAGNESIUM SERPL-MCNC: 1.7 MG/DL (ref 1.6–2.6)
MCH RBC QN AUTO: 29.6 PG (ref 27–31)
MCHC RBC AUTO-ENTMCNC: 33.5 G/DL (ref 32–36)
MCV RBC AUTO: 88 FL (ref 82–98)
MONOCYTES # BLD AUTO: 0.5 K/UL (ref 0.3–1)
MONOCYTES NFR BLD: 8.4 % (ref 4–15)
NEUTROPHILS # BLD AUTO: 2.6 K/UL (ref 1.8–7.7)
NEUTROPHILS NFR BLD: 47.1 % (ref 38–73)
NRBC BLD-RTO: 0 /100 WBC
PHOSPHATE SERPL-MCNC: 3.5 MG/DL (ref 2.7–4.5)
PLATELET # BLD AUTO: 225 K/UL (ref 150–450)
PMV BLD AUTO: 10 FL (ref 9.2–12.9)
POCT GLUCOSE: 186 MG/DL (ref 70–110)
POCT GLUCOSE: 256 MG/DL (ref 70–110)
POCT GLUCOSE: 269 MG/DL (ref 70–110)
POCT GLUCOSE: 304 MG/DL (ref 70–110)
POCT GLUCOSE: 318 MG/DL (ref 70–110)
POTASSIUM SERPL-SCNC: 3.4 MMOL/L (ref 3.5–5.1)
RBC # BLD AUTO: 3.61 M/UL (ref 4.6–6.2)
SODIUM SERPL-SCNC: 133 MMOL/L (ref 136–145)
VANCOMYCIN SERPL-MCNC: 8.5 UG/ML
WBC # BLD AUTO: 5.46 K/UL (ref 3.9–12.7)

## 2024-04-06 PROCEDURE — 87102 FUNGUS ISOLATION CULTURE: CPT

## 2024-04-06 PROCEDURE — G0378 HOSPITAL OBSERVATION PER HR: HCPCS

## 2024-04-06 PROCEDURE — 83036 HEMOGLOBIN GLYCOSYLATED A1C: CPT | Performed by: PHYSICIAN ASSISTANT

## 2024-04-06 PROCEDURE — 63600175 PHARM REV CODE 636 W HCPCS

## 2024-04-06 PROCEDURE — 82962 GLUCOSE BLOOD TEST: CPT

## 2024-04-06 PROCEDURE — 87077 CULTURE AEROBIC IDENTIFY: CPT

## 2024-04-06 PROCEDURE — 87205 SMEAR GRAM STAIN: CPT

## 2024-04-06 PROCEDURE — 25000003 PHARM REV CODE 250: Performed by: INTERNAL MEDICINE

## 2024-04-06 PROCEDURE — 87015 SPECIMEN INFECT AGNT CONCNTJ: CPT

## 2024-04-06 PROCEDURE — 63600175 PHARM REV CODE 636 W HCPCS: Performed by: INTERNAL MEDICINE

## 2024-04-06 PROCEDURE — 87075 CULTR BACTERIA EXCEPT BLOOD: CPT

## 2024-04-06 PROCEDURE — 96372 THER/PROPH/DIAG INJ SC/IM: CPT

## 2024-04-06 PROCEDURE — 96372 THER/PROPH/DIAG INJ SC/IM: CPT | Performed by: PHYSICIAN ASSISTANT

## 2024-04-06 PROCEDURE — 25000003 PHARM REV CODE 250

## 2024-04-06 PROCEDURE — 07DT3ZX EXTRACTION OF BONE MARROW, PERCUTANEOUS APPROACH, DIAGNOSTIC: ICD-10-PCS | Performed by: PODIATRIST

## 2024-04-06 PROCEDURE — 63600175 PHARM REV CODE 636 W HCPCS: Performed by: PHYSICIAN ASSISTANT

## 2024-04-06 PROCEDURE — 87116 MYCOBACTERIA CULTURE: CPT

## 2024-04-06 PROCEDURE — 99214 OFFICE O/P EST MOD 30 MIN: CPT | Mod: ,,, | Performed by: REGISTERED NURSE

## 2024-04-06 PROCEDURE — 25000003 PHARM REV CODE 250: Performed by: PHYSICIAN ASSISTANT

## 2024-04-06 PROCEDURE — 83735 ASSAY OF MAGNESIUM: CPT | Performed by: PHYSICIAN ASSISTANT

## 2024-04-06 PROCEDURE — 96366 THER/PROPH/DIAG IV INF ADDON: CPT

## 2024-04-06 PROCEDURE — 87206 SMEAR FLUORESCENT/ACID STAI: CPT

## 2024-04-06 PROCEDURE — 80202 ASSAY OF VANCOMYCIN: CPT | Performed by: INTERNAL MEDICINE

## 2024-04-06 PROCEDURE — 85025 COMPLETE CBC W/AUTO DIFF WBC: CPT | Performed by: PHYSICIAN ASSISTANT

## 2024-04-06 PROCEDURE — 96367 TX/PROPH/DG ADDL SEQ IV INF: CPT

## 2024-04-06 PROCEDURE — 99285 EMERGENCY DEPT VISIT HI MDM: CPT | Mod: 25

## 2024-04-06 PROCEDURE — 87186 SC STD MICRODIL/AGAR DIL: CPT

## 2024-04-06 PROCEDURE — 84100 ASSAY OF PHOSPHORUS: CPT | Performed by: PHYSICIAN ASSISTANT

## 2024-04-06 PROCEDURE — 87070 CULTURE OTHR SPECIMN AEROBIC: CPT

## 2024-04-06 PROCEDURE — 80048 BASIC METABOLIC PNL TOTAL CA: CPT | Performed by: PHYSICIAN ASSISTANT

## 2024-04-06 PROCEDURE — 87147 CULTURE TYPE IMMUNOLOGIC: CPT

## 2024-04-06 PROCEDURE — 96361 HYDRATE IV INFUSION ADD-ON: CPT

## 2024-04-06 RX ORDER — VANCOMYCIN HCL IN 5 % DEXTROSE 1.25 G/25
1250 PLASTIC BAG, INJECTION (ML) INTRAVENOUS
Status: DISCONTINUED | OUTPATIENT
Start: 2024-04-06 | End: 2024-04-06

## 2024-04-06 RX ORDER — POTASSIUM CHLORIDE 20 MEQ/1
40 TABLET, EXTENDED RELEASE ORAL ONCE
Status: COMPLETED | OUTPATIENT
Start: 2024-04-06 | End: 2024-04-06

## 2024-04-06 RX ORDER — INSULIN ASPART 100 [IU]/ML
8 INJECTION, SOLUTION INTRAVENOUS; SUBCUTANEOUS
Status: DISCONTINUED | OUTPATIENT
Start: 2024-04-06 | End: 2024-04-09 | Stop reason: HOSPADM

## 2024-04-06 RX ORDER — ACETAMINOPHEN 500 MG
500 TABLET ORAL 4 TIMES DAILY PRN
COMMUNITY

## 2024-04-06 RX ORDER — OXYCODONE HYDROCHLORIDE 5 MG/1
5 TABLET ORAL EVERY 4 HOURS PRN
Status: DISCONTINUED | OUTPATIENT
Start: 2024-04-06 | End: 2024-04-09 | Stop reason: HOSPADM

## 2024-04-06 RX ORDER — METHOCARBAMOL 500 MG/1
500 TABLET, FILM COATED ORAL 3 TIMES DAILY
Status: DISCONTINUED | OUTPATIENT
Start: 2024-04-06 | End: 2024-04-09 | Stop reason: HOSPADM

## 2024-04-06 RX ADMIN — INSULIN ASPART 6 UNITS: 100 INJECTION, SOLUTION INTRAVENOUS; SUBCUTANEOUS at 12:04

## 2024-04-06 RX ADMIN — INSULIN DETEMIR 10 UNITS: 100 INJECTION, SOLUTION SUBCUTANEOUS at 08:04

## 2024-04-06 RX ADMIN — POTASSIUM CHLORIDE 40 MEQ: 1500 TABLET, EXTENDED RELEASE ORAL at 09:04

## 2024-04-06 RX ADMIN — VANCOMYCIN HYDROCHLORIDE 1250 MG: 1.25 INJECTION, POWDER, LYOPHILIZED, FOR SOLUTION INTRAVENOUS at 01:04

## 2024-04-06 RX ADMIN — CEFEPIME 2 G: 2 INJECTION, POWDER, FOR SOLUTION INTRAVENOUS at 03:04

## 2024-04-06 RX ADMIN — INSULIN ASPART 3 UNITS: 100 INJECTION, SOLUTION INTRAVENOUS; SUBCUTANEOUS at 11:04

## 2024-04-06 RX ADMIN — CEFEPIME 2 G: 2 INJECTION, POWDER, FOR SOLUTION INTRAVENOUS at 05:04

## 2024-04-06 RX ADMIN — INSULIN ASPART 8 UNITS: 100 INJECTION, SOLUTION INTRAVENOUS; SUBCUTANEOUS at 11:04

## 2024-04-06 RX ADMIN — INSULIN DETEMIR 10 UNITS: 100 INJECTION, SOLUTION SUBCUTANEOUS at 12:04

## 2024-04-06 RX ADMIN — INSULIN ASPART 8 UNITS: 100 INJECTION, SOLUTION INTRAVENOUS; SUBCUTANEOUS at 04:04

## 2024-04-06 RX ADMIN — OXYCODONE 5 MG: 5 TABLET ORAL at 11:04

## 2024-04-06 RX ADMIN — INSULIN DETEMIR 14 UNITS: 100 INJECTION, SOLUTION SUBCUTANEOUS at 09:04

## 2024-04-06 RX ADMIN — SODIUM CHLORIDE, POTASSIUM CHLORIDE, SODIUM LACTATE AND CALCIUM CHLORIDE 1000 ML: 600; 310; 30; 20 INJECTION, SOLUTION INTRAVENOUS at 12:04

## 2024-04-06 RX ADMIN — ACETAMINOPHEN 650 MG: 325 TABLET ORAL at 09:04

## 2024-04-06 RX ADMIN — METHOCARBAMOL 500 MG: 500 TABLET ORAL at 02:04

## 2024-04-06 RX ADMIN — INSULIN ASPART 6 UNITS: 100 INJECTION, SOLUTION INTRAVENOUS; SUBCUTANEOUS at 08:04

## 2024-04-06 RX ADMIN — HEPARIN SODIUM 5000 UNITS: 5000 INJECTION INTRAVENOUS; SUBCUTANEOUS at 09:04

## 2024-04-06 RX ADMIN — CEFEPIME 2 G: 2 INJECTION, POWDER, FOR SOLUTION INTRAVENOUS at 10:04

## 2024-04-06 RX ADMIN — HEPARIN SODIUM 5000 UNITS: 5000 INJECTION INTRAVENOUS; SUBCUTANEOUS at 01:04

## 2024-04-06 RX ADMIN — METHOCARBAMOL 500 MG: 500 TABLET ORAL at 11:04

## 2024-04-06 RX ADMIN — INSULIN ASPART 3 UNITS: 100 INJECTION, SOLUTION INTRAVENOUS; SUBCUTANEOUS at 04:04

## 2024-04-06 RX ADMIN — METHOCARBAMOL 500 MG: 500 TABLET ORAL at 09:04

## 2024-04-06 NOTE — SUBJECTIVE & OBJECTIVE
Interval History: ISIDRO KOWALSKI. Denies pain at this time. Discussed strict adherence to DM diet and managing blood glucose. Pending ID and podiatry recs. Continuing abx for now.     Review of Systems   Constitutional:  Negative for chills and fever.   Respiratory:  Negative for chest tightness and shortness of breath.    Cardiovascular:  Negative for chest pain and leg swelling.   Gastrointestinal:  Negative for abdominal pain and nausea.   Skin:  Positive for wound.   Neurological:  Negative for dizziness and weakness.     Objective:     Vital Signs (Most Recent):  Temp: 98.3 °F (36.8 °C) (04/06/24 1238)  Pulse: 82 (04/06/24 1238)  Resp: 18 (04/06/24 1238)  BP: 138/71 (04/06/24 1238)  SpO2: 97 % (04/06/24 0700) Vital Signs (24h Range):  Temp:  [97.8 °F (36.6 °C)-98.8 °F (37.1 °C)] 98.3 °F (36.8 °C)  Pulse:  [] 82  Resp:  [14-18] 18  SpO2:  [97 %-100 %] 97 %  BP: (128-171)/() 138/71     Weight: 88.5 kg (195 lb)  Body mass index is 23.74 kg/m².    Intake/Output Summary (Last 24 hours) at 4/6/2024 1356  Last data filed at 4/6/2024 0601  Gross per 24 hour   Intake 1100.37 ml   Output --   Net 1100.37 ml         Physical Exam  Vitals and nursing note reviewed.   Constitutional:       Appearance: He is well-developed.   Eyes:      Pupils: Pupils are equal, round, and reactive to light.   Cardiovascular:      Rate and Rhythm: Normal rate and regular rhythm.   Pulmonary:      Effort: Pulmonary effort is normal.      Breath sounds: Normal breath sounds.   Abdominal:      Palpations: Abdomen is soft.      Tenderness: There is no abdominal tenderness.   Musculoskeletal:         General: Signs of injury present. No tenderness.   Skin:     General: Skin is warm and dry.      Findings: Erythema present.   Neurological:      Mental Status: He is alert and oriented to person, place, and time.   Psychiatric:         Behavior: Behavior normal.                   Significant Labs: All pertinent labs within the past 24 hours  have been reviewed.  CBC:   Recent Labs   Lab 04/05/24  2100 04/06/24  0443   WBC 6.41 5.46   HGB 12.4* 10.7*   HCT 36.8* 31.9*    225     CMP:   Recent Labs   Lab 04/05/24 2059 04/06/24  0443   * 133*   K 3.9 3.4*   CL 98 103   CO2 24 24   * 331*   BUN 16 13   CREATININE 1.6* 1.2   CALCIUM 9.9 9.0   PROT 8.3  --    ALBUMIN 3.4*  --    BILITOT 0.4  --    ALKPHOS 121  --    AST 13  --    ALT 10  --    ANIONGAP 10 6*       Significant Imaging: I have reviewed all pertinent imaging results/findings within the past 24 hours.

## 2024-04-06 NOTE — HPI
Ashutosh Ferrari is a 48 y.o. male with a PMHx of T2DM, CKD, HTN, hx R diabetic foot ulcer/infection who presents to [facility] for right foot wound. Patient had an infected wound to his right foot about 1 year ago which was treated with antibiotics and had healed until ~1 month ago. He noticied the wound reopened and began increasing in size. He stepped in ditched with his boots on about a week ago causing him to have to sit in his saturated boot for a few hours. The wound has worsened since then and now has surrounding redness, swelling and warmth extending up his foot and into his ankle. He hasn't taken his insulin in about 2 months as he's been trying to control his BG with diet modifications. Has chronic neuropathy and decreased sensation to BLEs without recent worsening. Denies fever, chills, N/V, abdominal pain, HA, vision changes or syncope.    ED: AFVSS. No leukocytosis. Na 132, , Cr 1.6 (baseline), normal AG. , . XR R foot shows soft tissue swelling and soft tissue ulcer about the 5th MTP joint. Given 1L NS, IV vanc and rocephin.

## 2024-04-06 NOTE — ED NOTES
Patient identifiers for Ashutosh Ferrari 48 y.o. male checked and correct.  Chief Complaint   Patient presents with    Foot Pain     Reports he had a wound on his foot, states he was taking good care of the wound and woke up with blood in his bed today      Past Medical History:   Diagnosis Date    Diabetes mellitus     Diabetes mellitus, type 2     Essential hypertension, benign 07/18/2013    Herpes      Allergies reported: Review of patient's allergies indicates:  No Known Allergies      HEENT: Denies vision changes. Denies ear drainage or hearing loss. No c/o nasal drainage. Denies dysphagia or voice changes.   Appearance: Pt awake, alert & oriented to person, place & time. Pt in no acute distress at present time. Pt is clean and well groomed with clothes appropriately fastened.   Skin: Skin warm, dry & intact. Color consistent with ethnicity. Mucous membranes moist. Ulceration to right foot noted along with skin breakdown  Musculoskeletal: Patient moving all extremities well, no obvious swelling or deformities noted.   Respiratory: Respirations spontaneous, even, and non-labored. Visible chest rise noted. Airway is open and patent. No accessory muscle use noted.   Neurologic: Sensation is intact. Speech is clear and appropriate. Eyes open spontaneously, behavior appropriate to situation, follows commands, facial expression symmetrical, bilateral hand grasp equal and even, purposeful motor response noted.  Cardiac: All peripheral pulses present. No Bilateral lower extremity edema. Cap refill is <3 seconds.  Abdomen: Abdomen soft, non distended, non tender to palpation.   : Pt voids independently, denies dysuria, hematuria, frequency.

## 2024-04-06 NOTE — ED NOTES
Attempted to call report to [facility], nurse requested a return call in 15 minutes. Requested a telebox from unit sec.

## 2024-04-06 NOTE — CONSULTS
[facility] - Emergency Dept  Infectious Disease  Consult Note    Patient Name: Ashutosh Ferrari  MRN: [id number]  Admission Date: 4/5/2024  Hospital Length of Stay: 0 days  Attending Physician: Wyatt Antony MD  Primary Care Provider: [facility] -     Isolation Status: No active isolations    Patient information was obtained from patient, past medical records, and ER records.      Inpatient consult to Infectious Diseases  Consult performed by: Jacinta Silverman, NP  Consult ordered by: Josselni Shabazz PA-C        Assessment/Plan:     Endocrine  * Diabetic infection of right foot  47 yo male with PMH Of T2DM, CKD, HTN, right diabetic food wound who presents with cf right foot wound. Has been followed per ID and podiatry, and was recently treated with bactrim for MRSA and proteus, and then doxycyline for anaerobes. He reports the wound recently reopened, and he stepped in a ditch about a week ago which saturated his shoe. He reports following, the wound worsened and is now red, warm, and swollen, and extending to his ankle.     Pt afebrile, HDS. Without leukocytosis. With elevated sed rate, CRP. Cr elevated to 1.6, though decreased today. H1C is 12.3%. MRI of right foot with osteo of 5th proximal phalanx.     Pt is currently on vanc and cefepime. ID was consulted for abx recs.     Recommendations:  - agree with continuing vancomycin IV (inpatient pharm to dose) and cefepime 2g IV q8hr for cf diabetic foot wound/osteo  - this will empirically cover past organisms, as well as concern for water bugs as pt was exposed to ditch water recently   - consider podiatry eval, would like bone biopsy and/or deep wound culture to guide antibiotic therapy. Will anticipate 6 weeks of antibiotic therapy for osteomyelitis.   - nutritional support, glucose control, wound care, off loading are important to assist in wound healing   - plan reviewed with ID staff. ID will follow.         Thank you for your consult. I will follow-up  with patient. Please contact us if you have any additional questions.    Jacinta Isabel NP  Infectious Disease  [facility] - Emergency Dept    Subjective:     Principal Problem: Diabetic infection of right foot    HPI: Mr. Ferrari is a 49 yo male with PMH Of T2DM, CKD, HTN, right diabetic food wound who presents with cf right foot wound.  He reports the wound recently reopened, and he stepped in a ditch about a week ago which saturated his shoe. He reports following, the wound worsened and is now red, warm, and swollen, and extending to his ankle.     Pt afebrile, HDS. Without leukocytosis. With elevated sed rate, CRP. Cr elevated to 1.6, though decreased today. H1C is 12.3%. MRI of right foot with osteo of 5th proximal phalanx.     Pt is currently on vanc and cefepime. ID was consulted for abx recs.     Past Medical History:   Diagnosis Date    Diabetes mellitus     Diabetes mellitus, type 2     Essential hypertension, benign 07/18/2013    Herpes        Past Surgical History:   Procedure Laterality Date    DEBRIDEMENT OF FOOT Right 4/1/2022    Procedure: DEBRIDEMENT, FOOT;  Surgeon: Amena Martinez DPM;  Location: [facility] OR;  Service: Podiatry;  Laterality: Right;    DEBRIDEMENT OF FOOT Right 4/4/2022    Procedure: DEBRIDEMENT, FOOT;  Surgeon: Amena Martinez DPM;  Location: [facility] OR;  Service: Podiatry;  Laterality: Right;    INCISION AND DRAINAGE Right 3/28/2022    Procedure: INCISION AND DRAINAGE RIGHT FOOT;  Surgeon: Amena Martinez DPM;  Location: [facility] OR;  Service: Podiatry;  Laterality: Right;       Review of patient's allergies indicates:  No Known Allergies    Medications:  (Not in a hospital admission)    Antibiotics (From admission, onward)      Start     Stop Route Frequency Ordered    04/06/24 1330  vancomycin (VANCOCIN) 1250 mg in 5 % dextrose 250 mL IVPB         -- IV Every 12 hours (non-standard times) 04/06/24 1230    04/06/24 0600  ceFEPIme (MAXIPIME) 2 g in dextrose 5 % in water (D5W) 100 mL IVPB  (MB+)         -- IV Every 12 hours (non-standard times) 04/05/24 2254 04/05/24 2350  vancomycin - pharmacy to dose  (vancomycin IVPB (PEDS and ADULTS))        See Hyperspace for full Linked Orders Report.    -- IV pharmacy to manage frequency 04/05/24 2250          Antifungals (From admission, onward)      None          Antivirals (From admission, onward)      None             Immunization History   Administered Date(s) Administered    COVID-19, MRNA, LN-S, PF (Pfizer) (Purple Cap) 09/21/2021    DTP 1977, 1977, 1977, 10/23/1978, 09/07/1982    MMR 05/11/1978    Poliovirus 1977, 1977, 1977, 10/23/1978, 09/07/1982       Family History       Problem Relation (Age of Onset)    Diabetes Mother          Social History     Socioeconomic History    Marital status: Single   Occupational History    Occupation: Unemployed   Tobacco Use    Smoking status: Never    Smokeless tobacco: Never   Substance and Sexual Activity    Alcohol use: No    Drug use: No    Sexual activity: Yes     Partners: Female     Birth control/protection: None, Condom     Social Determinants of Health     Financial Resource Strain: Low Risk  (4/20/2022)    Overall Financial Resource Strain (CARDIA)     Difficulty of Paying Living Expenses: Not very hard   Food Insecurity: No Food Insecurity (4/20/2022)    Hunger Vital Sign     Worried About Running Out of Food in the Last Year: Never true     Ran Out of Food in the Last Year: Never true   Transportation Needs: No Transportation Needs (4/20/2022)    PRAPARE - Transportation     Lack of Transportation (Medical): No     Lack of Transportation (Non-Medical): No   Physical Activity: Inactive (4/20/2022)    Exercise Vital Sign     Days of Exercise per Week: 0 days     Minutes of Exercise per Session: 0 min   Stress: No Stress Concern Present (4/20/2022)    [place] [employer] - Occupational Stress Questionnaire     Feeling of Stress : Not at all    Social Connections: Socially Isolated (4/20/2022)    Social Connection and Isolation Panel [NHANES]     Frequency of Communication with Friends and Family: More than three times a week     Frequency of Social Gatherings with Friends and Family: More than three times a week     Attends Baptism Services: Never     Active Member of Clubs or Organizations: No     Attends Club or Organization Meetings: Never     Marital Status: Never    Housing Stability: Low Risk  (4/20/2022)    Housing Stability Vital Sign     Unable to Pay for Housing in the Last Year: No     Number of Places Lived in the Last Year: 1     Unstable Housing in the Last Year: No     Review of Systems   Constitutional:  Negative for chills, diaphoresis, fatigue and fever.   HENT: Negative.     Eyes: Negative.    Respiratory:  Negative for cough and shortness of breath.    Cardiovascular:  Negative for chest pain, palpitations and leg swelling.   Gastrointestinal:  Negative for diarrhea, nausea and vomiting.   Genitourinary:  Negative for dysuria.   Musculoskeletal:  Negative for arthralgias and myalgias.   Skin:  Positive for wound.   Neurological:  Positive for headaches. Negative for dizziness.   Psychiatric/Behavioral:  Negative for agitation and confusion.      Objective:     Vital Signs (Most Recent):  Temp: 98.8 °F (37.1 °C) (04/06/24 0700)  Pulse: 84 (04/06/24 0700)  Resp: 18 (04/06/24 1139)  BP: (!) 143/74 (04/06/24 0700)  SpO2: 97 % (04/06/24 0700) Vital Signs (24h Range):  Temp:  [97.8 °F (36.6 °C)-98.8 °F (37.1 °C)] 98.8 °F (37.1 °C)  Pulse:  [] 84  Resp:  [14-18] 18  SpO2:  [97 %-100 %] 97 %  BP: (128-171)/() 143/74     Weight: 88.5 kg (195 lb)  Body mass index is 23.74 kg/m².    Estimated Creatinine Clearance: 94.4 mL/min (based on SCr of 1.2 mg/dL).     Physical Exam  Vitals reviewed.   Constitutional:       General: He is not in acute distress.     Appearance: Normal appearance. He is not ill-appearing.   HENT:       Head: Normocephalic.      Nose: Nose normal.      Mouth/Throat:      Mouth: Mucous membranes are moist.      Pharynx: No oropharyngeal exudate.   Eyes:      General:         Right eye: No discharge.         Left eye: No discharge.      Conjunctiva/sclera: Conjunctivae normal.   Pulmonary:      Effort: Pulmonary effort is normal. No respiratory distress.      Breath sounds: No stridor.   Abdominal:      General: Abdomen is flat. There is no distension.      Palpations: Abdomen is soft.   Musculoskeletal:         General: No swelling or tenderness. Normal range of motion.      Cervical back: Normal range of motion.      Right lower leg: No edema.      Left lower leg: No edema.   Skin:     Findings: Lesion present. No rash.      Comments: Wound to dorsal and plantar surfaces of right foot. No ascending redness, swelling. No foul odor.    Neurological:      General: No focal deficit present.      Mental Status: He is alert and oriented to person, place, and time.      Motor: No weakness.      Gait: Gait normal.   Psychiatric:         Mood and Affect: Mood normal.         Behavior: Behavior normal.         Thought Content: Thought content normal.         Judgment: Judgment normal.          Significant Labs: All pertinent labs within the past 24 hours have been reviewed.    Significant Imaging: I have reviewed all pertinent imaging results/findings within the past 24 hours.

## 2024-04-06 NOTE — ASSESSMENT & PLAN NOTE
- uncontrolled iso medication noncompliance  - start WB insulin regimen: detemir 10U BID + aspart 6U TIDWM   - increased detemir to 14U and aspart to 8U  - [place] accuchecks  - diabetic diet    Lab Results   Component Value Date    HGBA1C 12.3 (H) 04/06/2024

## 2024-04-06 NOTE — ASSESSMENT & PLAN NOTE
- AF, no leukocytosis  - inflammatory markers elevated  - XR R foot with soft tissue swelling and ulcer of 5th MTP joint.   - MRI R foot ordered  - podiatry consulted; appreciate recs  - continue IV vanc, switch to IV cefepime for pseudomonas coverage  - follow wound, blood cx  - strict glycemic control

## 2024-04-06 NOTE — ED NOTES
Care handoff from JHONY Suarez. Pt resting comfortably and shows no signs of distress. Pt updated on plan of care. Pain currently 0/10. Comfort, positioning, and restroom needs addressed. Bed locked in lowest position, side rails up x2, call button within reach.

## 2024-04-06 NOTE — PROGRESS NOTES
[facility] Emergency Dept  [facility] Medicine  Progress Note    Patient Name: Ashutosh Ferrari  MRN: [id number]  Patient Class: OP- Observation   Admission Date: 4/5/2024  Length of Stay: 0 days  Attending Physician: Wyatt Antony MD  Primary Care Provider: [facility] -        Subjective:     Principal Problem:Diabetic infection of right foot        HPI:  Ashutosh Ferrari is a 48 y.o. male with a PMHx of T2DM, CKD, HTN, hx R diabetic foot ulcer/infection who presents to [facility] for right foot wound. Patient had an infected wound to his right foot about 1 year ago which was treated with antibiotics and had healed until ~1 month ago. He noticied the wound reopened and began increasing in size. He stepped in ditched with his boots on about a week ago causing him to have to sit in his saturated boot for a few hours. The wound has worsened since then and now has surrounding redness, swelling and warmth extending up his foot and into his ankle. He hasn't taken his insulin in about 2 months as he's been trying to control his BG with diet modifications. Has chronic neuropathy and decreased sensation to BLEs without recent worsening. Denies fever, chills, N/V, abdominal pain, HA, vision changes or syncope.    ED: AFVSS. No leukocytosis. Na 132, , Cr 1.6 (baseline), normal AG. , . XR R foot shows soft tissue swelling and soft tissue ulcer about the 5th MTP joint. Given 1L NS, IV vanc and rocephin.     Overview/Hospital Course:  Ashutosh Ferrari is a 48 y.o. male who was admitted to hospital medicine for osteomyelitis of R 5th toe. MRI R foot with findings concerning for osteomyelitis of the 5th proximal phalanx, mildly progressed compared to prior exam. Persistent reactive osteitis involving the head of the 5th metatarsal. Ulceration along the dorsal lateral aspect of the 5th MTP joint. No drainable fluid collection. Started on vancomycin and cefepime. ID and podiatry consulted. Patient will be discharged when  medically ready.     Interval History: ISIDRO KOWALSKI. Denies pain at this time. Discussed strict adherence to DM diet and managing blood glucose. Pending ID and podiatry recs. Continuing abx for now.     Review of Systems   Constitutional:  Negative for chills and fever.   Respiratory:  Negative for chest tightness and shortness of breath.    Cardiovascular:  Negative for chest pain and leg swelling.   Gastrointestinal:  Negative for abdominal pain and nausea.   Skin:  Positive for wound.   Neurological:  Negative for dizziness and weakness.     Objective:     Vital Signs (Most Recent):  Temp: 98.3 °F (36.8 °C) (04/06/24 1238)  Pulse: 82 (04/06/24 1238)  Resp: 18 (04/06/24 1238)  BP: 138/71 (04/06/24 1238)  SpO2: 97 % (04/06/24 0700) Vital Signs (24h Range):  Temp:  [97.8 °F (36.6 °C)-98.8 °F (37.1 °C)] 98.3 °F (36.8 °C)  Pulse:  [] 82  Resp:  [14-18] 18  SpO2:  [97 %-100 %] 97 %  BP: (128-171)/() 138/71     Weight: 88.5 kg (195 lb)  Body mass index is 23.74 kg/m².    Intake/Output Summary (Last 24 hours) at 4/6/2024 1356  Last data filed at 4/6/2024 0601  Gross per 24 hour   Intake 1100.37 ml   Output --   Net 1100.37 ml         Physical Exam  Vitals and nursing note reviewed.   Constitutional:       Appearance: He is well-developed.   Eyes:      Pupils: Pupils are equal, round, and reactive to light.   Cardiovascular:      Rate and Rhythm: Normal rate and regular rhythm.   Pulmonary:      Effort: Pulmonary effort is normal.      Breath sounds: Normal breath sounds.   Abdominal:      Palpations: Abdomen is soft.      Tenderness: There is no abdominal tenderness.   Musculoskeletal:         General: Signs of injury present. No tenderness.   Skin:     General: Skin is warm and dry.      Findings: Erythema present.   Neurological:      Mental Status: He is alert and oriented to person, place, and time.   Psychiatric:         Behavior: Behavior normal.                   Significant Labs: All pertinent labs  within the past 24 hours have been reviewed.  CBC:   Recent Labs   Lab 04/05/24  2100 04/06/24  0443   WBC 6.41 5.46   HGB 12.4* 10.7*   HCT 36.8* 31.9*    225     CMP:   Recent Labs   Lab 04/05/24  2059 04/06/24  0443   * 133*   K 3.9 3.4*   CL 98 103   CO2 24 24   * 331*   BUN 16 13   CREATININE 1.6* 1.2   CALCIUM 9.9 9.0   PROT 8.3  --    ALBUMIN 3.4*  --    BILITOT 0.4  --    ALKPHOS 121  --    AST 13  --    ALT 10  --    ANIONGAP 10 6*       Significant Imaging: I have reviewed all pertinent imaging results/findings within the past 24 hours.    Assessment/Plan:      * Diabetic infection of right foot  - AF, no leukocytosis  - inflammatory markers elevated  - XR R foot with soft tissue swelling and ulcer of 5th MTP joint.   - MRI R foot ordered  - podiatry consulted; appreciate recs  - continue IV vanc, switch to IV cefepime for pseudomonas coverage  - follow wound, blood cx  - strict glycemic control     Osteomyelitis  Diabetic infection of right foot   - AF, no leukocytosis  - inflammatory markers elevated  - XR R foot with soft tissue swelling and ulcer of 5th MTP joint.   - MRI R foot with findings concerning for osteo   - ID consulted  - podiatry consulted; appreciate recs  - continue IV vanc, add IV cefepime for pseudomonas coverage  - follow wound, blood cx  - strict glycemic control     Type 2 diabetes mellitus with hyperglycemia, with long-term current use of insulin  - uncontrolled iso medication noncompliance  - start WB insulin regimen: detemir 10U BID + aspart 6U TIDWM   - increased detemir to 14U and aspart to 8U  - LDSSI, ACHS accuchecks  - diabetic diet    Lab Results   Component Value Date    HGBA1C 12.3 (H) 04/06/2024         Essential hypertension  - no longer taking hydralazine  - continue amlodipine 2.5mg daily  - monitor BP closely iso infection     Stage 3 chronic kidney disease  - stable at baseline  - monitor daily labs       VTE Risk Mitigation (From admission,  onward)           Ordered     heparin (porcine) injection 5,000 Units  Every 8 hours         04/05/24 2250     IP VTE HIGH RISK PATIENT  Once         04/05/24 2250     Place sequential compression device  Until discontinued         04/05/24 2250     IP VTE HIGH RISK PATIENT  Once         04/05/24 2250                    Discharge Planning   ROCCO: 4/8/2024     Code Status: Full Code   Is the patient medically ready for discharge?: No    Reason for patient still in hospital (select all that apply): Patient trending condition, Laboratory test, Treatment, Imaging, and Consult recommendations                     Josselin Shabazz PA-C  Department of Hospital Medicine   [facility] - Emergency Dept

## 2024-04-06 NOTE — PHARMACY MED REC
"    Admission Medication History     The home medication history was taken by Kyra Yanez.    You may go to "Admission" then "Reconcile Home Medications" tabs to review and/or act upon these items.     The home medication list has been updated by the Pharmacy department.   Please read ALL comments highlighted in yellow.   Please address this information as you see fit.    Feel free to contact us if you have any questions or require assistance.      The medications listed below were removed from the home medication list. Please reorder if appropriate:  Patient reports no longer taking the following medication(s):  albuterol (PROVENTIL/VENTOLIN HFA) 90 mcg/actuation inhaler  doxycycline (VIBRAMYCIN) 100 MG Cap  hydrALAZINE (APRESOLINE) 25 MG tablet  tobramycin sulfate 0.3% (TOBREX) 0.3 % ophthalmic solution    Medications listed below were obtained from: Patient/family and [employer]- [employer]  Current Outpatient Medications on File Prior to Encounter   Medication Sig    amLODIPine (NORVASC) 2.5 MG tablet   Take 1 tablet (2.5 mg total) by mouth once daily.    insulin detemir U-100 (LEVEMIR) 100 unit/mL injection   Inject 40 Units into the skin every evening.    multivitamin (THERAGRAN) per tablet   Take 1 tablet by mouth once daily.     acetaminophen (TYLENOL) 500 MG tablet   Take 500 mg by mouth as needed (pain).    blood sugar diagnostic Strp 1 strip by Misc.(Non-Drug; Combo Route) route 2 (two) times daily with meals.      insulin aspart U-100 (NOVOLOG) 100 unit/mL (3 mL) InPn pen   Inject 6 Units into the skin 3 (three) times daily. (Patient not taking: Reported on 4/6/2024)    lancets 33 gauge Misc 1 each by Misc.(Non-Drug; Combo Route) route 2 (two) times daily with meals.      lancing device Misc 1 Device by Misc.(Non-Drug; Combo Route) route 2 (two) times daily with meals.      pen needle, diabetic 32 gauge x 1/4" Ndle 1 each by Misc.(Non-Drug; Combo Route) route 2 (two) times daily with meals.      " pulse oximeter (PULSE OXIMETER) device by Apply Externally route 2 (two) times a day. Use twice daily at 8 AM and 3 PM and record the value in MyChart as directed.       Kyra Yanez  EXT [id number]             .

## 2024-04-06 NOTE — NURSING
Nurses Note -- 4 Eyes      4/6/2024   4:20 PM      Skin assessed during: Admit      [] No Altered Skin Integrity Present    []Prevention Measures Documented      [x] Yes- Altered Skin Integrity Present or Discovered   [x] LDA Added if Not in Epic (Describe Wound)   [x] New Altered Skin Integrity was Present on Admit and Documented in LDA   [x] Wound Image Taken    Wound Care Consulted? Yes    Attending Nurse:  Dedrick Pineda RN/Staff Member:   Meseret

## 2024-04-06 NOTE — ASSESSMENT & PLAN NOTE
49 yo male with PMH Of T2DM, CKD, HTN, right diabetic food wound who presents with cf right foot wound. Has been followed per ID and podiatry, and was recently treated with bactrim for MRSA and proteus, and then doxycyline for anaerobes. He reports the wound recently reopened, and he stepped in a ditch about a week ago which saturated his shoe. He reports following, the wound worsened and is now red, warm, and swollen, and extending to his ankle.     Pt afebrile, HDS. Without leukocytosis. With elevated sed rate, CRP. Cr elevated to 1.6, though decreased today. H1C is 12.3%. MRI of right foot with osteo of 5th proximal phalanx.     Pt is currently on vanc and cefepime. ID was consulted for abx recs.     Recommendations:  - agree with continuing vancomycin IV (inpatient pharm to dose) and cefepime 2g IV q8hr for cf diabetic foot wound/osteo  - this will empirically cover past organisms, as well as concern for water bugs as pt was exposed to ditch water recently   - consider podiatry eval, would like bone biopsy and/or deep wound culture to guide antibiotic therapy. Will anticipate 6 weeks of antibiotic therapy for osteomyelitis.   - nutritional support, glucose control, wound care, off loading are important to assist in wound healing   - plan reviewed with ID staff. ID will follow.

## 2024-04-06 NOTE — PROGRESS NOTES
Pharmacist Renal Dose Adjustment Note    Ashutosh Ferrari is a 48 y.o. male being treated with cefepime for diabetic foot wound / osteomyelitis.      Patient Data:    Vital Signs (Most Recent):  Temp: 98.3 °F (36.8 °C) (04/06/24 1238)  Pulse: 77 (04/06/24 1412)  Resp: 18 (04/06/24 1238)  BP: 138/71 (04/06/24 1238)  SpO2: 97 % (04/06/24 0700) Vital Signs (72h Range):  Temp:  [97.8 °F (36.6 °C)-98.8 °F (37.1 °C)]   Pulse:  []   Resp:  [14-18]   BP: (128-171)/()   SpO2:  [97 %-100 %]      Recent Labs   Lab 04/05/24 2059 04/06/24  0443   CREATININE 1.6* 1.2     Serum creatinine:  1.2 mg/dL 04/06/24 0443  Estimated creatinine clearance:  94.4 mL/min    Cefepime 2 grams IVPB every 12 hours will be changed to cefepime 2 grams IVPB every 8 hours.    Pharmacist's Name:  Keiko Munroe  Pharmacist's Extension:  [phone]

## 2024-04-06 NOTE — HPI
Mr. Ferrari is a 49 yo male with PMH Of T2DM, CKD, HTN, right diabetic food wound who presents with cf right foot wound.  He reports the wound recently reopened, and he stepped in a ditch about a week ago which saturated his shoe. He reports following, the wound worsened and is now red, warm, and swollen, and extending to his ankle.     Pt afebrile, HDS. Without leukocytosis. With elevated sed rate, CRP. Cr elevated to 1.6, though decreased today. H1C is 12.3%. MRI of right foot with osteo of 5th proximal phalanx.     Pt is currently on vanc and cefepime. ID was consulted for abx recs.

## 2024-04-06 NOTE — SUBJECTIVE & OBJECTIVE
Past Medical History:   Diagnosis Date    Diabetes mellitus     Diabetes mellitus, type 2     Essential hypertension, benign 07/18/2013    Herpes        Past Surgical History:   Procedure Laterality Date    DEBRIDEMENT OF FOOT Right 4/1/2022    Procedure: DEBRIDEMENT, FOOT;  Surgeon: Amena Martinez DPM;  Location: [facility] OR;  Service: Podiatry;  Laterality: Right;    DEBRIDEMENT OF FOOT Right 4/4/2022    Procedure: DEBRIDEMENT, FOOT;  Surgeon: Amena Martinez DPM;  Location: [facility] OR;  Service: Podiatry;  Laterality: Right;    INCISION AND DRAINAGE Right 3/28/2022    Procedure: INCISION AND DRAINAGE RIGHT FOOT;  Surgeon: Amena Martinez DPM;  Location: [facility] OR;  Service: Podiatry;  Laterality: Right;       Review of patient's allergies indicates:  No Known Allergies    No current facility-administered medications on file prior to encounter.     Current Outpatient Medications on File Prior to Encounter   Medication Sig    albuterol (PROVENTIL/VENTOLIN HFA) 90 mcg/actuation inhaler Inhale 2 puffs into the lungs 2 (two) times daily. Rescue    amLODIPine (NORVASC) 2.5 MG tablet Take 1 tablet (2.5 mg total) by mouth once daily.    blood sugar diagnostic Strp 1 strip by Misc.(Non-Drug; Combo Route) route 2 (two) times daily with meals.    doxycycline (VIBRAMYCIN) 100 MG Cap Take 1 capsule (100 mg total) by mouth every 12 (twelve) hours.    hydrALAZINE (APRESOLINE) 25 MG tablet Take 1 tablet (25 mg total) by mouth every 8 (eight) hours.    insulin aspart U-100 (NOVOLOG) 100 unit/mL (3 mL) InPn pen Inject 6 Units into the skin 3 (three) times daily.    insulin detemir U-100 (LEVEMIR) 100 unit/mL injection Inject 40 Units into the skin every evening.    lancets 33 gauge Misc 1 each by Misc.(Non-Drug; Combo Route) route 2 (two) times daily with meals.    lancing device Misc 1 Device by Misc.(Non-Drug; Combo Route) route 2 (two) times daily with meals.    multivitamin (THERAGRAN) per tablet Take 1 tablet by mouth once daily.   "   pen needle, diabetic 32 gauge x 1/4" Ndle 1 each by Misc.(Non-Drug; Combo Route) route 2 (two) times daily with meals.    pulse oximeter (PULSE OXIMETER) device by Apply Externally route 2 (two) times a day. Use twice daily at 8 AM and 3 PM and record the value in MyChart as directed.    tobramycin sulfate 0.3% (TOBREX) 0.3 % ophthalmic solution Place 1 drop into the left eye 2 (two) times daily. Apply to wound beds twice daily     Family History       Problem Relation (Age of Onset)    Diabetes Mother          Tobacco Use    Smoking status: Never    Smokeless tobacco: Never   Substance and Sexual Activity    Alcohol use: No    Drug use: No    Sexual activity: Yes     Partners: Female     Birth control/protection: None, Condom     Review of Systems   Constitutional:  Negative for activity change, chills and fever.   HENT:  Negative for trouble swallowing.    Eyes:  Negative for photophobia and visual disturbance.   Respiratory:  Negative for chest tightness, shortness of breath and wheezing.    Cardiovascular:  Negative for chest pain, palpitations and leg swelling.   Gastrointestinal:  Negative for abdominal pain, constipation, diarrhea, nausea and vomiting.   Genitourinary:  Negative for dysuria, frequency, hematuria and urgency.   Musculoskeletal:  Negative for arthralgias, back pain and gait problem.   Skin:  Positive for color change and wound. Negative for rash.   Neurological:  Negative for dizziness, syncope, weakness, light-headedness, numbness and headaches.   Psychiatric/Behavioral:  Negative for agitation and confusion. The patient is not nervous/anxious.      Objective:     Vital Signs (Most Recent):  Temp: 98.5 °F (36.9 °C) (04/05/24 1951)  Pulse: 102 (04/05/24 1951)  Resp: 14 (04/05/24 1951)  BP: (!) 128/98 (04/05/24 1951)  SpO2: 99 % (04/05/24 1951) Vital Signs (24h Range):  Temp:  [98.5 °F (36.9 °C)] 98.5 °F (36.9 °C)  Pulse:  [102] 102  Resp:  [14] 14  SpO2:  [99 %] 99 %  BP: (128)/(98) 128/98 "     Weight: 88.5 kg (195 lb)  Body mass index is 23.74 kg/m².     Physical Exam  Vitals and nursing note reviewed.   Constitutional:       General: He is not in acute distress.     Appearance: He is well-developed.   HENT:      Head: Normocephalic and atraumatic.      Mouth/Throat:      Pharynx: No oropharyngeal exudate.   Eyes:      General: No scleral icterus.     Conjunctiva/sclera: Conjunctivae normal.   Cardiovascular:      Rate and Rhythm: Normal rate and regular rhythm.      Heart sounds: Normal heart sounds.   Pulmonary:      Effort: Pulmonary effort is normal. No respiratory distress.      Breath sounds: Normal breath sounds. No wheezing.   Abdominal:      General: Bowel sounds are normal. There is no distension.      Palpations: Abdomen is soft.      Tenderness: There is no abdominal tenderness.   Musculoskeletal:         General: Swelling (slight swelling of R foot and ankle) present. No tenderness. Normal range of motion.      Cervical back: Normal range of motion.   Skin:     General: Skin is warm and dry.      Capillary Refill: Capillary refill takes less than 2 seconds.      Findings: Erythema present. No rash.      Comments: Wound noted to lateral aspect of R foot with drainage. Surrounding warmth, erythema & swelling. See media.    Neurological:      Mental Status: He is alert and oriented to person, place, and time.      Cranial Nerves: No cranial nerve deficit.      Sensory: No sensory deficit.      Coordination: Coordination normal.   Psychiatric:         Behavior: Behavior normal.         Thought Content: Thought content normal.         Judgment: Judgment normal.                Significant Labs: All pertinent labs within the past 24 hours have been reviewed.  BMP:   Recent Labs   Lab 04/05/24 2059   *   *   K 3.9   CL 98   CO2 24   BUN 16   CREATININE 1.6*   CALCIUM 9.9     CBC:   Recent Labs   Lab 04/05/24  2100   WBC 6.41   HGB 12.4*   HCT 36.8*          Significant  Imaging: I have reviewed all pertinent imaging results/findings within the past 24 hours.

## 2024-04-06 NOTE — ED NOTES
Assumed care of patient. Patient is alert and resting comfortably in bed in NAD. Patient changed into hospital gown and placed on cardiac monitor. Patient updated on plan of care, pt denies any needs or complaints at this time. Bed locked in lowest position, side rails up x2, call light within reach. VSS. Will continue to monitor.

## 2024-04-06 NOTE — ED NOTES
During specimen collection pt informed me that he was in pain. When asked what his pain level was currently on a 0-10 rating scale pt stated that his pain was currently at a 9. Pt has pain medication order for mild pain at a level of 1-3 on the pain scale. I informed the pt that I would need to reach out to the team to requested medication for his pain according to his level. MD notified.

## 2024-04-06 NOTE — PROGRESS NOTES
"Pharmacokinetic Initial Assessment: IV Vancomycin    Assessment/Plan:    Initiate intravenous vancomycin with loading dose of 1750 mg once, done in ED, with subsequent doses when random concentrations are less than 20 mcg/mL.  Desired empiric serum trough concentration is 10 to 20 mcg/mL.  Draw vancomycin random level on 04/06/2024 at 1030.  Pharmacy will continue to follow and monitor vancomycin.      Please contact pharmacy at extension [phone] with any questions regarding this assessment.     Thank you for the consult,   Keiko Munroe       Patient brief summary:  Ashutosh Ferrari is a 48 y.o. male initiated on antimicrobial therapy with IV Vancomycin for treatment of suspected skin & soft tissue infection.    Drug Allergies:   Review of patient's allergies indicates:  No Known Allergies    Actual Body Weight:   88.5 kg    Renal Function:   Estimated Creatinine Clearance: 70.8 mL/min (A) (based on SCr of 1.6 mg/dL (H)).    CBC (last 72 hours):  Recent Labs   Lab Result Units 04/05/24  2100   WBC K/uL 6.41   Hemoglobin g/dL 12.4*   Hematocrit % 36.8*   Platelets K/uL 268   Gran % % 56.5   Lymph % % 34.0   Mono % % 7.6   Eosinophil % % 1.2   Basophil % % 0.5   Differential Method  Automated       Metabolic Panel (last 72 hours):  Recent Labs   Lab Result Units 04/05/24  2059   Sodium mmol/L 132*   Potassium mmol/L 3.9   Chloride mmol/L 98   CO2 mmol/L 24   Glucose mg/dL 419*   BUN mg/dL 16   Creatinine mg/dL 1.6*   Albumin g/dL 3.4*   Total Bilirubin mg/dL 0.4   Alkaline Phosphatase U/L 121   AST U/L 13   ALT U/L 10       Drug levels (last 3 results):  No results for input(s): "VANCOMYCINRA", "VANCORANDOM", "VANCOMYCINPE", "VANCOPEAK", "VANCOMYCINTR", "VANCOTROUGH" in the last 72 hours.    Microbiologic Results:  Microbiology Results (last 7 days)       Procedure Component Value Units Date/Time    Blood culture #2 **CANNOT BE ORDERED STAT** [[id number]] Collected: 04/05/24 2100    Order Status: Sent Specimen: Blood " from Peripheral, Forearm, Left Updated: 04/05/24 2112    Blood culture #1 **CANNOT BE ORDERED STAT** [[id number]] Collected: 04/05/24 2100    Order Status: Sent Specimen: Blood from Peripheral, Hand, Left Updated: 04/05/24 2112    Culture, Anaerobe [[id number]] Collected: 04/05/24 2101    Order Status: Sent Specimen: Wound from Foot, Right Updated: 04/05/24 2111    Aerobic culture [[id number]] Collected: 04/05/24 2101    Order Status: Sent Specimen: Wound from Foot, Right Updated: 04/05/24 2111

## 2024-04-06 NOTE — SUBJECTIVE & OBJECTIVE
Past Medical History:   Diagnosis Date    Diabetes mellitus     Diabetes mellitus, type 2     Essential hypertension, benign 07/18/2013    Herpes        Past Surgical History:   Procedure Laterality Date    DEBRIDEMENT OF FOOT Right 4/1/2022    Procedure: DEBRIDEMENT, FOOT;  Surgeon: Amena Martinez DPM;  Location: [facility] OR;  Service: Podiatry;  Laterality: Right;    DEBRIDEMENT OF FOOT Right 4/4/2022    Procedure: DEBRIDEMENT, FOOT;  Surgeon: Amena Martinez DPM;  Location: [facility] OR;  Service: Podiatry;  Laterality: Right;    INCISION AND DRAINAGE Right 3/28/2022    Procedure: INCISION AND DRAINAGE RIGHT FOOT;  Surgeon: Amena Martinez DPM;  Location: [facility] OR;  Service: Podiatry;  Laterality: Right;       Review of patient's allergies indicates:  No Known Allergies    Medications:  (Not in a hospital admission)    Antibiotics (From admission, onward)      Start     Stop Route Frequency Ordered    04/06/24 1330  vancomycin (VANCOCIN) 1250 mg in 5 % dextrose 250 mL IVPB         -- IV Every 12 hours (non-standard times) 04/06/24 1230    04/06/24 0600  ceFEPIme (MAXIPIME) 2 g in dextrose 5 % in water (D5W) 100 mL IVPB (MB+)         -- IV Every 12 hours (non-standard times) 04/05/24 2254    04/05/24 2350  vancomycin - pharmacy to dose  (vancomycin IVPB (PEDS and ADULTS))        See Hyperspace for full Linked Orders Report.    -- IV pharmacy to manage frequency 04/05/24 2250          Antifungals (From admission, onward)      None          Antivirals (From admission, onward)      None             Immunization History   Administered Date(s) Administered    COVID-19, MRNA, LN-S, PF (Pfizer) (Purple Cap) 09/21/2021    DTP 1977, 1977, 1977, 10/23/1978, 09/07/1982    MMR 05/11/1978    Poliovirus 1977, 1977, 1977, 10/23/1978, 09/07/1982       Family History       Problem Relation (Age of Onset)    Diabetes Mother          Social History     Socioeconomic History    Marital status: Single    Occupational History    Occupation: Unemployed   Tobacco Use    Smoking status: Never    Smokeless tobacco: Never   Substance and Sexual Activity    Alcohol use: No    Drug use: No    Sexual activity: Yes     Partners: Female     Birth control/protection: None, Condom     Social Determinants of Health     Financial Resource Strain: Low Risk  (4/20/2022)    Overall Financial Resource Strain (CARDIA)     Difficulty of Paying Living Expenses: Not very hard   Food Insecurity: No Food Insecurity (4/20/2022)    Hunger Vital Sign     Worried About Running Out of Food in the Last Year: Never true     Ran Out of Food in the Last Year: Never true   Transportation Needs: No Transportation Needs (4/20/2022)    PRAPARE - Transportation     Lack of Transportation (Medical): No     Lack of Transportation (Non-Medical): No   Physical Activity: Inactive (4/20/2022)    Exercise Vital Sign     Days of Exercise per Week: 0 days     Minutes of Exercise per Session: 0 min   Stress: No Stress Concern Present (4/20/2022)    [employer] - Occupational Stress Questionnaire     Feeling of Stress : Not at all   Social Connections: Socially Isolated (4/20/2022)    Social Connection and Isolation Panel [NHANES]     Frequency of Communication with Friends and Family: More than three times a week     Frequency of Social Gatherings with Friends and Family: More than three times a week     Attends [employer] Services: Never     Active Member of Clubs or Organizations: No     Attends Club or Organization Meetings: Never     Marital Status: Never    Housing Stability: Low Risk  (4/20/2022)    Housing Stability Vital Sign     Unable to Pay for Housing in the Last Year: No     Number of Places Lived in the Last Year: 1     Unstable Housing in the Last Year: No     Review of Systems   Constitutional:  Negative for chills, diaphoresis, fatigue and fever.   HENT: Negative.     Eyes: Negative.    Respiratory:  Negative  for cough and shortness of breath.    Cardiovascular:  Negative for chest pain, palpitations and leg swelling.   Gastrointestinal:  Negative for diarrhea, nausea and vomiting.   Genitourinary:  Negative for dysuria.   Musculoskeletal:  Negative for arthralgias and myalgias.   Skin:  Positive for wound.   Neurological:  Positive for headaches. Negative for dizziness.   Psychiatric/Behavioral:  Negative for agitation and confusion.      Objective:     Vital Signs (Most Recent):  Temp: 98.8 °F (37.1 °C) (04/06/24 0700)  Pulse: 84 (04/06/24 0700)  Resp: 18 (04/06/24 1139)  BP: (!) 143/74 (04/06/24 0700)  SpO2: 97 % (04/06/24 0700) Vital Signs (24h Range):  Temp:  [97.8 °F (36.6 °C)-98.8 °F (37.1 °C)] 98.8 °F (37.1 °C)  Pulse:  [] 84  Resp:  [14-18] 18  SpO2:  [97 %-100 %] 97 %  BP: (128-171)/() 143/74     Weight: 88.5 kg (195 lb)  Body mass index is 23.74 kg/m².    Estimated Creatinine Clearance: 94.4 mL/min (based on SCr of 1.2 mg/dL).     Physical Exam  Vitals reviewed.   Constitutional:       General: He is not in acute distress.     Appearance: Normal appearance. He is not ill-appearing.   HENT:      Head: Normocephalic.      Nose: Nose normal.      Mouth/Throat:      Mouth: Mucous membranes are moist.      Pharynx: No oropharyngeal exudate.   Eyes:      General:         Right eye: No discharge.         Left eye: No discharge.      Conjunctiva/sclera: Conjunctivae normal.   Pulmonary:      Effort: Pulmonary effort is normal. No respiratory distress.      Breath sounds: No stridor.   Abdominal:      General: Abdomen is flat. There is no distension.      Palpations: Abdomen is soft.   Musculoskeletal:         General: No swelling or tenderness. Normal range of motion.      Cervical back: Normal range of motion.      Right lower leg: No edema.      Left lower leg: No edema.   Skin:     Findings: Lesion present. No rash.      Comments: Wound to dorsal and plantar surfaces of right foot. No ascending redness,  swelling. No foul odor.    Neurological:      General: No focal deficit present.      Mental Status: He is alert and oriented to person, place, and time.      Motor: No weakness.      Gait: Gait normal.   Psychiatric:         Mood and Affect: Mood normal.         Behavior: Behavior normal.         Thought Content: Thought content normal.         Judgment: Judgment normal.          Significant Labs: All pertinent labs within the past 24 hours have been reviewed.    Significant Imaging: I have reviewed all pertinent imaging results/findings within the past 24 hours.

## 2024-04-06 NOTE — H&P
[facility] Emergency Dept  Hospital Medicine  History & Physical    Patient Name: Ashutosh Ferrari  MRN: [id number]  Patient Class: OP- Observation  Admission Date: 4/5/2024  Attending Physician: Wyatt Antony MD   Primary Care Provider: [facility] -         Patient information was obtained from patient, past medical records, and ER records.     Subjective:     Principal Problem:Diabetic infection of right foot    Chief Complaint:   Chief Complaint   Patient presents with    Foot Pain     Reports he had a wound on his foot, states he was taking good care of the wound and woke up with blood in his bed today         HPI: Ashutosh Ferrari is a 48 y.o. male with a PMHx of T2DM, CKD, HTN, hx R diabetic foot ulcer/infection who presents to [facility] for right foot wound. Patient had an infected wound to his right foot about 1 year ago which was treated with antibiotics and had healed until ~1 month ago. He noticied the wound reopened and began increasing in size. He stepped in ditched with his boots on about a week ago causing him to have to sit in his saturated boot for a few hours. The wound has worsened since then and now has surrounding redness, swelling and warmth extending up his foot and into his ankle. He hasn't taken his insulin in about 2 months as he's been trying to control his BG with diet modifications. Has chronic neuropathy and decreased sensation to BLEs without recent worsening. Denies fever, chills, N/V, abdominal pain, HA, vision changes or syncope.    ED: AFVSS. No leukocytosis. Na 132, , Cr 1.6 (baseline), normal AG. , . XR R foot shows soft tissue swelling and soft tissue ulcer about the 5th MTP joint. Given 1L NS, IV vanc and rocephin.     Past Medical History:   Diagnosis Date    Diabetes mellitus     Diabetes mellitus, type 2     Essential hypertension, benign 07/18/2013    Herpes        Past Surgical History:   Procedure Laterality Date    DEBRIDEMENT OF FOOT Right 4/1/2022  "   Procedure: DEBRIDEMENT, FOOT;  Surgeon: Amena Martinez DPM;  Location: [facility] OR;  Service: Podiatry;  Laterality: Right;    DEBRIDEMENT OF FOOT Right 4/4/2022    Procedure: DEBRIDEMENT, FOOT;  Surgeon: Amena Martinez DPM;  Location: [facility] OR;  Service: Podiatry;  Laterality: Right;    INCISION AND DRAINAGE Right 3/28/2022    Procedure: INCISION AND DRAINAGE RIGHT FOOT;  Surgeon: Amena Martinez DPM;  Location: [facility] OR;  Service: Podiatry;  Laterality: Right;       Review of patient's allergies indicates:  No Known Allergies    No current facility-administered medications on file prior to encounter.     Current Outpatient Medications on File Prior to Encounter   Medication Sig    albuterol (PROVENTIL/VENTOLIN HFA) 90 mcg/actuation inhaler Inhale 2 puffs into the lungs 2 (two) times daily. Rescue    amLODIPine (NORVASC) 2.5 MG tablet Take 1 tablet (2.5 mg total) by mouth once daily.    blood sugar diagnostic Strp 1 strip by Misc.(Non-Drug; Combo Route) route 2 (two) times daily with meals.    doxycycline (VIBRAMYCIN) 100 MG Cap Take 1 capsule (100 mg total) by mouth every 12 (twelve) hours.    hydrALAZINE (APRESOLINE) 25 MG tablet Take 1 tablet (25 mg total) by mouth every 8 (eight) hours.    insulin aspart U-100 (NOVOLOG) 100 unit/mL (3 mL) InPn pen Inject 6 Units into the skin 3 (three) times daily.    insulin detemir U-100 (LEVEMIR) 100 unit/mL injection Inject 40 Units into the skin every evening.    lancets 33 gauge Misc 1 each by Misc.(Non-Drug; Combo Route) route 2 (two) times daily with meals.    lancing device Misc 1 Device by Misc.(Non-Drug; Combo Route) route 2 (two) times daily with meals.    multivitamin (THERAGRAN) per tablet Take 1 tablet by mouth once daily.     pen needle, diabetic 32 gauge x 1/4" Ndle 1 each by Misc.(Non-Drug; Combo Route) route 2 (two) times daily with meals.    pulse oximeter (PULSE OXIMETER) device by Apply [facility] route 2 (two) times a day. Use twice daily at 8 AM and 3 " PM and record the value in MyChart as directed.    tobramycin sulfate 0.3% (TOBREX) 0.3 % ophthalmic solution Place 1 drop into the left eye 2 (two) times daily. Apply to wound beds twice daily     Family History       Problem Relation (Age of Onset)    Diabetes Mother          Tobacco Use    Smoking status: Never    Smokeless tobacco: Never   Substance and Sexual Activity    Alcohol use: No    Drug use: No    Sexual activity: Yes     Partners: Female     Birth control/protection: None, Condom     Review of Systems   Constitutional:  Negative for activity change, chills and fever.   HENT:  Negative for trouble swallowing.    Eyes:  Negative for photophobia and visual disturbance.   Respiratory:  Negative for chest tightness, shortness of breath and wheezing.    Cardiovascular:  Negative for chest pain, palpitations and leg swelling.   Gastrointestinal:  Negative for abdominal pain, constipation, diarrhea, nausea and vomiting.   Genitourinary:  Negative for dysuria, frequency, hematuria and urgency.   Musculoskeletal:  Negative for arthralgias, back pain and gait problem.   Skin:  Positive for color change and wound. Negative for rash.   Neurological:  Negative for dizziness, syncope, weakness, light-headedness, numbness and headaches.   Psychiatric/Behavioral:  Negative for agitation and confusion. The patient is not nervous/anxious.      Objective:     Vital Signs (Most Recent):  Temp: 98.5 °F (36.9 °C) (04/05/24 1951)  Pulse: 102 (04/05/24 1951)  Resp: 14 (04/05/24 1951)  BP: (!) 128/98 (04/05/24 1951)  SpO2: 99 % (04/05/24 1951) Vital Signs (24h Range):  Temp:  [98.5 °F (36.9 °C)] 98.5 °F (36.9 °C)  Pulse:  [102] 102  Resp:  [14] 14  SpO2:  [99 %] 99 %  BP: (128)/(98) 128/98     Weight: 88.5 kg (195 lb)  Body mass index is 23.74 kg/m².     Physical Exam  Vitals and nursing note reviewed.   Constitutional:       General: He is not in acute distress.     Appearance: He is well-developed.   HENT:      Head:  Normocephalic and atraumatic.      Mouth/Throat:      Pharynx: No oropharyngeal exudate.   Eyes:      General: No scleral icterus.     Conjunctiva/sclera: Conjunctivae normal.   Cardiovascular:      Rate and Rhythm: Normal rate and regular rhythm.      Heart sounds: Normal heart sounds.   Pulmonary:      Effort: Pulmonary effort is normal. No respiratory distress.      Breath sounds: Normal breath sounds. No wheezing.   Abdominal:      General: Bowel sounds are normal. There is no distension.      Palpations: Abdomen is soft.      Tenderness: There is no abdominal tenderness.   Musculoskeletal:         General: Swelling (slight swelling of R foot and ankle) present. No tenderness. Normal range of motion.      Cervical back: Normal range of motion.   Skin:     General: Skin is warm and dry.      Capillary Refill: Capillary refill takes less than 2 seconds.      Findings: Erythema present. No rash.      Comments: Wound noted to lateral aspect of R foot with drainage. Surrounding warmth, erythema & swelling. See media.    Neurological:      Mental Status: He is alert and oriented to person, place, and time.      Cranial Nerves: No cranial nerve deficit.      Sensory: No sensory deficit.      Coordination: Coordination normal.   Psychiatric:         Behavior: Behavior normal.         Thought Content: Thought content normal.         Judgment: Judgment normal.                Significant Labs: All pertinent labs within the past 24 hours have been reviewed.  BMP:   Recent Labs   Lab 04/05/24  2059   *   *   K 3.9   CL 98   CO2 24   BUN 16   CREATININE 1.6*   CALCIUM 9.9     CBC:   Recent Labs   Lab 04/05/24  2100   WBC 6.41   HGB 12.4*   HCT 36.8*          Significant Imaging: I have reviewed all pertinent imaging results/findings within the past 24 hours.  Assessment/Plan:     * Diabetic infection of right foot  - AF, no leukocytosis  - inflammatory markers elevated  - XR R foot with soft tissue  swelling and ulcer of 5th MTP joint.   - MRI R foot ordered  - podiatry consulted; appreciate recs  - continue IV vanc, switch to IV cefepime for pseudomonas coverage  - follow wound, blood cx  - strict glycemic control     Type 2 diabetes mellitus with hyperglycemia, with long-term current use of insulin  - uncontrolled iso medication noncompliance  - start WB insulin regimen: detemir 10U BID + aspart 6U TIDWM  - LDSSI, ACHS accuchecks  - diabetic diet  - consider endo consult in AM    Lab Results   Component Value Date    HGBA1C 12.5 (H) 03/27/2022         Essential hypertension  - no longer taking hydralazine  - continue amlodipine 2.5mg daily  - monitor BP closely iso infection     Stage 3 chronic kidney disease  - stable at baseline  - monitor daily labs       VTE Risk Mitigation (From admission, onward)           Ordered     heparin (porcine) injection 5,000 Units  Every 8 hours         04/05/24 2250     IP VTE HIGH RISK PATIENT  Once         04/05/24 2250     Place sequential compression device  Until discontinued         04/05/24 2250     IP VTE HIGH RISK PATIENT  Once         04/05/24 2250                         On 04/06/2024, patient should be placed in hospital observation services under my care in collaboration with Dr. Oniel Laureano.      Carmen Walker PA-C  Department of Hospital Medicine  [facility] Emergency Dept

## 2024-04-06 NOTE — ED PROVIDER NOTES
"Encounter Date: 4/5/2024       History     Chief Complaint   Patient presents with    Foot Pain     Reports he had a wound on his foot, states he was taking good care of the wound and woke up with blood in his bed today      48-year-old male presents for a worsening wound on the lateral side of his right foot.  States that he 1st noticed a wound getting of March and has been trying to care for at home.  However, today it "popped" which prompted him to come to the ED for evaluation.  He reports associated swelling and redness of the foot.  He felt feverish few days ago.  He denies foot pain but notably he has significant neuropathy.      Review of patient's allergies indicates:  No Known Allergies  Past Medical History:   Diagnosis Date    Diabetes mellitus     Diabetes mellitus, type 2     Essential hypertension, benign 07/18/2013    Herpes      Past Surgical History:   Procedure Laterality Date    DEBRIDEMENT OF FOOT Right 4/1/2022    Procedure: DEBRIDEMENT, FOOT;  Surgeon: Amena Martinez DPM;  Location: [facility] OR;  Service: Podiatry;  Laterality: Right;    DEBRIDEMENT OF FOOT Right 4/4/2022    Procedure: DEBRIDEMENT, FOOT;  Surgeon: Amena Martinez DPM;  Location: [facility] OR;  Service: Podiatry;  Laterality: Right;    INCISION AND DRAINAGE Right 3/28/2022    Procedure: INCISION AND DRAINAGE RIGHT FOOT;  Surgeon: Amena Martinez DPM;  Location: [facility] OR;  Service: Podiatry;  Laterality: Right;     Family History   Problem Relation Age of Onset    Diabetes Mother      Social History     Tobacco Use    Smoking status: Never    Smokeless tobacco: Never   Substance Use Topics    Alcohol use: No    Drug use: No     Review of Systems    Physical Exam     Initial Vitals [04/05/24 1951]   BP Pulse Resp Temp SpO2   (!) 128/98 102 14 98.5 °F (36.9 °C) 99 %      MAP       --         Physical Exam    Nursing note and vitals reviewed.  Constitutional: He appears well-developed and well-nourished. He is not diaphoretic. No " distress.   HENT:   Head: Normocephalic and atraumatic.   Cardiovascular:  Normal rate, regular rhythm, normal heart sounds and intact distal pulses.     Exam reveals no gallop and no friction rub.       No murmur heard.  Pulmonary/Chest: Breath sounds normal. No respiratory distress. He has no wheezes. He has no rhonchi. He has no rales. He exhibits no tenderness.   Musculoskeletal:         General: Normal range of motion.     Neurological: He is alert and oriented to person, place, and time.   Skin: Skin is warm and dry. There is erythema.   Foot is swollen, erythematous, warm to the touch.  There is an open wound on the lateral side of the foot with tissue exposed.  Please see photo below.   Psychiatric: He has a normal mood and affect.         ED Course   Procedures  Labs Reviewed   CBC W/ AUTO DIFFERENTIAL - Abnormal; Notable for the following components:       Result Value    RBC 4.18 (*)     Hemoglobin 12.4 (*)     Hematocrit 36.8 (*)     All other components within normal limits   COMPREHENSIVE METABOLIC PANEL - Abnormal; Notable for the following components:    Sodium 132 (*)     Glucose 419 (*)     Creatinine 1.6 (*)     Albumin 3.4 (*)     eGFR 53.5 (*)     All other components within normal limits   C-REACTIVE PROTEIN - Abnormal; Notable for the following components:    .6 (*)     All other components within normal limits   SEDIMENTATION RATE - Abnormal; Notable for the following components:    Sed Rate 105 (*)     All other components within normal limits   CULTURE, ANAEROBIC   CULTURE, AEROBIC  (SPECIFY SOURCE)   CULTURE, BLOOD   CULTURE, BLOOD   HIV 1 / 2 ANTIBODY    Narrative:     Release to patient->Immediate   HEPATITIS C ANTIBODY    Narrative:     Release to patient->Immediate          Imaging Results    None          Medications   vancomycin 1.75 g in 5 % dextrose 500 mL IVPB (has no administration in time range)   cefTRIAXone (Rocephin) 1 g in dextrose 5 % in water (D5W) 100 mL IVPB (MB+)  (1 g Intravenous New Bag 4/5/24 2209)   sodium chloride 0.9% bolus 1,000 mL 1,000 mL (1,000 mLs Intravenous New Bag 4/5/24 2208)     Medical Decision Making  48-year-old male presenting for a foot wound.  /98, vitals otherwise normal.  Nontoxic appearing.    Differential diagnosis:  Cellulitis  Osteomyelitis   Hyperglycemia   DKA   Not overtly septic    Will check labs, x-ray and reassess.    Workup is notable for significantly elevated inflammatory markers, hyperglycemia.  He will be admitted to Hospital Medicine for further management.  Patient comfortable with admission.    Amount and/or Complexity of Data Reviewed  Labs: ordered. Decision-making details documented in ED Course.  Radiology: ordered.               ED Course as of 04/05/24 2222 Fri Apr 05, 2024 2118 WBC: 6.41 [CC]   2130 Sed Rate(!): 105 [CC]   2142 Glucose(!): 419 [CC]   2152 CRP(!): 154.6 [CC]      ED Course User Index  [CC] Danita Woodward PA-C                           Clinical Impression:  Final diagnoses:  [E11.621, L97.509] Diabetic foot ulcer (Primary)  [L03.119] Cellulitis of foot          ED Disposition Condition    Observation Stable                Danita Woodward PA-C  04/05/24 2222

## 2024-04-06 NOTE — ED TRIAGE NOTES
Ashutosh Ferrari, a 48 y.o. male presents to the ED w/ complaint of right foot wound x 2. Patient reports that about a year ago he was hospitalized for right foot diabetic wound and was treated in outpatient wound care until 7 months ago. He reports his wound healed.    Patient reports he fell in a ditch last week and thinks he developed a new wound. He has neuropathy and didn't realize until today when his foot bled through his sheets. Zabrina has a wound to the lateral 5th metatarsal and the sole of his foot. + Malodor, purlence from both. No active bleeding.          Denies fever, pain, ankle symptoms. Denies any other complaints.    Triage note:  Chief Complaint   Patient presents with    Foot Pain     Reports he had a wound on his foot, states he was taking good care of the wound and woke up with blood in his bed today      Review of patient's allergies indicates:  No Known Allergies  Past Medical History:   Diagnosis Date    Diabetes mellitus     Diabetes mellitus, type 2     Essential hypertension, benign 07/18/2013    Herpes            General: Awake and alert. Dress appropriate for weather. Wearing bluetooth headset. No distress.   Neck: Supple  Respiratory: Nonlabored respirations. No audible wheeze. Speaking in full sentences.  Cardiac: Well-perfused. No acrocyanosis.  Abdomen: Supple  MSK: No pain x 4 extremities. Walks with steady gait  SKIN: Generally warm, dry, intacy- however right foot with 2 wounds. Both about 1in in diameter, central tan purulence and wetness. Right lateral foot with erythema and slight warmth compared to right distal leg. No associated pain or bleeding at this time.  Neurological: Moves all extremities symmetrically and equally. Answers questions appropriately. Reports hx neuropathy. A&O

## 2024-04-06 NOTE — ASSESSMENT & PLAN NOTE
Diabetic infection of right foot   - AF, no leukocytosis  - inflammatory markers elevated  - XR R foot with soft tissue swelling and ulcer of 5th MTP joint.   - MRI R foot with findings concerning for osteo   - ID consulted  - podiatry consulted; appreciate recs  - continue IV vanc, add IV cefepime for pseudomonas coverage  - follow wound, blood cx  - strict glycemic control

## 2024-04-06 NOTE — PLAN OF CARE
Problem: Adult Inpatient Plan of Care  Goal: Plan of Care Review  Outcome: Ongoing, Progressing  Goal: Patient-Specific Goal (Individualized)  Outcome: Ongoing, Progressing  Goal: Absence of Hospital-Acquired Illness or Injury  Outcome: Ongoing, Progressing  Goal: Optimal Comfort and Wellbeing  Outcome: Ongoing, Progressing  Goal: Readiness for Transition of Care  Outcome: Ongoing, Progressing     Problem: Infection  Goal: Absence of Infection Signs and Symptoms  Outcome: Ongoing, Progressing     Problem: Diabetes Comorbidity  Goal: Blood Glucose Level Within Targeted Range  Outcome: Ongoing, Progressing     Problem: Fluid and Electrolyte Imbalance (Acute Kidney Injury/Impairment)  Goal: Fluid and Electrolyte Balance  Outcome: Ongoing, Progressing     Problem: Oral Intake Inadequate (Acute Kidney Injury/Impairment)  Goal: Optimal Nutrition Intake  Outcome: Ongoing, Progressing     Problem: Renal Function Impairment (Acute Kidney Injury/Impairment)  Goal: Effective Renal Function  Outcome: Ongoing, Progressing     Problem: Impaired Wound Healing  Goal: Optimal Wound Healing  Outcome: Ongoing, Progressing

## 2024-04-06 NOTE — ED NOTES
Telemetry Verification   Patient placed on Telemetry Box  Verified with War Room  Box # [id number]   Monitor Tech Will   Rate 75   Rhythm Normal Sinus

## 2024-04-06 NOTE — HOSPITAL COURSE
Ashutosh Ferrari is a 48 y.o. male who was admitted to hospital medicine for osteomyelitis of R 5th toe. MRI R foot with findings concerning for osteomyelitis of the 5th proximal phalanx, mildly progressed compared to prior exam. Persistent reactive osteitis involving the head of the 5th metatarsal. Ulceration along the dorsal lateral aspect of the 5th MTP joint. No drainable fluid collection. Started on vancomycin and cefepime. ID and podiatry consulted. Podiatry recommending amputation but patient not amenable at this time. Will continue treatment with IV abx. OPAT given and cleared for d/c per podiatry. Pt was seen and evaluated by me this morning, reports feeling well, and is eager to discharge home. All questions were answered. Patient acknowledged understanding of discharge instructions and feels safe to discharge home. Patient was discharged on 4/9/2024 in stable condition with podiatry and ID follow-up. Education regarding condition provided and return precautions given.     Physical Exam  Gen: in NAD, appears stated age  Neuro: AAOx3, motor, sensory, and strength grossly intact BL  CVS: RRR, no m/r/g  Resp: lungs CTAB, no w/r/r; no belabored breathing or accessory muscle use appreciated   Abd: NTND, soft to palpation  Extrem: no UE or LE edema BL;

## 2024-04-06 NOTE — ASSESSMENT & PLAN NOTE
- no longer taking hydralazine  - continue amlodipine 2.5mg daily  - monitor BP closely iso infection

## 2024-04-06 NOTE — CONSULTS
Pharmacokinetic Assessment Follow Up: IV Vancomycin    Vancomycin serum concentration assessment(s):    The random level was drawn correctly and can be used to guide therapy at this time. The measurement is below the desired definitive target range of 10 to 15 mcg/mL.    Vancomycin Regimen Plan:    Change regimen to Vancomycin 1250 mg IV every 12 hours with next serum trough concentration measured at 1230 prior to third dose on 4/7    Drug levels (last 3 results):  Recent Labs   Lab Result Units 04/06/24  1057   Vancomycin, Random ug/mL 8.5       Pharmacy will continue to follow and monitor vancomycin.    Please contact pharmacy at extension for questions regarding this assessment.    Thank you for the consult,   Frank Ozuna       Patient brief summary:  Ashutosh Ferrari is a 48 y.o. male initiated on antimicrobial therapy with IV Vancomycin for treatment of skin & soft tissue infection    The patient's current regimen is N/A    Drug Allergies:   Review of patient's allergies indicates:  No Known Allergies    Actual Body Weight:   88.5 kg    Renal Function:   Estimated Creatinine Clearance: 94.4 mL/min (based on SCr of 1.2 mg/dL).,     Dialysis Method (if applicable):  N/A    CBC (last 72 hours):  Recent Labs   Lab Result Units 04/05/24  2100 04/06/24  0443   WBC K/uL 6.41 5.46   Hemoglobin g/dL 12.4* 10.7*   Hemoglobin A1C %  --  12.3*   Hematocrit % 36.8* 31.9*   Platelets K/uL 268 225   Gran % % 56.5 47.1   Lymph % % 34.0 41.9   Mono % % 7.6 8.4   Eosinophil % % 1.2 1.8   Basophil % % 0.5 0.4   Differential Method  Automated Automated       Metabolic Panel (last 72 hours):  Recent Labs   Lab Result Units 04/05/24  2059 04/06/24  0443   Sodium mmol/L 132* 133*   Potassium mmol/L 3.9 3.4*   Chloride mmol/L 98 103   CO2 mmol/L 24 24   Glucose mg/dL 419* 331*   BUN mg/dL 16 13   Creatinine mg/dL 1.6* 1.2   Albumin g/dL 3.4*  --    Total Bilirubin mg/dL 0.4  --    Alkaline Phosphatase U/L 121  --    AST U/L 13  --     ALT U/L 10  --    Magnesium mg/dL  --  1.7   Phosphorus mg/dL  --  3.5       Vancomycin Administrations:  vancomycin given in the last 96 hours                     vancomycin 1.75 g in 5 % dextrose 500 mL IVPB (mg) 1,750 mg New Bag 04/05/24 2228                    Microbiologic Results:  Microbiology Results (last 7 days)       Procedure Component Value Units Date/Time    Blood culture #1 **CANNOT BE ORDERED STAT** [[id number]] Collected: 04/05/24 2100    Order Status: Completed Specimen: Blood from Peripheral, Hand, Left Updated: 04/06/24 0515     Blood Culture, Routine No Growth to date    Blood culture #2 **CANNOT BE ORDERED STAT** [[id number]] Collected: 04/05/24 2100    Order Status: Completed Specimen: Blood from Peripheral, Forearm, Left Updated: 04/06/24 0515     Blood Culture, Routine No Growth to date    Culture, Anaerobe [[id number]] Collected: 04/05/24 2101    Order Status: Sent Specimen: Wound from Foot, Right Updated: 04/05/24 2111    Aerobic culture [[id number]] Collected: 04/05/24 2101    Order Status: Sent Specimen: Wound from Foot, Right Updated: 04/05/24 2111

## 2024-04-06 NOTE — ASSESSMENT & PLAN NOTE
- uncontrolled iso medication noncompliance  - start WB insulin regimen: detemir 10U BID + aspart 6U TIDWM  - PRANAV, ACHS accuchecks  - diabetic diet  - consider endo consult in AM    Lab Results   Component Value Date    HGBA1C 12.5 (H) 03/27/2022

## 2024-04-07 LAB
ANION GAP SERPL CALC-SCNC: 9 MMOL/L (ref 8–16)
BASOPHILS # BLD AUTO: 0.02 K/UL (ref 0–0.2)
BASOPHILS NFR BLD: 0.3 % (ref 0–1.9)
BUN SERPL-MCNC: 11 MG/DL (ref 6–20)
CALCIUM SERPL-MCNC: 9.3 MG/DL (ref 8.7–10.5)
CHLORIDE SERPL-SCNC: 99 MMOL/L (ref 95–110)
CO2 SERPL-SCNC: 23 MMOL/L (ref 23–29)
CREAT SERPL-MCNC: 1.2 MG/DL (ref 0.5–1.4)
DIFFERENTIAL METHOD BLD: ABNORMAL
EOSINOPHIL # BLD AUTO: 0 K/UL (ref 0–0.5)
EOSINOPHIL NFR BLD: 0.4 % (ref 0–8)
ERYTHROCYTE [DISTWIDTH] IN BLOOD BY AUTOMATED COUNT: 11.3 % (ref 11.5–14.5)
EST. GFR  (NO RACE VARIABLE): >60 ML/MIN/1.73 M^2
GLUCOSE SERPL-MCNC: 258 MG/DL (ref 70–110)
HCT VFR BLD AUTO: 33.9 % (ref 40–54)
HGB BLD-MCNC: 11.6 G/DL (ref 14–18)
IMM GRANULOCYTES # BLD AUTO: 0.02 K/UL (ref 0–0.04)
IMM GRANULOCYTES NFR BLD AUTO: 0.3 % (ref 0–0.5)
LYMPHOCYTES # BLD AUTO: 2.3 K/UL (ref 1–4.8)
LYMPHOCYTES NFR BLD: 33.1 % (ref 18–48)
MAGNESIUM SERPL-MCNC: 1.6 MG/DL (ref 1.6–2.6)
MCH RBC QN AUTO: 29.5 PG (ref 27–31)
MCHC RBC AUTO-ENTMCNC: 34.2 G/DL (ref 32–36)
MCV RBC AUTO: 86 FL (ref 82–98)
MONOCYTES # BLD AUTO: 0.4 K/UL (ref 0.3–1)
MONOCYTES NFR BLD: 5.2 % (ref 4–15)
NEUTROPHILS # BLD AUTO: 4.2 K/UL (ref 1.8–7.7)
NEUTROPHILS NFR BLD: 60.7 % (ref 38–73)
NRBC BLD-RTO: 0 /100 WBC
PHOSPHATE SERPL-MCNC: 4 MG/DL (ref 2.7–4.5)
PLATELET # BLD AUTO: 275 K/UL (ref 150–450)
PMV BLD AUTO: 9.7 FL (ref 9.2–12.9)
POCT GLUCOSE: 124 MG/DL (ref 70–110)
POCT GLUCOSE: 218 MG/DL (ref 70–110)
POCT GLUCOSE: 233 MG/DL (ref 70–110)
POCT GLUCOSE: 261 MG/DL (ref 70–110)
POCT GLUCOSE: 96 MG/DL (ref 70–110)
POTASSIUM SERPL-SCNC: 3.6 MMOL/L (ref 3.5–5.1)
RBC # BLD AUTO: 3.93 M/UL (ref 4.6–6.2)
SODIUM SERPL-SCNC: 131 MMOL/L (ref 136–145)
VANCOMYCIN TROUGH SERPL-MCNC: 11.8 UG/ML (ref 10–22)
WBC # BLD AUTO: 6.95 K/UL (ref 3.9–12.7)

## 2024-04-07 PROCEDURE — 96372 THER/PROPH/DIAG INJ SC/IM: CPT | Performed by: PHYSICIAN ASSISTANT

## 2024-04-07 PROCEDURE — 80048 BASIC METABOLIC PNL TOTAL CA: CPT | Performed by: PHYSICIAN ASSISTANT

## 2024-04-07 PROCEDURE — 25000003 PHARM REV CODE 250

## 2024-04-07 PROCEDURE — 85025 COMPLETE CBC W/AUTO DIFF WBC: CPT | Performed by: PHYSICIAN ASSISTANT

## 2024-04-07 PROCEDURE — 36415 COLL VENOUS BLD VENIPUNCTURE: CPT | Performed by: PHYSICIAN ASSISTANT

## 2024-04-07 PROCEDURE — 36415 COLL VENOUS BLD VENIPUNCTURE: CPT | Performed by: INTERNAL MEDICINE

## 2024-04-07 PROCEDURE — 63600175 PHARM REV CODE 636 W HCPCS: Performed by: PHYSICIAN ASSISTANT

## 2024-04-07 PROCEDURE — G0378 HOSPITAL OBSERVATION PER HR: HCPCS

## 2024-04-07 PROCEDURE — 25000003 PHARM REV CODE 250: Performed by: INTERNAL MEDICINE

## 2024-04-07 PROCEDURE — 96368 THER/DIAG CONCURRENT INF: CPT

## 2024-04-07 PROCEDURE — 96366 THER/PROPH/DIAG IV INF ADDON: CPT

## 2024-04-07 PROCEDURE — 80202 ASSAY OF VANCOMYCIN: CPT | Performed by: INTERNAL MEDICINE

## 2024-04-07 PROCEDURE — 96372 THER/PROPH/DIAG INJ SC/IM: CPT

## 2024-04-07 PROCEDURE — 83735 ASSAY OF MAGNESIUM: CPT | Performed by: PHYSICIAN ASSISTANT

## 2024-04-07 PROCEDURE — 63600175 PHARM REV CODE 636 W HCPCS: Performed by: INTERNAL MEDICINE

## 2024-04-07 PROCEDURE — 84100 ASSAY OF PHOSPHORUS: CPT | Performed by: PHYSICIAN ASSISTANT

## 2024-04-07 RX ADMIN — CEFEPIME 2 G: 2 INJECTION, POWDER, FOR SOLUTION INTRAVENOUS at 11:04

## 2024-04-07 RX ADMIN — HEPARIN SODIUM 5000 UNITS: 5000 INJECTION INTRAVENOUS; SUBCUTANEOUS at 01:04

## 2024-04-07 RX ADMIN — INSULIN ASPART 8 UNITS: 100 INJECTION, SOLUTION INTRAVENOUS; SUBCUTANEOUS at 09:04

## 2024-04-07 RX ADMIN — INSULIN DETEMIR 16 UNITS: 100 INJECTION, SOLUTION SUBCUTANEOUS at 09:04

## 2024-04-07 RX ADMIN — METHOCARBAMOL 500 MG: 500 TABLET ORAL at 09:04

## 2024-04-07 RX ADMIN — INSULIN ASPART 2 UNITS: 100 INJECTION, SOLUTION INTRAVENOUS; SUBCUTANEOUS at 09:04

## 2024-04-07 RX ADMIN — METHOCARBAMOL 500 MG: 500 TABLET ORAL at 03:04

## 2024-04-07 RX ADMIN — INSULIN ASPART 8 UNITS: 100 INJECTION, SOLUTION INTRAVENOUS; SUBCUTANEOUS at 06:04

## 2024-04-07 RX ADMIN — INSULIN DETEMIR 10 UNITS: 100 INJECTION, SOLUTION SUBCUTANEOUS at 09:04

## 2024-04-07 RX ADMIN — VANCOMYCIN HYDROCHLORIDE 1250 MG: 1.25 INJECTION, POWDER, LYOPHILIZED, FOR SOLUTION INTRAVENOUS at 03:04

## 2024-04-07 RX ADMIN — CEFEPIME 2 G: 2 INJECTION, POWDER, FOR SOLUTION INTRAVENOUS at 03:04

## 2024-04-07 RX ADMIN — INSULIN ASPART 3 UNITS: 100 INJECTION, SOLUTION INTRAVENOUS; SUBCUTANEOUS at 06:04

## 2024-04-07 RX ADMIN — INSULIN ASPART 2 UNITS: 100 INJECTION, SOLUTION INTRAVENOUS; SUBCUTANEOUS at 01:04

## 2024-04-07 RX ADMIN — VANCOMYCIN HYDROCHLORIDE 1250 MG: 1.25 INJECTION, POWDER, LYOPHILIZED, FOR SOLUTION INTRAVENOUS at 01:04

## 2024-04-07 RX ADMIN — CEFEPIME 2 G: 2 INJECTION, POWDER, FOR SOLUTION INTRAVENOUS at 06:04

## 2024-04-07 RX ADMIN — INSULIN ASPART 8 UNITS: 100 INJECTION, SOLUTION INTRAVENOUS; SUBCUTANEOUS at 01:04

## 2024-04-07 NOTE — PROGRESS NOTES
Pharmacokinetic Assessment Follow Up: IV Vancomycin    Vancomycin serum concentration assessment(s):    The trough level was drawn correctly and can be used to guide therapy at this time. The measurement is within the desired definitive target range of 10 to 15 mcg/mL.    Vancomycin Regimen Plan:  - patients renal function appears to have returned to baseline  - will get another level to confirm the patient is stable of the current dose  - please get a level earlier if there is any changes in renal function     Continue regimen to Vancomycin 1250 mg IV every 12 hours with next serum trough concentration measured at 0030 prior to 8th dose on 04/09    Drug levels (last 3 results):  Recent Labs   Lab Result Units 04/06/24  1057 04/07/24  1254   Vancomycin, Random ug/mL 8.5  --    Vancomycin-Trough ug/mL  --  11.8       Pharmacy will continue to follow and monitor vancomycin.    Please contact pharmacy at extension [phone] for questions regarding this assessment.    Thank you for the consult,   Rosio Montelongo       Patient brief summary:  Ashutosh Ferrari is a 49 y.o. male initiated on antimicrobial therapy with IV Vancomycin for treatment of skin & soft tissue infection    The patient's current regimen is 1250 mg q12h    Drug Allergies:   Review of patient's allergies indicates:  No Known Allergies    Actual Body Weight:   88.5 kg    Renal Function:   Estimated Creatinine Clearance: 93.4 mL/min (based on SCr of 1.2 mg/dL).,     Dialysis Method (if applicable):  N/A

## 2024-04-07 NOTE — SUBJECTIVE & OBJECTIVE
Scheduled Meds:   ceFEPime IV (PEDS and ADULTS)  2 g Intravenous Q8H    heparin (porcine)  5,000 Units Subcutaneous Q8H    insulin aspart U-100  8 Units Subcutaneous TIDWM    insulin detemir U-100  14 Units Subcutaneous BID    methocarbamoL  500 mg Oral TID    vancomycin (VANCOCIN) IV (PEDS and ADULTS)  1,250 mg Intravenous Q12H     Continuous Infusions:  PRN Meds:acetaminophen, albuterol-ipratropium, bisacodyL, dextrose 10%, dextrose 10%, glucagon (human recombinant), glucose, glucose, insulin aspart U-100, melatonin, ondansetron, oxyCODONE, polyethylene glycol, prochlorperazine, sodium chloride 0.9%, Pharmacy to dose Vancomycin consult **AND** vancomycin - pharmacy to dose    Review of patient's allergies indicates:  No Known Allergies     Past Medical History:   Diagnosis Date    Diabetes mellitus     Diabetes mellitus, type 2     Essential hypertension, benign 07/18/2013    Herpes      Past Surgical History:   Procedure Laterality Date    DEBRIDEMENT OF FOOT Right 4/1/2022    Procedure: DEBRIDEMENT, FOOT;  Surgeon: Amena Martinez DPM;  Location: [facility] OR;  Service: Podiatry;  Laterality: Right;    DEBRIDEMENT OF FOOT Right 4/4/2022    Procedure: DEBRIDEMENT, FOOT;  Surgeon: Amena Martinez DPM;  Location: [facility] OR;  Service: Podiatry;  Laterality: Right;    INCISION AND DRAINAGE Right 3/28/2022    Procedure: INCISION AND DRAINAGE RIGHT FOOT;  Surgeon: Amena Martinez DPM;  Location: [facility] OR;  Service: Podiatry;  Laterality: Right;       Family History       Problem Relation (Age of Onset)    Diabetes Mother          Tobacco Use    Smoking status: Never    Smokeless tobacco: Never   Substance and Sexual Activity    Alcohol use: No    Drug use: No    Sexual activity: Yes     Partners: Female     Birth control/protection: None, Condom     Review of Systems   Constitutional:  Negative for fatigue and fever.   HENT: Negative.     Eyes: Negative.    Respiratory: Negative.     Cardiovascular: Negative.     Gastrointestinal: Negative.    Endocrine: Negative.    Genitourinary: Negative.    Skin:  Positive for color change and wound.        Reports a wound to his R foot   Allergic/Immunologic: Negative.    Hematological: Negative.    Psychiatric/Behavioral: Negative.       Objective:     Vital Signs (Most Recent):  Temp: 98.4 °F (36.9 °C) (04/06/24 1527)  Pulse: 82 (04/06/24 1527)  Resp: 18 (04/06/24 1527)  BP: 134/85 (04/06/24 1527)  SpO2: 99 % (04/06/24 1527) Vital Signs (24h Range):  Temp:  [97.8 °F (36.6 °C)-98.8 °F (37.1 °C)] 98.4 °F (36.9 °C)  Pulse:  [77-86] 82  Resp:  [14-18] 18  SpO2:  [97 %-100 %] 99 %  BP: (134-171)/() 134/85     Weight: 88.5 kg (195 lb)  Body mass index is 23.74 kg/m².    Foot Exam    Right Foot/Ankle     Inspection and Palpation  Ecchymosis: none  Tenderness: none   Swelling: fifth metatarsal base   Skin Exam: drainage, cellulitis, skin changes, abnormal color, ulcer and erythema; skin not intact     Neurovascular  Dorsalis pedis: 2+  Posterior tibial: 2+  Saphenous nerve sensation: normal  Tibial nerve sensation: normal  Superficial peroneal nerve sensation: normal  Deep peroneal nerve sensation: normal  Sural nerve sensation: normal    Comments  R foot:  Wounds noted to the dorsolateral and plantar fifth metatarsal head.  The 2 wounds do not communicate with each other.  Wound base mostly granular tissue with mild serosanguineous drainage.  Mild erythema noted around the wounds.  No tenderness to palpation.  Positive probe to bone distally towards the fifth digit from the dorsolateral wound.  No malodor noted.  No fluctuance or crepitus noted.          Laboratory:  A1C:   Recent Labs   Lab 04/06/24 0443   HGBA1C 12.3*     BMP:   Recent Labs   Lab 04/06/24 0443   *   *   K 3.4*      CO2 24   BUN 13   CREATININE 1.2   CALCIUM 9.0   MG 1.7     CBC:   Recent Labs   Lab 04/06/24 0443   WBC 5.46   RBC 3.61*   HGB 10.7*   HCT 31.9*      MCV 88   MCH 29.6    [facility] 33.5     Diagnostic Results:  I have reviewed all pertinent imaging results/findings within the past 24 hours.    Clinical Findings:

## 2024-04-07 NOTE — NURSING
2142    Pt had an episode of feeling faint after receiving Levemir shot and Heparin shot. Pt states he does not like shots.Vital signs obtained, within the normal range for patient. Head of bed lowered, feet elevated, cool towel placed on head. Significant other at bedside. Pt states starting to feel better, Informed will return to check on him in a few minutes.    2150    Pt sitting up in bed with no distress. Pt states feels ok. Visitor at bedside. Instructed to call for any distress

## 2024-04-07 NOTE — ASSESSMENT & PLAN NOTE
Diabetic infection of right foot   - AF, no leukocytosis  - inflammatory markers elevated  - XR R foot with soft tissue swelling and ulcer of 5th MTP joint.   - MRI R foot with findings concerning for osteo   - ID consulted, continuing abx and following cultures  - podiatry consulted; appreciate recs  - continue IV vanc, add IV cefepime for pseudomonas coverage  - follow wound, blood cx  - strict glycemic control

## 2024-04-07 NOTE — HPI
Ashutosh Ferrari is a 47 yo M with PMHx ff T2D, CKD, HTN, R diabetic food wound who was admitted for R foot wound.     Podiatry was consulted for R foot wound with OM. Patient has been followed by podiatry and ID, and was recently treated with bactrim for MRSA and proteus, and then doxycyline for anaerobes. He reports the wound recently reopened, and he stepped in a ditch about a week ago which saturated his shoe. He reports following, the wound worsened and is now red, warm, and swollen, and extending to his ankle.      VSS, Afebrile, WBC WNL, , , A1C 12.3. MRI shows OM of the R 5th proximal phalanx.

## 2024-04-07 NOTE — ASSESSMENT & PLAN NOTE
Assesment:  Podiatry consulted for R foot wounds. Wounds noted to dorsal lateral and plantar fifth metatarsal head with serosanguineous drainage.  Positive probe to bone.  MRI shows OM to the fifth proximal phalanx. IDSA mild foot infection.    Plan  - Physical exam and imaging findings discussed with the patient. Discussed conservative and surgical treatment and their benefits and risks in detail with the patient.  Explained to the patient that amputation is the gold standard for infection source control.  Patient continues to not be amenable to amputation and would like to continue with conservative treatment.  - With written consent bone biopsy conducted today. Pending cx  - Abx per Primary/ID  - R foot dressed with Iodosorb, 4x4 gauze, cast padding, and ACE wrap  - WBAT  - Podiatry will continue to follow

## 2024-04-07 NOTE — ASSESSMENT & PLAN NOTE
Assesment:  Podiatry consulted for R foot wounds. Wounds noted to dorsal lateral and plantar fifth metatarsal head with serosanguineous drainage.  Positive probe to bone.  MRI shows OM to the fifth proximal phalanx. IDSA mild foot infection.    Plan  - Physical exam and imaging findings discussed with the patient. Discussed conservative and surgical treatment and their benefits and risks in detail with the patient.  Explained to the patient that amputation is the gold standard for infection source control.  Patient continues to not be amenable to amputation and would like to continue with conservative treatment.  - S/p bone biopsy. Pending cx  - Abx per Primary/ID  - R foot dressed with Iodosorb, 4x4 gauze, cast padding, and ACE wrap  - Wound care orders in  - WBAT  - Podiatry will continue to follow    Discharge Recommendations  - Patient to follow up in outpatient Podiatry clinic. Podiatry will schedule  - Antibiotics per Primary/ID  - Patietn to conduct dressings daily as follows: R foot dress with Iodosorb, 4x4 gauze, cast padding, and ACE wrap daily  - Weight bearing as tolerated  - Keep dressings clean, dry, and intact until follow-up appointment

## 2024-04-07 NOTE — PROGRESS NOTES
[facility] [address] [place] [address] Medicine  Progress Note    Patient Name: Ashutosh Ferrari  MRN: [id number]  Patient Class: OP- Observation   Admission Date: 4/5/2024  Length of Stay: 0 days  Attending Physician: Wyatt Antony MD  Primary Care Provider: [facility] -        Subjective:     Principal Problem:Diabetic infection of right foot        HPI:  Ashutosh Ferrari is a 48 y.o. male with a PMHx of T2DM, CKD, HTN, hx R diabetic foot ulcer/infection who [place] [facility] [place]. Patient had an infected wound to his right foot about 1 year ago which was treated with antibiotics and had healed until ~1 month ago. He noticied the wound reopened and began increasing in size. He stepped in ditched with his boots on about a week ago causing him to have to sit in his saturated boot for a few hours. The wound has worsened since then and now has surrounding redness, swelling and warmth extending up his foot and into his ankle. He hasn't taken his insulin in about 2 months as he's been trying to control his BG with diet modifications. Has chronic neuropathy and decreased sensation to BLEs without recent worsening. Denies fever, chills, N/V, abdominal pain, HA, vision changes or syncope.    ED: AFVSS. No leukocytosis. Na 132, , Cr 1.6 (baseline), normal AG. , . XR R foot shows soft tissue swelling and soft tissue ulcer about the 5th MTP joint. Given 1L NS, IV vanc and rocephin.     Overview/Hospital Course:  Ashutosh Ferrari is a 48 y.o. male who was admitted to hospital medicine for osteomyelitis of R 5th toe. MRI R foot with findings concerning for osteomyelitis of the 5th proximal phalanx, mildly progressed compared to prior exam. Persistent reactive osteitis involving the head of the 5th metatarsal. Ulceration along the dorsal lateral aspect of the 5th MTP joint. No drainable fluid collection. Started on vancomycin and cefepime. ID and podiatry consulted. Podiatry recommending amputation  but patient not amenable at this time. Will continue treatment with IV abx.      Interval History: LUKASZ KOWALSKIS. Continuing abx, following podiatry and ID recs. Wound cultures growing step agalactiae.      Review of Systems   Constitutional:  Negative for fatigue and fever.   HENT: Negative.     Eyes: Negative.    Respiratory: Negative.     Cardiovascular: Negative.    Gastrointestinal: Negative.    Endocrine: Negative.    Genitourinary: Negative.    Skin:  Positive for color change and wound.   Allergic/Immunologic: Negative.    Hematological: Negative.    Psychiatric/Behavioral: Negative.       Objective:     Vital Signs (Most Recent):  Temp: 98.6 °F (37 °C) (04/07/24 1226)  Pulse: 92 (04/07/24 1226)  Resp: 16 (04/07/24 1226)  BP: (!) 162/96 (04/07/24 1226)  SpO2: 97 % (04/07/24 1226) Vital Signs (24h Range):  Temp:  [97.6 °F (36.4 °C)-99 °F (37.2 °C)] 98.6 °F (37 °C)  Pulse:  [] 92  Resp:  [16-20] 16  SpO2:  [97 %-100 %] 97 %  BP: (133-162)/(85-96) 162/96     Weight: 88.5 kg (195 lb)  Body mass index is 23.74 kg/m².    Intake/Output Summary (Last 24 hours) at 4/7/2024 1257  Last data filed at 4/7/2024 0548  Gross per 24 hour   Intake 1400.12 ml   Output 400 ml   Net 1000.12 ml         Physical Exam  Vitals and nursing note reviewed.   Constitutional:       Appearance: He is well-developed.   Eyes:      Pupils: Pupils are equal, round, and reactive to light.   Cardiovascular:      Rate and Rhythm: Normal rate and regular rhythm.   Pulmonary:      Effort: Pulmonary effort is normal.      Breath sounds: Normal breath sounds.   Abdominal:      Palpations: Abdomen is soft.      Tenderness: There is no abdominal tenderness.   Musculoskeletal:         General: Signs of injury present. No tenderness.   Skin:     General: Skin is warm and dry.      Findings: Erythema present.   Neurological:      Mental Status: He is alert and oriented to person, place, and time.   Psychiatric:         Behavior: Behavior normal.              Significant Labs: All pertinent labs within the past 24 hours have been reviewed.  CBC:   Recent Labs   Lab 04/05/24  2100 04/06/24  0443 04/07/24  0255   WBC 6.41 5.46 6.95   HGB 12.4* 10.7* 11.6*   HCT 36.8* 31.9* 33.9*    225 275     CMP:   Recent Labs   Lab 04/05/24  2059 04/06/24  0443 04/07/24  0255   * 133* 131*   K 3.9 3.4* 3.6   CL 98 103 99   CO2 24 24 23   * 331* 258*   BUN 16 13 11   CREATININE 1.6* 1.2 1.2   CALCIUM 9.9 9.0 9.3   PROT 8.3  --   --    ALBUMIN 3.4*  --   --    BILITOT 0.4  --   --    ALKPHOS 121  --   --    AST 13  --   --    ALT 10  --   --    ANIONGAP 10 6* 9       Significant Imaging: I have reviewed all pertinent imaging results/findings within the past 24 hours.    Assessment/Plan:      * Diabetic infection of right foot  - AF, no leukocytosis  - inflammatory markers elevated  - XR R foot with soft tissue swelling and ulcer of 5th MTP joint.   - MRI R foot    - Findings concerning for osteomyelitis of the 5th proximal phalanx, mildly progressed compared to prior exam.   - podiatry consulted; appreciate recs  - Patient continues to not be amenable to amputation and would like to continue with conservative treatment.  - With written consent bone biopsy conducted today. Pending cx  - Abx per Primary/ID  - R foot dressed with Iodosorb, 4x4 gauze, cast padding, and ACE wrap  - WBAT  - Podiatry will continue to follow  - continue IV vanc, switch to IV cefepime for pseudomonas coverage  - follow wound, blood cx  - strict glycemic control     Osteomyelitis  Diabetic infection of right foot   - AF, no leukocytosis  - inflammatory markers elevated  - XR R foot with soft tissue swelling and ulcer of 5th MTP joint.   - MRI R foot with findings concerning for osteo   - ID consulted, continuing abx and following cultures  - podiatry consulted; appreciate recs  - continue IV vanc, add IV cefepime for pseudomonas coverage  - follow wound, blood cx  - strict glycemic  control     Type 2 diabetes mellitus with hyperglycemia, with long-term current use of insulin  - uncontrolled iso medication noncompliance  - start WB insulin regimen: detemir 10U BID + aspart 6U TIDWM   - increased detemir to 16U and aspart to 8U  - [place] accuchecks  - diabetic diet    Lab Results   Component Value Date    HGBA1C 12.3 (H) 04/06/2024         Essential hypertension  - no longer taking hydralazine  - continue amlodipine 2.5mg daily  - monitor BP closely iso infection     Stage 3 chronic kidney disease  - stable at baseline  - monitor daily labs       VTE Risk Mitigation (From admission, onward)           Ordered     heparin (porcine) injection 5,000 Units  Every 8 hours         04/05/24 2250     IP VTE HIGH RISK PATIENT  Once         04/05/24 2250     Place sequential compression device  Until discontinued         04/05/24 2250     IP VTE HIGH RISK PATIENT  Once         04/05/24 2250                    Discharge Planning   ROCCO: 4/8/2024     Code Status: Full Code   Is the patient medically ready for discharge?: No    Reason for patient still in hospital (select all that apply): Patient trending condition, Laboratory test, Treatment, Imaging, and Consult recommendations                     Josselin Shabazz PA-C  Department of Hospital Medicine   [facility]

## 2024-04-07 NOTE — ASSESSMENT & PLAN NOTE
- uncontrolled iso medication noncompliance  - start WB insulin regimen: detemir 10U BID + aspart 6U TIDWM   - increased detemir to 16U and aspart to 8U  - [place] accuchecks  - diabetic diet    Lab Results   Component Value Date    HGBA1C 12.3 (H) 04/06/2024

## 2024-04-07 NOTE — PROGRESS NOTES
[facility]  Podiatry  Progress Note    Patient Name: Ashutosh Ferrari  MRN: [id number]  Admission Date: 4/5/2024  Hospital Length of Stay: 0 days  Attending Physician: Wyatt Antony MD  Primary Care Provider: [facility] -     Subjective:     Interval History: VSS, Afebrile, WBC WNL. Patient doing well. Wife also bedside. Denies pain to his R foot. Denies f/c/n/v. Denies any new pedal complaints.     Scheduled Meds:   ceFEPime IV (PEDS and ADULTS)  2 g Intravenous Q8H    heparin (porcine)  5,000 Units Subcutaneous Q8H    insulin aspart U-100  8 Units Subcutaneous TIDWM    insulin detemir U-100  16 Units Subcutaneous BID    methocarbamoL  500 mg Oral TID    vancomycin (VANCOCIN) IV (PEDS and ADULTS)  1,250 mg Intravenous Q12H     Continuous Infusions:  PRN Meds:acetaminophen, albuterol-ipratropium, bisacodyL, dextrose 10%, dextrose 10%, glucagon (human recombinant), glucose, glucose, insulin aspart U-100, melatonin, ondansetron, oxyCODONE, polyethylene glycol, prochlorperazine, sodium chloride 0.9%, Pharmacy to dose Vancomycin consult **AND** vancomycin - pharmacy to dose    Review of Systems   Constitutional:  Negative for fatigue and fever.   HENT: Negative.     Eyes: Negative.    Respiratory: Negative.     Cardiovascular: Negative.    Gastrointestinal: Negative.    Endocrine: Negative.    Genitourinary: Negative.    Skin:  Positive for color change and wound.        Reports a wound to his R foot   Allergic/Immunologic: Negative.    Hematological: Negative.    Psychiatric/Behavioral: Negative.      Objective:     Vital Signs (Most Recent):  Temp: 98.8 °F (37.1 °C) (04/07/24 1643)  Pulse: 94 (04/07/24 1643)  Resp: 16 (04/07/24 1643)  BP: (!) 156/90 (04/07/24 1643)  SpO2: 97 % (04/07/24 1643) Vital Signs (24h Range):  Temp:  [97.6 °F (36.4 °C)-99 °F (37.2 °C)] 98.8 °F (37.1 °C)  Pulse:  [] 94  Resp:  [16-20] 16  SpO2:  [97 %-100 %] 97 %  BP: (133-162)/(85-96) 156/90     Weight: 88.5 kg  (195 lb)  Body mass index is 23.74 kg/m².    Foot Exam     Right Foot/Ankle   Inspection and Palpation  Ecchymosis: none  Tenderness: none   Swelling: fifth metatarsal base   Skin Exam: drainage, cellulitis, skin changes, abnormal color, ulcer and erythema; skin not intact      Neurovascular  Dorsalis pedis: 2+  Posterior tibial: 2+  Saphenous nerve sensation: normal  Tibial nerve sensation: normal  Superficial peroneal nerve sensation: normal  Deep peroneal nerve sensation: normal  Sural nerve sensation: normal     Comments  R foot:  Wounds noted to the dorsolateral and plantar fifth metatarsal head.  The 2 wounds do not communicate with each other.  Wound base mostly granular tissue with mild serosanguineous drainage.  Mild erythema noted around the wounds.  No tenderness to palpation.  Positive probe to bone distally towards the fifth digit from the dorsolateral wound.  No malodor noted.  No fluctuance or crepitus noted.    Laboratory:  BMP:   Recent Labs   Lab 04/07/24  0255   *   *   K 3.6   CL 99   CO2 23   BUN 11   CREATININE 1.2   CALCIUM 9.3   MG 1.6     CBC:   Recent Labs   Lab 04/07/24  0255   WBC 6.95   RBC 3.93*   HGB 11.6*   HCT 33.9*      MCV 86   MCH 29.5   MCHC 34.2     Diagnostic Results:  I have reviewed all pertinent imaging results/findings within the past 24 hours.    Clinical Findings:          Assessment/Plan:     ID  Osteomyelitis  Assesment:  Podiatry consulted for R foot wounds. Wounds noted to dorsal lateral and plantar fifth metatarsal head with serosanguineous drainage.  Positive probe to bone.  MRI shows OM to the fifth proximal phalanx. IDSA mild foot infection.    Plan  - Physical exam and imaging findings discussed with the patient. Discussed conservative and surgical treatment and their benefits and risks in detail with the patient.  Explained to the patient that amputation is the gold standard for infection source control.  Patient continues to not be amenable  to amputation and would like to continue with conservative treatment.  - S/p bone biopsy. Pending cx  - Abx per Primary/ID  - R foot dressed with Iodosorb, 4x4 gauze, cast padding, and ACE wrap  - Wound care orders in  - WBAT  - Podiatry will continue to follow    Discharge Recommendations  - Patient to follow up in outpatient Podiatry clinic with Dr Martinez. Podiatry will schedule  - Antibiotics per Primary/ID  - Patietn to conduct dressings daily as follows: R foot dress with Iodosorb, 4x4 gauze, cast padding, and ACE wrap daily  - Weight bearing as tolerated  - Keep dressings clean, dry, and intact until follow-up appointment    Hiram Montez DPM  Podiatry  [facility]

## 2024-04-07 NOTE — SUBJECTIVE & OBJECTIVE
Subjective:     Interval History: VSS, Afebrile, WBC WNL. Patient doing well. Wife also bedside. Denies pain to his R foot. Denies f/c/n/v. Denies any new pedal complaints.         Scheduled Meds:   ceFEPime IV (PEDS and ADULTS)  2 g Intravenous Q8H    heparin (porcine)  5,000 Units Subcutaneous Q8H    insulin aspart U-100  8 Units Subcutaneous TIDWM    insulin detemir U-100  16 Units Subcutaneous BID    methocarbamoL  500 mg Oral TID    vancomycin (VANCOCIN) IV (PEDS and ADULTS)  1,250 mg Intravenous Q12H     Continuous Infusions:  PRN Meds:acetaminophen, albuterol-ipratropium, bisacodyL, dextrose 10%, dextrose 10%, glucagon (human recombinant), glucose, glucose, insulin aspart U-100, melatonin, ondansetron, oxyCODONE, polyethylene glycol, prochlorperazine, sodium chloride 0.9%, Pharmacy to dose Vancomycin consult **AND** vancomycin - pharmacy to dose    Review of Systems   Constitutional:  Negative for fatigue and fever.   HENT: Negative.     Eyes: Negative.    Respiratory: Negative.     Cardiovascular: Negative.    Gastrointestinal: Negative.    Endocrine: Negative.    Genitourinary: Negative.    Skin:  Positive for color change and wound.        Reports a wound to his R foot   Allergic/Immunologic: Negative.    Hematological: Negative.    Psychiatric/Behavioral: Negative.      Objective:     Vital Signs (Most Recent):  Temp: 98.8 °F (37.1 °C) (04/07/24 1643)  Pulse: 94 (04/07/24 1643)  Resp: 16 (04/07/24 1643)  BP: (!) 156/90 (04/07/24 1643)  SpO2: 97 % (04/07/24 1643) Vital Signs (24h Range):  Temp:  [97.6 °F (36.4 °C)-99 °F (37.2 °C)] 98.8 °F (37.1 °C)  Pulse:  [] 94  Resp:  [16-20] 16  SpO2:  [97 %-100 %] 97 %  BP: (133-162)/(85-96) 156/90     Weight: 88.5 kg (195 lb)  Body mass index is 23.74 kg/m².    Foot Exam     Right Foot/Ankle      Inspection and Palpation  Ecchymosis: none  Tenderness: none   Swelling: fifth metatarsal base   Skin Exam: drainage, cellulitis, skin changes, abnormal color, ulcer  and erythema; skin not intact      Neurovascular  Dorsalis pedis: 2+  Posterior tibial: 2+  Saphenous nerve sensation: normal  Tibial nerve sensation: normal  Superficial peroneal nerve sensation: normal  Deep peroneal nerve sensation: normal  Sural nerve sensation: normal     Comments  R foot:  Wounds noted to the dorsolateral and plantar fifth metatarsal head.  The 2 wounds do not communicate with each other.  Wound base mostly granular tissue with mild serosanguineous drainage.  Mild erythema noted around the wounds.  No tenderness to palpation.  Positive probe to bone distally towards the fifth digit from the dorsolateral wound.  No malodor noted.  No fluctuance or crepitus noted.    Laboratory:  BMP:   Recent Labs   Lab 04/07/24  0255   *   *   K 3.6   CL 99   CO2 23   BUN 11   CREATININE 1.2   CALCIUM 9.3   MG 1.6     CBC:   Recent Labs   Lab 04/07/24  0255   WBC 6.95   RBC 3.93*   HGB 11.6*   HCT 33.9*      MCV 86   MCH 29.5   MCHC 34.2     Diagnostic Results:  I have reviewed all pertinent imaging results/findings within the past 24 hours.    Clinical Findings:

## 2024-04-07 NOTE — CONSULTS
[facility]  Wound Care    Patient Name:  Ashutosh Ferrari   MRN:  [id number]  Date: 4/7/2024  Diagnosis: Diabetic infection of right foot    History:     Past Medical History:   Diagnosis Date    Diabetes mellitus     Diabetes mellitus, type 2     Essential hypertension, benign 07/18/2013    Herpes        Social History     Socioeconomic History    Marital status: Single   Occupational History    Occupation: Unemployed   Tobacco Use    Smoking status: Never    Smokeless tobacco: Never   Substance and Sexual Activity    Alcohol use: No    Drug use: No    Sexual activity: Yes     Partners: Female     Birth control/protection: None, Condom     Social Determinants of Health     Financial Resource Strain: Low Risk  (4/20/2022)    Overall Financial Resource Strain (CARDIA)     Difficulty of Paying Living Expenses: Not very hard   Food Insecurity: No Food Insecurity (4/20/2022)    Hunger Vital Sign     Worried About Running Out of Food in the Last Year: Never true     Ran Out of Food in the Last Year: Never true   Transportation Needs: No Transportation Needs (4/20/2022)    PRAPARE - Transportation     Lack of Transportation (Medical): No     Lack of Transportation (Non-Medical): No   Physical Activity: Inactive (4/20/2022)    Exercise Vital Sign     Days of Exercise per Week: 0 days     Minutes of Exercise per Session: 0 min   Stress: No Stress Concern Present (4/20/2022)    [employer] - Occupational Stress Questionnaire     Feeling of Stress : Not at all   Social Connections: Socially Isolated (4/20/2022)    Social Connection and Isolation Panel [NHANES]     Frequency of Communication with Friends and Family: More than three times a week     Frequency of Social Gatherings with Friends and Family: More than three times a week     Attends Mosque Services: Never     Active Member of Clubs or Organizations: No     Attends Club or Organization Meetings: Never     Marital Status: Never     Housing Stability: Low Risk  (4/20/2022)    Housing Stability Vital Sign     Unable to Pay for Housing in the Last Year: No     Number of Places Lived in the Last Year: 1     Unstable Housing in the Last Year: No       Precautions:     Allergies as of 04/05/2024    (No Known Allergies)       WO Assessment Details/Treatment     Patient seen for wound care consultation.   Reviewed chart for this encounter.   See Flow Sheet for findings.      RECOMMENDATIONS: Patient is currently being followed by podiatry with wound care orders. Inpatient wound care defers to their wound care orders and care. Inpatient wound care sign off.    Discussed POC with patient and primary nurse.   See EMR for orders & patient education.    Discussed nutrition and the role of protein in wound healing with the patient. Instructed patient to optimize protein for wound healing.    Bedside nursing to continue care & monitoring.  Bedside nursing to maintain pressure injury prevention interventions.            04/07/24 1300   WOCN Assessment   WOCN Total Time (mins) 15   Visit Date 04/07/24   Visit Time 1300   Consult Type New   WOCN Speciality Wound   Intervention chart review   Teaching on-going       Orders placed.   Aron HOLCOMBN, RN  04/07/2024

## 2024-04-07 NOTE — PLAN OF CARE
Problem: Adult Inpatient Plan of Care  Goal: Plan of Care Review  Outcome: Ongoing, Progressing  Goal: Patient-Specific Goal (Individualized)  Outcome: Ongoing, Progressing  Goal: Absence of Hospital-Acquired Illness or Injury  Outcome: Ongoing, Progressing  Goal: Optimal Comfort and Wellbeing  Outcome: Ongoing, Progressing  Goal: Readiness for Transition of Care  Outcome: Ongoing, Progressing     Problem: Infection  Goal: Absence of Infection Signs and Symptoms  Outcome: Ongoing, Progressing     Problem: Diabetes Comorbidity  Goal: Blood Glucose Level Within Targeted Range  Outcome: Ongoing, Progressing     Problem: Fluid and Electrolyte Imbalance (Acute Kidney Injury/Impairment)  Goal: Fluid and Electrolyte Balance  Outcome: Ongoing, Progressing     Problem: Oral Intake Inadequate (Acute Kidney Injury/Impairment)  Goal: Optimal Nutrition Intake  Outcome: Ongoing, Progressing     Problem: Renal Function Impairment (Acute Kidney Injury/Impairment)  Goal: Effective Renal Function  Outcome: Ongoing, Progressing     Problem: Impaired Wound Healing  Goal: Optimal Wound Healing  Outcome: Ongoing, Progressing     Problem: Fall Injury Risk  Goal: Absence of Fall and Fall-Related Injury  Outcome: Ongoing, Progressing

## 2024-04-07 NOTE — SUBJECTIVE & OBJECTIVE
Interval History: DEX, VSS. Continuing abx, following podiatry and ID recs. Wound cultures growing step agalactiae.      Review of Systems   Constitutional:  Negative for fatigue and fever.   HENT: Negative.     Eyes: Negative.    Respiratory: Negative.     Cardiovascular: Negative.    Gastrointestinal: Negative.    Endocrine: Negative.    Genitourinary: Negative.    Skin:  Positive for color change and wound.   Allergic/Immunologic: Negative.    Hematological: Negative.    Psychiatric/Behavioral: Negative.       Objective:     Vital Signs (Most Recent):  Temp: 98.6 °F (37 °C) (04/07/24 1226)  Pulse: 92 (04/07/24 1226)  Resp: 16 (04/07/24 1226)  BP: (!) 162/96 (04/07/24 1226)  SpO2: 97 % (04/07/24 1226) Vital Signs (24h Range):  Temp:  [97.6 °F (36.4 °C)-99 °F (37.2 °C)] 98.6 °F (37 °C)  Pulse:  [] 92  Resp:  [16-20] 16  SpO2:  [97 %-100 %] 97 %  BP: (133-162)/(85-96) 162/96     Weight: 88.5 kg (195 lb)  Body mass index is 23.74 kg/m².    Intake/Output Summary (Last 24 hours) at 4/7/2024 1257  Last data filed at 4/7/2024 0548  Gross per 24 hour   Intake 1400.12 ml   Output 400 ml   Net 1000.12 ml         Physical Exam  Vitals and nursing note reviewed.   Constitutional:       Appearance: He is well-developed.   Eyes:      Pupils: Pupils are equal, round, and reactive to light.   Cardiovascular:      Rate and Rhythm: Normal rate and regular rhythm.   Pulmonary:      Effort: Pulmonary effort is normal.      Breath sounds: Normal breath sounds.   Abdominal:      Palpations: Abdomen is soft.      Tenderness: There is no abdominal tenderness.   Musculoskeletal:         General: Signs of injury present. No tenderness.   Skin:     General: Skin is warm and dry.      Findings: Erythema present.   Neurological:      Mental Status: He is alert and oriented to person, place, and time.   Psychiatric:         Behavior: Behavior normal.             Significant Labs: All pertinent labs within the past 24 hours have been  reviewed.  CBC:   Recent Labs   Lab 04/05/24  2100 04/06/24  0443 04/07/24  0255   WBC 6.41 5.46 6.95   HGB 12.4* 10.7* 11.6*   HCT 36.8* 31.9* 33.9*    225 275     CMP:   Recent Labs   Lab 04/05/24  2059 04/06/24  0443 04/07/24  0255   * 133* 131*   K 3.9 3.4* 3.6   CL 98 103 99   CO2 24 24 23   * 331* 258*   BUN 16 13 11   CREATININE 1.6* 1.2 1.2   CALCIUM 9.9 9.0 9.3   PROT 8.3  --   --    ALBUMIN 3.4*  --   --    BILITOT 0.4  --   --    ALKPHOS 121  --   --    AST 13  --   --    ALT 10  --   --    ANIONGAP 10 6* 9       Significant Imaging: I have reviewed all pertinent imaging results/findings within the past 24 hours.

## 2024-04-07 NOTE — PLAN OF CARE
Problem: Adult Inpatient Plan of Care  Goal: Plan of Care Review  Outcome: Ongoing, Progressing  Goal: Absence of Hospital-Acquired Illness or Injury  Outcome: Ongoing, Progressing     Problem: Infection  Goal: Absence of Infection Signs and Symptoms  Outcome: Ongoing, Progressing     Problem: Diabetes Comorbidity  Goal: Blood Glucose Level Within Targeted Range  Outcome: Ongoing, Progressing     Problem: Fluid and Electrolyte Imbalance (Acute Kidney Injury/Impairment)  Goal: Fluid and Electrolyte Balance  Outcome: Ongoing, Progressing     Problem: Renal Function Impairment (Acute Kidney Injury/Impairment)  Goal: Effective Renal Function  Outcome: Ongoing, Progressing     Problem: Impaired Wound Healing  Goal: Optimal Wound Healing  Outcome: Ongoing, Progressing     Problem: Fall Injury Risk  Goal: Absence of Fall and Fall-Related Injury  Outcome: Ongoing, Progressing

## 2024-04-07 NOTE — CONSULTS
[facility]  Podiatry  Consult Note    Patient Name: Ashutosh Ferrari  MRN: [id number]  Admission Date: 4/5/2024  Hospital Length of Stay: 0 days  Attending Physician: Wyatt Antony MD  Primary Care Provider: [facility] -     Inpatient consult to Podiatry  Consult performed by: Hiram Montez DPM  Consult ordered by: Josselin Shabazz PA-C  Reason for consult: see below        Subjective:     History of Present Illness:  Ashutosh Ferrari is a 49 yo M with PMHx ff T2D, CKD, HTN, R diabetic food wound who was admitted for R foot wound.     Podiatry was consulted for R foot wound with OM. Patient has been followed by podiatry and ID, and was recently treated with bactrim for MRSA and proteus, and then doxycyline for anaerobes. He reports the wound recently reopened, and he stepped in a ditch about a week ago which saturated his shoe. He reports following, the wound worsened and is now red, warm, and swollen, and extending to his ankle.      VSS, Afebrile, WBC WNL, , , A1C 12.3. MRI shows OM of the R 5th proximal phalanx.    Scheduled Meds:   ceFEPime IV (PEDS and ADULTS)  2 g Intravenous Q8H    heparin (porcine)  5,000 Units Subcutaneous Q8H    insulin aspart U-100  8 Units Subcutaneous TIDWM    insulin detemir U-100  14 Units Subcutaneous BID    methocarbamoL  500 mg Oral TID    vancomycin (VANCOCIN) IV (PEDS and ADULTS)  1,250 mg Intravenous Q12H     Continuous Infusions:  PRN Meds:acetaminophen, albuterol-ipratropium, bisacodyL, dextrose 10%, dextrose 10%, glucagon (human recombinant), glucose, glucose, insulin aspart U-100, melatonin, ondansetron, oxyCODONE, polyethylene glycol, prochlorperazine, sodium chloride 0.9%, Pharmacy to dose Vancomycin consult **AND** vancomycin - pharmacy to dose    Review of patient's allergies indicates:  No Known Allergies     Past Medical History:   Diagnosis Date    Diabetes mellitus     Diabetes mellitus, type 2     Essential hypertension, benign  07/18/2013    Herpes      Past Surgical History:   Procedure Laterality Date    DEBRIDEMENT OF FOOT Right 4/1/2022    Procedure: DEBRIDEMENT, FOOT;  Surgeon: Amena Martinez DPM;  Location: [facility] OR;  Service: Podiatry;  Laterality: Right;    DEBRIDEMENT OF FOOT Right 4/4/2022    Procedure: DEBRIDEMENT, FOOT;  Surgeon: Amena Martinez DPM;  Location: [facility] OR;  Service: Podiatry;  Laterality: Right;    INCISION AND DRAINAGE Right 3/28/2022    Procedure: INCISION AND DRAINAGE RIGHT FOOT;  Surgeon: Amena Martinez DPM;  Location: [facility] OR;  Service: Podiatry;  Laterality: Right;       Family History       Problem Relation (Age of Onset)    Diabetes Mother          Tobacco Use    Smoking status: Never    Smokeless tobacco: Never   Substance and Sexual Activity    Alcohol use: No    Drug use: No    Sexual activity: Yes     Partners: Female     Birth control/protection: None, Condom     Review of Systems   Constitutional:  Negative for fatigue and fever.   HENT: Negative.     Eyes: Negative.    Respiratory: Negative.     Cardiovascular: Negative.    Gastrointestinal: Negative.    Endocrine: Negative.    Genitourinary: Negative.    Skin:  Positive for color change and wound.        Reports a wound to his R foot   Allergic/Immunologic: Negative.    Hematological: Negative.    Psychiatric/Behavioral: Negative.       Objective:     Vital Signs (Most Recent):  Temp: 98.4 °F (36.9 °C) (04/06/24 1527)  Pulse: 82 (04/06/24 1527)  Resp: 18 (04/06/24 1527)  BP: 134/85 (04/06/24 1527)  SpO2: 99 % (04/06/24 1527) Vital Signs (24h Range):  Temp:  [97.8 °F (36.6 °C)-98.8 °F (37.1 °C)] 98.4 °F (36.9 °C)  Pulse:  [77-86] 82  Resp:  [14-18] 18  SpO2:  [97 %-100 %] 99 %  BP: (134-171)/() 134/85     Weight: 88.5 kg (195 lb)  Body mass index is 23.74 kg/m².    Foot Exam    Right Foot/Ankle     Inspection and Palpation  Ecchymosis: none  Tenderness: none   Swelling: fifth metatarsal base   Skin Exam: drainage, cellulitis, skin changes,  abnormal color, ulcer and erythema; skin not intact     Neurovascular  Dorsalis pedis: 2+  Posterior tibial: 2+  Saphenous nerve sensation: normal  Tibial nerve sensation: normal  Superficial peroneal nerve sensation: normal  Deep peroneal nerve sensation: normal  Sural nerve sensation: normal    Comments  R foot:  Wounds noted to the dorsolateral and plantar fifth metatarsal head.  The 2 wounds do not communicate with each other.  Wound base mostly granular tissue with mild serosanguineous drainage.  Mild erythema noted around the wounds.  No tenderness to palpation.  Positive probe to bone distally towards the fifth digit from the dorsolateral wound.  No malodor noted.  No fluctuance or crepitus noted.    Laboratory:  A1C:   Recent Labs   Lab 04/06/24  0443   HGBA1C 12.3*     BMP:   Recent Labs   Lab 04/06/24 0443   *   *   K 3.4*      CO2 24   BUN 13   CREATININE 1.2   CALCIUM 9.0   MG 1.7     CBC:   Recent Labs   Lab 04/06/24 0443   WBC 5.46   RBC 3.61*   HGB 10.7*   HCT 31.9*      MCV 88   MCH 29.6   MCHC 33.5     Diagnostic Results:  I have reviewed all pertinent imaging results/findings within the past 24 hours.    Clinical Findings:            Assessment/Plan:     ID  Osteomyelitis  Assesment:  Podiatry consulted for R foot wounds. Wounds noted to dorsal lateral and plantar fifth metatarsal head with serosanguineous drainage.  Positive probe to bone.  MRI shows OM to the fifth proximal phalanx. IDSA mild foot infection.    Plan  - Physical exam and imaging findings discussed with the patient. Discussed conservative and surgical treatment and their benefits and risks in detail with the patient.  Explained to the patient that amputation is the gold standard for infection source control.  Patient continues to not be amenable to amputation and would like to continue with conservative treatment.  - With written consent bone biopsy conducted today. Pending cx  - Abx per Primary/ID  - R  foot dressed with Iodosorb, 4x4 gauze, cast padding, and ACE wrap  - WBAT  - Podiatry will continue to follow    Thank you for your consult. I will follow-up with patient. Please contact us if you have any additional questions.    Hiram Montez DPM  Podiatry  [facility]

## 2024-04-07 NOTE — PROCEDURES
"Ashutosh Ferrari is a 48 y.o. male patient.    Temp: 98.4 °F (36.9 °C) (04/06/24 1527)  Pulse: 82 (04/06/24 1527)  Resp: 18 (04/06/24 1527)  BP: 134/85 (04/06/24 1527)  SpO2: 99 % (04/06/24 1527)  Weight: 88.5 kg (195 lb) (04/06/24 1700)  Height: 6' 4" (193 cm) (04/06/24 1700)       Bone marrow    Date/Time: 4/6/2024 9:30 PM    Performed by: Hiram Montez DPM  Authorized by: Gaby Hager DPM    Consent Done?: Yes (Written)   Immediately prior to procedure a time out was called to verify the correct patient, procedure, equipment, support staff and site/side marked as required. .    Assistants?: No      Position: supine  Anesthesia: local infiltration  Local anesthetic: bupivacaine 0.25% without epinephrine and lidocaine 1% without epinephrine  Aspiration?: No    Biopsy?: Yes    Patient tolerated the procedure well with no immediate complications.    With written consent, bone biopsy was conducted to the R foot fifth proximal phalanx.  Bone cultures were sent off to the microbiology and pathology lab for further evaluation.  Patient tolerated procedure well.  R foot dressed with Iodosorb, 4x4 gauze, cast padding, and ACE wrap.        4/6/2024    "

## 2024-04-07 NOTE — ASSESSMENT & PLAN NOTE
- AF, no leukocytosis  - inflammatory markers elevated  - XR R foot with soft tissue swelling and ulcer of 5th MTP joint.   - MRI R foot    - Findings concerning for osteomyelitis of the 5th proximal phalanx, mildly progressed compared to prior exam.   - podiatry consulted; appreciate recs  - Patient continues to not be amenable to amputation and would like to continue with conservative treatment.  - With written consent bone biopsy conducted today. Pending cx  - Abx per Primary/ID  - R foot dressed with Iodosorb, 4x4 gauze, cast padding, and ACE wrap  - WBAT  - Podiatry will continue to follow  - continue IV vanc, switch to IV cefepime for pseudomonas coverage  - follow wound, blood cx  - strict glycemic control

## 2024-04-08 LAB
ANION GAP SERPL CALC-SCNC: 9 MMOL/L (ref 8–16)
BACTERIA SPEC AEROBE CULT: ABNORMAL
BASOPHILS # BLD AUTO: 0.03 K/UL (ref 0–0.2)
BASOPHILS NFR BLD: 0.6 % (ref 0–1.9)
BUN SERPL-MCNC: 11 MG/DL (ref 6–20)
CALCIUM SERPL-MCNC: 9.3 MG/DL (ref 8.7–10.5)
CHLORIDE SERPL-SCNC: 100 MMOL/L (ref 95–110)
CO2 SERPL-SCNC: 26 MMOL/L (ref 23–29)
CREAT SERPL-MCNC: 1.3 MG/DL (ref 0.5–1.4)
DIFFERENTIAL METHOD BLD: ABNORMAL
EOSINOPHIL # BLD AUTO: 0.1 K/UL (ref 0–0.5)
EOSINOPHIL NFR BLD: 1.8 % (ref 0–8)
ERYTHROCYTE [DISTWIDTH] IN BLOOD BY AUTOMATED COUNT: 11.6 % (ref 11.5–14.5)
EST. GFR  (NO RACE VARIABLE): >60 ML/MIN/1.73 M^2
GLUCOSE SERPL-MCNC: 269 MG/DL (ref 70–110)
HCT VFR BLD AUTO: 35.7 % (ref 40–54)
HGB BLD-MCNC: 12 G/DL (ref 14–18)
IMM GRANULOCYTES # BLD AUTO: 0.02 K/UL (ref 0–0.04)
IMM GRANULOCYTES NFR BLD AUTO: 0.4 % (ref 0–0.5)
LYMPHOCYTES # BLD AUTO: 2 K/UL (ref 1–4.8)
LYMPHOCYTES NFR BLD: 40.9 % (ref 18–48)
MAGNESIUM SERPL-MCNC: 1.6 MG/DL (ref 1.6–2.6)
MCH RBC QN AUTO: 29.4 PG (ref 27–31)
MCHC RBC AUTO-ENTMCNC: 33.6 G/DL (ref 32–36)
MCV RBC AUTO: 88 FL (ref 82–98)
MONOCYTES # BLD AUTO: 0.3 K/UL (ref 0.3–1)
MONOCYTES NFR BLD: 6.8 % (ref 4–15)
NEUTROPHILS # BLD AUTO: 2.5 K/UL (ref 1.8–7.7)
NEUTROPHILS NFR BLD: 49.5 % (ref 38–73)
NRBC BLD-RTO: 0 /100 WBC
PHOSPHATE SERPL-MCNC: 3.4 MG/DL (ref 2.7–4.5)
PLATELET # BLD AUTO: 283 K/UL (ref 150–450)
PMV BLD AUTO: 9.6 FL (ref 9.2–12.9)
POCT GLUCOSE: 213 MG/DL (ref 70–110)
POCT GLUCOSE: 255 MG/DL (ref 70–110)
POCT GLUCOSE: 268 MG/DL (ref 70–110)
POCT GLUCOSE: 293 MG/DL (ref 70–110)
POTASSIUM SERPL-SCNC: 3.7 MMOL/L (ref 3.5–5.1)
RBC # BLD AUTO: 4.08 M/UL (ref 4.6–6.2)
SODIUM SERPL-SCNC: 135 MMOL/L (ref 136–145)
WBC # BLD AUTO: 4.99 K/UL (ref 3.9–12.7)

## 2024-04-08 PROCEDURE — 96366 THER/PROPH/DIAG IV INF ADDON: CPT

## 2024-04-08 PROCEDURE — 99233 SBSQ HOSP IP/OBS HIGH 50: CPT | Mod: ,,, | Performed by: PHYSICIAN ASSISTANT

## 2024-04-08 PROCEDURE — 63600175 PHARM REV CODE 636 W HCPCS: Performed by: INTERNAL MEDICINE

## 2024-04-08 PROCEDURE — 25000003 PHARM REV CODE 250: Performed by: PHYSICIAN ASSISTANT

## 2024-04-08 PROCEDURE — 84100 ASSAY OF PHOSPHORUS: CPT | Performed by: PHYSICIAN ASSISTANT

## 2024-04-08 PROCEDURE — 21400001 HC TELEMETRY ROOM

## 2024-04-08 PROCEDURE — 83735 ASSAY OF MAGNESIUM: CPT | Performed by: PHYSICIAN ASSISTANT

## 2024-04-08 PROCEDURE — 25000003 PHARM REV CODE 250: Performed by: INTERNAL MEDICINE

## 2024-04-08 PROCEDURE — 85025 COMPLETE CBC W/AUTO DIFF WBC: CPT | Performed by: PHYSICIAN ASSISTANT

## 2024-04-08 PROCEDURE — 80048 BASIC METABOLIC PNL TOTAL CA: CPT | Performed by: PHYSICIAN ASSISTANT

## 2024-04-08 PROCEDURE — 36415 COLL VENOUS BLD VENIPUNCTURE: CPT | Performed by: PHYSICIAN ASSISTANT

## 2024-04-08 PROCEDURE — 96365 THER/PROPH/DIAG IV INF INIT: CPT | Mod: 59

## 2024-04-08 PROCEDURE — 63600175 PHARM REV CODE 636 W HCPCS: Performed by: PHYSICIAN ASSISTANT

## 2024-04-08 PROCEDURE — 25000003 PHARM REV CODE 250

## 2024-04-08 RX ADMIN — INSULIN ASPART 1 UNITS: 100 INJECTION, SOLUTION INTRAVENOUS; SUBCUTANEOUS at 08:04

## 2024-04-08 RX ADMIN — CEFTRIAXONE SODIUM 2 G: 2 INJECTION, POWDER, FOR SOLUTION INTRAMUSCULAR; INTRAVENOUS at 06:04

## 2024-04-08 RX ADMIN — VANCOMYCIN HYDROCHLORIDE 1250 MG: 1.25 INJECTION, POWDER, LYOPHILIZED, FOR SOLUTION INTRAVENOUS at 01:04

## 2024-04-08 RX ADMIN — INSULIN ASPART 8 UNITS: 100 INJECTION, SOLUTION INTRAVENOUS; SUBCUTANEOUS at 08:04

## 2024-04-08 RX ADMIN — CEFEPIME 2 G: 2 INJECTION, POWDER, FOR SOLUTION INTRAVENOUS at 06:04

## 2024-04-08 RX ADMIN — INSULIN DETEMIR 16 UNITS: 100 INJECTION, SOLUTION SUBCUTANEOUS at 08:04

## 2024-04-08 RX ADMIN — METHOCARBAMOL 500 MG: 500 TABLET ORAL at 03:04

## 2024-04-08 RX ADMIN — INSULIN ASPART 8 UNITS: 100 INJECTION, SOLUTION INTRAVENOUS; SUBCUTANEOUS at 11:04

## 2024-04-08 RX ADMIN — INSULIN ASPART 8 UNITS: 100 INJECTION, SOLUTION INTRAVENOUS; SUBCUTANEOUS at 05:04

## 2024-04-08 RX ADMIN — ACETAMINOPHEN 650 MG: 325 TABLET ORAL at 08:04

## 2024-04-08 RX ADMIN — CEFEPIME 2 G: 2 INJECTION, POWDER, FOR SOLUTION INTRAVENOUS at 03:04

## 2024-04-08 RX ADMIN — METHOCARBAMOL 500 MG: 500 TABLET ORAL at 08:04

## 2024-04-08 NOTE — ASSESSMENT & PLAN NOTE
47 yo male with PMH Of T2DM, CKD, HTN, right diabetic food wound who presented with cf right foot wound. Has been followed per ID and podiatry, and was recently treated with bactrim for MRSA and proteus, and then doxycyline for anaerobes. He reports the wound recently reopened, and he stepped in a ditch about a week ago which saturated his shoe. He reported the following, the wound worsened and red, warm, and swollen, and extending to his ankle.     MRI of right foot: osteomyelitis 5th proximal phalanx, mildly progressed compared to prior exam.  Persistent reactive osteitis involving the head of the 5th metatarsal.  POD offered amputation but patient deferred and elected for antimicrobials alone.    Pt is currently on vanc and cefepime. ID was consulted for abx recs. Feels improved. Wound and bone cxs with Gp B Strep and skin omari. The patient denies any recent fever, chills, or sweats.      Recommendations:  - DC vanc and cefepime  - Start ceftriaxone 2g IV q24h.   - PICC placement  - Anticipate 6 weeks of antibiotic therapy for osteomyelitis.   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 ID will sign off.    Outpatient Antibiotic Therapy Plan:    Please send referral to [facility].    1) Infection: R foot osteo with GBS    2) Discharge Antibiotics:    Intravenous antibiotics:  Ceftriaxone 2g IV q24h       3) Therapy Duration:  6 weeks    Estimated end date of IV antibiotics: 5/17/24    4) Outpatient Weekly Labs:    Order the following labs to be drawn on Mondays:   CBC  CMP   CRP      5) Fax Lab Results to Infectious Diseases Provider:  Radha    [facility] Fax Number: [phone]    6) Outpatient Infectious Diseases Follow-up    Follow-up appointment will be arranged by the ID clinic and will be found in the patient's appointments tab.    Prior to discharge, please ensure the patient's follow-up has been scheduled.    If there is still no follow-up scheduled prior to discharge, please send an EPIC message to Sydney Stanley in Infectious Diseases.

## 2024-04-08 NOTE — PLAN OF CARE
CM to patient's room for discharge planning assessment. Patient napping and requested CM to come back at later time.      Kristel Miller RN  Weekend  - [facility]  Spectralink: [phone]

## 2024-04-08 NOTE — SUBJECTIVE & OBJECTIVE
Interval History:   No acute events   Afebrile and WBC WNL  Feels OK  Foot improved.  The patient denies any recent fever, chills, or sweats.      Review of Systems   Constitutional:  Negative for chills, diaphoresis and fever.   Respiratory:  Negative for shortness of breath.    Cardiovascular:  Negative for chest pain.   Gastrointestinal:  Negative for abdominal pain, diarrhea, nausea and vomiting.   Genitourinary:  Negative for dysuria and hematuria.   Skin:  Positive for wound.     Objective:     Vital Signs (Most Recent):  Temp: 97.4 °F (36.3 °C) (04/08/24 1206)  Pulse: 77 (04/08/24 1451)  Resp: 18 (04/08/24 1206)  BP: (!) 147/94 (04/08/24 1206)  SpO2: 98 % (04/08/24 1206) Vital Signs (24h Range):  Temp:  [97.4 °F (36.3 °C)-98.2 °F (36.8 °C)] 97.4 °F (36.3 °C)  Pulse:  [] 77  Resp:  [18] 18  SpO2:  [95 %-100 %] 98 %  BP: (137-175)/() 147/94     Weight: 88.5 kg (195 lb)  Body mass index is 23.74 kg/m².    Estimated Creatinine Clearance: 86.2 mL/min (based on SCr of 1.3 mg/dL).     Physical Exam  Constitutional:       General: He is not in acute distress.     Appearance: Normal appearance. He is well-developed. He is not ill-appearing, toxic-appearing or diaphoretic.   HENT:      Head: Normocephalic and atraumatic.   Cardiovascular:      Rate and Rhythm: Normal rate and regular rhythm.      Heart sounds: Normal heart sounds. No murmur heard.     No friction rub. No gallop.   Pulmonary:      Effort: Pulmonary effort is normal. No respiratory distress.      Breath sounds: Normal breath sounds. No wheezing or rales.   Abdominal:      General: Bowel sounds are normal. There is no distension.      Palpations: Abdomen is soft. There is no mass.      Tenderness: There is no abdominal tenderness. There is no guarding or rebound.   Musculoskeletal:        Feet:    Skin:     General: Skin is warm and dry.   Neurological:      Mental Status: He is alert and oriented to person, place, and time.   Psychiatric:          Behavior: Behavior normal.          Significant Labs: Blood Culture:   Recent Labs   Lab 04/05/24  2100   LABBLOO No Growth to date  No Growth to date  No Growth to date  No Growth to date  No Growth to date  No Growth to date     CBC:   Recent Labs   Lab 04/07/24  0255 04/08/24  0604   WBC 6.95 4.99   HGB 11.6* 12.0*   HCT 33.9* 35.7*    283     CMP:   Recent Labs   Lab 04/07/24  0255 04/08/24  0604   * 135*   K 3.6 3.7   CL 99 100   CO2 23 26   * 269*   BUN 11 11   CREATININE 1.2 1.3   CALCIUM 9.3 9.3   ANIONGAP 9 9     Wound Culture:   Recent Labs   Lab 04/05/24  2101 04/06/24  1903   LABAERO STREPTOCOCCUS AGALACTIAE (GROUP B)  Many  Beta-hemolytic streptococci are routinely susceptible to   penicillins,cephalosporins and carbapenems.  Susceptibility testing not routinely performed  Skin omari also present  * STREPTOCOCCUS AGALACTIAE (GROUP B)  Rare  Beta-hemolytic streptococci are routinely susceptible to   penicillins,cephalosporins and carbapenems.  Susceptibility testing not routinely performed  *     All pertinent labs within the past 24 hours have been reviewed.    Significant Imaging: I have reviewed all pertinent imaging results/findings within the past 24 hours.  MRI Foot (Forefoot) Right Without Contrast [[id number]] (Abnormal) Resulted: 04/06/24 0624   Order Status: Completed Updated: 04/06/24 0626   Narrative:     EXAMINATION:  MRI FOOT (FOREFOOT) RIGHT WITHOUT CONTRAST    CLINICAL HISTORY:  Foot swelling, diabetic, osteomyelitis suspected, xray done;    TECHNIQUE:  Multiplanar, multisequence MR imaging of the  forefoot without the use of intravenous gadolinium IV contrast.    COMPARISON:  MRI 03/28/2022.    FINDINGS:  SOFT TISSUES: There is soft tissue ulceration along the dorsal lateral aspect of the 5th MTP joint measuring approximately 1.8 cm (series 10, image 19).  There is skin thickening and subcutaneous edema, which could be inflammatory or infectious. No  organized subcutaneous fluid collections.    BONES: There is confluent T1 marrow replacement and cortical irregularity with STIR hyperintensity involving the 5th proximal phalanx with sparing of its most distal portion, progressed compared to prior exam.  There is mild STIR hyperintensity involving the head of the 5th metatarsal without abnormal T1 marrow signal.  No fracture or osteonecrosis.    JOINTS: No joint effusion or synovitis.    TENDONS: No tenosynovitis.    LIGAMENTS: Lisfranc ligament is intact.    MUSCLES: T2 signal hyperintensity of muscles of the foot associated with diabetes related neuropathic change.   Impression:       Findings concerning for osteomyelitis of the 5th proximal phalanx, mildly progressed compared to prior exam.  Persistent reactive osteitis involving the head of the 5th metatarsal.    Ulceration along the dorsal lateral aspect of the 5th MTP joint.  No drainable fluid collection.    This report was flagged in Epic as abnormal.    Electronically signed by resident: Denilson Núñez  Date: 04/06/2024  Time: 06:00    Electronically signed by: Rom Duffy MD  Date: 04/06/2024  Time: 06:24   X-Ray Foot Complete Right [[id number]] Resulted: 04/05/24 2257   Order Status: Completed Updated: 04/05/24 2300   Narrative:     EXAMINATION:  XR FOOT COMPLETE 3 VIEW RIGHT    CLINICAL HISTORY:  . Type 2 diabetes mellitus with foot ulcer    TECHNIQUE:  AP, lateral, and oblique views of the right foot were performed.    COMPARISON:  Right foot series 05/01/2023    FINDINGS:  Soft tissue swelling about the 5th MTP joint, extending into the 5th toe.    Soft tissue lucency, likely a soft tissue ulcer, inferolateral to the 5th MTP joint.    At this time, there is no direct radiographic evidence of acute articular or osseous involvement.    No acute fracture deformity, dislocation, radiopaque foreign body, or soft tissue emphysema.    There are pre-existing deformities of the 5th metatarsal head and 5th  proximal phalanx base, possibly related to old remote trauma.    Mild degenerative changes.   Impression:       Soft tissue swelling and soft tissue ulcer about the 5th MTP joint.    Correlate clinically, and with follow-up radiographs or other imaging.      Electronically signed by: Lucho Gamboa  Date: 04/05/2024  Time: 22:57     Imaging History    2024    Date Procedure Name Study Review Link PACS Link Status Accession Number Location   04/06/24 05:32 AM MRI Foot (Forefoot) Right Without Contrast Study Review  Images Final [id number] [facility]   04/05/24 10:18 PM X-Ray Foot Complete Right Study Review  Images Final [id number] [facility]   04/08/24 03:04 AM CARDIAC MONITORING STRIPS Study Review  Final     04/07/24 05:48 AM CARDIAC MONITORING STRIPS Study Review  Final     04/07/24 02:52 AM CARDIAC MONITORING STRIPS Study Review  Final     04/07/24 02:52 AM CARDIAC MONITORING STRIPS Study Review  Final

## 2024-04-08 NOTE — SUBJECTIVE & OBJECTIVE
Interval History: LUKASZ KOWALSKIS. Podiatry continues to follow, pending ID recs on abx. Working with  for outpatient infusion set up.     Review of Systems   Constitutional:  Negative for fatigue and fever.   HENT: Negative.     Eyes: Negative.    Respiratory: Negative.     Cardiovascular: Negative.    Gastrointestinal: Negative.    Endocrine: Negative.    Genitourinary: Negative.    Skin:  Positive for color change and wound.   Allergic/Immunologic: Negative.    Hematological: Negative.    Psychiatric/Behavioral: Negative.       Objective:     Vital Signs (Most Recent):  Temp: 97.4 °F (36.3 °C) (04/08/24 1206)  Pulse: 81 (04/08/24 1206)  Resp: 18 (04/08/24 1206)  BP: (!) 147/94 (04/08/24 1206)  SpO2: 98 % (04/08/24 1206) Vital Signs (24h Range):  Temp:  [97.4 °F (36.3 °C)-98.8 °F (37.1 °C)] 97.4 °F (36.3 °C)  Pulse:  [] 81  Resp:  [16-18] 18  SpO2:  [95 %-100 %] 98 %  BP: (137-175)/() 147/94     Weight: 88.5 kg (195 lb)  Body mass index is 23.74 kg/m².    Intake/Output Summary (Last 24 hours) at 4/8/2024 1335  Last data filed at 4/8/2024 0457  Gross per 24 hour   Intake 720 ml   Output 500 ml   Net 220 ml         Physical Exam  Vitals and nursing note reviewed.   Constitutional:       Appearance: He is well-developed.   Eyes:      Pupils: Pupils are equal, round, and reactive to light.   Cardiovascular:      Rate and Rhythm: Normal rate and regular rhythm.   Pulmonary:      Effort: Pulmonary effort is normal.      Breath sounds: Normal breath sounds.   Abdominal:      Palpations: Abdomen is soft.      Tenderness: There is no abdominal tenderness.   Musculoskeletal:         General: Signs of injury present. No tenderness.   Skin:     General: Skin is warm and dry.      Findings: Erythema present.   Neurological:      Mental Status: He is alert and oriented to person, place, and time.   Psychiatric:         Behavior: Behavior normal.             Significant Labs: All pertinent labs within the past 24 hours  have been reviewed.  CBC:   Recent Labs   Lab 04/07/24  0255 04/08/24  0604   WBC 6.95 4.99   HGB 11.6* 12.0*   HCT 33.9* 35.7*    283     CMP:   Recent Labs   Lab 04/07/24  0255 04/08/24  0604   * 135*   K 3.6 3.7   CL 99 100   CO2 23 26   * 269*   BUN 11 11   CREATININE 1.2 1.3   CALCIUM 9.3 9.3   ANIONGAP 9 9       Significant Imaging: I have reviewed all pertinent imaging results/findings within the past 24 hours.

## 2024-04-08 NOTE — PLAN OF CARE
1925 rec'd resting in bed A/A/OX4. Dressing intact to right foot, denies pain. Trace edema noted to extremity, able to move toes. No complaints at this time. Bed low and wheels locked.    Current /103 and also declining subQ heparin. Aaron JOHN made aware, no orders rec'd.     2038 blood glucose 96. Per MAR four glucose tablets should be given- patient wanted to eat some of his birthday cake instead. Okay'd by Aaron.    Glucose 124 after cake. 10U levemir given per Aaron orders.     2AM refused blood glucose recheck. Aaron updated, no orders.    End of shift- rested quietly over the night. No complaints voiced. VSS. Safety measures intact.    -declining subQ heparin again, education provided and still declining-

## 2024-04-08 NOTE — PLAN OF CARE
[facility]  Initial Discharge Assessment       Primary Care Provider: [facility] -    Admission Diagnosis: Cellulitis of foot [L03.119]  Diabetic foot ulcer [E11.621, L97.509]  Chest pain [R07.9]    Admission Date: 4/5/2024  Expected Discharge Date: 4/8/2024    Transition of Care Barriers: Unisured    Payor: /     Extended Emergency Contact Information  Primary Emergency Contact: Catie Ferrari   [place]  Mobile Phone: [phone]  Relation: Brother  Secondary Emergency Contact: Nica Ferrari   [facility]  Home Phone: [phone]  Relation: Relative    Discharge Plan A: Home with family  Discharge Plan B: Home      [facility] - [place] - [address]  [address]  [place] [address]  Phone: [phone] Fax: [phone]    [facility] - [place] - [address]  [address]  [place] [address]  Phone: [phone] Fax: [phone]      Initial Assessment (most recent)       Adult Discharge Assessment - 04/08/24 1035          Discharge Assessment    Assessment Type Discharge Planning Assessment     Confirmed/corrected address, phone number and insurance Yes     Confirmed Demographics Correct on Facesheet     Source of Information patient     If unable to respond/provide information was family/caregiver contacted? No Contact Information Available     Reason For Admission Diabetic infection of right foot     People in Home alone     Do you expect to return to your current living situation? Yes     Do you have help at home or someone to help you manage your care at home? No     Prior to hospitilization cognitive status: Alert/Oriented     Current cognitive status: Alert/Oriented     Dressing/Bathing Difficulty no     Equipment Currently Used at Home none     Patient currently being followed by outpatient case management? No     Do you currently have service(s) that help you manage your care at home? No     Do you  take prescription medications? Yes     Do you have prescription coverage? No     Do you have any problems affording any of your prescribed medications? TBD     Is the patient taking medications as prescribed? yes     Who is going to help you get home at discharge? Family     How do you get to doctors appointments? car, drives self;family or friend will provide     Are you on dialysis? No     Do you take coumadin? No     Discharge Plan A Home with family     Discharge Plan B Home     DME Needed Upon Discharge  none     Discharge Plan discussed with: Patient     Transition of Care Barriers Unisured                   SW met with the patient at the bedside and discussed the discharge plan. Gave her the discharge booklet and placed contact numbers on the white board in the room. Patient alert and sitting up in bed. Patient verified her name , , PCP, Insurance and Pharmacy . Stated he lives alone and has 0 steps to point of entry . Prior to admission patient was independent with all ADLS and wasn't receiving any HH services. He is not on coumadin nor is he a dialysis patient . DME's include: none. He takes medication as prescribed and has no problems getting  medication. Family will provide transportation at [place].      JOHNATHAN Lockwood  Department of Case Management   [facility]  [address] [place] [address]. [place] [address]  Phone : [phone]      Discharge Plan A and Plan B have been determined by review of patient's clinical status, future medical and therapeutic needs, and coverage/benefits for post-acute care in coordination with multidisciplinary team members.

## 2024-04-08 NOTE — PROGRESS NOTES
[facility] [address] [place] [address] Medicine  Progress Note    Patient Name: Ashutosh Ferrari  MRN: [id number]  Patient Class: IP- Inpatient   Admission Date: 4/5/2024  Length of Stay: 0 days  Attending Physician: Wyatt Antony MD  Primary Care Provider: [facility] -        Subjective:     Principal Problem:Diabetic infection of right foot        HPI:  Ashutosh Ferrari is a 48 y.o. male with a PMHx of T2DM, CKD, HTN, hx R diabetic foot ulcer/infection who presents to [facility] for right foot wound. Patient had an infected wound to his right foot about 1 year ago which was treated with antibiotics and had healed until ~1 month ago. He noticied the wound reopened and began increasing in size. He stepped in ditched with his boots on about a week ago causing him to have to sit in his saturated boot for a few hours. The wound has worsened since then and now has surrounding redness, swelling and warmth extending up his foot and into his ankle. He hasn't taken his insulin in about 2 months as he's been trying to control his BG with diet modifications. Has chronic neuropathy and decreased sensation to BLEs without recent worsening. Denies fever, chills, N/V, abdominal pain, HA, vision changes or syncope.    ED: AFVSS. No leukocytosis. Na 132, , Cr 1.6 (baseline), normal AG. , . XR R foot shows soft tissue swelling and soft tissue ulcer about the 5th MTP joint. Given 1L NS, IV vanc and rocephin.     Overview/Hospital Course:  Ashutosh Ferrari is a 48 y.o. male who was admitted to hospital medicine for osteomyelitis of R 5th toe. MRI R foot with findings concerning for osteomyelitis of the 5th proximal phalanx, mildly progressed compared to prior exam. Persistent reactive osteitis involving the head of the 5th metatarsal. Ulceration along the dorsal lateral aspect of the 5th MTP joint. No drainable fluid collection. Started on vancomycin and cefepime. ID and podiatry consulted. Podiatry recommending amputation  but patient not amenable at this time. Will continue treatment with IV abx.      Interval History: ISIDRO KOWALSKI. Podiatry continues to follow, pending ID recs on abx. Working with  for outpatient infusion set up.     Review of Systems   Constitutional:  Negative for fatigue and fever.   HENT: Negative.     Eyes: Negative.    Respiratory: Negative.     Cardiovascular: Negative.    Gastrointestinal: Negative.    Endocrine: Negative.    Genitourinary: Negative.    Skin:  Positive for color change and wound.   Allergic/Immunologic: Negative.    Hematological: Negative.    Psychiatric/Behavioral: Negative.       Objective:     Vital Signs (Most Recent):  Temp: 97.4 °F (36.3 °C) (04/08/24 1206)  Pulse: 81 (04/08/24 1206)  Resp: 18 (04/08/24 1206)  BP: (!) 147/94 (04/08/24 1206)  SpO2: 98 % (04/08/24 1206) Vital Signs (24h Range):  Temp:  [97.4 °F (36.3 °C)-98.8 °F (37.1 °C)] 97.4 °F (36.3 °C)  Pulse:  [] 81  Resp:  [16-18] 18  SpO2:  [95 %-100 %] 98 %  BP: (137-175)/() 147/94     Weight: 88.5 kg (195 lb)  Body mass index is 23.74 kg/m².    Intake/Output Summary (Last 24 hours) at 4/8/2024 1335  Last data filed at 4/8/2024 0457  Gross per 24 hour   Intake 720 ml   Output 500 ml   Net 220 ml         Physical Exam  Vitals and nursing note reviewed.   Constitutional:       Appearance: He is well-developed.   Eyes:      Pupils: Pupils are equal, round, and reactive to light.   Cardiovascular:      Rate and Rhythm: Normal rate and regular rhythm.   Pulmonary:      Effort: Pulmonary effort is normal.      Breath sounds: Normal breath sounds.   Abdominal:      Palpations: Abdomen is soft.      Tenderness: There is no abdominal tenderness.   Musculoskeletal:         General: Signs of injury present. No tenderness.   Skin:     General: Skin is warm and dry.      Findings: Erythema present.   Neurological:      Mental Status: He is alert and oriented to person, place, and time.   Psychiatric:         Behavior: Behavior  normal.             Significant Labs: All pertinent labs within the past 24 hours have been reviewed.  CBC:   Recent Labs   Lab 04/07/24  0255 04/08/24  0604   WBC 6.95 4.99   HGB 11.6* 12.0*   HCT 33.9* 35.7*    283     CMP:   Recent Labs   Lab 04/07/24  0255 04/08/24  0604   * 135*   K 3.6 3.7   CL 99 100   CO2 23 26   * 269*   BUN 11 11   CREATININE 1.2 1.3   CALCIUM 9.3 9.3   ANIONGAP 9 9       Significant Imaging: I have reviewed all pertinent imaging results/findings within the past 24 hours.    Assessment/Plan:      * Diabetic infection of right foot  - AF, no leukocytosis  - inflammatory markers elevated  - XR R foot with soft tissue swelling and ulcer of 5th MTP joint.   - MRI R foot    - Findings concerning for osteomyelitis of the 5th proximal phalanx, mildly progressed compared to prior exam.   - podiatry consulted; appreciate recs  - Patient continues to not be amenable to amputation and would like to continue with conservative treatment.  - With written consent bone biopsy conducted today. Pending cx  - Abx per Primary/ID  - R foot dressed with Iodosorb, 4x4 gauze, cast padding, and ACE wrap  - WBAT  - Podiatry will continue to follow  - continue IV vanc, switch to IV cefepime for pseudomonas coverage  - follow wound, blood cx  - strict glycemic control     Osteomyelitis  Diabetic infection of right foot   - AF, no leukocytosis  - inflammatory markers elevated  - XR R foot with soft tissue swelling and ulcer of 5th MTP joint.   - MRI R foot with findings concerning for osteo   - ID consulted, continuing abx and following cultures  - podiatry consulted; appreciate recs  - continue IV vanc, add IV cefepime for pseudomonas coverage  - follow wound, blood cx  - strict glycemic control     Type 2 diabetes mellitus with hyperglycemia, with long-term current use of insulin  - uncontrolled iso medication noncompliance  - start WB insulin regimen: detemir 10U BID + aspart 6U TIDWM   -  increased detemir to 16U and aspart to 8U  - LDSSI, ACHS accuchecks  - diabetic diet    Lab Results   Component Value Date    HGBA1C 12.3 (H) 04/06/2024         Essential hypertension  - no longer taking hydralazine  - continue amlodipine 2.5mg daily  - monitor BP closely iso infection     Stage 3 chronic kidney disease  - stable at baseline  - monitor daily labs       VTE Risk Mitigation (From admission, onward)           Ordered     heparin (porcine) injection 5,000 Units  Every 8 hours         04/05/24 2250     IP VTE HIGH RISK PATIENT  Once         04/05/24 2250     Place sequential compression device  Until discontinued         04/05/24 2250     IP VTE HIGH RISK PATIENT  Once         04/05/24 2250                    Discharge Planning   ROCCO: 4/8/2024     Code Status: Full Code   Is the patient medically ready for discharge?: No    Reason for patient still in hospital (select all that apply): Patient trending condition, Laboratory test, Treatment, Imaging, and Consult recommendations  Discharge Plan A: Home with family                  Josselin Shabazz PA-C  Department of Hospital Medicine   [facility]

## 2024-04-08 NOTE — PROGRESS NOTES
Food & Nutrition  Education    Diet Education: A1C  Time Spent: 10 minutes  Learners: Patient and Family Member      Nutrition Education provided with handouts: Meal Planning Using the Plate Method, CHO Counting for People with Diabetes      Comments: Discussed importance of choosing healthy carbohydrates and to control the amount of carbohydrates you eat at each meal for blood sugar management. Reminded patient not to skip meals. Encouraged intake of fresh, unprocessed foods. Reviewed limiting sugary beverages such as jony, juice, and punch. Food labels, CHO counting, and recommended CHO intake reviewed. Educated patient on meal planning and eating out. Patient is aware of A1C >12% and what the lab value means. Patient agrees to make diet changes to comply with diabetic diet. All questions and concerns answered. Dietitian's contact information provided.     Follow-Up: Yes    Please Re-consult as needed    Thanks!   Kamla Wing, MS, RD, LDN

## 2024-04-09 VITALS
DIASTOLIC BLOOD PRESSURE: 99 MMHG | TEMPERATURE: 98 F | OXYGEN SATURATION: 98 % | WEIGHT: 195 LBS | HEIGHT: 76 IN | RESPIRATION RATE: 18 BRPM | SYSTOLIC BLOOD PRESSURE: 150 MMHG | BODY MASS INDEX: 23.75 KG/M2 | HEART RATE: 88 BPM

## 2024-04-09 LAB
POCT GLUCOSE: 235 MG/DL (ref 70–110)
POCT GLUCOSE: 242 MG/DL (ref 70–110)

## 2024-04-09 PROCEDURE — 94761 N-INVAS EAR/PLS OXIMETRY MLT: CPT

## 2024-04-09 PROCEDURE — 76937 US GUIDE VASCULAR ACCESS: CPT

## 2024-04-09 PROCEDURE — A4216 STERILE WATER/SALINE, 10 ML: HCPCS | Performed by: INTERNAL MEDICINE

## 2024-04-09 PROCEDURE — 02HV33Z INSERTION OF INFUSION DEVICE INTO SUPERIOR VENA CAVA, PERCUTANEOUS APPROACH: ICD-10-PCS | Performed by: INTERNAL MEDICINE

## 2024-04-09 PROCEDURE — 25000003 PHARM REV CODE 250: Performed by: INTERNAL MEDICINE

## 2024-04-09 PROCEDURE — C1751 CATH, INF, PER/CENT/MIDLINE: HCPCS

## 2024-04-09 PROCEDURE — 25000003 PHARM REV CODE 250

## 2024-04-09 PROCEDURE — 36573 INSJ PICC RS&I 5 YR+: CPT

## 2024-04-09 PROCEDURE — 99900035 HC TECH TIME PER 15 MIN (STAT)

## 2024-04-09 PROCEDURE — 63600175 PHARM REV CODE 636 W HCPCS

## 2024-04-09 PROCEDURE — 63600175 PHARM REV CODE 636 W HCPCS: Performed by: PHYSICIAN ASSISTANT

## 2024-04-09 RX ORDER — SODIUM CHLORIDE 0.9 % (FLUSH) 0.9 %
10 SYRINGE (ML) INJECTION EVERY 6 HOURS
Status: DISCONTINUED | OUTPATIENT
Start: 2024-04-09 | End: 2024-04-09 | Stop reason: HOSPADM

## 2024-04-09 RX ORDER — SODIUM CHLORIDE 0.9 % (FLUSH) 0.9 %
10 SYRINGE (ML) INJECTION
Status: DISCONTINUED | OUTPATIENT
Start: 2024-04-09 | End: 2024-04-09 | Stop reason: HOSPADM

## 2024-04-09 RX ADMIN — Medication 10 ML: at 11:04

## 2024-04-09 RX ADMIN — CEFTRIAXONE SODIUM 2 G: 2 INJECTION, POWDER, FOR SOLUTION INTRAMUSCULAR; INTRAVENOUS at 03:04

## 2024-04-09 RX ADMIN — INSULIN ASPART 8 UNITS: 100 INJECTION, SOLUTION INTRAVENOUS; SUBCUTANEOUS at 11:04

## 2024-04-09 RX ADMIN — INSULIN ASPART 8 UNITS: 100 INJECTION, SOLUTION INTRAVENOUS; SUBCUTANEOUS at 08:04

## 2024-04-09 RX ADMIN — INSULIN ASPART 2 UNITS: 100 INJECTION, SOLUTION INTRAVENOUS; SUBCUTANEOUS at 08:04

## 2024-04-09 RX ADMIN — INSULIN DETEMIR 16 UNITS: 100 INJECTION, SOLUTION SUBCUTANEOUS at 08:04

## 2024-04-09 RX ADMIN — METHOCARBAMOL 500 MG: 500 TABLET ORAL at 08:04

## 2024-04-09 RX ADMIN — INSULIN ASPART 2 UNITS: 100 INJECTION, SOLUTION INTRAVENOUS; SUBCUTANEOUS at 11:04

## 2024-04-09 NOTE — PROGRESS NOTES
[facility]    [facility] educator met with the patient and discussed the discharge plan for home IVABX. Ashutosh Ferrari will discharge home with family support. The patient will infuse his medication via Elastomeric Pump. The patient was educated on the S.A.S.H procedure and a S.A.S.H mat was provided. The patient education checklist was reviewed and acknowledged by the above person and he is agreeable to discharge with the home infusion plan of care. The IV administration process using aspetic technique was reviewed with successful return demonstration. Patient feels comfortable with doing his own home infusions. The patient will discharge home with Ceftriaxone 2 gm IV every 24 hours for an estimated end of therapy date of 5/17/24. Dosing schedule times are 0900 daily. Extensions were placed to his double lumen PICC line. The patient will come to [facility] for weekly dressing changes and lab draws. Time was allotted for questions. The patient's nurse and the case management team were notified that the teaching has been completed.     Medication delivery will be made to the home.    [facility]  Cecilia Hodges RN, Clinical Educator  Cell [phone]  Office [phone]  Fax [phone]

## 2024-04-09 NOTE — PROGRESS NOTES
[facility]  Infectious Disease  Progress Note    Patient Name: Ashutosh Ferrari  MRN: [id number]  Admission Date: 4/5/2024  Length of Stay: 0 days  Attending Physician: Wyatt Antony MD  Primary Care Provider: [facility]    Isolation Status: No active isolations  Assessment/Plan:      Endocrine  * Diabetic infection of right foot  47 yo male with PMH Of T2DM, CKD, HTN, right diabetic food wound who presented with cf right foot wound. Has been followed per ID and podiatry, and was recently treated with bactrim for MRSA and proteus, and then doxycyline for anaerobes. He reports the wound recently reopened, and he stepped in a ditch about a week ago which saturated his shoe. He reported the following, the wound worsened and red, warm, and swollen, and extending to his ankle.     MRI of right foot: osteomyelitis 5th proximal phalanx, mildly progressed compared to prior exam.  Persistent reactive osteitis involving the head of the 5th metatarsal.  POD offered amputation but patient deferred and elected for antimicrobials alone.    Pt is currently on vanc and cefepime. ID was consulted for abx recs. Feels improved. Wound and bone cxs with Gp B Strep and skin omari. The patient denies any recent fever, chills, or sweats.      Recommendations:  - DC vanc and cefepime  - Start ceftriaxone 2g IV q24h.   - PICC placement  - Anticipate 6 weeks of antibiotic therapy for osteomyelitis.   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 ID will sign off.    Outpatient Antibiotic Therapy Plan:    Please send referral to [facility].    1)  Infection: R foot osteo with GBS    2) Discharge Antibiotics:    Intravenous antibiotics:  Ceftriaxone 2g IV q24h       3) Therapy Duration:  6 weeks    Estimated end date of IV antibiotics: 5/17/24    4) Outpatient Weekly Labs:    Order the following labs to be drawn on Mondays:   CBC  CMP   CRP      5) Fax Lab Results to Infectious Diseases Provider: [facility] Fax Number: [phone]    6) Outpatient Infectious Diseases Follow-up    Follow-up appointment will be arranged by the ID clinic and will be found in the patient's appointments tab.    Prior to discharge, please ensure the patient's follow-up has been scheduled.    If there is still no follow-up scheduled prior to discharge, please send an EPIC message to Sydney Stanley in Infectious Diseases.                Anticipated Disposition: tbd    Thank you for your consult. I will sign off. Please contact us if you have any additional questions.    DORA Ortez  Infectious Disease  [facility]    Subjective:     Principal Problem:Diabetic infection of right foot    HPI: Mr. Ferrari is a 47 yo male with PMH Of T2DM, CKD, HTN, right diabetic food wound who presents with cf right foot wound.  He reports the wound recently reopened, and he stepped in a ditch about a week ago which saturated his shoe. He reports following, the wound worsened and is now red, warm, and swollen, and extending to his ankle.     Pt afebrile, HDS. Without leukocytosis. With elevated sed rate, CRP. Cr elevated to 1.6, though decreased today. H1C is 12.3%. MRI of right foot with osteo of 5th proximal phalanx.     Pt is currently on vanc and cefepime. ID was consulted for abx recs.   Interval History:   No acute events   Afebrile and WBC WNL  Feels OK  Foot improved.  The patient denies any recent fever, chills, or sweats.      Review of Systems   Constitutional:  Negative for chills, diaphoresis and fever.   Respiratory:  Negative for shortness of  breath.    Cardiovascular:  Negative for chest pain.   Gastrointestinal:  Negative for abdominal pain, diarrhea, nausea and vomiting.   Genitourinary:  Negative for dysuria and hematuria.   Skin:  Positive for wound.     Objective:     Vital Signs (Most Recent):  Temp: 97.4 °F (36.3 °C) (04/08/24 1206)  Pulse: 77 (04/08/24 1451)  Resp: 18 (04/08/24 1206)  BP: (!) 147/94 (04/08/24 1206)  SpO2: 98 % (04/08/24 1206) Vital Signs (24h Range):  Temp:  [97.4 °F (36.3 °C)-98.2 °F (36.8 °C)] 97.4 °F (36.3 °C)  Pulse:  [] 77  Resp:  [18] 18  SpO2:  [95 %-100 %] 98 %  BP: (137-175)/() 147/94     Weight: 88.5 kg (195 lb)  Body mass index is 23.74 kg/m².    Estimated Creatinine Clearance: 86.2 mL/min (based on SCr of 1.3 mg/dL).     Physical Exam  Constitutional:       General: He is not in acute distress.     Appearance: Normal appearance. He is well-developed. He is not ill-appearing, toxic-appearing or diaphoretic.   HENT:      Head: Normocephalic and atraumatic.   Cardiovascular:      Rate and Rhythm: Normal rate and regular rhythm.      Heart sounds: Normal heart sounds. No murmur heard.     No friction rub. No gallop.   Pulmonary:      Effort: Pulmonary effort is normal. No respiratory distress.      Breath sounds: Normal breath sounds. No wheezing or rales.   Abdominal:      General: Bowel sounds are normal. There is no distension.      Palpations: Abdomen is soft. There is no mass.      Tenderness: There is no abdominal tenderness. There is no guarding or rebound.   Musculoskeletal:        Feet:    Skin:     General: Skin is warm and dry.   Neurological:      Mental Status: He is alert and oriented to person, place, and time.   Psychiatric:         Behavior: Behavior normal.          Significant Labs: Blood Culture:   Recent Labs   Lab 04/05/24  2100   LABBLOO No Growth to date  No Growth to date  No Growth to date  No Growth to date  No Growth to date  No Growth to date     CBC:   Recent Labs   Lab  04/07/24  0255 04/08/24  0604   WBC 6.95 4.99   HGB 11.6* 12.0*   HCT 33.9* 35.7*    283     CMP:   Recent Labs   Lab 04/07/24  0255 04/08/24  0604   * 135*   K 3.6 3.7   CL 99 100   CO2 23 26   * 269*   BUN 11 11   CREATININE 1.2 1.3   CALCIUM 9.3 9.3   ANIONGAP 9 9     Wound Culture:   Recent Labs   Lab 04/05/24  2101 04/06/24  1903   LABAERO STREPTOCOCCUS AGALACTIAE (GROUP B)  Many  Beta-hemolytic streptococci are routinely susceptible to   penicillins,cephalosporins and carbapenems.  Susceptibility testing not routinely performed  Skin omari also present  * STREPTOCOCCUS AGALACTIAE (GROUP B)  Rare  Beta-hemolytic streptococci are routinely susceptible to   penicillins,cephalosporins and carbapenems.  Susceptibility testing not routinely performed  *     All pertinent labs within the past 24 hours have been reviewed.    Significant Imaging: I have reviewed all pertinent imaging results/findings within the past 24 hours.  MRI Foot (Forefoot) Right Without Contrast [[id number]] (Abnormal) Resulted: 04/06/24 0624   Order Status: Completed Updated: 04/06/24 0626   Narrative:     EXAMINATION:  MRI FOOT (FOREFOOT) RIGHT WITHOUT CONTRAST    CLINICAL HISTORY:  Foot swelling, diabetic, osteomyelitis suspected, xray done;    TECHNIQUE:  Multiplanar, multisequence MR imaging of the  forefoot without the use of intravenous gadolinium IV contrast.    COMPARISON:  MRI 03/28/2022.    FINDINGS:  SOFT TISSUES: There is soft tissue ulceration along the dorsal lateral aspect of the 5th MTP joint measuring approximately 1.8 cm (series 10, image 19).  There is skin thickening and subcutaneous edema, which could be inflammatory or infectious. No organized subcutaneous fluid collections.    BONES: There is confluent T1 marrow replacement and cortical irregularity with STIR hyperintensity involving the 5th proximal phalanx with sparing of its most distal portion, progressed compared to prior exam.  There is mild STIR  hyperintensity involving the head of the 5th metatarsal without abnormal T1 marrow signal.  No fracture or osteonecrosis.    JOINTS: No joint effusion or synovitis.    TENDONS: No tenosynovitis.    LIGAMENTS: Lisfranc ligament is intact.    MUSCLES: T2 signal hyperintensity of muscles of the foot associated with diabetes related neuropathic change.   Impression:       Findings concerning for osteomyelitis of the 5th proximal phalanx, mildly progressed compared to prior exam.  Persistent reactive osteitis involving the head of the 5th metatarsal.    Ulceration along the dorsal lateral aspect of the 5th MTP joint.  No drainable fluid collection.    This report was flagged in Epic as abnormal.    Electronically signed by resident: Denilson Núñez  Date: 04/06/2024  Time: 06:00    Electronically signed by: Rom Duffy MD  Date: 04/06/2024  Time: 06:24   X-Ray Foot Complete Right [[id number]] Resulted: 04/05/24 2257   Order Status: Completed Updated: 04/05/24 2300   Narrative:     EXAMINATION:  XR FOOT COMPLETE 3 VIEW RIGHT    CLINICAL HISTORY:  . Type 2 diabetes mellitus with foot ulcer    TECHNIQUE:  AP, lateral, and oblique views of the right foot were performed.    COMPARISON:  Right foot series 05/01/2023    FINDINGS:  Soft tissue swelling about the 5th MTP joint, extending into the 5th toe.    Soft tissue lucency, likely a soft tissue ulcer, inferolateral to the 5th MTP joint.    At this time, there is no direct radiographic evidence of acute articular or osseous involvement.    No acute fracture deformity, dislocation, radiopaque foreign body, or soft tissue emphysema.    There are pre-existing deformities of the 5th metatarsal head and 5th proximal phalanx base, possibly related to old remote trauma.    Mild degenerative changes.   Impression:       Soft tissue swelling and soft tissue ulcer about the 5th MTP joint.    Correlate clinically, and with follow-up radiographs or other imaging.      Electronically  signed by: Lucho Gamboa  Date: 04/05/2024  Time: 22:57     Imaging History    2024    Date Procedure Name Study Review Link PACS Link Status Accession Number Location   04/06/24 05:32 AM MRI Foot (Forefoot) Right Without Contrast Study Review  Images Final [id number] [facility]   04/05/24 10:18 PM X-Ray Foot Complete Right Study Review  Images Final [id number] [facility]   04/08/24 03:04 AM CARDIAC MONITORING STRIPS Study Review  Final     04/07/24 05:48 AM CARDIAC MONITORING STRIPS Study Review  Final     04/07/24 02:52 AM CARDIAC MONITORING STRIPS Study Review  Final     04/07/24 02:52 AM CARDIAC MONITORING STRIPS Study Review  Final

## 2024-04-09 NOTE — PROGRESS NOTES
VANCOMYCIN DOSING BY PHARMACY DISCONTINUATION NOTE    Ashutosh Ferrari is a 49 y.o. male who had been consulted for vancomycin dosing.    The pharmacy consult for vancomycin dosing has been discontinued.     Vancomycin Dosing by Pharmacy Consult will sign-off. Please reconsult if necessary. Thank you for allowing us to participate in this patient's care.       NAVEEN Do, PharmD  [id number]

## 2024-04-09 NOTE — CONSULTS
Double lumen PICC to right basilic vein.  45 cm in length, 37 cm arm circumference and 0 cm exposed.   Lot # [id number].

## 2024-04-09 NOTE — PLAN OF CARE
Problem: Adult Inpatient Plan of Care  Goal: Plan of Care Review  Outcome: Ongoing, Progressing     Problem: Infection  Goal: Absence of Infection Signs and Symptoms  Outcome: Ongoing, Progressing     Problem: Diabetes Comorbidity  Goal: Blood Glucose Level Within Targeted Range  Outcome: Ongoing, Progressing     Problem: Fluid and Electrolyte Imbalance (Acute Kidney Injury/Impairment)  Goal: Fluid and Electrolyte Balance  Outcome: Ongoing, Progressing     Problem: Impaired Wound Healing  Goal: Optimal Wound Healing  Outcome: Ongoing, Progressing     Problem: Fall Injury Risk  Goal: Absence of Fall and Fall-Related Injury  Outcome: Ongoing, Progressing     Problem: Skin Injury Risk Increased  Goal: Skin Health and Integrity  Outcome: Ongoing, Progressing

## 2024-04-09 NOTE — PLAN OF CARE
[facility]      HOME HEALTH ORDERS  FACE TO FACE ENCOUNTER    Patient Name: Ashutosh Ferrari  YOB: 1975    PCP: [facility]   PCP Address: [address] / [place] [address]  PCP Phone Number: [phone]  PCP Fax: [phone]    Encounter Date: 4/5/24    Admit to Home Health    Diagnoses:  Active Hospital Problems    Diagnosis  POA    *Diabetic infection of right foot [E11.628, L08.9]  Yes    Osteomyelitis [M86.9]  Yes    Type 2 diabetes mellitus with hyperglycemia, with long-term current use of insulin [E11.65, Z79.4]  Not Applicable    Essential hypertension [I10]  Yes    Stage 3 chronic kidney disease [N18.30]  Yes      Resolved Hospital Problems   No resolved problems to display.       Follow Up Appointments:  No future appointments.    Allergies:Review of patient's allergies indicates:  No Known Allergies    Medications: Review discharge medications with patient and family and provide education.    Current Facility-Administered Medications   Medication Dose Route Frequency Provider Last Rate Last Admin    acetaminophen tablet 650 mg  650 mg Oral Q4H PRN Carmen Walker PA-C   650 mg at 04/08/24 2044    albuterol-ipratropium 2.5 mg-0.5 mg/3 mL nebulizer solution 3 mL  3 mL Nebulization Q4H PRN Carmen Walker PA-C        bisacodyL suppository 10 mg  10 mg Rectal Daily PRN Carmen Walker PA-C        cefTRIAXone (ROCEPHIN) 2 g in dextrose 5 % in water (D5W) 100 mL IVPB (MB+)  2 g Intravenous Q24H Jorge Luis Alas Jr., PA   Stopped at 04/08/24 1830    dextrose 10% bolus 125 mL 125 mL  12.5 g Intravenous PRN Carmen Walker PA-C        dextrose 10% bolus 250 mL 250 mL  25 g Intravenous PRN Carmen Walker PA-C        glucagon (human recombinant) injection 1 mg  1 mg Intramuscular PRN Carmen Walker PA-C        glucose chewable tablet 16 g  16 g Oral PRN Carmen Walker PA-C        glucose chewable tablet 24 g  24 g Oral PRN  Carmen Walker PA-C        heparin (porcine) injection 5,000 Units  5,000 Units Subcutaneous Q8H Carmen Walker PA-C   5,000 Units at 04/07/24 1341    insulin aspart U-100 pen 0-5 Units  0-5 Units Subcutaneous QID (AC + HS) PRN Carmen Walker PA-C   1 Units at 04/08/24 2045    insulin aspart U-100 pen 8 Units  8 Units Subcutaneous TIDWM Josselin Shaabzz PA-C   8 Units at 04/08/24 1759    insulin detemir U-100 (Levemir) pen 16 Units  16 Units Subcutaneous BID Josselin Shabazz PA-C   16 Units at 04/08/24 2045    melatonin tablet 6 mg  6 mg Oral Nightly PRN Danita Woodward PA-C        methocarbamoL tablet 500 mg  500 mg Oral TID Josselin Shabazz PA-C   500 mg at 04/08/24 2045    ondansetron injection 4 mg  4 mg Intravenous Q8H PRN Carmen Walker PA-C        oxyCODONE immediate release tablet 5 mg  5 mg Oral Q4H PRN Josselin Shabazz PA-C   5 mg at 04/06/24 1139    polyethylene glycol packet 17 g  17 g Oral Daily PRN Carmen Walker PA-C        prochlorperazine injection Soln 5 mg  5 mg Intravenous Q6H PRN Carmen Walker PA-C        sodium chloride 0.9% flush 10 mL  10 mL Intravenous PRN Danita Woodward PA-C         Current Discharge Medication List        START taking these medications    Details   dextrose 5 % in water (D5W) PgBk 100 mL with cefTRIAXone 2 gram SolR 2 g Inject 2 g into the vein once daily.           CONTINUE these medications which have NOT CHANGED    Details   amLODIPine (NORVASC) 2.5 MG tablet Take 1 tablet (2.5 mg total) by mouth once daily.  Qty: 30 tablet, Refills: 11    Comments: .      insulin detemir U-100 (LEVEMIR) 100 unit/mL injection Inject 40 Units into the skin every evening.      multivitamin (THERAGRAN) per tablet Take 1 tablet by mouth once daily.       acetaminophen (TYLENOL) 500 MG tablet Take 500 mg by mouth 4 (four) times daily as needed (pain).      blood sugar diagnostic Strp 1 strip by Misc.(Non-Drug; Combo Route) route 2 (two) times daily  "with meals.  Qty: 100 each, Refills: 11      insulin aspart U-100 (NOVOLOG) 100 unit/mL (3 mL) InPn pen Inject 6 Units into the skin 3 (three) times daily.  Qty: 15 mL, Refills: 11      lancets 33 gauge Misc 1 each by Misc.(Non-Drug; Combo Route) route 2 (two) times daily with meals.  Qty: 100 each, Refills: 11      lancing device Misc 1 Device by Misc.(Non-Drug; Combo Route) route 2 (two) times daily with meals.  Qty: 1 each, Refills: 0      pen needle, diabetic 32 gauge x 1/4" Ndle 1 each by Misc.(Non-Drug; Combo Route) route 2 (two) times daily with meals.  Qty: 100 each, Refills: 11      pulse oximeter (PULSE OXIMETER) device by Apply Externally route 2 (two) times a day. Use twice daily at 8 AM and 3 PM and record the value in [employer]lect[employer] as directed.  Qty: 1 each, Refills: 0    Comments: This is a NO CHARGE item.  Please override price to zero.  DO NOT PRINT.  NORMAL MODE e-PRESCRIBE ONLY.           Outpatient Antibiotic Therapy Plan:     Please send referral to [facility].     1) Infection: R foot osteo with GBS     2) Discharge Antibiotics:     Intravenous antibiotics:  Ceftriaxone 2g IV q24h         3) Therapy Duration:  6 weeks     Estimated end date of IV antibiotics: 5/17/24     4) Outpatient Weekly Labs:     Order the following labs to be drawn on Mondays:   CBC  CMP   CRP        5) Fax Lab Results to Infectious Diseases Provider: [facility] Fax Number: [phone]     6) Outpatient Infectious Diseases Follow-up     Follow-up appointment will be arranged by the ID clinic and will be found in the patient's appointments tab.     Prior to discharge, please ensure the patient's follow-up has been scheduled.    If there is still no follow-up scheduled prior to discharge, please send an EPIC message to Sydney Stanley in Infectious Diseases.       I have seen and examined this patient within the last 30 days. My clinical findings that support the need for the " home health skilled services and home bound status are the following:no   Medical restrictions requiring assistance of another human to leave home due to  IV infusion Needs.     Diet:   diabetic diet 2000 calorie    Labs:  Order the following labs to be drawn on Mondays:   CBC  CMP   CRP     Fax Lab Results to Infectious Diseases Provider: [facility] Fax Number: [phone]    Referrals/ Consults  Aide to provide assistance with personal care, ADLs, and vital signs.    Activities:   activity as tolerated    Nursing:   Agency to admit patient within 24 hours of hospital discharge unless specified on physician order or at patient request    SN to complete comprehensive assessment including routine vital signs. Instruct on disease process and s/s of complications to report to MD. Review/verify medication list sent home with the patient at time of discharge  and instruct patient/caregiver as needed. Frequency may be adjusted depending on start of care date.     Skilled nurse to perform up to 3 visits PRN for symptoms related to diagnosis    Notify MD if SBP > 160 or < 90; DBP > 90 or < 50; HR > 120 or < 50; Temp > 101; O2 < 88%    Ok to schedule additional visits based on staff availability and patient request on consecutive days within the home health episode.    When multiple disciplines ordered:    Start of Care occurs on Sunday - Wednesday schedule remaining discipline evaluations as ordered on separate consecutive days following the start of care.    Thursday SOC -schedule subsequent evaluations Friday and Monday the following week.     Friday - Saturday SOC - schedule subsequent discipline evaluations on consecutive days starting Monday of the following week.    For all post-discharge communication and subsequent orders please contact patient's primary care physician. If unable to reach primary care physician or do not receive response within 30 minutes, please contact PCP for clinical staff order  clarification    Miscellaneous   Home Infusion Therapy:   SN to perform Infusion Therapy/Central Line Care.  Review Central Line Care & Central Line Flush with patient.    See OPAT above    Scrub the Hub: Prior to accessing the line, always perform a 30 second alcohol scrub  Each lumen of the central line is to be flushed at least daily with 10 mL Normal Saline and 3 mL Heparin flush (10 units/mL)  Skilled Nurse (SN) may draw blood from IV access  Blood Draw Procedure:   - Aspirate at least 5 mL of blood   - Discard   - Obtain specimen   - Change injection cap   - Flush with 20 mL Normal Saline followed by a                 3-5 mL Heparin flush (10 units/mL)  Central :   - Sterile dressing changes are done weekly and as needed.   - Use chlor-hexadine scrub to cleanse site, apply Biopatch to insertion site,       apply securement device dressing   - Injection caps are changed weekly and after EVERY lab draw.   - If sterile gauze is under dressing to control oozing,                 dressing change must be performed every 24 hours until gauze is not needed.    Home Health Aide:  Nursing Three times weekly    Wound Care Orders  yes:    Patient to conduct dressings daily as follows: R foot dress with Iodosorb, 4x4 gauze, cast padding, and ACE wrap daily  - Weight bearing as tolerated    I certify that this patient is confined to his home and needs intermittent skilled nursing care.      Josselin Shabazz PA-C  Department of Hospital Medicine  [facility]

## 2024-04-09 NOTE — PLAN OF CARE
04/09/24 1421   Final Note   Assessment Type Final Discharge Note   Anticipated Discharge Disposition Home   Hospital Resources/Appts/Education Provided Provided patient/caregiver with written discharge plan information   Post-Acute Status   Patient choice form signed by patient/caregiver List with quality metrics by geographic area provided   Discharge Delays None known at this time     Pt d/c home with family and [facility]. No d/c needs reported by medical team at this time.      JOHNATHAN Lockwood  [facility]  Ext. [phone]

## 2024-04-09 NOTE — PROCEDURES
"Ashutosh Ferrari is a 49 y.o. male patient.    Temp: 98.2 °F (36.8 °C) (04/09/24 0758)  Pulse: 89 (04/09/24 0758)  Resp: 18 (04/09/24 0758)  BP: 135/82 (04/09/24 0758)  SpO2: 95 % (04/09/24 0939)  Weight: 88.5 kg (195 lb) (04/06/24 1700)  Height: 6' 4" (193 cm) (04/06/24 1700)    PICC  Date/Time: 4/9/2024 9:53 AM  Performed by: Isaias Ruiz RN  Assisting provider: Jeff Medina RN  Consent Done: Yes  Time out: Immediately prior to procedure a time out was called to verify the correct patient, procedure, equipment, support staff and site/side marked as required  Indications: med administration and vascular access  Anesthesia: local infiltration  Local anesthetic: lidocaine 1% without epinephrine  Anesthetic Total (mL): 3  Description of findings: PICC  Preparation: skin prepped with ChloraPrep  Skin prep agent dried: skin prep agent completely dried prior to procedure  Sterile barriers: all five maximum sterile barriers used - cap, mask, sterile gown, sterile gloves, and large sterile sheet  Hand hygiene: hand hygiene performed prior to central venous catheter insertion  Location details: right basilic  Catheter type: double lumen  Catheter size: 5 Fr  Catheter Length: 45cm    Ultrasound guidance: yes  Vessel Caliber: medium and patent, compressibility normal  Vascular Doppler: not done  Needle advanced into vessel with real time Ultrasound guidance.  Guidewire confirmed in vessel.  Image recorded and saved.  Sterile sheath used.  Number of attempts: 1  Post-procedure: blood return through all ports, chlorhexidine patch and sterile dressing applied  Technical procedures used: 3CG  Specimens: No  Implants: No  Assessment: placement verified by x-ray  Complications: none          Name Isaias Ruiz RN  4/9/2024    "

## 2024-04-09 NOTE — DISCHARGE SUMMARY
[facility] [address] [place] [address] Medicine  Discharge Summary      Patient Name: Ashutosh Ferrari  MRN: [id number]  СВЕТЛАНА: [id number]  Patient Class: IP- Inpatient  Admission Date: 4/5/2024  Hospital Length of Stay: 1 days  Discharge Date and Time: No discharge date for patient encounter.  Attending Physician: Wyatt Antony MD   Discharging Provider: Josselin Shabazz PA-C  [facility] Provider: [facility] E Josselin Shabazz PA-C  Primary Care Team: [facility] HOSP MED E    HPI:   Ashutosh Ferrari is a 48 y.o. male with a PMHx of T2DM, CKD, HTN, hx R diabetic foot ulcer/infection who presents to [facility] for right foot wound. Patient had an infected wound to his right foot about 1 year ago which was treated with antibiotics and had healed until ~1 month ago. He noticied the wound reopened and began increasing in size. He stepped in ditched with his boots on about a week ago causing him to have to sit in his saturated boot for a few hours. The wound has worsened since then and now has surrounding redness, swelling and warmth extending up his foot and into his ankle. He hasn't taken his insulin in about 2 months as he's been trying to control his BG with diet modifications. Has chronic neuropathy and decreased sensation to BLEs without recent worsening. Denies fever, chills, N/V, abdominal pain, HA, vision changes or syncope.    ED: AFVSS. No leukocytosis. Na 132, , Cr 1.6 (baseline), normal AG. , . XR R foot shows soft tissue swelling and soft tissue ulcer about the 5th MTP joint. Given 1L NS, IV vanc and rocephin.     * No surgery found *      Hospital Course:   Ashutosh Ferrari is a 48 y.o. male who was admitted to hospital medicine for osteomyelitis of R 5th toe. MRI R foot with findings concerning for osteomyelitis of the 5th proximal phalanx, mildly progressed compared to prior exam. Persistent reactive osteitis involving the head of the 5th metatarsal. Ulceration along  the dorsal lateral aspect of the 5th MTP joint. No drainable fluid collection. Started on vancomycin and cefepime. ID and podiatry consulted. Podiatry recommending amputation but patient not amenable at this time. Will continue treatment with IV abx. OPAT given and cleared for d/c per podiatry. Pt was seen and evaluated by me this morning, reports feeling well, and is eager to discharge home. All questions were answered. Patient acknowledged understanding of discharge instructions and feels safe to discharge home. Patient was discharged on 4/9/2024 in stable condition with podiatry and ID follow-up. Education regarding condition provided and return precautions given.     Physical Exam  Gen: in NAD, appears stated age  Neuro: AAOx3, motor, sensory, and strength grossly intact BL  CVS: RRR, no m/r/g  Resp: lungs CTAB, no w/r/r; no belabored breathing or accessory muscle use appreciated   Abd: NTND, soft to palpation  Extrem: no UE or LE edema BL;        Goals of Care Treatment Preferences:  Code Status: Full Code      Consults:   Consults (From admission, onward)          Status Ordering Provider     Inpatient consult to PICC team (NIAS)  Once        Provider:  (Not yet assigned)    Completed CARSON BROWN     Inpatient consult to Podiatry  Once        Provider:  (Not yet assigned)    Completed COURTNEY SHAH     Inpatient consult to Infectious Diseases  Once        Provider:  (Not yet assigned)    Completed COURTNEY SHAH            No new Assessment & Plan notes have been filed under this hospital service since the last note was generated.  Service: Hospital Medicine    Final Active Diagnoses:    Diagnosis Date Noted POA    PRINCIPAL PROBLEM:  Diabetic infection of right foot [E11.628, L08.9] 04/05/2024 Yes    Osteomyelitis [M86.9] 04/06/2024 Yes    Type 2 diabetes mellitus with hyperglycemia, with long-term current use of insulin [E11.65, Z79.4] 03/27/2022 Not Applicable    Essential hypertension [I10] 07/18/2013  "Yes    Stage 3 chronic kidney disease [N18.30] 07/17/2013 Yes      Problems Resolved During this Admission:       Discharged Condition: stable    Disposition: Home-Health Care [facility]    Follow Up:    Patient Instructions:      Notify your health care provider if you experience any of the following:  temperature >100.4     Notify your health care provider if you experience any of the following:  redness, tenderness, or signs of infection (pain, swelling, redness, odor or green/yellow discharge around incision site)     Notify your health care provider if you experience any of the following:  severe uncontrolled pain     Activity as tolerated       Significant Diagnostic Studies: Labs: All labs within the past 24 hours have been reviewed    Pending Diagnostic Studies:       Procedure Component Value Units Date/Time    Specimen to Pathology, Surgery Orthopedics [[id number]] Collected: 04/06/24 1903    Order Status: Sent Lab Status: In process Updated: 04/06/24 1935    Specimen: Tissue            Medications:  Reconciled Home Medications:      Medication List        START taking these medications      dextrose 5 % in water (D5W) PgBk 100 mL with cefTRIAXone 2 gram SolR 2 g  Inject 2 g into the vein once daily.            CONTINUE taking these medications      acetaminophen 500 MG tablet  Commonly known as: TYLENOL  Take 500 mg by mouth 4 (four) times daily as needed (pain).     amLODIPine 2.5 MG tablet  Commonly known as: NORVASC  Take 1 tablet (2.5 mg total) by mouth once daily.     insulin detemir U-100 100 unit/mL injection  Commonly known as: Levemir  Inject 40 Units into the skin every evening.     lancing device Misc  1 Device by Misc.(Non-Drug; Combo Route) route 2 (two) times daily with meals.     multivitamin per tablet  Commonly known as: THERAGRAN  Take 1 tablet by mouth once daily.     NOVOFINE 32 32 gauge x 1/4" Ndle  Generic drug: pen needle, diabetic  1 each by Misc.(Non-Drug; Combo Route) route 2 (two) " times daily with meals.     NovoLOG Flexpen U-100 Insulin 100 unit/mL (3 mL) Inpn pen  Generic drug: insulin aspart U-100  Inject 6 Units into the skin 3 (three) times daily.     pulse oximeter device  Commonly known as: pulse oximeter  by Apply Externally route 2 (two) times a day. Use twice daily at 8 AM and 3 PM and record the value in MyChart as directed.     TRUE METRIX GLUCOSE TEST STRIP Strp  Generic drug: blood sugar diagnostic  1 strip by Misc.(Non-Drug; Combo Route) route 2 (two) times daily with meals.     TRUEPLUS LANCETS 33 gauge Misc  Generic drug: lancets  1 each by Misc.(Non-Drug; Combo Route) route 2 (two) times daily with meals.              Indwelling Lines/Drains at time of discharge:   Lines/Drains/Airways       Peripherally Inserted Central Catheter Line  Duration             PICC Double Lumen 04/09/24 0953 right basilic <1 day                    Time spent on the discharge of patient: 36 minutes         Josselin Shabazz PA-C  Department of Hospital Medicine  [facility]

## 2024-04-09 NOTE — PLAN OF CARE
SW met with the pt at the bedside to inquire about insurance due to needing IV ABX at DC. Pt reported that he has Medicare insurance, however admissions is unable to locate Medicare without his information, which pt stated that he does not have. Per admissions, pt is also not eligible for Medicaid at this time. SW is waiting on [facility] team for out of pocket cost and will continue to follow up.     1:50 PM [facility] will provide services for pt. Pt is willing to pay out of pocket cost and receive services at the infusion suite. Teachings will be provided before pt DC.    Isela Richardson, JOHNATHAN  [facility]  Ext. [phone]

## 2024-04-10 ENCOUNTER — TELEPHONE (OUTPATIENT)
Dept: PODIATRY | Facility: CLINIC | Age: 49
End: 2024-04-10

## 2024-04-10 LAB
BACTERIA BLD CULT: NORMAL
BACTERIA BLD CULT: NORMAL
BACTERIA SPEC AEROBE CULT: ABNORMAL
BACTERIA SPEC AEROBE CULT: ABNORMAL
BACTERIA SPEC ANAEROBE CULT: ABNORMAL

## 2024-04-10 NOTE — TELEPHONE ENCOUNTER
Patient gave verbal understanding of being scheduled after hospital discharge with Dr Castillo on 4/17 at 2 pm for wound care. Reminder sent in the mail on today with verbal understanding of date time and directions to the clinic for evaluation.

## 2024-04-11 LAB — BACTERIA SPEC ANAEROBE CULT: NORMAL

## 2024-04-16 ENCOUNTER — TELEPHONE (OUTPATIENT)
Dept: INFECTIOUS DISEASES | Facility: HOSPITAL | Age: 49
End: 2024-04-16

## 2024-04-17 ENCOUNTER — OFFICE VISIT (OUTPATIENT)
Dept: PODIATRY | Facility: CLINIC | Age: 49
End: 2024-04-17

## 2024-04-17 VITALS
BODY MASS INDEX: 25.77 KG/M2 | WEIGHT: 211.63 LBS | SYSTOLIC BLOOD PRESSURE: 148 MMHG | HEIGHT: 76 IN | DIASTOLIC BLOOD PRESSURE: 95 MMHG | HEART RATE: 99 BPM | RESPIRATION RATE: 18 BRPM

## 2024-04-17 DIAGNOSIS — E11.42 TYPE 2 DIABETES MELLITUS WITH DIABETIC POLYNEUROPATHY, WITHOUT LONG-TERM CURRENT USE OF INSULIN: Primary | ICD-10-CM

## 2024-04-17 PROCEDURE — 11042 DBRDMT SUBQ TIS 1ST 20SQCM/<: CPT | Mod: S$PBB,,, | Performed by: PODIATRIST

## 2024-04-17 PROCEDURE — 99213 OFFICE O/P EST LOW 20 MIN: CPT | Mod: PBBFAC,25 | Performed by: PODIATRIST

## 2024-04-17 PROCEDURE — 11042 DBRDMT SUBQ TIS 1ST 20SQCM/<: CPT | Mod: PBBFAC | Performed by: PODIATRIST

## 2024-04-17 PROCEDURE — 99999 PR PBB SHADOW E&M-EST. PATIENT-LVL III: CPT | Mod: PBBFAC,,, | Performed by: PODIATRIST

## 2024-04-17 PROCEDURE — 99499 UNLISTED E&M SERVICE: CPT | Mod: S$PBB,,, | Performed by: PODIATRIST

## 2024-04-17 NOTE — TELEPHONE ENCOUNTER
Called patient.  Informed him of his high blood glucose.  Patient is feeling fine. Has no symptoms.  He just took his night time insulin.  Advised patient to speak to his diabetes doctor tomorrow morning.

## 2024-04-17 NOTE — PROGRESS NOTES
Subjective:     Patient ID: Ashutosh Ferrari is a 49 y.o. male.    Chief Complaint: No chief complaint on file.    Ashutosh is a 49 y.o. male who presents to the clinic for evaluation and treatment of high risk feet. Ashutosh has a past medical history of Diabetes mellitus, Diabetes mellitus, type 2, Essential hypertension, benign (07/18/2013), and Herpes. The patient's chief complaint is diabetic foot ulcer, R. Pt admitted 2/2 acute OM. Bone bx +MRSA & GBS. On IV CTX via PICC x 6 weeks . This patient has documented high risk feet requiring routine maintenance secondary to diabetes mellitis and those secondary complications of diabetes, as mentioned..    PCP: [facility] -    Date Last Seen by PCP:   Chief Complaint   Patient presents with    Wound Care     Rt foot     Dressing Change     Hemoglobin A1C   Date Value Ref Range Status   04/06/2024 12.3 (H) 4.0 - 5.6 % Final     Comment:     ADA Screening Guidelines:  5.7-6.4%  Consistent with prediabetes  >or=6.5%  Consistent with diabetes    High levels of fetal hemoglobin interfere with the HbA1C  assay. Heterozygous hemoglobin variants (HbS, HgC, etc)do  not significantly interfere with this assay.   However, presence of multiple variants may affect accuracy.     03/27/2022 12.5 (H) 4.0 - 5.6 % Final     Comment:     ADA Screening Guidelines:  5.7-6.4%  Consistent with prediabetes  >or=6.5%  Consistent with diabetes    High levels of fetal hemoglobin interfere with the HbA1C  assay. Heterozygous hemoglobin variants (HbS, HgC, etc)do  not significantly interfere with this assay.   However, presence of multiple variants may affect accuracy.     09/19/2021 12.8 (H) 4.0 - 5.6 % Final     Comment:     ADA Screening Guidelines:  5.7-6.4%  Consistent with prediabetes  >or=6.5%  Consistent with diabetes    High levels of fetal hemoglobin interfere with the HbA1C  assay. Heterozygous hemoglobin variants (HbS, HgC, etc)do  not significantly interfere with this assay.    However, presence of multiple variants may affect accuracy.         ROS       Patient Active Problem List   Diagnosis    Diabetes mellitus, new onset    HHNC (hyperglycemic hyperosmolar nonketotic coma)    Volume depletion, unspecified    Stage 3 chronic kidney disease    Type 2 diabetes mellitus    Abscess, scalp    Dehydration, moderate    Hypophosphatasia    Hypomagnesemia    Hypokalemia    Essential hypertension    Pneumonia due to COVID-19 virus    Hyperkalemia    Leukopenia    Increased anion gap metabolic acidosis    Type 2 diabetes mellitus with hyperglycemia, with long-term current use of insulin    Diabetic foot wound and Cellulitis    Encounter for wound re-check    Anemia    Herpes simplex    Personal history of COVID-19    Acute kidney failure    Diabetic infection of right foot    Osteomyelitis       Current Outpatient Medications on File Prior to Visit   Medication Sig Dispense Refill    acetaminophen (TYLENOL) 500 MG tablet Take 500 mg by mouth 4 (four) times daily as needed (pain).      amLODIPine (NORVASC) 2.5 MG tablet Take 1 tablet (2.5 mg total) by mouth once daily. 30 tablet 11    blood sugar diagnostic Strp 1 strip by Misc.(Non-Drug; Combo Route) route 2 (two) times daily with meals. 100 each 11    dextrose 5 % in water (D5W) PgBk 100 mL with cefTRIAXone 2 gram SolR 2 g Inject 2 g into the vein once daily.      insulin aspart U-100 (NOVOLOG) 100 unit/mL (3 mL) InPn pen Inject 6 Units into the skin 3 (three) times daily. (Patient not taking: Reported on 4/6/2024) 15 mL 11    insulin detemir U-100 (LEVEMIR) 100 unit/mL injection Inject 40 Units into the skin every evening.      lancets 33 gauge Misc 1 each by Misc.(Non-Drug; Combo Route) route 2 (two) times daily with meals. 100 each 11    lancing device Misc 1 Device by Misc.(Non-Drug; Combo Route) route 2 (two) times daily with meals. 1 each 0    multivitamin (THERAGRAN) per tablet Take 1 tablet by mouth once daily.       pen needle,  "diabetic 32 gauge x 1/4" Ndle 1 each by Misc.(Non-Drug; Combo Route) route 2 (two) times daily with meals. 100 each 11    pulse oximeter (PULSE OXIMETER) device by Apply Externally route 2 (two) times a day. Use twice daily at 8 AM and 3 PM and record the value in MyChart as directed. 1 each 0     No current facility-administered medications on file prior to visit.       Review of patient's allergies indicates:  No Known Allergies    Past Surgical History:   Procedure Laterality Date    DEBRIDEMENT OF FOOT Right 4/1/2022    Procedure: DEBRIDEMENT, FOOT;  Surgeon: Amena Martinez DPM;  Location: [facility] OR;  Service: Podiatry;  Laterality: Right;    DEBRIDEMENT OF FOOT Right 4/4/2022    Procedure: DEBRIDEMENT, FOOT;  Surgeon: Amena Martinez DPM;  Location: [facility] OR;  Service: Podiatry;  Laterality: Right;    INCISION AND DRAINAGE Right 3/28/2022    Procedure: INCISION AND DRAINAGE RIGHT FOOT;  Surgeon: Amena Martinez DPM;  Location: [facility] OR;  Service: Podiatry;  Laterality: Right;       Family History   Problem Relation Name Age of Onset    Diabetes Mother         Social History     Socioeconomic History    Marital status: Single   Occupational History    Occupation: Unemployed   Tobacco Use    Smoking status: Never    Smokeless tobacco: Never   Substance and Sexual Activity    Alcohol use: No    Drug use: No    Sexual activity: Yes     Partners: Female     Birth control/protection: None, Condom     Social Determinants of Health     Financial Resource Strain: Low Risk  (4/20/2022)    Overall Financial Resource Strain (CARDIA)     Difficulty of Paying Living Expenses: Not very hard   Food Insecurity: No Food Insecurity (4/20/2022)    Hunger Vital Sign     Worried About Running Out of Food in the Last Year: Never true     Ran Out of Food in the Last Year: Never true   Transportation Needs: No Transportation Needs (4/20/2022)    PRAPARE - Transportation     Lack of Transportation (Medical): No     Lack of " Transportation (Non-Medical): No   Physical Activity: Inactive (4/20/2022)    Exercise Vital Sign     Days of Exercise per Week: 0 days     Minutes of Exercise per Session: 0 min   Stress: No Stress Concern Present (4/20/2022)    [employer] [place] [employer] - Occupational Stress Questionnaire     Feeling of Stress : Not at all   Social Connections: Socially Isolated (4/20/2022)    Social Connection and Isolation Panel [NHANES]     Frequency of Communication with Friends and Family: More than three times a week     Frequency of Social Gatherings with Friends and Family: More than three times a week     Attends Gnosticist Services: Never     Active Member of Clubs or Organizations: No     Attends Club or Organization Meetings: Never     Marital Status: Never    Housing Stability: Low Risk  (4/20/2022)    Housing Stability Vital Sign     Unable to Pay for Housing in the Last Year: No     Number of Places Lived in the Last Year: 1     Unstable Housing in the Last Year: No           Objective:     Physical Exam  4.17.24    1.5x0.9x0.3 cm sub 5th MPJ R . Granular base w/ lesa wound maceration and hyperkeratosis. No surrounding erythema, edema, malodor, nor drainage noted       1.6x1.4x0.1 cm granular wound to R dorsal 5th MTPJ. Mild lesa wound maceration. No surrounding erythema, edema, malodor, nor drainage noted           CRP-    CRP   Date Value Ref Range Status   04/16/2024 6.2 0.0 - 8.2 mg/L Final     ESR-   Sed Rate   Date Value Ref Range Status   04/05/2024 105 (H) 0 - 23 mm/Hr Final     CBC-   WBC   Date Value Ref Range Status   04/16/2024 5.98 3.90 - 12.70 K/uL Final     A1C-   Hemoglobin A1C   Date Value Ref Range Status   04/06/2024 12.3 (H) 4.0 - 5.6 % Final     Comment:     ADA Screening Guidelines:  5.7-6.4%  Consistent with prediabetes  >or=6.5%  Consistent with diabetes    High levels of fetal hemoglobin interfere with the HbA1C  assay. Heterozygous hemoglobin variants (HbS, HgC,  etc)do  not significantly interfere with this assay.   However, presence of multiple variants may affect accuracy.       MICRO:    Aerobic Bacterial Culture   Date Value Ref Range Status   04/06/2024 (A)  Final    STREPTOCOCCUS AGALACTIAE (GROUP B)  Rare  Beta-hemolytic streptococci are routinely susceptible to   penicillins,cephalosporins and carbapenems.  Susceptibility testing not routinely performed     04/06/2024 (A)  Final    METHICILLIN RESISTANT STAPHYLOCOCCUS AUREUS  Rare          Anaerobic Culture   Date Value Ref Range Status   04/06/2024 No anaerobes isolated  Final     -----------------------------------------------------------------------------------------------------------------------------------------------------------    Assessment:      Encounter Diagnosis   Name Primary?    Type 2 diabetes mellitus with diabetic polyneuropathy, without long-term current use of insulin Yes     Plan:     Diagnoses and all orders for this visit:    Type 2 diabetes mellitus with diabetic polyneuropathy, without long-term current use of insulin      I counseled the patient on his conditions, their implications and medical management.    Independent review of patients previous clinical notes    Reviewed current medication(s), and lab(s) specific to foot ailment(s) with patient in detail. All questions answered     IV abx via PICC x 6 weeks per ID rec's     Debridement: With verbal consent, nonviable tissues on the right foot were debrided beyond sub q utilizing a  sterile No. 3 scalpel and forceps. Minimal bleeding controlled with direct pressure  The patient tolerated this well.     Dressings: Ni  Offloading:Michael COPELAND MA assisted w/ application of football dressing under my direct supervision. Walking boot  shoe applied to offload the area. Advised patient that this should be worn on the affected foot at all times when ambulating     Follow-up:Patient is to return to the clinic in 1 week  for follow-up but  should call [facility] immediately if any signs of infection, such as fever, chills, sweats, increased redness or pain.    Short-term goals include maintaining good offloading and minimizing bioburden, promoting granulation and epithelialization to healing.  Long-term goals include keeping the wound healed by good offloading and medical management under the direction of internist.        .

## 2024-04-23 ENCOUNTER — TELEPHONE (OUTPATIENT)
Dept: INFECTIOUS DISEASES | Facility: CLINIC | Age: 49
End: 2024-04-23

## 2024-04-24 NOTE — TELEPHONE ENCOUNTER
Spoke with patient as again, has significan hyperglycemia on labs.  Spoke with him and says he is feeling great.  Disappointed his blood sugar is so high.  Unsure why his blood glucose is so high.  I encouraged him to see an endocrinologist if he does not already.    Jorge Luis Alas PA-C

## 2024-04-26 ENCOUNTER — TELEPHONE (OUTPATIENT)
Dept: INFECTIOUS DISEASES | Facility: HOSPITAL | Age: 49
End: 2024-04-26

## 2024-04-26 NOTE — TELEPHONE ENCOUNTER
"Called patient evening of 4/25/24 and got voicemail of "The [employer] Man"  and the same AM 4/26/24.  Left message to return my calls about his foot culture results and that ABX change is necessitated.  Will have [facility] reach out to him for 1st dosing as he will be changed from Ceftriaxone to Daptomycin.      Jorge Luis Alas PA-C  "

## 2024-04-29 ENCOUNTER — PATIENT MESSAGE (OUTPATIENT)
Dept: INFECTIOUS DISEASES | Facility: CLINIC | Age: 49
End: 2024-04-29

## 2024-05-01 ENCOUNTER — OFFICE VISIT (OUTPATIENT)
Dept: PODIATRY | Facility: CLINIC | Age: 49
End: 2024-05-01

## 2024-05-01 VITALS
RESPIRATION RATE: 18 BRPM | BODY MASS INDEX: 25.77 KG/M2 | HEIGHT: 76 IN | DIASTOLIC BLOOD PRESSURE: 94 MMHG | SYSTOLIC BLOOD PRESSURE: 139 MMHG | HEART RATE: 98 BPM | WEIGHT: 211.63 LBS

## 2024-05-01 DIAGNOSIS — E11.621 DIABETIC FOOT ULCER ASSOCIATED WITH TYPE 2 DIABETES MELLITUS, UNSPECIFIED LATERALITY, UNSPECIFIED PART OF FOOT, UNSPECIFIED ULCER STAGE: ICD-10-CM

## 2024-05-01 DIAGNOSIS — E11.42 TYPE 2 DIABETES MELLITUS WITH DIABETIC POLYNEUROPATHY, WITHOUT LONG-TERM CURRENT USE OF INSULIN: Primary | ICD-10-CM

## 2024-05-01 DIAGNOSIS — L97.509 DIABETIC FOOT ULCER ASSOCIATED WITH TYPE 2 DIABETES MELLITUS, UNSPECIFIED LATERALITY, UNSPECIFIED PART OF FOOT, UNSPECIFIED ULCER STAGE: ICD-10-CM

## 2024-05-01 PROCEDURE — 11042 DBRDMT SUBQ TIS 1ST 20SQCM/<: CPT | Mod: PBBFAC | Performed by: PODIATRIST

## 2024-05-01 PROCEDURE — 11042 DBRDMT SUBQ TIS 1ST 20SQCM/<: CPT | Mod: S$PBB,,, | Performed by: PODIATRIST

## 2024-05-01 PROCEDURE — 99499 UNLISTED E&M SERVICE: CPT | Mod: S$PBB,,, | Performed by: PODIATRIST

## 2024-05-01 PROCEDURE — 99214 OFFICE O/P EST MOD 30 MIN: CPT | Mod: PBBFAC,25 | Performed by: PODIATRIST

## 2024-05-01 PROCEDURE — 99999 PR PBB SHADOW E&M-EST. PATIENT-LVL IV: CPT | Mod: PBBFAC,,, | Performed by: PODIATRIST

## 2024-05-06 LAB — FUNGUS SPEC CULT: NORMAL

## 2024-05-08 ENCOUNTER — OFFICE VISIT (OUTPATIENT)
Dept: PODIATRY | Facility: CLINIC | Age: 49
End: 2024-05-08

## 2024-05-08 VITALS
BODY MASS INDEX: 25.77 KG/M2 | HEIGHT: 76 IN | WEIGHT: 211.63 LBS | DIASTOLIC BLOOD PRESSURE: 86 MMHG | SYSTOLIC BLOOD PRESSURE: 158 MMHG

## 2024-05-08 DIAGNOSIS — E11.621 DIABETIC FOOT ULCER ASSOCIATED WITH TYPE 2 DIABETES MELLITUS, UNSPECIFIED LATERALITY, UNSPECIFIED PART OF FOOT, UNSPECIFIED ULCER STAGE: Primary | ICD-10-CM

## 2024-05-08 DIAGNOSIS — L97.509 DIABETIC FOOT ULCER ASSOCIATED WITH TYPE 2 DIABETES MELLITUS, UNSPECIFIED LATERALITY, UNSPECIFIED PART OF FOOT, UNSPECIFIED ULCER STAGE: Primary | ICD-10-CM

## 2024-05-08 PROCEDURE — 99214 OFFICE O/P EST MOD 30 MIN: CPT | Mod: PBBFAC,PO,25 | Performed by: PODIATRIST

## 2024-05-08 PROCEDURE — 11042 DBRDMT SUBQ TIS 1ST 20SQCM/<: CPT | Mod: S$PBB,,, | Performed by: PODIATRIST

## 2024-05-08 PROCEDURE — 99499 UNLISTED E&M SERVICE: CPT | Mod: S$PBB,,, | Performed by: PODIATRIST

## 2024-05-08 PROCEDURE — 99999 PR PBB SHADOW E&M-EST. PATIENT-LVL IV: CPT | Mod: PBBFAC,,, | Performed by: PODIATRIST

## 2024-05-08 PROCEDURE — 11042 DBRDMT SUBQ TIS 1ST 20SQCM/<: CPT | Mod: PBBFAC,PO | Performed by: PODIATRIST

## 2024-05-09 NOTE — PROGRESS NOTES
Subjective:     Patient ID: Ashutosh Ferrari is a 49 y.o. male.    Chief Complaint: Wound Care and Dressing Change    Ashutosh is a 49 y.o. male who presents to the clinic for evaluation and treatment of high risk feet. Ashutosh has a past medical history of Diabetes mellitus, Diabetes mellitus, type 2, Essential hypertension, benign (07/18/2013), and Herpes. The patient's chief complaint is diabetic foot ulcer, R. Pt admitted 2/2 acute OM. Bone bx +MRSA & GBS. On IV CTX via PICC x 6 weeks . This patient has documented high risk feet requiring routine maintenance secondary to diabetes mellitis and those secondary complications of diabetes, as mentioned..    5.1.24: Patient has been in football dressing, ambulating in Darco shoe x 1 week with out issues     PCP: [facility] -    Date Last Seen by PCP:   Chief Complaint   Patient presents with    Wound Care    Dressing Change     Hemoglobin A1C   Date Value Ref Range Status   04/06/2024 12.3 (H) 4.0 - 5.6 % Final     Comment:     ADA Screening Guidelines:  5.7-6.4%  Consistent with prediabetes  >or=6.5%  Consistent with diabetes    High levels of fetal hemoglobin interfere with the HbA1C  assay. Heterozygous hemoglobin variants (HbS, HgC, etc)do  not significantly interfere with this assay.   However, presence of multiple variants may affect accuracy.     03/27/2022 12.5 (H) 4.0 - 5.6 % Final     Comment:     ADA Screening Guidelines:  5.7-6.4%  Consistent with prediabetes  >or=6.5%  Consistent with diabetes    High levels of fetal hemoglobin interfere with the HbA1C  assay. Heterozygous hemoglobin variants (HbS, HgC, etc)do  not significantly interfere with this assay.   However, presence of multiple variants may affect accuracy.     09/19/2021 12.8 (H) 4.0 - 5.6 % Final     Comment:     ADA Screening Guidelines:  5.7-6.4%  Consistent with prediabetes  >or=6.5%  Consistent with diabetes    High levels of fetal hemoglobin interfere with the HbA1C  assay.  Heterozygous hemoglobin variants (HbS, HgC, etc)do  not significantly interfere with this assay.   However, presence of multiple variants may affect accuracy.         Review of Systems   Constitutional: Negative for chills, decreased appetite and fever.   Cardiovascular:  Negative for leg swelling.   Musculoskeletal:  Negative for arthritis, joint pain, joint swelling and myalgias.   Gastrointestinal:  Negative for nausea and vomiting.   Neurological:  Negative for loss of balance, numbness and paresthesias.          Patient Active Problem List   Diagnosis    Diabetes mellitus, new onset    HHNC (hyperglycemic hyperosmolar nonketotic coma)    Volume depletion, unspecified    Stage 3 chronic kidney disease    Type 2 diabetes mellitus    Abscess, scalp    Dehydration, moderate    Hypophosphatasia    Hypomagnesemia    Hypokalemia    Essential hypertension    Pneumonia due to COVID-19 virus    Hyperkalemia    Leukopenia    Increased anion gap metabolic acidosis    Type 2 diabetes mellitus with hyperglycemia, with long-term current use of insulin    Diabetic foot wound and Cellulitis    Encounter for wound re-check    Anemia    Herpes simplex    Personal history of COVID-19    Acute kidney failure    Diabetic infection of right foot    Osteomyelitis       Current Outpatient Medications on File Prior to Visit   Medication Sig Dispense Refill    acetaminophen (TYLENOL) 500 MG tablet Take 500 mg by mouth 4 (four) times daily as needed (pain).      blood sugar diagnostic Strp 1 strip by Misc.(Non-Drug; Combo Route) route 2 (two) times daily with meals. 100 each 11    dextrose 5 % in water (D5W) PgBk 100 mL with cefTRIAXone 2 gram SolR 2 g Inject 2 g into the vein once daily.      insulin detemir U-100 (LEVEMIR) 100 unit/mL injection Inject 40 Units into the skin every evening.      lancets 33 gauge Misc 1 each by Misc.(Non-Drug; Combo Route) route 2 (two) times daily with meals. 100 each 11    multivitamin (THERAGRAN) per  "tablet Take 1 tablet by mouth once daily.       pen needle, diabetic 32 gauge x 1/4" Ndle 1 each by Misc.(Non-Drug; Combo Route) route 2 (two) times daily with meals. 100 each 11    pulse oximeter (PULSE OXIMETER) device by Apply Externally route 2 (two) times a day. Use twice daily at 8 AM and 3 PM and [facility] the [facility] as directed. 1 each 0    amLODIPine (NORVASC) 2.5 MG tablet Take 1 tablet (2.5 mg total) by mouth once daily. 30 tablet 11    insulin aspart U-100 (NOVOLOG) 100 unit/mL (3 mL) InPn pen Inject 6 Units into the skin 3 (three) times daily. (Patient not taking: Reported on 4/6/2024) 15 mL 11    lancing device Misc 1 Device by Misc.(Non-Drug; Combo Route) route 2 (two) times daily with meals. 1 each 0     No current facility-administered medications on file prior to visit.       Review of patient's allergies indicates:  No Known Allergies    Past Surgical History:   Procedure Laterality Date    DEBRIDEMENT OF FOOT Right 4/1/2022    Procedure: DEBRIDEMENT, FOOT;  Surgeon: Amena Martinez DPM;  Location: [facility] OR;  Service: Podiatry;  Laterality: Right;    DEBRIDEMENT OF FOOT Right 4/4/2022    Procedure: DEBRIDEMENT, FOOT;  Surgeon: Amena Martinez DPM;  Location: [facility] OR;  Service: Podiatry;  Laterality: Right;    INCISION AND DRAINAGE Right 3/28/2022    Procedure: INCISION AND DRAINAGE RIGHT FOOT;  Surgeon: Amena Martinez DPM;  Location: [facility] OR;  Service: Podiatry;  Laterality: Right;       Family History   Problem Relation Name Age of Onset    Diabetes Mother         Social History     Socioeconomic History    Marital status: Single   Occupational History    Occupation: Unemployed   Tobacco Use    Smoking status: Never    Smokeless tobacco: Never   Substance and Sexual Activity    Alcohol use: No    Drug use: No    Sexual activity: Yes     Partners: Female     Birth control/protection: None, Condom     Social Determinants of Health     Financial Resource Strain: Low Risk  (4/20/2022)    " Overall Financial Resource Strain (CARDIA)     Difficulty of Paying Living Expenses: Not very hard   Food Insecurity: No Food Insecurity (4/20/2022)    Hunger Vital Sign     Worried About Running Out of Food in the Last Year: Never true     Ran Out of Food in the Last Year: Never true   Transportation Needs: No Transportation Needs (4/20/2022)    PRAPARE - Transportation     Lack of Transportation (Medical): No     Lack of Transportation (Non-Medical): No   Physical Activity: Inactive (4/20/2022)    Exercise Vital Sign     Days of Exercise per Week: 0 days     Minutes of Exercise per Session: 0 min   Stress: No Stress Concern Present (4/20/2022)    [place] [employer] - Occupational Stress Questionnaire     Feeling of Stress : Not at all   Housing Stability: Low Risk  (4/20/2022)    Housing Stability Vital Sign     Unable to Pay for Housing in the Last Year: No     Number of Places Lived in the Last Year: 1     Unstable Housing in the Last Year: No           Objective:     Physical Exam  Vitals reviewed.   Constitutional:       Appearance: He is well-developed.   Cardiovascular:      Comments: dorsalis pedis and posterior tibial pulses are palpable bilaterally. Capillary refill time is within normal limits. + pedal hair growth         Musculoskeletal:         General: No tenderness. Normal range of motion.      Comments: Adequate joint range of motion without pain, limitation, nor crepitation Bilateral feet and ankle joints. Muscle strength is 5/5 in all groups bilaterally.         Skin:     General: Skin is warm and dry.      Findings: No erythema, lesion or rash.   Neurological:      Mental Status: He is alert and oriented to person, place, and time.      Sensory: No sensory deficit.      Comments: [place]-Angella 5.07 monofilament is intact bilateral feet.      Psychiatric:         Behavior: Behavior normal.       4.17.24    1.5x0.9x0.3 cm sub 5th MPJ R . Granular base w/ lesa wound  maceration and hyperkeratosis. No surrounding erythema, edema, malodor, nor drainage noted       1.6x1.4x0.1 cm granular wound to R dorsal 5th MTPJ. Mild lesa wound maceration. No surrounding erythema, edema, malodor, nor drainage noted       5.1.24    1.1x0.5x0.2 cm   Granular wound base.  Nearly resolved periwound maceration and hyperkeratosis.  No deep probe.  No surrounding erythema nor edema.  No malodor.    CRP-    CRP   Date Value Ref Range Status   05/07/2024 6.9 0.0 - 8.2 mg/L Final     ESR-   Sed Rate   Date Value Ref Range Status   04/05/2024 105 (H) 0 - 23 mm/Hr Final     CBC-   WBC   Date Value Ref Range Status   04/30/2024 5.58 3.90 - 12.70 K/uL Final     A1C-   Hemoglobin A1C   Date Value Ref Range Status   04/06/2024 12.3 (H) 4.0 - 5.6 % Final     Comment:     ADA Screening Guidelines:  5.7-6.4%  Consistent with prediabetes  >or=6.5%  Consistent with diabetes    High levels of fetal hemoglobin interfere with the HbA1C  assay. Heterozygous hemoglobin variants (HbS, HgC, etc)do  not significantly interfere with this assay.   However, presence of multiple variants may affect accuracy.       MICRO:    Aerobic Bacterial Culture   Date Value Ref Range Status   04/06/2024 (A)  Final    STREPTOCOCCUS AGALACTIAE (GROUP B)  Rare  Beta-hemolytic streptococci are routinely susceptible to   penicillins,cephalosporins and carbapenems.  Susceptibility testing not routinely performed     04/06/2024 (A)  Final    METHICILLIN RESISTANT STAPHYLOCOCCUS AUREUS  Rare          Anaerobic Culture   Date Value Ref Range Status   04/06/2024 No anaerobes isolated  Final     -----------------------------------------------------------------------------------------------------------------------------------------------------------    Assessment:      Encounter Diagnoses   Name Primary?    Type 2 diabetes mellitus with diabetic polyneuropathy, without long-term current use of insulin Yes    Diabetic foot ulcer associated with type 2  diabetes mellitus, unspecified laterality, unspecified part of foot, unspecified ulcer stage      Plan:     Ashutosh was seen today for wound care and dressing change.    Diagnoses and all orders for this visit:    Type 2 diabetes mellitus with diabetic polyneuropathy, without long-term current use of insulin    Diabetic foot ulcer associated with type 2 diabetes mellitus, unspecified laterality, unspecified part of foot, unspecified ulcer stage      I counseled the patient on his conditions, their implications and medical management.    Independent review of patients previous clinical notes    Reviewed current medication(s), and lab(s) specific to foot ailment(s) with patient in detail. All questions answered     IV abx via PICC x 6 weeks per ID rec's     Debridement: With verbal consent, nonviable tissues on the right foot were debrided beyond sub q utilizing a  sterile No. 3 scalpel and forceps. Minimal bleeding controlled with direct pressure  The patient tolerated this well.     Dressings: Ni  Offloading:Michael COPELAND MA assisted w/ application of football dressing under my direct supervision. Walking boot  shoe applied to offload the area. Advised patient that this should be worn on the affected foot at all times when ambulating     Follow-up:Patient is to return to the clinic in 1 week  for follow-up but should call [facility] immediately if any signs of infection, such as fever, chills, sweats, increased redness or pain.    Short-term goals include maintaining good offloading and minimizing bioburden, promoting granulation and epithelialization to healing.  Long-term goals include keeping the wound healed by good offloading and medical management under the direction of internist.        .

## 2024-05-10 NOTE — PROGRESS NOTES
Subjective:     Patient ID: Ashutosh Ferrari is a 49 y.o. male.    Chief Complaint: Diabetes Mellitus and Wound Check    Ashutosh is a 49 y.o. male who presents to the clinic for evaluation and treatment of high risk feet. Ashutosh has a past medical history of Diabetes mellitus, Diabetes mellitus, type 2, Essential hypertension, benign (07/18/2013), and Herpes. The patient's chief complaint is diabetic foot ulcer, R. Pt admitted 2/2 acute OM. Bone bx +MRSA & GBS. On IV CTX via PICC x 6 weeks . This patient has documented high risk feet requiring routine maintenance secondary to diabetes mellitis and those secondary complications of diabetes, as mentioned..    5.1.24: Patient has been in football dressing, ambulating in Darco shoe x 1 week with out issues     5/8/24: F/u right foot ulceration. Presents in football dressing.    PCP: [facility] -    Date Last Seen by PCP:   Chief Complaint   Patient presents with    Diabetes Mellitus    Wound Check     Hemoglobin A1C   Date Value Ref Range Status   04/06/2024 12.3 (H) 4.0 - 5.6 % Final     Comment:     ADA Screening Guidelines:  5.7-6.4%  Consistent with prediabetes  >or=6.5%  Consistent with diabetes    High levels of fetal hemoglobin interfere with the HbA1C  assay. Heterozygous hemoglobin variants (HbS, HgC, etc)do  not significantly interfere with this assay.   However, presence of multiple variants may affect accuracy.     03/27/2022 12.5 (H) 4.0 - 5.6 % Final     Comment:     ADA Screening Guidelines:  5.7-6.4%  Consistent with prediabetes  >or=6.5%  Consistent with diabetes    High levels of fetal hemoglobin interfere with the HbA1C  assay. Heterozygous hemoglobin variants (HbS, HgC, etc)do  not significantly interfere with this assay.   However, presence of multiple variants may affect accuracy.     09/19/2021 12.8 (H) 4.0 - 5.6 % Final     Comment:     ADA Screening Guidelines:  5.7-6.4%  Consistent with prediabetes  >or=6.5%  Consistent with  diabetes    High levels of fetal hemoglobin interfere with the HbA1C  assay. Heterozygous hemoglobin variants (HbS, HgC, etc)do  not significantly interfere with this assay.   However, presence of multiple variants may affect accuracy.         Review of Systems   Constitutional: Negative for chills, decreased appetite and fever.   Cardiovascular:  Negative for leg swelling.   Musculoskeletal:  Negative for arthritis, joint pain, joint swelling and myalgias.   Gastrointestinal:  Negative for nausea and vomiting.   Neurological:  Negative for loss of balance, numbness and paresthesias.          Patient Active Problem List   Diagnosis    Diabetes mellitus, new onset    HHNC (hyperglycemic hyperosmolar nonketotic coma)    Volume depletion, unspecified    Stage 3 chronic kidney disease    Type 2 diabetes mellitus    Abscess, scalp    Dehydration, moderate    Hypophosphatasia    Hypomagnesemia    Hypokalemia    Essential hypertension    Pneumonia due to COVID-19 virus    Hyperkalemia    Leukopenia    Increased anion gap metabolic acidosis    Type 2 diabetes mellitus with hyperglycemia, with long-term current use of insulin    Diabetic foot wound and Cellulitis    Encounter for wound re-check    Anemia    Herpes simplex    Personal history of COVID-19    Acute kidney failure    Diabetic infection of right foot    Osteomyelitis       Current Outpatient Medications on File Prior to Visit   Medication Sig Dispense Refill    acetaminophen (TYLENOL) 500 MG tablet Take 500 mg by mouth 4 (four) times daily as needed (pain).      blood sugar diagnostic Strp 1 strip by Misc.(Non-Drug; Combo Route) route 2 (two) times daily with meals. 100 each 11    dextrose 5 % in water (D5W) PgBk 100 mL with cefTRIAXone 2 gram SolR 2 g Inject 2 g into the vein once daily.      insulin detemir U-100 (LEVEMIR) 100 unit/mL injection Inject 40 Units into the skin every evening.      lancets 33 gauge Misc 1 each by Misc.(Non-Drug; Combo Route) route 2  "(two) times daily with meals. 100 each 11    multivitamin (THERAGRAN) per tablet Take 1 tablet by mouth once daily.       pen needle, diabetic 32 gauge x 1/4" Ndle 1 each by Misc.(Non-Drug; Combo Route) route 2 (two) times daily with meals. 100 each 11    pulse oximeter (PULSE OXIMETER) device by Apply Externally route 2 (two) times a day. Use twice daily at 8 AM and 3 PM and [facility] as directed. 1 each 0    amLODIPine (NORVASC) 2.5 MG tablet Take 1 tablet (2.5 mg total) by mouth once daily. 30 tablet 11    insulin aspart U-100 (NOVOLOG) 100 unit/mL (3 mL) InPn pen Inject 6 Units into the skin 3 (three) times daily. (Patient not taking: Reported on 4/6/2024) 15 mL 11    lancing device Misc 1 Device by Misc.(Non-Drug; Combo Route) route 2 (two) times daily with meals. 1 each 0     No current facility-administered medications on file prior to visit.       Review of patient's allergies indicates:  No Known Allergies    Past Surgical History:   Procedure Laterality Date    DEBRIDEMENT OF FOOT Right 4/1/2022    Procedure: DEBRIDEMENT, FOOT;  Surgeon: Amena Martinez DPM;  Location: [facility] OR;  Service: Podiatry;  Laterality: Right;    DEBRIDEMENT OF FOOT Right 4/4/2022    Procedure: DEBRIDEMENT, FOOT;  Surgeon: Amena Martinez DPM;  Location: [facility] OR;  Service: Podiatry;  Laterality: Right;    INCISION AND DRAINAGE Right 3/28/2022    Procedure: INCISION AND DRAINAGE RIGHT FOOT;  Surgeon: Amena Martinez DPM;  Location: [facility] OR;  Service: Podiatry;  Laterality: Right;       Family History   Problem Relation Name Age of Onset    Diabetes Mother         Social History     Socioeconomic History    Marital status: Single   Occupational History    Occupation: Unemployed   Tobacco Use    Smoking status: Never    Smokeless tobacco: Never   Substance and Sexual Activity    Alcohol use: No    Drug use: No    Sexual activity: Yes     Partners: Female     Birth control/protection: None, Condom     Social " Determinants of Health     Financial Resource Strain: Low Risk  (4/20/2022)    Overall Financial Resource Strain (CARDIA)     Difficulty of Paying Living Expenses: Not very hard   Food Insecurity: No Food Insecurity (4/20/2022)    Hunger Vital Sign     Worried About Running Out of Food in the Last Year: Never true     Ran Out of Food in the Last Year: Never true   Transportation Needs: No Transportation Needs (4/20/2022)    PRAPARE - Transportation     Lack of Transportation (Medical): No     Lack of Transportation (Non-Medical): No   Physical Activity: Inactive (4/20/2022)    Exercise Vital Sign     Days of Exercise per Week: 0 days     Minutes of Exercise per Session: 0 min   Stress: No Stress Concern Present (4/20/2022)    [place] [employer] - Occupational Stress Questionnaire     Feeling of Stress : Not at all   Housing Stability: Low Risk  (4/20/2022)    Housing Stability Vital Sign     Unable to Pay for Housing in the Last Year: No     Number of Places Lived in the Last Year: 1     Unstable Housing in the Last Year: No           Objective:     Physical Exam  Vitals reviewed.   Constitutional:       Appearance: He is well-developed.   Cardiovascular:      Comments: dorsalis pedis and posterior tibial pulses are palpable bilaterally. Capillary refill time is within normal limits. + pedal hair growth         Musculoskeletal:         General: No tenderness. Normal range of motion.      Comments: Adequate joint range of motion without pain, limitation, nor crepitation Bilateral feet and ankle joints. Muscle strength is 5/5 in all groups bilaterally.         Skin:     General: Skin is warm and dry.      Findings: No erythema, lesion or rash.   Neurological:      Mental Status: He is alert and oriented to person, place, and time.      Sensory: No sensory deficit.      Comments: [place]-Angella 5.07 monofilament is intact bilateral feet.      Psychiatric:         Behavior: Behavior normal.      5/8/24:    Pre debridement       Post debridement       4.17.24    1.5x0.9x0.3 cm sub 5th MPJ R . Granular base w/ lesa wound maceration and hyperkeratosis. No surrounding erythema, edema, malodor, nor drainage noted       1.6x1.4x0.1 cm granular wound to R dorsal 5th MTPJ. Mild lesa wound maceration. No surrounding erythema, edema, malodor, nor drainage noted       5.1.24    1.1x0.5x0.2 cm   Granular wound base.  Nearly resolved periwound maceration and hyperkeratosis.  No deep probe.  No surrounding erythema nor edema.  No malodor.    CRP-    CRP   Date Value Ref Range Status   05/07/2024 6.9 0.0 - 8.2 mg/L Final     ESR-   Sed Rate   Date Value Ref Range Status   04/05/2024 105 (H) 0 - 23 mm/Hr Final     CBC-   WBC   Date Value Ref Range Status   04/30/2024 5.58 3.90 - 12.70 K/uL Final     A1C-   Hemoglobin A1C   Date Value Ref Range Status   04/06/2024 12.3 (H) 4.0 - 5.6 % Final     Comment:     ADA Screening Guidelines:  5.7-6.4%  Consistent with prediabetes  >or=6.5%  Consistent with diabetes    High levels of fetal hemoglobin interfere with the HbA1C  assay. Heterozygous hemoglobin variants (HbS, HgC, etc)do  not significantly interfere with this assay.   However, presence of multiple variants may affect accuracy.       MICRO:    Aerobic Bacterial Culture   Date Value Ref Range Status   04/06/2024 (A)  Final    STREPTOCOCCUS AGALACTIAE (GROUP B)  Rare  Beta-hemolytic streptococci are routinely susceptible to   penicillins,cephalosporins and carbapenems.  Susceptibility testing not routinely performed     04/06/2024 (A)  Final    METHICILLIN RESISTANT STAPHYLOCOCCUS AUREUS  Rare          Anaerobic Culture   Date Value Ref Range Status   04/06/2024 No anaerobes isolated  Final     -----------------------------------------------------------------------------------------------------------------------------------------------------------    Assessment:      Encounter Diagnosis   Name Primary?    Diabetic foot  ulcer associated with type 2 diabetes mellitus, unspecified laterality, unspecified part of foot, unspecified ulcer stage Yes     Plan:     Ashutosh was seen today for diabetes mellitus and wound check.    Diagnoses and all orders for this visit:    Diabetic foot ulcer associated with type 2 diabetes mellitus, unspecified laterality, unspecified part of foot, unspecified ulcer stage  -     Ambulatory referral/consult to Wound Clinic; Future      I counseled the patient on his conditions, their implications and medical management.      IV abx via PICC x 6 weeks per ID rec's     Debridement: With verbal consent, nonviable tissues on the right foot were debrided beyond sub q utilizing a  sterile No. 3 currette. Minimal bleeding controlled with direct pressure  The patient tolerated this well.     Dressings: Endoform  Offloading:Foam,football     Follow-up:Patient is to return to the clinic in 1 week  for follow-up but should call [facility] immediately if any signs of infection, such as fever, chills, sweats, increased redness or pain.    Short-term goals include maintaining good offloading and minimizing bioburden, promoting granulation and epithelialization to healing.  Long-term goals include keeping the wound healed by good offloading and medical management under the direction of internist.    Referral placed to wound care.       .

## 2024-05-25 LAB
ACID FAST MOD KINY STN SPEC: NORMAL
MYCOBACTERIUM SPEC QL CULT: NORMAL

## 2024-05-27 ENCOUNTER — TELEPHONE (OUTPATIENT)
Dept: INFECTIOUS DISEASES | Facility: CLINIC | Age: 49
End: 2024-05-27

## 2024-05-27 NOTE — TELEPHONE ENCOUNTER
----- Message from Jorge Luis Alas Jr. PA sent at 5/27/2024  9:42 AM CDT -----  Labs acceptable from an ID standpoint.  CRP slightly up to 14.  Chronic hyperglycemia (previously discussed with patient and was to fu wit PCP who manages that).  Needs ID follow up but has missed multiple appts.

## 2024-06-04 ENCOUNTER — TELEPHONE (OUTPATIENT)
Dept: PODIATRY | Facility: CLINIC | Age: 49
End: 2024-06-04

## 2024-06-04 ENCOUNTER — TELEPHONE (OUTPATIENT)
Dept: INFECTIOUS DISEASES | Facility: HOSPITAL | Age: 49
End: 2024-06-04

## 2024-06-04 ENCOUNTER — TELEPHONE (OUTPATIENT)
Dept: INFECTIOUS DISEASES | Facility: CLINIC | Age: 49
End: 2024-06-04

## 2024-06-04 DIAGNOSIS — R73.9 HYPERGLYCEMIA, UNSPECIFIED: Primary | ICD-10-CM

## 2024-06-05 ENCOUNTER — OFFICE VISIT (OUTPATIENT)
Dept: INFECTIOUS DISEASES | Facility: CLINIC | Age: 49
End: 2024-06-05

## 2024-06-05 ENCOUNTER — TELEPHONE (OUTPATIENT)
Dept: INFECTIOUS DISEASES | Facility: CLINIC | Age: 49
End: 2024-06-05

## 2024-06-05 ENCOUNTER — OFFICE VISIT (OUTPATIENT)
Dept: PODIATRY | Facility: CLINIC | Age: 49
End: 2024-06-05

## 2024-06-05 VITALS
BODY MASS INDEX: 26.1 KG/M2 | HEIGHT: 76 IN | HEART RATE: 93 BPM | DIASTOLIC BLOOD PRESSURE: 89 MMHG | SYSTOLIC BLOOD PRESSURE: 156 MMHG

## 2024-06-05 DIAGNOSIS — E11.628 DIABETIC INFECTION OF RIGHT FOOT: ICD-10-CM

## 2024-06-05 DIAGNOSIS — M86.271 SUBACUTE OSTEOMYELITIS OF RIGHT FOOT: ICD-10-CM

## 2024-06-05 DIAGNOSIS — L08.9 DIABETIC INFECTION OF RIGHT FOOT: ICD-10-CM

## 2024-06-05 DIAGNOSIS — E08.621 DIABETIC ULCER OF MIDFOOT ASSOCIATED WITH DIABETES MELLITUS DUE TO UNDERLYING CONDITION, WITH NECROSIS OF MUSCLE, UNSPECIFIED LATERALITY: Primary | ICD-10-CM

## 2024-06-05 DIAGNOSIS — L97.403 DIABETIC ULCER OF MIDFOOT ASSOCIATED WITH DIABETES MELLITUS DUE TO UNDERLYING CONDITION, WITH NECROSIS OF MUSCLE, UNSPECIFIED LATERALITY: Primary | ICD-10-CM

## 2024-06-05 DIAGNOSIS — L97.512 ULCERATED, FOOT, RIGHT, WITH FAT LAYER EXPOSED: Primary | ICD-10-CM

## 2024-06-05 PROCEDURE — 99999 PR PBB SHADOW E&M-EST. PATIENT-LVL I: CPT | Mod: PBBFAC,,, | Performed by: PHYSICIAN ASSISTANT

## 2024-06-05 PROCEDURE — 99999 PR PBB SHADOW E&M-EST. PATIENT-LVL III: CPT | Mod: PBBFAC,,, | Performed by: PODIATRIST

## 2024-06-05 PROCEDURE — 99499 UNLISTED E&M SERVICE: CPT | Mod: S$PBB,,, | Performed by: PODIATRIST

## 2024-06-05 PROCEDURE — 99213 OFFICE O/P EST LOW 20 MIN: CPT | Mod: S$PBB,,, | Performed by: PHYSICIAN ASSISTANT

## 2024-06-05 PROCEDURE — 99211 OFF/OP EST MAY X REQ PHY/QHP: CPT | Mod: PBBFAC,25,27 | Performed by: PHYSICIAN ASSISTANT

## 2024-06-05 PROCEDURE — 11042 DBRDMT SUBQ TIS 1ST 20SQCM/<: CPT | Mod: PBBFAC | Performed by: PODIATRIST

## 2024-06-05 PROCEDURE — 99213 OFFICE O/P EST LOW 20 MIN: CPT | Mod: PBBFAC,25,27 | Performed by: PODIATRIST

## 2024-06-05 PROCEDURE — 11042 DBRDMT SUBQ TIS 1ST 20SQCM/<: CPT | Mod: S$PBB,,, | Performed by: PODIATRIST

## 2024-06-05 NOTE — TELEPHONE ENCOUNTER
----- Message from Jorge Luis Alas Jr. PA sent at 6/4/2024  8:12 PM CDT -----  Please schedule urgent appt with [facility] for him - referral ordered.  Thx

## 2024-06-05 NOTE — PROGRESS NOTES
Patient again with hyperglycemia >400.  Spoke with patient who feels well.  Urgent endocrine referral placed.  Will schedule.    Jorge Luis Alas PA-C [facility]

## 2024-06-05 NOTE — PROGRESS NOTES
Subjective:     Patient ID: Ashutosh Ferrari is a 49 y.o. male.    Chief Complaint: Wound Care (Right foot, wrapping hasn't been changed since [place] visit 5/8)    Ashutosh is a 49 y.o. male who presents to the clinic for evaluation and treatment of high risk feet. Ashutosh has a past medical history of Diabetes mellitus, Diabetes mellitus, type 2, Essential hypertension, benign (07/18/2013), and Herpes. The patient's chief complaint is diabetic foot ulcer, right foot. Pt is under the care of ID and was present on today's visit. Pt states no one told him to remove or change his dressing, pt has had football dressing on since last visit. This patient has documented high risk feet requiring routine maintenance secondary to diabetes mellitis and those secondary complications of diabetes, as mentioned..      PCP: [facility] -    Date Last Seen by PCP:   Chief Complaint   Patient presents with    Wound Care     Right foot, wrapping hasn't been changed since [place] visit 5/8     Hemoglobin A1C   Date Value Ref Range Status   04/06/2024 12.3 (H) 4.0 - 5.6 % Final     Comment:     ADA Screening Guidelines:  5.7-6.4%  Consistent with prediabetes  >or=6.5%  Consistent with diabetes    High levels of fetal hemoglobin interfere with the HbA1C  assay. Heterozygous hemoglobin variants (HbS, HgC, etc)do  not significantly interfere with this assay.   However, presence of multiple variants may affect accuracy.     03/27/2022 12.5 (H) 4.0 - 5.6 % Final     Comment:     ADA Screening Guidelines:  5.7-6.4%  Consistent with prediabetes  >or=6.5%  Consistent with diabetes    High levels of fetal hemoglobin interfere with the HbA1C  assay. Heterozygous hemoglobin variants (HbS, HgC, etc)do  not significantly interfere with this assay.   However, presence of multiple variants may affect accuracy.     09/19/2021 12.8 (H) 4.0 - 5.6 % Final     Comment:     ADA Screening Guidelines:  5.7-6.4%  Consistent with prediabetes  >or=6.5%   Consistent with diabetes    High levels of fetal hemoglobin interfere with the HbA1C  assay. Heterozygous hemoglobin variants (HbS, HgC, etc)do  not significantly interfere with this assay.   However, presence of multiple variants may affect accuracy.         Review of Systems   Constitutional: Negative for chills, decreased appetite and fever.   Cardiovascular:  Negative for leg swelling.   Musculoskeletal:  Negative for arthritis, joint pain, joint swelling and myalgias.   Gastrointestinal:  Negative for nausea and vomiting.   Neurological:  Negative for loss of balance, numbness and paresthesias.          Patient Active Problem List   Diagnosis    Diabetes mellitus, new onset    HHNC (hyperglycemic hyperosmolar nonketotic coma)    Volume depletion, unspecified    Stage 3 chronic kidney disease    Type 2 diabetes mellitus    Abscess, scalp    Dehydration, moderate    Hypophosphatasia    Hypomagnesemia    Hypokalemia    Essential hypertension    Pneumonia due to COVID-19 virus    Hyperkalemia    Leukopenia    Increased anion gap metabolic acidosis    Type 2 diabetes mellitus with hyperglycemia, with long-term current use of insulin    Diabetic foot wound and Cellulitis    Encounter for wound re-check    Anemia    Herpes simplex    Personal history of COVID-19    Acute kidney failure    Diabetic infection of right foot    Osteomyelitis       Current Outpatient Medications on File Prior to Visit   Medication Sig Dispense Refill    acetaminophen (TYLENOL) 500 MG tablet Take 500 mg by mouth 4 (four) times daily as needed (pain).      blood sugar diagnostic Strp 1 strip by Misc.(Non-Drug; Combo Route) route 2 (two) times daily with meals. 100 each 11    insulin detemir U-100 (LEVEMIR) 100 unit/mL injection Inject 40 Units into the skin every evening.      lancets 33 gauge Misc 1 each by Misc.(Non-Drug; Combo Route) route 2 (two) times daily with meals. 100 each 11    multivitamin (THERAGRAN) per tablet Take 1 tablet by  "mouth once daily.       pen needle, diabetic 32 gauge x 1/4" Ndle 1 each by Misc.(Non-Drug; Combo Route) route 2 (two) times daily with meals. 100 each 11    pulse oximeter (PULSE OXIMETER) device by Apply Externally route 2 (two) times a day. Use twice daily at 8 AM and 3 PM and record the value in [employer] as directed. 1 each 0    amLODIPine (NORVASC) 2.5 MG tablet Take 1 tablet (2.5 mg total) by mouth once daily. 30 tablet 11    insulin aspart U-100 (NOVOLOG) 100 unit/mL (3 mL) InPn pen Inject 6 Units into the skin 3 (three) times daily. (Patient not taking: Reported on 4/6/2024) 15 mL 11    lancing device Misc 1 Device by Misc.(Non-Drug; Combo Route) route 2 (two) times daily with meals. 1 each 0     No current facility-administered medications on file prior to visit.       Review of patient's allergies indicates:  No Known Allergies    Past Surgical History:   Procedure Laterality Date    DEBRIDEMENT OF FOOT Right 4/1/2022    Procedure: DEBRIDEMENT, FOOT;  Surgeon: Amena Martinez DPM;  Location: [facility] OR;  Service: Podiatry;  Laterality: Right;    DEBRIDEMENT OF FOOT Right 4/4/2022    Procedure: DEBRIDEMENT, FOOT;  Surgeon: Amena Martinez DPM;  Location: [facility] OR;  Service: Podiatry;  Laterality: Right;    INCISION AND DRAINAGE Right 3/28/2022    Procedure: INCISION AND DRAINAGE RIGHT FOOT;  Surgeon: Amena Martinez DPM;  Location: [facility] OR;  Service: Podiatry;  Laterality: Right;       Family History   Problem Relation Name Age of Onset    Diabetes Mother         Social History     Socioeconomic History    Marital status: Single   Occupational History    Occupation: Unemployed   Tobacco Use    Smoking status: Never    Smokeless tobacco: Never   Substance and Sexual Activity    Alcohol use: No    Drug use: No    Sexual activity: Yes     Partners: Female     Birth control/protection: None, Condom   Social History Narrative    ** Merged History Encounter **          Social Determinants of Health     Financial " Resource Strain: Low Risk  (4/20/2022)    Overall Financial Resource Strain (CARDIA)     Difficulty of Paying Living Expenses: Not very hard   Food Insecurity: No Food Insecurity (4/20/2022)    Hunger Vital Sign     Worried About Running Out of Food in the Last Year: Never true     Ran Out of Food in the Last Year: Never true   Transportation Needs: No Transportation Needs (4/20/2022)    PRAPARE - Transportation     Lack of Transportation (Medical): No     Lack of Transportation (Non-Medical): No   Physical Activity: Inactive (4/20/2022)    Exercise Vital Sign     Days of Exercise per Week: 0 days     Minutes of Exercise per Session: 0 min   Stress: No Stress Concern Present (4/20/2022)    [employer] - Occupational Stress Questionnaire     Feeling of Stress : Not at all   Housing Stability: Low Risk  (4/20/2022)    Housing Stability Vital Sign     Unable to Pay for Housing in the Last Year: No     Number of Places Lived in the Last Year: 1     Unstable Housing in the Last Year: No           Objective:     Physical Exam  Vitals reviewed.   Constitutional:       Appearance: He is well-developed.   Cardiovascular:      Comments: dorsalis pedis and posterior tibial pulses are palpable bilaterally. Capillary refill time is within normal limits. + pedal hair growth         Musculoskeletal:         General: No tenderness. Normal range of motion.      Comments: Adequate joint range of motion without pain, limitation, nor crepitation Bilateral feet and ankle joints. Muscle strength is 5/5 in all groups bilaterally.         Feet:      Right foot:      Skin integrity: Ulcer present.      Left foot:      Skin integrity: Callus present.   Skin:     General: Skin is warm and dry.      Findings: No erythema, lesion or rash.      Comments: Ulceration  Location: right sub 5th met  Measurements: 2.8x 2.5x 0.2cm  Drainage: serous  Wound base: granular  SOI: none       Neurological:      Mental Status: He  is alert and oriented to person, place, and time.      Sensory: Sensory deficit present.      Comments:      Psychiatric:         Behavior: Behavior normal.           CRP-    CRP   Date Value Ref Range Status   06/04/2024 8.7 (H) 0.0 - 8.2 mg/L Final     ESR-   Sed Rate   Date Value Ref Range Status   04/05/2024 105 (H) 0 - 23 mm/Hr Final     CBC-   WBC   Date Value Ref Range Status   06/04/2024 4.59 3.90 - 12.70 K/uL Final     A1C-   Hemoglobin A1C   Date Value Ref Range Status   04/06/2024 12.3 (H) 4.0 - 5.6 % Final     Comment:     ADA Screening Guidelines:  5.7-6.4%  Consistent with prediabetes  >or=6.5%  Consistent with diabetes    High levels of fetal hemoglobin interfere with the HbA1C  assay. Heterozygous hemoglobin variants (HbS, HgC, etc)do  not significantly interfere with this assay.   However, presence of multiple variants may affect accuracy.       MICRO:    Aerobic Bacterial Culture   Date Value Ref Range Status   04/06/2024 (A)  Final    STREPTOCOCCUS AGALACTIAE (GROUP B)  Rare  Beta-hemolytic streptococci are routinely susceptible to   penicillins,cephalosporins and carbapenems.  Susceptibility testing not routinely performed     04/06/2024 (A)  Final    METHICILLIN RESISTANT STAPHYLOCOCCUS AUREUS  Rare          Anaerobic Culture   Date Value Ref Range Status   04/06/2024 No anaerobes isolated  Final     -----------------------------------------------------------------------------------------------------------------------------------------------------------    Assessment:      Encounter Diagnosis   Name Primary?    Ulcerated, foot, right, with fat layer exposed Yes     Plan:     Ashutosh was seen today for wound care.    Diagnoses and all orders for this visit:    Ulcerated, foot, right, with fat layer exposed      I counseled the patient on his conditions, their implications and medical management.    Independent review of patients previous clinical notes    Reviewed current medication(s), and lab(s)  specific to foot ailment(s) with patient in detail. All questions answered       Debridement: With verbal consent, nonviable tissues on the right foot were debrided beyond sub q utilizing a  sterile No. 3 scalpel and forceps. Minimal bleeding controlled with direct pressure  The patient tolerated this well.     Dressings: hydrofera blue  Offloading:Foam     MA assisted w/ application of football dressing under my direct supervision. Walking boot  shoe applied to offload the area. Advised patient that this should be worn on the affected foot at all times when ambulating     Follow-up:Patient is to return to the clinic in 1 week  for follow-up but should call [facility] immediately if any signs of infection, such as fever, chills, sweats, increased redness or pain.    Short-term goals include maintaining good offloading and minimizing bioburden, promoting granulation and epithelialization to healing.  Long-term goals include keeping the wound healed by good offloading and medical management under the direction of internist.        .

## 2024-06-05 NOTE — TELEPHONE ENCOUNTER
Andriy Cooley MD      Your patient's lung screening CT from 4/9/19 showed a lung nodule for which a 6 month follow-up low dose CT is recommended--due Oct 10, 2019.     When placing the order for the follow up CT, please use order: [id number] \"CT Chest Short Term Follow Up\" instead of \"CT Chest Lung Screening\", since the follow up low dose CT is considered diagnostic rather than screening it is generally covered by insurance. This order also ensures the patient receives the lower dose CT and allows us to track the results for appropriate follow up.    Please feel free to contact me with any questions.      Alecia Lund RN, BSN  Lung Screening            Spoke with patient who denies issues and feels well.  Has chronic hyperglycemia >400.  Has appt with PCP on 6/11.  Placed urgent referral for endocrine consult.  Will see in am with POD at 8 am appt.  Encouraged to got to ED for any issues overnight.    Jorge Luis Alas PA-C

## 2024-06-05 NOTE — TELEPHONE ENCOUNTER
Called patient. Informed him that his blood sugar was above 400. Patient informed me that Jorge Luis Alas had just called him as well.

## 2024-06-05 NOTE — PROGRESS NOTES
Subjective     Patient ID: Ashutosh Ferrari is a 49 y.o. male.    Chief Complaint:No chief complaint on file.      History of Present Illness  47 yo male with PMH Of T2DM, CKD, HTN, right diabetic food wound who presented in early April 2024 with cf worsening right foot wound. Has been followed per ID and podiatry, and had recently treated with bactrim for MRSA and proteus, and then doxycyline for anaerobes. He reported the wound recently reopened, and he stepped in a ditch about a week prior to admission which saturated his shoe. He reported the wound had worsened and became red, warm, and swollen, and extending to his ankle.      MRI of right foot: osteomyelitis 5th proximal phalanx, mildly progressed compared to prior exam.  Persistent reactive osteitis involving the head of the 5th metatarsal.  POD offered amputation but patient deferred and elected for antimicrobials alone.     Pt treated with vanc and cefepime as inpt . ID was consulted for abx recs. Wound culture with prevotella and GBS and bone cxs with Gp B Strep and skin omari.      ID DC Recommendations:  - Start ceftriaxone 2g IV q24h.   - PICC placement  - Anticipate 6 weeks of antibiotic therapy for osteomyelitis.   - On Mondays - CBC,CMP, CRP     Later, MRSA grew on bone culture and he was changed to Daptomycin starting on 4/6/24.  Patient has not had ID follow up.  His weekly labs have consistently shown hyperglycemia > 400 weekly.  This was discussed with him and had fu with his PCP but again, there has been no improvement.  He has not had any problems. He is seen today in podiatry clinic as he has not yet had ID fu.  Reports doing well and that R foot wounds have healed.  Has a callus on plantar lateral foot with causes discomfort with pressure, otherwise denies pain.  Tolerating abx and PICC wihtout issue.  EOC for 6 weeks dapto is 6/11.  Recent labs show CRP essentially normal. The patient denies any recent fever, chills, or sweats.      Review of  Systems   Constitutional: Negative for chills, fever, malaise/fatigue and night sweats.   Cardiovascular:  Negative for chest pain.   Respiratory:  Negative for cough, hemoptysis, shortness of breath, sputum production and wheezing.    Skin:  Positive for poor wound healing. Negative for rash and suspicious lesions.   Gastrointestinal:  Negative for abdominal pain, constipation, diarrhea, heartburn, nausea and vomiting.   Genitourinary:  Negative for dysuria and hematuria.        Objective   Physical Exam  Constitutional:       General: He is not in acute distress.     Appearance: Normal appearance. He is well-developed. He is not ill-appearing, toxic-appearing or diaphoretic.   HENT:      Head: Normocephalic and atraumatic.   Cardiovascular:      Rate and Rhythm: Normal rate and regular rhythm.      Heart sounds: Normal heart sounds. No murmur heard.     No friction rub. No gallop.   Pulmonary:      Effort: Pulmonary effort is normal. No respiratory distress.      Breath sounds: Normal breath sounds. No wheezing or rales.   Abdominal:      General: Bowel sounds are normal. There is no distension.      Palpations: Abdomen is soft. There is no mass.      Tenderness: There is no abdominal tenderness. There is no guarding or rebound.   Musculoskeletal:        Feet:    Skin:     General: Skin is warm and dry.   Neurological:      Mental Status: He is alert and oriented to person, place, and time.   Psychiatric:         Behavior: Behavior normal.        Latest Reference Range & Units 05/28/24 10:09 06/04/24 10:48   WBC 3.90 - 12.70 K/uL 5.22 4.59   RBC 4.60 - 6.20 M/uL 4.50 (L) 4.33 (L)   Hemoglobin 14.0 - 18.0 g/dL 13.2 (L) 12.9 (L)   Hematocrit 40.0 - 54.0 % 39.4 (L) 37.9 (L)   MCV 82 - 98 fL 88 88   MCH 27.0 - 31.0 pg 29.3 29.8   MCHC 32.0 - 36.0 g/dL 33.5 34.0   RDW 11.5 - 14.5 % 12.3 12.4   Platelet Count 150 - 450 K/uL 244 226   MPV 9.2 - 12.9 fL 10.9 11.3   Gran % 38.0 - 73.0 % 55.3 56.3   Lymph % 18.0 - 48.0 %  35.1 34.4   Mono % 4.0 - 15.0 % 7.3 7.2   Eos % 0.0 - 8.0 % 1.5 1.3   Basophil % 0.0 - 1.9 % 0.6 0.4   Immature Granulocytes 0.0 - 0.5 % 0.2 0.4   Gran # (ANC) 1.8 - 7.7 K/uL 2.9 2.6   Lymph # 1.0 - 4.8 K/uL 1.8 1.6   Mono # 0.3 - 1.0 K/uL 0.4 0.3   Eos # 0.0 - 0.5 K/uL 0.1 0.1   Baso # 0.00 - 0.20 K/uL 0.03 0.02   Immature Grans (Abs) 0.00 - 0.04 K/uL 0.01 0.02   nRBC 0 /100 WBC 0 0   Differential Method  Automated Automated   Sodium 136 - 145 mmol/L 134 (L) 133 (L)   Potassium 3.5 - 5.1 mmol/L 4.1 3.8   Chloride 95 - 110 mmol/L 101 100   CO2 23 - 29 mmol/L 22 (L) 23   Anion Gap 8 - 16 mmol/L 11 10   BUN 6 - 20 mg/dL 14 14   Creatinine 0.5 - 1.4 mg/dL 1.6 (H) 1.4   eGFR >60 mL/min/1.73 m^2 53.1 ! >60.0   Glucose 70 - 110 mg/dL 450 (H) 487 (HH)   Calcium 8.7 - 10.5 mg/dL 9.6 9.6   ALP 55 - 135 U/L 108 100   PROTEIN TOTAL 6.0 - 8.4 g/dL 8.1 7.6   Albumin 3.5 - 5.2 g/dL 3.7 3.6   BILIRUBIN TOTAL 0.1 - 1.0 mg/dL 0.5 0.5   AST 10 - 40 U/L 12 14   ALT 10 - 44 U/L 12 13   CRP 0.0 - 8.2 mg/L 10.6 (H) 8.7 (H)   (HH): Data is critically high  (L): Data is abnormally low  (H): Data is abnormally high  !: Data is abnormal     Assessment and Plan     1. Diabetic ulcer of midfoot associated with diabetes mellitus due to underlying condition, with necrosis of muscle, unspecified laterality    2. Diabetic infection of right foot    3. Subacute osteomyelitis of right foot      48 yo male with DM foot wound and osteo of 5th toe.  Bone culture early April with GBS and MRSA.  Was treated with 4 weeks rocephin and now completing 6 weeks daptomycin.  Wound appears to be healed and CRP essentially normalized at 8.7.  No abx or PICC issues.  Ongoing severe hyperglycemia and poorly controlled DM.    Plan:  Completed 6 weeks of daptomycin - EOC 6/11  Weekly labs  Urgent endocrine referral placed  FU at EOC for line removal  Continue POD care   The patient was encouraged to call the office for further concerns or complaints.

## 2024-06-11 ENCOUNTER — OFFICE VISIT (OUTPATIENT)
Dept: INFECTIOUS DISEASES | Facility: CLINIC | Age: 49
End: 2024-06-11

## 2024-06-11 ENCOUNTER — INFUSION (OUTPATIENT)
Dept: INFECTIOUS DISEASES | Facility: HOSPITAL | Age: 49
End: 2024-06-11

## 2024-06-11 VITALS
HEIGHT: 75 IN | TEMPERATURE: 98 F | DIASTOLIC BLOOD PRESSURE: 94 MMHG | BODY MASS INDEX: 26.76 KG/M2 | SYSTOLIC BLOOD PRESSURE: 146 MMHG | WEIGHT: 215.19 LBS | HEART RATE: 76 BPM

## 2024-06-11 VITALS
BODY MASS INDEX: 26.47 KG/M2 | OXYGEN SATURATION: 97 % | TEMPERATURE: 98 F | SYSTOLIC BLOOD PRESSURE: 158 MMHG | HEIGHT: 76 IN | DIASTOLIC BLOOD PRESSURE: 91 MMHG | HEART RATE: 96 BPM | RESPIRATION RATE: 21 BRPM | WEIGHT: 217.38 LBS

## 2024-06-11 DIAGNOSIS — M86.9 OSTEOMYELITIS, UNSPECIFIED SITE, UNSPECIFIED TYPE: ICD-10-CM

## 2024-06-11 DIAGNOSIS — Z79.2 RECEIVING INTRAVENOUS ANTIBIOTIC TREATMENT AS OUTPATIENT: ICD-10-CM

## 2024-06-11 DIAGNOSIS — E11.628 DIABETIC INFECTION OF RIGHT FOOT: Primary | ICD-10-CM

## 2024-06-11 DIAGNOSIS — E11.628 DIABETIC INFECTION OF RIGHT FOOT: ICD-10-CM

## 2024-06-11 DIAGNOSIS — Z79.4 TYPE 2 DIABETES MELLITUS WITH HYPERGLYCEMIA, WITH LONG-TERM CURRENT USE OF INSULIN: ICD-10-CM

## 2024-06-11 DIAGNOSIS — L08.9 DIABETIC INFECTION OF RIGHT FOOT: Primary | ICD-10-CM

## 2024-06-11 DIAGNOSIS — E11.65 TYPE 2 DIABETES MELLITUS WITH HYPERGLYCEMIA, WITH LONG-TERM CURRENT USE OF INSULIN: ICD-10-CM

## 2024-06-11 DIAGNOSIS — L08.9 DIABETIC INFECTION OF RIGHT FOOT: ICD-10-CM

## 2024-06-11 LAB
ALBUMIN SERPL BCP-MCNC: 3.8 G/DL (ref 3.5–5.2)
ALP SERPL-CCNC: 108 U/L (ref 55–135)
ALT SERPL W/O P-5'-P-CCNC: 14 U/L (ref 10–44)
ANION GAP SERPL CALC-SCNC: 12 MMOL/L (ref 8–16)
AST SERPL-CCNC: 14 U/L (ref 10–40)
BASOPHILS # BLD AUTO: 0.03 K/UL (ref 0–0.2)
BASOPHILS NFR BLD: 0.4 % (ref 0–1.9)
BILIRUB SERPL-MCNC: 0.5 MG/DL (ref 0.1–1)
BUN SERPL-MCNC: 13 MG/DL (ref 6–20)
CALCIUM SERPL-MCNC: 9.5 MG/DL (ref 8.7–10.5)
CHLORIDE SERPL-SCNC: 99 MMOL/L (ref 95–110)
CO2 SERPL-SCNC: 21 MMOL/L (ref 23–29)
CREAT SERPL-MCNC: 1.4 MG/DL (ref 0.5–1.4)
CRP SERPL-MCNC: 8.8 MG/L (ref 0–8.2)
DIFFERENTIAL METHOD BLD: ABNORMAL
EOSINOPHIL # BLD AUTO: 0.1 K/UL (ref 0–0.5)
EOSINOPHIL NFR BLD: 0.9 % (ref 0–8)
ERYTHROCYTE [DISTWIDTH] IN BLOOD BY AUTOMATED COUNT: 12.3 % (ref 11.5–14.5)
EST. GFR  (NO RACE VARIABLE): >60 ML/MIN/1.73 M^2
GLUCOSE SERPL-MCNC: 382 MG/DL (ref 70–110)
HCT VFR BLD AUTO: 36.4 % (ref 40–54)
HGB BLD-MCNC: 13 G/DL (ref 14–18)
IMM GRANULOCYTES # BLD AUTO: 0.01 K/UL (ref 0–0.04)
IMM GRANULOCYTES NFR BLD AUTO: 0.1 % (ref 0–0.5)
LYMPHOCYTES # BLD AUTO: 2.4 K/UL (ref 1–4.8)
LYMPHOCYTES NFR BLD: 34.7 % (ref 18–48)
MCH RBC QN AUTO: 30.4 PG (ref 27–31)
MCHC RBC AUTO-ENTMCNC: 35.7 G/DL (ref 32–36)
MCV RBC AUTO: 85 FL (ref 82–98)
MONOCYTES # BLD AUTO: 0.4 K/UL (ref 0.3–1)
MONOCYTES NFR BLD: 6.5 % (ref 4–15)
NEUTROPHILS # BLD AUTO: 3.9 K/UL (ref 1.8–7.7)
NEUTROPHILS NFR BLD: 57.4 % (ref 38–73)
NRBC BLD-RTO: 0 /100 WBC
PLATELET # BLD AUTO: 233 K/UL (ref 150–450)
PMV BLD AUTO: 10.6 FL (ref 9.2–12.9)
POTASSIUM SERPL-SCNC: 3.8 MMOL/L (ref 3.5–5.1)
PROT SERPL-MCNC: 7.9 G/DL (ref 6–8.4)
RBC # BLD AUTO: 4.27 M/UL (ref 4.6–6.2)
SODIUM SERPL-SCNC: 132 MMOL/L (ref 136–145)
WBC # BLD AUTO: 6.8 K/UL (ref 3.9–12.7)

## 2024-06-11 PROCEDURE — 86140 C-REACTIVE PROTEIN: CPT | Performed by: PHYSICIAN ASSISTANT

## 2024-06-11 PROCEDURE — 99213 OFFICE O/P EST LOW 20 MIN: CPT | Mod: PBBFAC | Performed by: PHYSICIAN ASSISTANT

## 2024-06-11 PROCEDURE — 99213 OFFICE O/P EST LOW 20 MIN: CPT | Mod: S$PBB,,, | Performed by: PHYSICIAN ASSISTANT

## 2024-06-11 PROCEDURE — 80053 COMPREHEN METABOLIC PANEL: CPT | Performed by: PHYSICIAN ASSISTANT

## 2024-06-11 PROCEDURE — 99999 PR PBB SHADOW E&M-EST. PATIENT-LVL III: CPT | Mod: PBBFAC,,, | Performed by: PHYSICIAN ASSISTANT

## 2024-06-11 PROCEDURE — 36591 DRAW BLOOD OFF VENOUS DEVICE: CPT

## 2024-06-11 PROCEDURE — 85025 COMPLETE CBC W/AUTO DIFF WBC: CPT | Performed by: PHYSICIAN ASSISTANT

## 2024-06-11 PROCEDURE — 36415 COLL VENOUS BLD VENIPUNCTURE: CPT | Performed by: PHYSICIAN ASSISTANT

## 2024-06-11 NOTE — PROGRESS NOTES
Patient arrives ambulatory for lab drawn & PICC removal as ordered. Lab drawn (CRP, CBC, CMP) as ordered and sent to lab.  PICC removed without difficulty from right upper medial aspect of arm - measurement marked at 45 cm with entire catheter intact without compromise noted - sterile vaseline gauze placed and large co-flex wrap with instructions to leave dry and in place x 24 hours . No signs of infection noted to site - no swelling or drainage - Will monitor on unit for 30 minutes as per protocol

## 2024-06-11 NOTE — PROGRESS NOTES
Subjective:      Patient ID: Ashutosh Ferrari is a 49 y.o. male.    Chief Complaint:Follow-up      History of Present Illness    Ashutosh Ferrari is a 49 year old male with history of T2DM, CKD, HTN, right diabetic foot wound who presented in early April 2024 with c/f worsening right foot wound. Has been followed by ID and podiatry. He was previously treated with bactrim for MRSA and proteus and then doxycyline for anaerobes. The wound re-opened, and he stepped in a ditch about a week prior to admission which saturated his shoe. He developed redness, warmth and swelling extending to his ankle which prompted him to go to the ED.      MRI of right foot: osteomyelitis 5th proximal phalanx, mildly progressed compared to prior exam.  Persistent reactive osteitis involving the head of the 5th metatarsal.  Podiatry was consulted and offered amputation but patient deferred and elected for antimicrobials alone. Wound culture 4/5 grew prevotella and GBS. Bone culture 4/6 grew MRSA and GBS. He was discharged initially on Ceftriaxone but  after bone cultures finalized he was changed to Daptomycin x 6 weeks (EOC 6/11).    He is seen today for follow up. Overall he feels great. Denies fevers, chills, sweats, pain. Denies PICC issues. He only has his foot dressings taken down in podiatry clinic and has an appt this week. His weekly labs have consistently shown hyperglycemia > 400 weekly.  This was discussed with him and had f/u with his PCP but again, there has been no improvement.  He has not had any problems. Endocrine referral placed.        Review of Systems   Constitutional: Positive for malaise/fatigue. Negative for chills, decreased appetite, fever, night sweats, weight gain and weight loss.   HENT:  Negative for congestion, ear pain, hearing loss, hoarse voice, sore throat and tinnitus.    Eyes:  Negative for blurred vision, redness and visual disturbance.   Cardiovascular:  Negative for chest pain, leg swelling and palpitations.  "  Respiratory:  Negative for cough, hemoptysis, shortness of breath, sputum production and wheezing.    Hematologic/Lymphatic: Negative for adenopathy. Does not bruise/bleed easily.   Skin:  Positive for itching. Negative for dry skin, rash and suspicious lesions.   Musculoskeletal:  Negative for back pain, joint pain, myalgias and neck pain.   Gastrointestinal:  Negative for abdominal pain, constipation, diarrhea, heartburn, nausea and vomiting.   Genitourinary:  Negative for dysuria, flank pain, frequency, hematuria, hesitancy and urgency.   Neurological:  Negative for dizziness, headaches, numbness, paresthesias and weakness.   Psychiatric/Behavioral:  Negative for depression and memory loss. The patient does not have insomnia and is not nervous/anxious.    Allergic/Immunologic: Negative for environmental allergies, HIV exposure, hives and persistent infections.     Objective:     Vitals:    06/11/24 1337   BP: (!) 146/94   Pulse: 76   Temp: 97.6 °F (36.4 °C)   TempSrc: Oral   Weight: 97.6 kg (215 lb 2.7 oz)   Height: 6' 3" (1.905 m)   PainSc: 0-No pain     Physical Exam  Constitutional:       General: He is not in acute distress.     Appearance: Normal appearance. He is well-developed. He is not ill-appearing or diaphoretic.   HENT:      Head: Normocephalic and atraumatic.      Right Ear: External ear normal.      Left Ear: External ear normal.      Nose: Nose normal.   Eyes:      General: No scleral icterus.        Right eye: No discharge.         Left eye: No discharge.      Extraocular Movements: Extraocular movements intact.      Conjunctiva/sclera: Conjunctivae normal.   Pulmonary:      Effort: Pulmonary effort is normal. No respiratory distress.      Breath sounds: No stridor.   Musculoskeletal:      Comments: Foot wrapped/dressed and in boot. Deferred dressing takedown and wound exam  PICC c/d/i   Skin:     General: Skin is dry.      Coloration: Skin is not jaundiced or pale.      Findings: No erythema. "   Neurological:      General: No focal deficit present.      Mental Status: He is alert and oriented to person, place, and time. Mental status is at baseline.   Psychiatric:         Mood and Affect: Mood normal.         Behavior: Behavior normal.         Thought Content: Thought content normal.         Judgment: Judgment normal.             WBC   Date Value Ref Range Status   06/04/2024 4.59 3.90 - 12.70 K/uL Final   05/28/2024 5.22 3.90 - 12.70 K/uL Final   05/21/2024 5.51 3.90 - 12.70 K/uL Final     Hemoglobin   Date Value Ref Range Status   06/04/2024 12.9 (L) 14.0 - 18.0 g/dL Final   05/28/2024 13.2 (L) 14.0 - 18.0 g/dL Final   05/21/2024 13.5 (L) 14.0 - 18.0 g/dL Final     Hematocrit   Date Value Ref Range Status   06/04/2024 37.9 (L) 40.0 - 54.0 % Final   05/28/2024 39.4 (L) 40.0 - 54.0 % Final   05/21/2024 39.5 (L) 40.0 - 54.0 % Final     Platelets   Date Value Ref Range Status   06/04/2024 226 150 - 450 K/uL Final   05/28/2024 244 150 - 450 K/uL Final   05/21/2024 239 150 - 450 K/uL Final     Sodium   Date Value Ref Range Status   06/04/2024 133 (L) 136 - 145 mmol/L Final   05/28/2024 134 (L) 136 - 145 mmol/L Final   05/21/2024 134 (L) 136 - 145 mmol/L Final     Potassium   Date Value Ref Range Status   06/04/2024 3.8 3.5 - 5.1 mmol/L Final   05/28/2024 4.1 3.5 - 5.1 mmol/L Final   05/21/2024 3.9 3.5 - 5.1 mmol/L Final     Chloride   Date Value Ref Range Status   06/04/2024 100 95 - 110 mmol/L Final   05/28/2024 101 95 - 110 mmol/L Final   05/21/2024 99 95 - 110 mmol/L Final     CO2   Date Value Ref Range Status   06/04/2024 23 23 - 29 mmol/L Final   05/28/2024 22 (L) 23 - 29 mmol/L Final   05/21/2024 22 (L) 23 - 29 mmol/L Final     BUN   Date Value Ref Range Status   06/04/2024 14 6 - 20 mg/dL Final   05/28/2024 14 6 - 20 mg/dL Final   05/21/2024 16 6 - 20 mg/dL Final     Creatinine   Date Value Ref Range Status   06/04/2024 1.4 0.5 - 1.4 mg/dL Final   05/28/2024 1.6 (H) 0.5 - 1.4 mg/dL Final   05/21/2024  1.6 (H) 0.5 - 1.4 mg/dL Final     Calcium   Date Value Ref Range Status   06/04/2024 9.6 8.7 - 10.5 mg/dL Final   05/28/2024 9.6 8.7 - 10.5 mg/dL Final   05/21/2024 9.9 8.7 - 10.5 mg/dL Final     Total Protein   Date Value Ref Range Status   06/04/2024 7.6 6.0 - 8.4 g/dL Final   05/28/2024 8.1 6.0 - 8.4 g/dL Final   05/21/2024 8.3 6.0 - 8.4 g/dL Final     Total Bilirubin   Date Value Ref Range Status   06/04/2024 0.5 0.1 - 1.0 mg/dL Final     Comment:     For infants and newborns, interpretation of results should be based  on gestational age, weight and in agreement with clinical  observations.    Premature Infant recommended reference ranges:  Up to 24 hours.............<8.0 mg/dL  Up to 48 hours............<12.0 mg/dL  3-5 days..................<15.0 mg/dL  6-29 days.................<15.0 mg/dL     05/28/2024 0.5 0.1 - 1.0 mg/dL Final     Comment:     For infants and newborns, interpretation of results should be based  on gestational age, weight and in agreement with clinical  observations.    Premature Infant recommended reference ranges:  Up to 24 hours.............<8.0 mg/dL  Up to 48 hours............<12.0 mg/dL  3-5 days..................<15.0 mg/dL  6-29 days.................<15.0 mg/dL     05/21/2024 0.5 0.1 - 1.0 mg/dL Final     Comment:     For infants and newborns, interpretation of results should be based  on gestational age, weight and in agreement with clinical  observations.    Premature Infant recommended reference ranges:  Up to 24 hours.............<8.0 mg/dL  Up to 48 hours............<12.0 mg/dL  3-5 days..................<15.0 mg/dL  6-29 days.................<15.0 mg/dL       Alkaline Phosphatase   Date Value Ref Range Status   06/04/2024 100 55 - 135 U/L Final   05/28/2024 108 55 - 135 U/L Final   05/21/2024 108 55 - 135 U/L Final     ALT   Date Value Ref Range Status   06/04/2024 13 10 - 44 U/L Final   05/28/2024 12 10 - 44 U/L Final   05/21/2024 14 10 - 44 U/L Final     AST   Date Value Ref  Range Status   06/04/2024 14 10 - 40 U/L Final   05/28/2024 12 10 - 40 U/L Final   05/21/2024 13 10 - 40 U/L Final     Sed Rate   Date Value Ref Range Status   04/05/2024 105 (H) 0 - 23 mm/Hr Final   04/20/2022 92 (H) 0 - 23 mm/Hr Final   09/19/2021 55 (H) 0 - 10 mm/Hr Final     CRP   Date Value Ref Range Status   06/04/2024 8.7 (H) 0.0 - 8.2 mg/L Final   05/28/2024 10.6 (H) 0.0 - 8.2 mg/L Final   05/21/2024 14.1 (H) 0.0 - 8.2 mg/L Final       Assessment:       1. Diabetic infection of right foot    2. Osteomyelitis, unspecified site, unspecified type    3. Type 2 diabetes mellitus with hyperglycemia, with long-term current use of insulin    4. Receiving intravenous antibiotic treatment as outpatient         49 year old male with DM foot wound and osteomyelitis of 5th toe.  Bone culture in April grew GBS and MRSA.  He completed six weeks of Daptomycin today. During last visit wound appeared to be healing and CRP essentially normalized at 8.7.  Patient deferred dressing takedown and wound exam today as sees podiatry this week. He has ongoing severe hyperglycemia and poorly controlled DM.     Plan:   Patient completed six weeks of IV Daptomycin. Will arrange PICC removal today in infusion suite and monitor off antibiotics  Repeat labs today - CBC, CMP, CRP  Endocrine referral placed. Unable to schedule an appointment until October. Have reached out to the Endocrine department to expedite this  Follow up with podiatry as planned. If there are any infectious concerns, please notify ID.  The patient understands and agrees with the plan of care. All questions were answered.           20 minutes of total time was spent on this encounter, which includes face to face time and non-face to face time preparing to see the patient (eg, review of tests), Obtaining and/or reviewing separately obtained history, documenting clinical information in the electronic or other health record, independently interpreting results (not  separately reported) and communicating results to the patient/family/caregiver, or care coordination (not separately reported).

## 2024-06-14 ENCOUNTER — OFFICE VISIT (OUTPATIENT)
Dept: PODIATRY | Facility: CLINIC | Age: 49
End: 2024-06-14

## 2024-06-14 VITALS
SYSTOLIC BLOOD PRESSURE: 151 MMHG | DIASTOLIC BLOOD PRESSURE: 96 MMHG | HEART RATE: 108 BPM | HEIGHT: 75 IN | BODY MASS INDEX: 27.17 KG/M2

## 2024-06-14 DIAGNOSIS — E11.42 DIABETIC POLYNEUROPATHY ASSOCIATED WITH TYPE 2 DIABETES MELLITUS: ICD-10-CM

## 2024-06-14 DIAGNOSIS — L97.512 ULCERATED, FOOT, RIGHT, WITH FAT LAYER EXPOSED: Primary | ICD-10-CM

## 2024-06-14 PROCEDURE — 99999 PR PBB SHADOW E&M-EST. PATIENT-LVL III: CPT | Mod: PBBFAC,,, | Performed by: PODIATRIST

## 2024-06-14 PROCEDURE — 99213 OFFICE O/P EST LOW 20 MIN: CPT | Mod: PBBFAC,25 | Performed by: PODIATRIST

## 2024-06-14 PROCEDURE — 11042 DBRDMT SUBQ TIS 1ST 20SQCM/<: CPT | Mod: PBBFAC | Performed by: PODIATRIST

## 2024-06-14 PROCEDURE — 11042 DBRDMT SUBQ TIS 1ST 20SQCM/<: CPT | Mod: S$PBB,,, | Performed by: PODIATRIST

## 2024-06-14 PROCEDURE — 99499 UNLISTED E&M SERVICE: CPT | Mod: S$PBB,,, | Performed by: PODIATRIST

## 2024-06-14 NOTE — PROGRESS NOTES
Subjective:     Patient ID: Ashutosh Ferrari is a 49 y.o. male.    Chief Complaint: Wound Care (Right foot )    Ashutosh is a 49 y.o. male who presents to the clinic for evaluation and treatment of high risk feet. Ashutosh has a past medical history of Diabetes mellitus, Diabetes mellitus, type 2, Essential hypertension, benign (07/18/2013), and Herpes. The patient's chief complaint is diabetic foot ulcer, right foot. Pt is under the care of ID and was present on today's visit. Pt states no one told him to remove or change his dressing, pt has had football dressing on since last visit. This patient has documented high risk feet requiring routine maintenance secondary to diabetes mellitis and those secondary complications of diabetes, as mentioned..      PCP: [facility] -    Date Last Seen by PCP:   Chief Complaint   Patient presents with    Wound Care     Right foot      Hemoglobin A1C   Date Value Ref Range Status   04/06/2024 12.3 (H) 4.0 - 5.6 % Final     Comment:     ADA Screening Guidelines:  5.7-6.4%  Consistent with prediabetes  >or=6.5%  Consistent with diabetes    High levels of fetal hemoglobin interfere with the HbA1C  assay. Heterozygous hemoglobin variants (HbS, HgC, etc)do  not significantly interfere with this assay.   However, presence of multiple variants may affect accuracy.     03/27/2022 12.5 (H) 4.0 - 5.6 % Final     Comment:     ADA Screening Guidelines:  5.7-6.4%  Consistent with prediabetes  >or=6.5%  Consistent with diabetes    High levels of fetal hemoglobin interfere with the HbA1C  assay. Heterozygous hemoglobin variants (HbS, HgC, etc)do  not significantly interfere with this assay.   However, presence of multiple variants may affect accuracy.     09/19/2021 12.8 (H) 4.0 - 5.6 % Final     Comment:     ADA Screening Guidelines:  5.7-6.4%  Consistent with prediabetes  >or=6.5%  Consistent with diabetes    High levels of fetal hemoglobin interfere with the HbA1C  assay. Heterozygous  hemoglobin variants (HbS, HgC, etc)do  not significantly interfere with this assay.   However, presence of multiple variants may affect accuracy.         Review of Systems   Constitutional: Negative for chills, decreased appetite and fever.   Cardiovascular:  Negative for leg swelling.   Skin:  Positive for poor wound healing.   Musculoskeletal:  Negative for arthritis, joint pain, joint swelling and myalgias.   Gastrointestinal:  Negative for nausea and vomiting.   Neurological:  Positive for numbness, paresthesias and sensory change. Negative for loss of balance.          Patient Active Problem List   Diagnosis    Diabetes mellitus, new onset    HHNC (hyperglycemic hyperosmolar nonketotic coma)    Volume depletion, unspecified    Stage 3 chronic kidney disease    Type 2 diabetes mellitus    Abscess, scalp    Dehydration, moderate    Hypophosphatasia    Hypomagnesemia    Hypokalemia    Essential hypertension    Pneumonia due to COVID-19 virus    Hyperkalemia    Leukopenia    Increased anion gap metabolic acidosis    Type 2 diabetes mellitus with hyperglycemia, with long-term current use of insulin    Diabetic foot wound and Cellulitis    Encounter for wound re-check    Anemia    Herpes simplex    Personal history of COVID-19    Acute kidney failure    Diabetic infection of right foot    Osteomyelitis       Current Outpatient Medications on File Prior to Visit   Medication Sig Dispense Refill    acetaminophen (TYLENOL) 500 MG tablet Take 500 mg by mouth 4 (four) times daily as needed (pain).      blood sugar diagnostic Strp 1 strip by Misc.(Non-Drug; Combo Route) route 2 (two) times daily with meals. 100 each 11    insulin detemir U-100 (LEVEMIR) 100 unit/mL injection Inject 40 Units into the skin every evening.      lancets 33 gauge Misc 1 each by Misc.(Non-Drug; Combo Route) route 2 (two) times daily with meals. 100 each 11    multivitamin (THERAGRAN) per tablet Take 1 tablet by mouth once daily.       pen needle,  "diabetic 32 gauge x 1/4" Ndle 1 each by Misc.(Non-Drug; Combo Route) route 2 (two) times daily with meals. 100 each 11    pulse oximeter (PULSE OXIMETER) device by Apply Externally route 2 (two) times a day. Use twice daily at 8 AM and 3 PM and record the value in [employer] as directed. 1 each 0    amLODIPine (NORVASC) 2.5 MG tablet Take 1 tablet (2.5 mg total) by mouth once daily. 30 tablet 11    insulin aspart U-100 (NOVOLOG) 100 unit/mL (3 mL) InPn pen Inject 6 Units into the skin 3 (three) times daily. (Patient not taking: Reported on 4/6/2024) 15 mL 11    lancing device Misc 1 Device by Misc.(Non-Drug; Combo Route) route 2 (two) times daily with meals. 1 each 0     No current facility-administered medications on file prior to visit.       Review of patient's allergies indicates:  No Known Allergies    Past Surgical History:   Procedure Laterality Date    DEBRIDEMENT OF FOOT Right 4/1/2022    Procedure: DEBRIDEMENT, FOOT;  Surgeon: Amena Martinez DPM;  Location: [facility] OR;  Service: Podiatry;  Laterality: Right;    DEBRIDEMENT OF FOOT Right 4/4/2022    Procedure: DEBRIDEMENT, FOOT;  Surgeon: Amena Martinez DPM;  Location: [facility] OR;  Service: Podiatry;  Laterality: Right;    INCISION AND DRAINAGE Right 3/28/2022    Procedure: INCISION AND DRAINAGE RIGHT FOOT;  Surgeon: Amena Martinez DPM;  Location: [facility] OR;  Service: Podiatry;  Laterality: Right;       Family History   Problem Relation Name Age of Onset    Diabetes Mother         Social History     Socioeconomic History    Marital status: Single   Occupational History    Occupation: Unemployed   Tobacco Use    Smoking status: Never    Smokeless tobacco: Never   Substance and Sexual Activity    Alcohol use: No    Drug use: No    Sexual activity: Yes     Partners: Female     Birth control/protection: None, Condom   Social History Narrative    ** Merged History Encounter **          Social Determinants of Health     Financial Resource Strain: Low Risk  (4/20/2022) "    Overall Financial Resource Strain (CARDIA)     Difficulty of Paying Living Expenses: Not very hard   Food Insecurity: No Food Insecurity (4/20/2022)    Hunger Vital Sign     Worried About Running Out of Food in the Last Year: Never true     Ran Out of Food in the Last Year: Never true   Transportation Needs: No Transportation Needs (4/20/2022)    PRAPARE - Transportation     Lack of Transportation (Medical): No     Lack of Transportation (Non-Medical): No   Physical Activity: Inactive (4/20/2022)    Exercise Vital Sign     Days of Exercise per Week: 0 days     Minutes of Exercise per Session: 0 min   Stress: No Stress Concern Present (4/20/2022)    [employer] [place] - Occupational Stress Questionnaire     Feeling of Stress : Not at all   Housing Stability: Low Risk  (4/20/2022)    Housing Stability Vital Sign     Unable to Pay for Housing in the Last Year: No     Number of Places Lived in the Last Year: 1     Unstable Housing in the Last Year: No           Objective:     Physical Exam  Vitals reviewed.   Constitutional:       Appearance: He is well-developed.   Cardiovascular:      Comments: dorsalis pedis and posterior tibial pulses are palpable bilaterally. Capillary refill time is within normal limits. + pedal hair growth         Musculoskeletal:         General: No tenderness. Normal range of motion.      Comments: Adequate joint range of motion without pain, limitation, nor crepitation Bilateral feet and ankle joints. Muscle strength is 5/5 in all groups bilaterally.         Feet:      Right foot:      Skin integrity: Ulcer present.      Left foot:      Skin integrity: Callus present.   Skin:     General: Skin is warm and dry.      Findings: No erythema, lesion or rash.      Comments: Ulceration  Location: right sub 5th met  Measurements: 1.5x 1.0x 0.3cm  Drainage: serous  Wound base: granular  SOI: none       Neurological:      Mental Status: He is alert and oriented to person, place,  and time.      Sensory: Sensory deficit present.      Comments:      Psychiatric:         Behavior: Behavior normal.           CRP-    CRP   Date Value Ref Range Status   06/11/2024 8.8 (H) 0.0 - 8.2 mg/L Final     ESR-   Sed Rate   Date Value Ref Range Status   04/05/2024 105 (H) 0 - 23 mm/Hr Final     CBC-   WBC   Date Value Ref Range Status   06/11/2024 6.80 3.90 - 12.70 K/uL Final     A1C-   Hemoglobin A1C   Date Value Ref Range Status   04/06/2024 12.3 (H) 4.0 - 5.6 % Final     Comment:     ADA Screening Guidelines:  5.7-6.4%  Consistent with prediabetes  >or=6.5%  Consistent with diabetes    High levels of fetal hemoglobin interfere with the HbA1C  assay. Heterozygous hemoglobin variants (HbS, HgC, etc)do  not significantly interfere with this assay.   However, presence of multiple variants may affect accuracy.       MICRO:    Aerobic Bacterial Culture   Date Value Ref Range Status   04/06/2024 (A)  Final    STREPTOCOCCUS AGALACTIAE (GROUP B)  Rare  Beta-hemolytic streptococci are routinely susceptible to   penicillins,cephalosporins and carbapenems.  Susceptibility testing not routinely performed     04/06/2024 (A)  Final    METHICILLIN RESISTANT STAPHYLOCOCCUS AUREUS  Rare          Anaerobic Culture   Date Value Ref Range Status   04/06/2024 No anaerobes isolated  Final     -----------------------------------------------------------------------------------------------------------------------------------------------------------    Assessment:      Encounter Diagnoses   Name Primary?    Ulcerated, foot, right, with fat layer exposed Yes    Diabetic polyneuropathy associated with type 2 diabetes mellitus      Plan:     Ashutosh was seen today for wound care.    Diagnoses and all orders for this visit:    Ulcerated, foot, right, with fat layer exposed    Diabetic polyneuropathy associated with type 2 diabetes mellitus      I counseled the patient on his conditions, their implications and medical  management.    Independent review of patients previous clinical notes    Reviewed current medication(s), and lab(s) specific to foot ailment(s) with patient in detail. All questions answered       Debridement: With verbal consent, nonviable tissues on the right foot were debrided beyond sub q utilizing a  sterile No. 3 scalpel and forceps. Minimal bleeding controlled with direct pressure  The patient tolerated this well.     Dressings: medihoney  Offloading:Foam     MA assisted w/ application of football dressing under my direct supervision. Walking boot  shoe applied to offload the area. Advised patient that this should be worn on the affected foot at all times when ambulating     Follow-up:Patient is to return to the clinic in 1 week  for follow-up but should call [facility] immediately if any signs of infection, such as fever, chills, sweats, increased redness or pain.    Short-term goals include maintaining good offloading and minimizing bioburden, promoting granulation and epithelialization to healing.  Long-term goals include keeping the wound healed by good offloading and medical management under the direction of internist.        .

## 2024-06-19 ENCOUNTER — OFFICE VISIT (OUTPATIENT)
Dept: PODIATRY | Facility: CLINIC | Age: 49
End: 2024-06-19

## 2024-06-19 VITALS
HEART RATE: 85 BPM | WEIGHT: 216.06 LBS | SYSTOLIC BLOOD PRESSURE: 144 MMHG | RESPIRATION RATE: 18 BRPM | DIASTOLIC BLOOD PRESSURE: 90 MMHG | HEIGHT: 75 IN | BODY MASS INDEX: 26.86 KG/M2

## 2024-06-19 DIAGNOSIS — L97.512 ULCERATED, FOOT, RIGHT, WITH FAT LAYER EXPOSED: ICD-10-CM

## 2024-06-19 DIAGNOSIS — E11.42 DIABETIC POLYNEUROPATHY ASSOCIATED WITH TYPE 2 DIABETES MELLITUS: Primary | ICD-10-CM

## 2024-06-19 PROCEDURE — 99214 OFFICE O/P EST MOD 30 MIN: CPT | Mod: PBBFAC | Performed by: PODIATRIST

## 2024-06-19 PROCEDURE — 99999 PR PBB SHADOW E&M-EST. PATIENT-LVL IV: CPT | Mod: PBBFAC,,, | Performed by: PODIATRIST

## 2024-06-19 PROCEDURE — 11042 DBRDMT SUBQ TIS 1ST 20SQCM/<: CPT | Mod: PBBFAC | Performed by: PODIATRIST

## 2024-06-19 PROCEDURE — 11042 DBRDMT SUBQ TIS 1ST 20SQCM/<: CPT | Mod: S$PBB,,, | Performed by: PODIATRIST

## 2024-06-19 PROCEDURE — 99499 UNLISTED E&M SERVICE: CPT | Mod: S$PBB,,, | Performed by: PODIATRIST

## 2024-06-19 NOTE — PROGRESS NOTES
Subjective:     Patient ID: Ashutosh Ferrari is a 49 y.o. male.    Chief Complaint: Wound Care (Rt foot ) and Dressing Change (Football )    Ashutosh is a 49 y.o. male who presents to the clinic for evaluation and treatment of high risk feet. Ashutosh has a past medical history of Diabetes mellitus, Diabetes mellitus, type 2, Essential hypertension, benign (07/18/2013), and Herpes. The patient's chief complaint is diabetic foot ulcer, R. Patient has been in football dressing, ambulating in Darco shoe x 1 week with out issues    This patient has documented high risk feet requiring routine maintenance secondary to diabetes mellitis and those secondary complications of diabetes, as mentioned..    PCP: [facility] -    Date Last Seen by PCP:   Chief Complaint   Patient presents with    Wound Care     Rt foot     Dressing Change     Football      Hemoglobin A1C   Date Value Ref Range Status   04/06/2024 12.3 (H) 4.0 - 5.6 % Final     Comment:     ADA Screening Guidelines:  5.7-6.4%  Consistent with prediabetes  >or=6.5%  Consistent with diabetes    High levels of fetal hemoglobin interfere with the HbA1C  assay. Heterozygous hemoglobin variants (HbS, HgC, etc)do  not significantly interfere with this assay.   However, presence of multiple variants may affect accuracy.     03/27/2022 12.5 (H) 4.0 - 5.6 % Final     Comment:     ADA Screening Guidelines:  5.7-6.4%  Consistent with prediabetes  >or=6.5%  Consistent with diabetes    High levels of fetal hemoglobin interfere with the HbA1C  assay. Heterozygous hemoglobin variants (HbS, HgC, etc)do  not significantly interfere with this assay.   However, presence of multiple variants may affect accuracy.     09/19/2021 12.8 (H) 4.0 - 5.6 % Final     Comment:     ADA Screening Guidelines:  5.7-6.4%  Consistent with prediabetes  >or=6.5%  Consistent with diabetes    High levels of fetal hemoglobin interfere with the HbA1C  assay. Heterozygous hemoglobin variants (HbS, HgC,  etc)do  not significantly interfere with this assay.   However, presence of multiple variants may affect accuracy.         Review of Systems   Constitutional: Negative for chills, decreased appetite and fever.   Cardiovascular:  Negative for leg swelling.   Skin:  Positive for poor wound healing.   Musculoskeletal:  Negative for arthritis, joint pain, joint swelling and myalgias.   Gastrointestinal:  Negative for nausea and vomiting.   Neurological:  Negative for loss of balance, numbness and paresthesias.      Objective:     Physical Exam    6.19.24    0.8x0.5x0.3 Cm ulceration to the plantar aspect of the right 5th metatarsophalangeal joint with exuberant hyperkeratotic tissue observed.  Moderate periwound maceration is noted.  No malodor.  No edema mild serous drainage noted.  No deep probe.  No purulence  Assessment:      Encounter Diagnoses   Name Primary?    Diabetic polyneuropathy associated with type 2 diabetes mellitus Yes    Ulcerated, foot, right, with fat layer exposed      Plan:     Ashutosh was seen today for wound care and dressing change.    Diagnoses and all orders for this visit:    Diabetic polyneuropathy associated with type 2 diabetes mellitus    Ulcerated, foot, right, with fat layer exposed      I counseled the patient on his conditions, their implications and medical management.    Independent review of patients previous clinical notes    Reviewed current medication(s), and lab(s) specific to foot ailment(s) with patient in detail. All questions answered     Debridement: With verbal consent, nonviable tissues on the right foot were debrided beyond sub q utilizing a  sterile No. 3 scalpel and forceps. Minimal bleeding controlled with direct pressure  The patient tolerated this well.     Dressings: Hydrofera Blue ready  Offloading:Michael COPELAND MA assisted w/ application of football dressing under my direct supervision. Darco shoe applied to offload the area. Advised patient that this should  be worn on the affected foot at all times when ambulating     Follow-up:Patient is to return to the clinic in 1 week  for follow-up but should call [facility] immediately if any signs of infection, such as fever, chills, sweats, increased redness or pain.    Short-term goals include maintaining good offloading and minimizing bioburden, promoting granulation and epithelialization to healing.  Long-term goals include keeping the wound healed by good offloading and medical management under the direction of internist.      .

## 2024-06-26 ENCOUNTER — OFFICE VISIT (OUTPATIENT)
Dept: PODIATRY | Facility: CLINIC | Age: 49
End: 2024-06-26

## 2024-06-26 VITALS
HEART RATE: 93 BPM | WEIGHT: 216.06 LBS | BODY MASS INDEX: 26.86 KG/M2 | SYSTOLIC BLOOD PRESSURE: 137 MMHG | HEIGHT: 75 IN | RESPIRATION RATE: 18 BRPM | DIASTOLIC BLOOD PRESSURE: 91 MMHG

## 2024-06-26 DIAGNOSIS — E11.42 DIABETIC POLYNEUROPATHY ASSOCIATED WITH TYPE 2 DIABETES MELLITUS: Primary | ICD-10-CM

## 2024-06-26 DIAGNOSIS — L97.512 ULCERATED, FOOT, RIGHT, WITH FAT LAYER EXPOSED: ICD-10-CM

## 2024-06-26 PROCEDURE — 11042 DBRDMT SUBQ TIS 1ST 20SQCM/<: CPT | Mod: S$PBB,,, | Performed by: PODIATRIST

## 2024-06-26 PROCEDURE — 11042 DBRDMT SUBQ TIS 1ST 20SQCM/<: CPT | Mod: PBBFAC | Performed by: PODIATRIST

## 2024-06-26 PROCEDURE — 87075 CULTR BACTERIA EXCEPT BLOOD: CPT | Performed by: PODIATRIST

## 2024-06-26 PROCEDURE — 87070 CULTURE OTHR SPECIMN AEROBIC: CPT | Performed by: PODIATRIST

## 2024-06-26 PROCEDURE — 99214 OFFICE O/P EST MOD 30 MIN: CPT | Mod: PBBFAC | Performed by: PODIATRIST

## 2024-06-26 PROCEDURE — 87077 CULTURE AEROBIC IDENTIFY: CPT | Mod: 59 | Performed by: PODIATRIST

## 2024-06-26 PROCEDURE — 99213 OFFICE O/P EST LOW 20 MIN: CPT | Mod: 25,S$PBB,, | Performed by: PODIATRIST

## 2024-06-26 PROCEDURE — 87186 SC STD MICRODIL/AGAR DIL: CPT | Performed by: PODIATRIST

## 2024-06-26 PROCEDURE — 99999 PR PBB SHADOW E&M-EST. PATIENT-LVL IV: CPT | Mod: PBBFAC,,, | Performed by: PODIATRIST

## 2024-06-26 NOTE — PROGRESS NOTES
Subjective:     Patient ID: Ashutosh Ferrari is a 49 y.o. male.    Chief Complaint: Wound Care, Dressing Change (Football ), and Foot Pain (Tingling )    Ashutosh is a 49 y.o. male who presents to the clinic for evaluation and treatment of high risk feet. Ashutosh has a past medical history of Diabetes mellitus, Diabetes mellitus, type 2, Essential hypertension, benign (07/18/2013), and Herpes. The patient's chief complaint is diabetic foot ulcer, R. Patient has been in football dressing, ambulating in Darco shoe x 1 week with out issues    This patient has documented high risk feet requiring routine maintenance secondary to diabetes mellitis and those secondary complications of diabetes, as mentioned..    PCP: [facility] -    Date Last Seen by PCP:   Chief Complaint   Patient presents with    Wound Care    Dressing Change     Football     Foot Pain     Tingling      Hemoglobin A1C   Date Value Ref Range Status   04/06/2024 12.3 (H) 4.0 - 5.6 % Final     Comment:     ADA Screening Guidelines:  5.7-6.4%  Consistent with prediabetes  >or=6.5%  Consistent with diabetes    High levels of fetal hemoglobin interfere with the HbA1C  assay. Heterozygous hemoglobin variants (HbS, HgC, etc)do  not significantly interfere with this assay.   However, presence of multiple variants may affect accuracy.     03/27/2022 12.5 (H) 4.0 - 5.6 % Final     Comment:     ADA Screening Guidelines:  5.7-6.4%  Consistent with prediabetes  >or=6.5%  Consistent with diabetes    High levels of fetal hemoglobin interfere with the HbA1C  assay. Heterozygous hemoglobin variants (HbS, HgC, etc)do  not significantly interfere with this assay.   However, presence of multiple variants may affect accuracy.     09/19/2021 12.8 (H) 4.0 - 5.6 % Final     Comment:     ADA Screening Guidelines:  5.7-6.4%  Consistent with prediabetes  >or=6.5%  Consistent with diabetes    High levels of fetal hemoglobin interfere with the HbA1C  assay. Heterozygous  hemoglobin variants (HbS, HgC, etc)do  not significantly interfere with this assay.   However, presence of multiple variants may affect accuracy.         Review of Systems   Constitutional: Negative for chills, decreased appetite and fever.   Cardiovascular:  Negative for leg swelling.   Skin:  Positive for dry skin and poor wound healing. Negative for flushing and itching.   Musculoskeletal:  Negative for arthritis, joint pain, joint swelling and myalgias.   Gastrointestinal:  Negative for nausea and vomiting.   Neurological:  Negative for loss of balance, numbness and paresthesias.        Objective:     Physical Exam  Vitals reviewed.   Constitutional:       Appearance: He is well-developed.   Cardiovascular:      Comments: dorsalis pedis and posterior tibial pulses are palpable bilaterally. Capillary refill time is within normal limits. + pedal hair growth         Musculoskeletal:         General: No tenderness or signs of injury. Normal range of motion.      Comments: Adequate joint range of motion without pain, limitation, nor crepitation Bilateral feet and ankle joints. Muscle strength is 5/5 in all groups bilaterally.         Skin:     General: Skin is warm and dry.      Findings: No erythema, lesion or rash.   Neurological:      Mental Status: He is alert and oriented to person, place, and time.      Sensory: No sensory deficit.      Comments: [place]-Angella 5.07 monofilament is intact bilateral feet.      Psychiatric:         Behavior: Behavior normal.         6.19.24    0.8x0.5x0.3 Cm ulceration to the plantar aspect of the right 5th metatarsophalangeal joint with exuberant hyperkeratotic tissue observed.  Moderate periwound maceration is noted.  No malodor.  No edema mild serous drainage noted.  No deep probe.  No purulence    6.26.24    0.5x0.3x0.3 cm ulceration to the plantar aspect of the right 5th metatarsophalangeal joint.  Periwound hyperkeratosis and maceration is noted.  Improved from last week.   No purulence noted.  No exposed bone or tendinous structures observed.  Mild odor observed.  Assessment:      Encounter Diagnoses   Name Primary?    Diabetic polyneuropathy associated with type 2 diabetes mellitus Yes    Ulcerated, foot, right, with fat layer exposed      Plan:     Ashutosh was seen today for wound care, dressing change and foot pain.    Diagnoses and all orders for this visit:    Diabetic polyneuropathy associated with type 2 diabetes mellitus  -     Aerobic culture  -     Culture, Anaerobic    Ulcerated, foot, right, with fat layer exposed  -     Aerobic culture  -     Culture, Anaerobic      I counseled the patient on his conditions, their implications and medical management.    Independent review of patients previous clinical notes    Reviewed current medication(s), and lab(s) specific to foot ailment(s) with patient in detail. All questions answered     Aerobic/anaerobic cultures obtained from wound.  Will monitor culture results and prescribe antibiotics as needed.      Debridement: With verbal consent, nonviable tissues on the right foot were debrided beyond sub q utilizing a  sterile No. 3 scalpel and forceps. Minimal bleeding controlled with direct pressure  The patient tolerated this well.     Dressings: Hydrofera Blue ready  Offloading:Michael COPELAND MA assisted w/ application of football dressing under my direct supervision. Darco shoe applied to offload the area. Advised patient that this should be worn on the affected foot at all times when ambulating     Follow-up:Patient is to return to the clinic in 1 week  for follow-up but should call [facility] immediately if any signs of infection, such as fever, chills, sweats, increased redness or pain.    Short-term goals include maintaining good offloading and minimizing bioburden, promoting granulation and epithelialization to healing.  Long-term goals include keeping the wound healed by good offloading and medical management under the  direction of internist.      .

## 2024-06-28 LAB — BACTERIA SPEC ANAEROBE CULT: NORMAL

## 2024-06-29 LAB
BACTERIA SPEC AEROBE CULT: ABNORMAL
BACTERIA SPEC AEROBE CULT: ABNORMAL

## 2024-07-01 ENCOUNTER — PATIENT MESSAGE (OUTPATIENT)
Dept: PODIATRY | Facility: CLINIC | Age: 49
End: 2024-07-01

## 2024-07-01 RX ORDER — DOXYCYCLINE 100 MG/1
100 CAPSULE ORAL 2 TIMES DAILY
Qty: 20 CAPSULE | Refills: 0 | Status: SHIPPED | OUTPATIENT
Start: 2024-07-01 | End: 2024-07-11

## 2024-07-03 ENCOUNTER — OFFICE VISIT (OUTPATIENT)
Dept: PODIATRY | Facility: CLINIC | Age: 49
End: 2024-07-03

## 2024-07-03 VITALS
BODY MASS INDEX: 26.86 KG/M2 | SYSTOLIC BLOOD PRESSURE: 149 MMHG | WEIGHT: 216.06 LBS | HEART RATE: 98 BPM | DIASTOLIC BLOOD PRESSURE: 86 MMHG | HEIGHT: 75 IN

## 2024-07-03 DIAGNOSIS — E11.9 DIABETES MELLITUS, NEW ONSET: Primary | ICD-10-CM

## 2024-07-03 DIAGNOSIS — L97.512 ULCERATED, FOOT, RIGHT, WITH FAT LAYER EXPOSED: ICD-10-CM

## 2024-07-03 PROCEDURE — 99999 PR PBB SHADOW E&M-EST. PATIENT-LVL III: CPT | Mod: PBBFAC,,, | Performed by: PODIATRIST

## 2024-07-03 PROCEDURE — 11042 DBRDMT SUBQ TIS 1ST 20SQCM/<: CPT | Mod: S$PBB,,, | Performed by: PODIATRIST

## 2024-07-03 PROCEDURE — 99213 OFFICE O/P EST LOW 20 MIN: CPT | Mod: PBBFAC | Performed by: PODIATRIST

## 2024-07-03 PROCEDURE — 99499 UNLISTED E&M SERVICE: CPT | Mod: S$PBB,,, | Performed by: PODIATRIST

## 2024-07-03 PROCEDURE — 11042 DBRDMT SUBQ TIS 1ST 20SQCM/<: CPT | Mod: PBBFAC | Performed by: PODIATRIST

## 2024-07-03 NOTE — PROGRESS NOTES
Subjective:     Patient ID: Ashutosh Ferrari is a 49 y.o. male.    Chief Complaint: Wound Care (Right foot)    Ashutosh is a 49 y.o. male who presents to the clinic for evaluation and treatment of high risk feet. Ashutosh has a past medical history of Diabetes mellitus, Diabetes mellitus, type 2, Essential hypertension, benign (07/18/2013), and Herpes. The patient's chief complaint is diabetic foot ulcer, R. Patient has been in football dressing, ambulating in Darco shoe x 1 week with out issues    This patient has documented high risk feet requiring routine maintenance secondary to diabetes mellitis and those secondary complications of diabetes, as mentioned..    PCP: [facility] -    Date Last Seen by PCP:   Chief Complaint   Patient presents with    Wound Care     Right foot     Hemoglobin A1C   Date Value Ref Range Status   04/06/2024 12.3 (H) 4.0 - 5.6 % Final     Comment:     ADA Screening Guidelines:  5.7-6.4%  Consistent with prediabetes  >or=6.5%  Consistent with diabetes    High levels of fetal hemoglobin interfere with the HbA1C  assay. Heterozygous hemoglobin variants (HbS, HgC, etc)do  not significantly interfere with this assay.   However, presence of multiple variants may affect accuracy.     03/27/2022 12.5 (H) 4.0 - 5.6 % Final     Comment:     ADA Screening Guidelines:  5.7-6.4%  Consistent with prediabetes  >or=6.5%  Consistent with diabetes    High levels of fetal hemoglobin interfere with the HbA1C  assay. Heterozygous hemoglobin variants (HbS, HgC, etc)do  not significantly interfere with this assay.   However, presence of multiple variants may affect accuracy.     09/19/2021 12.8 (H) 4.0 - 5.6 % Final     Comment:     ADA Screening Guidelines:  5.7-6.4%  Consistent with prediabetes  >or=6.5%  Consistent with diabetes    High levels of fetal hemoglobin interfere with the HbA1C  assay. Heterozygous hemoglobin variants (HbS, HgC, etc)do  not significantly interfere with this assay.   However,  presence of multiple variants may affect accuracy.         Review of Systems   Constitutional: Negative for chills, decreased appetite and fever.   Cardiovascular:  Negative for leg swelling.   Skin:  Positive for dry skin and poor wound healing. Negative for flushing, itching, rash and skin cancer.   Musculoskeletal:  Negative for arthritis, joint pain, joint swelling and myalgias.   Gastrointestinal:  Negative for nausea and vomiting.   Neurological:  Negative for loss of balance, numbness and paresthesias.        Objective:     Physical Exam  Vitals reviewed.   Constitutional:       Appearance: He is well-developed.   Cardiovascular:      Comments: dorsalis pedis and posterior tibial pulses are palpable bilaterally. Capillary refill time is within normal limits. + pedal hair growth         Musculoskeletal:         General: No tenderness or signs of injury. Normal range of motion.      Comments: Adequate joint range of motion without pain, limitation, nor crepitation Bilateral feet and ankle joints. Muscle strength is 5/5 in all groups bilaterally.         Skin:     General: Skin is warm and dry.      Findings: No erythema, lesion or rash.   Neurological:      Mental Status: He is alert and oriented to person, place, and time.      Sensory: No sensory deficit.      Comments: [place]-Angella 5.07 monofilament is intact bilateral feet.      Psychiatric:         Behavior: Behavior normal.         6.19.24    0.8x0.5x0.3 Cm ulceration to the plantar aspect of the right 5th metatarsophalangeal joint with exuberant hyperkeratotic tissue observed.  Moderate periwound maceration is noted.  No malodor.  No edema mild serous drainage noted.  No deep probe.  No purulence    6.26.24    0.5x0.3x0.3 cm ulceration to the plantar aspect of the right 5th metatarsophalangeal joint.  Periwound hyperkeratosis and maceration is noted.  Improved from last week.  No purulence noted.  No exposed bone or tendinous structures observed.   Mild odor observed.    7.3.24    0.4x0.2x0.1 cm Healthy granular wound bed.  Periwound hyperkeratosis with mild maceration observed.  Serous drainage.  Assessment:      Encounter Diagnoses   Name Primary?    Diabetes mellitus, new onset Yes    Ulcerated, foot, right, with fat layer exposed      Plan:     Ashutosh was seen today for wound care.    Diagnoses and all orders for this visit:    Diabetes mellitus, new onset    Ulcerated, foot, right, with fat layer exposed      I counseled the patient on his conditions, their implications and medical management.    Independent review of patients previous clinical notes    Reviewed current medication(s), and lab(s) specific to foot ailment(s) with patient in detail. All questions answered     Debridement: With verbal consent, nonviable tissues on the right foot were debrided beyond sub q utilizing a  sterile No. 3 scalpel and forceps. Minimal bleeding controlled with direct pressure  The patient tolerated this well.     Dressings: Ni   Offloading:iMchael Alcantar LPN assisted w/ application of football dressing under my direct supervision. Darco shoe applied to offload the area. Advised patient that this should be worn on the affected foot at all times when ambulating     Follow-up:Patient is to return to the clinic in 1 week  for follow-up but should call [facility] immediately if any signs of infection, such as fever, chills, sweats, increased redness or pain.    Short-term goals include maintaining good offloading and minimizing bioburden, promoting granulation and epithelialization to healing.  Long-term goals include keeping the wound healed by good offloading and medical management under the direction of internist.      .

## 2024-07-08 PROBLEM — N17.9 ACUTE KIDNEY FAILURE: Chronic | Status: RESOLVED | Noted: 2022-04-06 | Resolved: 2024-07-08

## 2024-07-17 ENCOUNTER — PATIENT MESSAGE (OUTPATIENT)
Dept: PODIATRY | Facility: CLINIC | Age: 49
End: 2024-07-17

## 2024-07-18 ENCOUNTER — OFFICE VISIT (OUTPATIENT)
Dept: PODIATRY | Facility: CLINIC | Age: 49
End: 2024-07-18

## 2024-07-18 VITALS
WEIGHT: 216.06 LBS | HEART RATE: 105 BPM | HEIGHT: 75 IN | BODY MASS INDEX: 26.86 KG/M2 | SYSTOLIC BLOOD PRESSURE: 157 MMHG | DIASTOLIC BLOOD PRESSURE: 82 MMHG

## 2024-07-18 DIAGNOSIS — E11.9 DIABETES MELLITUS, NEW ONSET: Primary | ICD-10-CM

## 2024-07-18 DIAGNOSIS — L97.512 ULCERATED, FOOT, RIGHT, WITH FAT LAYER EXPOSED: ICD-10-CM

## 2024-07-18 PROCEDURE — 99213 OFFICE O/P EST LOW 20 MIN: CPT | Mod: PBBFAC | Performed by: PODIATRIST

## 2024-07-18 PROCEDURE — 99999 PR PBB SHADOW E&M-EST. PATIENT-LVL III: CPT | Mod: PBBFAC,,, | Performed by: PODIATRIST

## 2024-07-18 PROCEDURE — 99499 UNLISTED E&M SERVICE: CPT | Mod: S$PBB,,, | Performed by: PODIATRIST

## 2024-07-18 PROCEDURE — 11042 DBRDMT SUBQ TIS 1ST 20SQCM/<: CPT | Mod: S$PBB,,, | Performed by: PODIATRIST

## 2024-07-18 PROCEDURE — 11042 DBRDMT SUBQ TIS 1ST 20SQCM/<: CPT | Mod: PBBFAC | Performed by: PODIATRIST

## 2024-07-18 NOTE — PROGRESS NOTES
Subjective:     Patient ID: Ashutosh Ferrari is a 49 y.o. male.    Chief Complaint: Wound Care (Right foot)    Ashutosh is a 49 y.o. male who presents to the clinic for evaluation and treatment of high risk feet. Ashutosh has a past medical history of Diabetes mellitus, Diabetes mellitus, type 2, Essential hypertension, benign (07/18/2013), and Herpes. The patient's chief complaint is diabetic foot ulcer, R. Patient has been in football dressing, ambulating in Darco shoe x 1+ week with out issues    This patient has documented high risk feet requiring routine maintenance secondary to diabetes mellitis and those secondary complications of diabetes, as mentioned..    PCP: [facility] -    Date Last Seen by PCP:   Chief Complaint   Patient presents with    Wound Care     Right foot     Hemoglobin A1C   Date Value Ref Range Status   04/06/2024 12.3 (H) 4.0 - 5.6 % Final     Comment:     ADA Screening Guidelines:  5.7-6.4%  Consistent with prediabetes  >or=6.5%  Consistent with diabetes    High levels of fetal hemoglobin interfere with the HbA1C  assay. Heterozygous hemoglobin variants (HbS, HgC, etc)do  not significantly interfere with this assay.   However, presence of multiple variants may affect accuracy.     03/27/2022 12.5 (H) 4.0 - 5.6 % Final     Comment:     ADA Screening Guidelines:  5.7-6.4%  Consistent with prediabetes  >or=6.5%  Consistent with diabetes    High levels of fetal hemoglobin interfere with the HbA1C  assay. Heterozygous hemoglobin variants (HbS, HgC, etc)do  not significantly interfere with this assay.   However, presence of multiple variants may affect accuracy.     09/19/2021 12.8 (H) 4.0 - 5.6 % Final     Comment:     ADA Screening Guidelines:  5.7-6.4%  Consistent with prediabetes  >or=6.5%  Consistent with diabetes    High levels of fetal hemoglobin interfere with the HbA1C  assay. Heterozygous hemoglobin variants (HbS, HgC, etc)do  not significantly interfere with this assay.    However, presence of multiple variants may affect accuracy.         Review of Systems   Constitutional: Negative for chills, decreased appetite and fever.   Cardiovascular:  Negative for leg swelling.   Skin:  Positive for dry skin and poor wound healing. Negative for flushing, itching, rash and skin cancer.   Musculoskeletal:  Negative for arthritis, joint pain, joint swelling and myalgias.   Gastrointestinal:  Negative for nausea and vomiting.   Neurological:  Negative for loss of balance, numbness and paresthesias.        Objective:     Physical Exam  Vitals reviewed.   Constitutional:       Appearance: He is well-developed.   Cardiovascular:      Comments: dorsalis pedis and posterior tibial pulses are palpable bilaterally. Capillary refill time is within normal limits. + pedal hair growth         Musculoskeletal:         General: No tenderness or signs of injury. Normal range of motion.      Comments: Adequate joint range of motion without pain, limitation, nor crepitation Bilateral feet and ankle joints. Muscle strength is 5/5 in all groups bilaterally.         Skin:     General: Skin is warm and dry.      Findings: No erythema, lesion or rash.   Neurological:      Mental Status: He is alert and oriented to person, place, and time.      Sensory: No sensory deficit.      Comments: [place]-Angella 5.07 monofilament is intact bilateral feet.      Psychiatric:         Behavior: Behavior normal.         6.19.24    0.8x0.5x0.3 Cm ulceration to the plantar aspect of the right 5th metatarsophalangeal joint with exuberant hyperkeratotic tissue observed.  Moderate periwound maceration is noted.  No malodor.  No edema mild serous drainage noted.  No deep probe.  No purulence    6.26.24    0.5x0.3x0.3 cm ulceration to the plantar aspect of the right 5th metatarsophalangeal joint.  Periwound hyperkeratosis and maceration is noted.  Improved from last week.  No purulence noted.  No exposed bone or tendinous structures  observed.  Mild odor observed.    7.3.24    0.4x0.2x0.1 cm Healthy granular wound bed.  Periwound hyperkeratosis with mild maceration observed.  Serous drainage.    7.18.24    0.6x0.4x0.3 cm granular base, + lesa wound maceration. No surrounding erythema, edema, malodor, nor drainage noted       Assessment:        Encounter Diagnoses   Name Primary?    Diabetes mellitus, new onset Yes    Ulcerated, foot, right, with fat layer exposed      Plan:     Ashutosh was seen today for wound care.    Diagnoses and all orders for this visit:    Diabetes mellitus, new onset    Ulcerated, foot, right, with fat layer exposed      I counseled the patient on his conditions, their implications and medical management.    Independent review of patients previous clinical notes    Reviewed current medication(s), and lab(s) specific to foot ailment(s) with patient in detail. All questions answered     Debridement: With verbal consent, nonviable tissues on the right foot were debrided beyond sub q utilizing a  sterile No. 3 scalpel and forceps. Minimal bleeding controlled with direct pressure  The patient tolerated this well.     Dressings: GV/HB   Offloading:Michael Alcantar LPN assisted w/ application of football dressing under my direct supervision. Darco shoe applied to offload the area. Advised patient that this should be worn on the affected foot at all times when ambulating     Follow-up:Patient is to return to the clinic in 1 week  for follow-up but should call [facility] immediately if any signs of infection, such as fever, chills, sweats, increased redness or pain.    Short-term goals include maintaining good offloading and minimizing bioburden, promoting granulation and epithelialization to healing.  Long-term goals include keeping the wound healed by good offloading and medical management under the direction of internist.      .

## 2024-07-19 ENCOUNTER — PATIENT MESSAGE (OUTPATIENT)
Dept: PODIATRY | Facility: CLINIC | Age: 49
End: 2024-07-19

## 2024-07-22 ENCOUNTER — PATIENT MESSAGE (OUTPATIENT)
Dept: PODIATRY | Facility: CLINIC | Age: 49
End: 2024-07-22

## 2024-07-25 ENCOUNTER — OFFICE VISIT (OUTPATIENT)
Dept: PODIATRY | Facility: CLINIC | Age: 49
End: 2024-07-25

## 2024-07-25 VITALS
HEIGHT: 75 IN | BODY MASS INDEX: 26.86 KG/M2 | WEIGHT: 216.06 LBS | DIASTOLIC BLOOD PRESSURE: 86 MMHG | HEART RATE: 93 BPM | SYSTOLIC BLOOD PRESSURE: 142 MMHG

## 2024-07-25 DIAGNOSIS — E11.42 DIABETIC POLYNEUROPATHY ASSOCIATED WITH TYPE 2 DIABETES MELLITUS: ICD-10-CM

## 2024-07-25 DIAGNOSIS — E11.621 DIABETIC FOOT ULCER ASSOCIATED WITH TYPE 2 DIABETES MELLITUS, UNSPECIFIED LATERALITY, UNSPECIFIED PART OF FOOT, UNSPECIFIED ULCER STAGE: Primary | ICD-10-CM

## 2024-07-25 DIAGNOSIS — L97.509 DIABETIC FOOT ULCER ASSOCIATED WITH TYPE 2 DIABETES MELLITUS, UNSPECIFIED LATERALITY, UNSPECIFIED PART OF FOOT, UNSPECIFIED ULCER STAGE: Primary | ICD-10-CM

## 2024-07-25 DIAGNOSIS — L97.512 ULCERATED, FOOT, RIGHT, WITH FAT LAYER EXPOSED: ICD-10-CM

## 2024-07-25 PROCEDURE — 11042 DBRDMT SUBQ TIS 1ST 20SQCM/<: CPT | Mod: PBBFAC | Performed by: PODIATRIST

## 2024-07-25 PROCEDURE — 99213 OFFICE O/P EST LOW 20 MIN: CPT | Mod: PBBFAC | Performed by: PODIATRIST

## 2024-07-25 PROCEDURE — 99999 PR PBB SHADOW E&M-EST. PATIENT-LVL III: CPT | Mod: PBBFAC,,, | Performed by: PODIATRIST

## 2024-07-25 NOTE — PROGRESS NOTES
Subjective:     Patient ID: Ashutosh Ferrari is a 49 y.o. male.    Chief Complaint: Wound Care (Right foot)    Ashutosh is a 49 y.o. male who presents to the clinic for evaluation and treatment of high risk feet. Ashutosh has a past medical history of Diabetes mellitus, Diabetes mellitus, type 2, Essential hypertension, benign (07/18/2013), and Herpes. The patient's chief complaint is diabetic foot ulcer, R. Patient has been in football dressing, ambulating in Darco shoe x 1 weeks with out issues    This patient has documented high risk feet requiring routine maintenance secondary to diabetes mellitis and those secondary complications of diabetes, as mentioned..    PCP: [facility] -    Date Last Seen by PCP:   Chief Complaint   Patient presents with    Wound Care     Right foot     Hemoglobin A1C   Date Value Ref Range Status   04/06/2024 12.3 (H) 4.0 - 5.6 % Final     Comment:     ADA Screening Guidelines:  5.7-6.4%  Consistent with prediabetes  >or=6.5%  Consistent with diabetes    High levels of fetal hemoglobin interfere with the HbA1C  assay. Heterozygous hemoglobin variants (HbS, HgC, etc)do  not significantly interfere with this assay.   However, presence of multiple variants may affect accuracy.     03/27/2022 12.5 (H) 4.0 - 5.6 % Final     Comment:     ADA Screening Guidelines:  5.7-6.4%  Consistent with prediabetes  >or=6.5%  Consistent with diabetes    High levels of fetal hemoglobin interfere with the HbA1C  assay. Heterozygous hemoglobin variants (HbS, HgC, etc)do  not significantly interfere with this assay.   However, presence of multiple variants may affect accuracy.     09/19/2021 12.8 (H) 4.0 - 5.6 % Final     Comment:     ADA Screening Guidelines:  5.7-6.4%  Consistent with prediabetes  >or=6.5%  Consistent with diabetes    High levels of fetal hemoglobin interfere with the HbA1C  assay. Heterozygous hemoglobin variants (HbS, HgC, etc)do  not significantly interfere with this assay.  "  However, presence of multiple variants may affect accuracy.         Review of Systems   Constitutional: Negative for chills, decreased appetite and fever.   Cardiovascular:  Negative for leg swelling.   Skin:  Positive for dry skin and poor wound healing. Negative for flushing, itching, rash and skin cancer.   Musculoskeletal:  Negative for arthritis, joint pain, joint swelling and myalgias.   Gastrointestinal:  Negative for nausea and vomiting.   Neurological:  Negative for loss of balance, numbness and paresthesias.        Objective:     Vitals:    07/25/24 0857   BP: (!) 142/86   Pulse: 93   Weight: 98 kg (216 lb 0.8 oz)   Height: 6' 3" (1.905 m)   PainSc:   4   PainLoc: Foot        Physical Exam  Vitals reviewed.   Constitutional:       Appearance: He is well-developed.   Cardiovascular:      Comments: dorsalis pedis and posterior tibial pulses are palpable bilaterally. Capillary refill time is within normal limits. + pedal hair growth         Musculoskeletal:         General: No tenderness or signs of injury. Normal range of motion.      Comments: Adequate joint range of motion without pain, limitation, nor crepitation Bilateral feet and ankle joints. Muscle strength is 5/5 in all groups bilaterally.         Skin:     General: Skin is warm and dry.      Findings: No erythema, lesion or rash.   Neurological:      Mental Status: He is alert and oriented to person, place, and time.      Sensory: No sensory deficit.      Comments: [place]-Angella 5.07 monofilament is intact bilateral feet.      Psychiatric:         Behavior: Behavior normal.         6.19.24    0.8x0.5x0.3 Cm ulceration to the plantar aspect of the right 5th metatarsophalangeal joint with exuberant hyperkeratotic tissue observed.  Moderate periwound maceration is noted.  No malodor.  No edema mild serous drainage noted.  No deep probe.  No purulence    6.26.24    0.5x0.3x0.3 cm ulceration to the plantar aspect of the right 5th metatarsophalangeal " joint.  Periwound hyperkeratosis and maceration is noted.  Improved from last week.  No purulence noted.  No exposed bone or tendinous structures observed.  Mild odor observed.    7.3.24    0.4x0.2x0.1 cm Healthy granular wound bed.  Periwound hyperkeratosis with mild maceration observed.  Serous drainage.    7.18.24    0.6x0.4x0.3 cm granular base, + lesa wound maceration. No surrounding erythema, edema, malodor, nor drainage noted     7.25.24    0.9x0.4x0.4 cm granular base, + lesa wound maceration. No surrounding erythema, edema, malodor, nor drainage noted       Assessment:        Encounter Diagnoses   Name Primary?    Diabetic foot ulcer associated with type 2 diabetes mellitus, unspecified laterality, unspecified part of foot, unspecified ulcer stage Yes    Ulcerated, foot, right, with fat layer exposed     Diabetic polyneuropathy associated with type 2 diabetes mellitus        Plan:     Ashutosh was seen today for wound care.    Diagnoses and all orders for this visit:    Diabetic foot ulcer associated with type 2 diabetes mellitus, unspecified laterality, unspecified part of foot, unspecified ulcer stage    Ulcerated, foot, right, with fat layer exposed    Diabetic polyneuropathy associated with type 2 diabetes mellitus        I counseled the patient on his conditions, their implications and medical management.    Independent review of patients previous clinical notes    Reviewed current medication(s), and lab(s) specific to foot ailment(s) with patient in detail. All questions answered     Debridement: With verbal consent, nonviable tissues on the right foot were debrided beyond sub q utilizing a sterile No. 3 scalpel. Minimal bleeding controlled with direct pressure. The patient tolerated this well.     Continued HK w/ deep probe inspite of switching to walking boot last week. Again advised opt of the importance of wb compliance     Dressings: GV/HB  Offloading: Michael Alcantar LPN assisted w/ application  of football dressing under my direct supervision. WB applied to offload the area. Advised patient that this should be worn on the affected foot at all times when ambulating     Follow-up:Patient is to return to the clinic in 1 week  for follow-up but should call [facility] immediately if any signs of infection, such as fever, chills, sweats, increased redness or pain.    Short-term goals include maintaining good offloading and minimizing bioburden, promoting granulation and epithelialization to healing.  Long-term goals include keeping the wound healed by good offloading and medical management under the direction of internist.

## 2024-07-29 ENCOUNTER — PATIENT MESSAGE (OUTPATIENT)
Dept: PODIATRY | Facility: CLINIC | Age: 49
End: 2024-07-29

## 2024-08-02 ENCOUNTER — TELEPHONE (OUTPATIENT)
Dept: PODIATRY | Facility: CLINIC | Age: 49
End: 2024-08-02

## 2024-08-02 NOTE — TELEPHONE ENCOUNTER
Spoke with patient to confirm his appointment on 08/06/2024 with Dr. Castillo(Podiatry), patient has verbally confirmed appt.

## 2024-08-06 ENCOUNTER — OFFICE VISIT (OUTPATIENT)
Dept: PODIATRY | Facility: CLINIC | Age: 49
End: 2024-08-06

## 2024-08-06 VITALS
BODY MASS INDEX: 26.86 KG/M2 | SYSTOLIC BLOOD PRESSURE: 167 MMHG | HEART RATE: 96 BPM | WEIGHT: 216.06 LBS | RESPIRATION RATE: 18 BRPM | DIASTOLIC BLOOD PRESSURE: 79 MMHG | HEIGHT: 75 IN

## 2024-08-06 DIAGNOSIS — N18.30 STAGE 3 CHRONIC KIDNEY DISEASE, UNSPECIFIED WHETHER STAGE 3A OR 3B CKD: ICD-10-CM

## 2024-08-06 DIAGNOSIS — E11.621 DIABETIC FOOT ULCER ASSOCIATED WITH TYPE 2 DIABETES MELLITUS, UNSPECIFIED LATERALITY, UNSPECIFIED PART OF FOOT, UNSPECIFIED ULCER STAGE: Primary | ICD-10-CM

## 2024-08-06 DIAGNOSIS — L97.509 DIABETIC FOOT ULCER ASSOCIATED WITH TYPE 2 DIABETES MELLITUS, UNSPECIFIED LATERALITY, UNSPECIFIED PART OF FOOT, UNSPECIFIED ULCER STAGE: Primary | ICD-10-CM

## 2024-08-06 DIAGNOSIS — L97.512 ULCERATED, FOOT, RIGHT, WITH FAT LAYER EXPOSED: ICD-10-CM

## 2024-08-06 PROCEDURE — 99999 PR PBB SHADOW E&M-EST. PATIENT-LVL IV: CPT | Mod: PBBFAC,,, | Performed by: PODIATRIST

## 2024-08-06 PROCEDURE — 99499 UNLISTED E&M SERVICE: CPT | Mod: S$PBB,,, | Performed by: PODIATRIST

## 2024-08-06 PROCEDURE — 11042 DBRDMT SUBQ TIS 1ST 20SQCM/<: CPT | Mod: PBBFAC | Performed by: PODIATRIST

## 2024-08-06 PROCEDURE — 11042 DBRDMT SUBQ TIS 1ST 20SQCM/<: CPT | Mod: S$PBB,,, | Performed by: PODIATRIST

## 2024-08-06 PROCEDURE — 99214 OFFICE O/P EST MOD 30 MIN: CPT | Mod: PBBFAC,25 | Performed by: PODIATRIST

## 2024-08-09 ENCOUNTER — TELEPHONE (OUTPATIENT)
Dept: PODIATRY | Facility: CLINIC | Age: 49
End: 2024-08-09

## 2024-08-14 ENCOUNTER — OFFICE VISIT (OUTPATIENT)
Dept: PODIATRY | Facility: CLINIC | Age: 49
End: 2024-08-14

## 2024-08-14 VITALS
SYSTOLIC BLOOD PRESSURE: 147 MMHG | HEART RATE: 96 BPM | DIASTOLIC BLOOD PRESSURE: 96 MMHG | BODY MASS INDEX: 27 KG/M2 | HEIGHT: 75 IN

## 2024-08-14 DIAGNOSIS — E11.621 DIABETIC FOOT ULCER ASSOCIATED WITH TYPE 2 DIABETES MELLITUS, UNSPECIFIED LATERALITY, UNSPECIFIED PART OF FOOT, UNSPECIFIED ULCER STAGE: Primary | ICD-10-CM

## 2024-08-14 DIAGNOSIS — L97.509 DIABETIC FOOT ULCER ASSOCIATED WITH TYPE 2 DIABETES MELLITUS, UNSPECIFIED LATERALITY, UNSPECIFIED PART OF FOOT, UNSPECIFIED ULCER STAGE: Primary | ICD-10-CM

## 2024-08-14 DIAGNOSIS — E11.42 DIABETIC POLYNEUROPATHY ASSOCIATED WITH TYPE 2 DIABETES MELLITUS: ICD-10-CM

## 2024-08-14 PROCEDURE — 11042 DBRDMT SUBQ TIS 1ST 20SQCM/<: CPT | Mod: PBBFAC | Performed by: PODIATRIST

## 2024-08-14 PROCEDURE — 99499 UNLISTED E&M SERVICE: CPT | Mod: S$PBB,,, | Performed by: PODIATRIST

## 2024-08-14 PROCEDURE — 99213 OFFICE O/P EST LOW 20 MIN: CPT | Mod: PBBFAC | Performed by: PODIATRIST

## 2024-08-14 PROCEDURE — 99999 PR PBB SHADOW E&M-EST. PATIENT-LVL III: CPT | Mod: PBBFAC,,, | Performed by: PODIATRIST

## 2024-08-14 PROCEDURE — 11042 DBRDMT SUBQ TIS 1ST 20SQCM/<: CPT | Mod: S$PBB,,, | Performed by: PODIATRIST

## 2024-08-19 ENCOUNTER — PATIENT MESSAGE (OUTPATIENT)
Dept: PODIATRY | Facility: CLINIC | Age: 49
End: 2024-08-19

## 2024-08-19 ENCOUNTER — TELEPHONE (OUTPATIENT)
Dept: PODIATRY | Facility: CLINIC | Age: 49
End: 2024-08-19

## 2024-08-19 NOTE — TELEPHONE ENCOUNTER
Attempted to contact patient to confirm his appointment on 08/21/2024 with Dr. Castillo(Podiatry), unable to leave a vm message, mailbox is full

## 2024-08-21 ENCOUNTER — OFFICE VISIT (OUTPATIENT)
Dept: PODIATRY | Facility: CLINIC | Age: 49
End: 2024-08-21

## 2024-08-21 VITALS
RESPIRATION RATE: 18 BRPM | DIASTOLIC BLOOD PRESSURE: 87 MMHG | HEART RATE: 92 BPM | WEIGHT: 216.06 LBS | BODY MASS INDEX: 26.86 KG/M2 | HEIGHT: 75 IN | SYSTOLIC BLOOD PRESSURE: 144 MMHG

## 2024-08-21 DIAGNOSIS — E11.621 DIABETIC FOOT ULCER ASSOCIATED WITH TYPE 2 DIABETES MELLITUS, UNSPECIFIED LATERALITY, UNSPECIFIED PART OF FOOT, UNSPECIFIED ULCER STAGE: Primary | ICD-10-CM

## 2024-08-21 DIAGNOSIS — L97.509 DIABETIC FOOT ULCER ASSOCIATED WITH TYPE 2 DIABETES MELLITUS, UNSPECIFIED LATERALITY, UNSPECIFIED PART OF FOOT, UNSPECIFIED ULCER STAGE: Primary | ICD-10-CM

## 2024-08-21 DIAGNOSIS — E11.42 DIABETIC POLYNEUROPATHY ASSOCIATED WITH TYPE 2 DIABETES MELLITUS: ICD-10-CM

## 2024-08-21 DIAGNOSIS — N18.30 STAGE 3 CHRONIC KIDNEY DISEASE, UNSPECIFIED WHETHER STAGE 3A OR 3B CKD: ICD-10-CM

## 2024-08-21 PROCEDURE — 99999 PR PBB SHADOW E&M-EST. PATIENT-LVL IV: CPT | Mod: PBBFAC,,, | Performed by: PODIATRIST

## 2024-08-21 PROCEDURE — 11042 DBRDMT SUBQ TIS 1ST 20SQCM/<: CPT | Mod: S$PBB,,, | Performed by: PODIATRIST

## 2024-08-21 PROCEDURE — 99499 UNLISTED E&M SERVICE: CPT | Mod: S$PBB,,, | Performed by: PODIATRIST

## 2024-08-21 PROCEDURE — 99214 OFFICE O/P EST MOD 30 MIN: CPT | Mod: PBBFAC | Performed by: PODIATRIST

## 2024-08-21 PROCEDURE — 11042 DBRDMT SUBQ TIS 1ST 20SQCM/<: CPT | Mod: PBBFAC | Performed by: PODIATRIST

## 2024-08-21 NOTE — PROGRESS NOTES
Subjective:     Patient ID: Ashutosh Ferrari is a 49 y.o. male.    Chief Complaint: Wound Care (Right foot) and Diabetes Mellitus    Ashutosh is a 49 y.o. male who presents to the clinic for evaluation and treatment of high risk feet. Ashutosh has a past medical history of Diabetes mellitus, Diabetes mellitus, type 2, Essential hypertension, benign (07/18/2013), and Herpes. The patient's chief complaint is diabetic foot ulcer, R. Patient has been in football dressing, ambulating in Darco shoe x 1 weeks with out issues    This patient has documented high risk feet requiring routine maintenance secondary to diabetes mellitis and those secondary complications of diabetes, as mentioned..    PCP: [facility] -    Date Last Seen by PCP:   Chief Complaint   Patient presents with    Wound Care     Right foot    Diabetes Mellitus     Hemoglobin A1C   Date Value Ref Range Status   04/06/2024 12.3 (H) 4.0 - 5.6 % Final     Comment:     ADA Screening Guidelines:  5.7-6.4%  Consistent with prediabetes  >or=6.5%  Consistent with diabetes    High levels of fetal hemoglobin interfere with the HbA1C  assay. Heterozygous hemoglobin variants (HbS, HgC, etc)do  not significantly interfere with this assay.   However, presence of multiple variants may affect accuracy.     03/27/2022 12.5 (H) 4.0 - 5.6 % Final     Comment:     ADA Screening Guidelines:  5.7-6.4%  Consistent with prediabetes  >or=6.5%  Consistent with diabetes    High levels of fetal hemoglobin interfere with the HbA1C  assay. Heterozygous hemoglobin variants (HbS, HgC, etc)do  not significantly interfere with this assay.   However, presence of multiple variants may affect accuracy.     09/19/2021 12.8 (H) 4.0 - 5.6 % Final     Comment:     ADA Screening Guidelines:  5.7-6.4%  Consistent with prediabetes  >or=6.5%  Consistent with diabetes    High levels of fetal hemoglobin interfere with the HbA1C  assay. Heterozygous hemoglobin variants (HbS, HgC, etc)do  not  "significantly interfere with this assay.   However, presence of multiple variants may affect accuracy.         Review of Systems   Constitutional: Negative for chills, decreased appetite and fever.   Cardiovascular:  Negative for leg swelling.   Skin:  Positive for dry skin and poor wound healing. Negative for flushing, itching, rash and skin cancer.   Musculoskeletal:  Negative for arthritis, joint pain, joint swelling and myalgias.   Gastrointestinal:  Negative for nausea and vomiting.   Neurological:  Negative for loss of balance, numbness and paresthesias.        Objective:     Vitals:    08/14/24 0925   BP: (!) 147/96   Pulse: 96   Height: 6' 3" (1.905 m)   PainSc: 0-No pain        Physical Exam  Vitals reviewed.   Constitutional:       Appearance: He is well-developed.   Cardiovascular:      Comments: dorsalis pedis and posterior tibial pulses are palpable bilaterally. Capillary refill time is within normal limits. + pedal hair growth         Musculoskeletal:         General: No tenderness or signs of injury. Normal range of motion.      Comments: Adequate joint range of motion without pain, limitation, nor crepitation Bilateral feet and ankle joints. Muscle strength is 5/5 in all groups bilaterally.         Skin:     General: Skin is warm and dry.      Findings: No erythema, lesion or rash.   Neurological:      Mental Status: He is alert and oriented to person, place, and time.      Sensory: No sensory deficit.      Comments: [place]-Angella 5.07 monofilament is intact bilateral feet.      Psychiatric:         Behavior: Behavior normal.         6.19.24    0.8x0.5x0.3 Cm ulceration to the plantar aspect of the right 5th metatarsophalangeal joint with exuberant hyperkeratotic tissue observed.  Moderate periwound maceration is noted.  No malodor.  No edema mild serous drainage noted.  No deep probe.  No purulence    6.26.24    0.5x0.3x0.3 cm ulceration to the plantar aspect of the right 5th metatarsophalangeal " joint.  Periwound hyperkeratosis and maceration is noted.  Improved from last week.  No purulence noted.  No exposed bone or tendinous structures observed.  Mild odor observed.    7.3.24    0.4x0.2x0.1 cm Healthy granular wound bed.  Periwound hyperkeratosis with mild maceration observed.  Serous drainage.    7.18.24    0.6x0.4x0.3 cm granular base, + lesa wound maceration. No surrounding erythema, edema, malodor, nor drainage noted     7.25.24    0.9x0.4x0.4 cm granular base, + lesa wound maceration. No surrounding erythema, edema, malodor, nor drainage noted     8.6.24    0.8X0.4X0.3 CM GRANULAR BASE MILD PERIWOUND MACERATION.  HEALTHY PERIWOUND SKIN.  NO MALODOR.  MILD BLOODY DRAINAGE.      0.3x0.2x0.3 cm granular wound base with friable periwound.  Mild serous drainage is observed.  No erythema no edema no swelling   Assessment:        Encounter Diagnoses   Name Primary?    Diabetic foot ulcer associated with type 2 diabetes mellitus, unspecified laterality, unspecified part of foot, unspecified ulcer stage Yes    Diabetic polyneuropathy associated with type 2 diabetes mellitus        Plan:     Ashutosh was seen today for wound care and diabetes mellitus.    Diagnoses and all orders for this visit:    Diabetic foot ulcer associated with type 2 diabetes mellitus, unspecified laterality, unspecified part of foot, unspecified ulcer stage    Diabetic polyneuropathy associated with type 2 diabetes mellitus        I counseled the patient on his conditions, their implications and medical management.    Independent review of patients previous clinical notes    Reviewed current medication(s), and lab(s) specific to foot ailment(s) with patient in detail. All questions answered     Debridement: With verbal consent, nonviable tissues on the right foot were debrided beyond sub q utilizing a sterile No. 3 scalpel. Minimal bleeding controlled with direct pressure. The patient tolerated this well.     Dressings:  GV/ELIAS  Offloading: Foam/FELT CUT OUT       Ashley COPELAND MA assisted w/ application of football dressing under my direct supervision. Darco shoe applied to offload the area. Advised patient that this should be worn on the affected foot at all times when ambulating     Follow-up:Patient is to return to the clinic in 1 week  for follow-up but should call [facility] immediately if any signs of infection, such as fever, chills, sweats, increased redness or pain.    Short-term goals include maintaining good offloading and minimizing bioburden, promoting granulation and epithelialization to healing.  Long-term goals include keeping the wound healed by good offloading and medical management under the direction of internist.

## 2024-08-21 NOTE — PROGRESS NOTES
Subjective:     Patient ID: Ashutosh Ferrari is a 49 y.o. male.    Chief Complaint: No chief complaint on file.    Ashutosh is a 49 y.o. male who presents to the clinic for evaluation and treatment of high risk feet. Ashutosh has a past medical history of Diabetes mellitus, Diabetes mellitus, type 2, Essential hypertension, benign (07/18/2013), and Herpes. The patient's chief complaint is diabetic foot ulcer, R. Patient has been in football dressing, ambulating in Darco shoe x 1 weeks with out issues    This patient has documented high risk feet requiring routine maintenance secondary to diabetes mellitis and those secondary complications of diabetes, as mentioned..    PCP: [facility] -    Date Last Seen by PCP:   No chief complaint on file.    Hemoglobin A1C   Date Value Ref Range Status   04/06/2024 12.3 (H) 4.0 - 5.6 % Final     Comment:     ADA Screening Guidelines:  5.7-6.4%  Consistent with prediabetes  >or=6.5%  Consistent with diabetes    High levels of fetal hemoglobin interfere with the HbA1C  assay. Heterozygous hemoglobin variants (HbS, HgC, etc)do  not significantly interfere with this assay.   However, presence of multiple variants may affect accuracy.     03/27/2022 12.5 (H) 4.0 - 5.6 % Final     Comment:     ADA Screening Guidelines:  5.7-6.4%  Consistent with prediabetes  >or=6.5%  Consistent with diabetes    High levels of fetal hemoglobin interfere with the HbA1C  assay. Heterozygous hemoglobin variants (HbS, HgC, etc)do  not significantly interfere with this assay.   However, presence of multiple variants may affect accuracy.     09/19/2021 12.8 (H) 4.0 - 5.6 % Final     Comment:     ADA Screening Guidelines:  5.7-6.4%  Consistent with prediabetes  >or=6.5%  Consistent with diabetes    High levels of fetal hemoglobin interfere with the HbA1C  assay. Heterozygous hemoglobin variants (HbS, HgC, etc)do  not significantly interfere with this assay.   However, presence of multiple variants  may affect accuracy.         Review of Systems   Constitutional: Negative for chills, decreased appetite and fever.   Cardiovascular:  Negative for leg swelling.   Skin:  Positive for dry skin and poor wound healing. Negative for flushing, itching, rash and skin cancer.   Musculoskeletal:  Negative for arthritis, joint pain, joint swelling and myalgias.   Gastrointestinal:  Negative for nausea and vomiting.   Neurological:  Negative for loss of balance, numbness and paresthesias.        Objective:     There were no vitals filed for this visit.       Physical Exam  Vitals reviewed.   Constitutional:       Appearance: He is well-developed.   Cardiovascular:      Comments: dorsalis pedis and posterior tibial pulses are palpable bilaterally. Capillary refill time is within normal limits. + pedal hair growth         Musculoskeletal:         General: No tenderness or signs of injury. Normal range of motion.      Comments: Adequate joint range of motion without pain, limitation, nor crepitation Bilateral feet and ankle joints. Muscle strength is 5/5 in all groups bilaterally.         Skin:     General: Skin is warm and dry.      Findings: No erythema, lesion or rash.   Neurological:      Mental Status: He is alert and oriented to person, place, and time.      Sensory: No sensory deficit.      Comments: [place]-Angella 5.07 monofilament is intact bilateral feet.      Psychiatric:         Behavior: Behavior normal.         6.19.24    0.8x0.5x0.3 Cm ulceration to the plantar aspect of the right 5th metatarsophalangeal joint with exuberant hyperkeratotic tissue observed.  Moderate periwound maceration is noted.  No malodor.  No edema mild serous drainage noted.  No deep probe.  No purulence    6.26.24    0.5x0.3x0.3 cm ulceration to the plantar aspect of the right 5th metatarsophalangeal joint.  Periwound hyperkeratosis and maceration is noted.  Improved from last week.  No purulence noted.  No exposed bone or tendinous  structures observed.  Mild odor observed.    7.3.24    0.4x0.2x0.1 cm Healthy granular wound bed.  Periwound hyperkeratosis with mild maceration observed.  Serous drainage.    7.18.24    0.6x0.4x0.3 cm granular base, + lesa wound maceration. No surrounding erythema, edema, malodor, nor drainage noted     7.25.24    0.9x0.4x0.4 cm granular base, + lesa wound maceration. No surrounding erythema, edema, malodor, nor drainage noted     8.6.24    0.8X0.4X0.3 CM GRANULAR BASE MILD PERIWOUND MACERATION.  HEALTHY PERIWOUND SKIN.  NO MALODOR.  MILD BLOODY DRAINAGE.      0.3x0.2x0.3 cm granular wound base with friable periwound.  Mild serous drainage is observed.  No erythema no edema no swelling     8.21.24    0.4x0.3x0.1 cm with healthy granular wound base.  Increased periwound hyperkeratosis is noted.  The wound has slightly increased in width however the depth is greatly improved.  Assessment:        Encounter Diagnoses   Name Primary?    Diabetic foot ulcer associated with type 2 diabetes mellitus, unspecified laterality, unspecified part of foot, unspecified ulcer stage Yes    Stage 3 chronic kidney disease, unspecified whether stage 3a or 3b CKD     Diabetic polyneuropathy associated with type 2 diabetes mellitus        Plan:     Diagnoses and all orders for this visit:    Diabetic foot ulcer associated with type 2 diabetes mellitus, unspecified laterality, unspecified part of foot, unspecified ulcer stage    Stage 3 chronic kidney disease, unspecified whether stage 3a or 3b CKD    Diabetic polyneuropathy associated with type 2 diabetes mellitus        I counseled the patient on his conditions, their implications and medical management.    Independent review of patients previous clinical notes    Reviewed current medication(s), and lab(s) specific to foot ailment(s) with patient in detail. All questions answered     Debridement: With verbal consent, nonviable tissues on the right foot were debrided beyond sub q  utilizing a sterile No. 3 scalpel. Minimal bleeding controlled with direct pressure. The patient tolerated this well.   Patient has been moving for the past several weeks I advised the patient that is very important to stay off his foot as much as possible he voices understanding.  He has travel plans coming up [place] of the country.  We will schedule follow-up upon his arrival.  Dressings: alg  Offloading: Foam/FELT CUT OUT       Ashley COPELAND MA assisted w/ application of football dressing under my direct supervision. Darco shoe applied to offload the area. Advised patient that this should be worn on the affected foot at all times when ambulating     Follow-up:Patient is to return to the clinic in 1 week  for follow-up but should call [facility] immediately if any signs of infection, such as fever, chills, sweats, increased redness or pain.    Short-term goals include maintaining good offloading and minimizing bioburden, promoting granulation and epithelialization to healing.  Long-term goals include keeping the wound healed by good offloading and medical management under the direction of internist.

## 2024-08-29 ENCOUNTER — PATIENT MESSAGE (OUTPATIENT)
Dept: PODIATRY | Facility: CLINIC | Age: 49
End: 2024-08-29

## 2024-09-03 ENCOUNTER — OFFICE VISIT (OUTPATIENT)
Dept: PODIATRY | Facility: CLINIC | Age: 49
End: 2024-09-03

## 2024-09-03 VITALS
SYSTOLIC BLOOD PRESSURE: 159 MMHG | BODY MASS INDEX: 26.86 KG/M2 | HEART RATE: 86 BPM | HEIGHT: 75 IN | DIASTOLIC BLOOD PRESSURE: 94 MMHG | WEIGHT: 216.06 LBS

## 2024-09-03 DIAGNOSIS — L97.509 DIABETIC FOOT ULCER ASSOCIATED WITH TYPE 2 DIABETES MELLITUS, UNSPECIFIED LATERALITY, UNSPECIFIED PART OF FOOT, UNSPECIFIED ULCER STAGE: Primary | ICD-10-CM

## 2024-09-03 DIAGNOSIS — E11.621 DIABETIC FOOT ULCER ASSOCIATED WITH TYPE 2 DIABETES MELLITUS, UNSPECIFIED LATERALITY, UNSPECIFIED PART OF FOOT, UNSPECIFIED ULCER STAGE: Primary | ICD-10-CM

## 2024-09-03 DIAGNOSIS — N18.30 STAGE 3 CHRONIC KIDNEY DISEASE, UNSPECIFIED WHETHER STAGE 3A OR 3B CKD: ICD-10-CM

## 2024-09-03 PROCEDURE — 99213 OFFICE O/P EST LOW 20 MIN: CPT | Mod: PBBFAC,25 | Performed by: PODIATRIST

## 2024-09-03 PROCEDURE — 11042 DBRDMT SUBQ TIS 1ST 20SQCM/<: CPT | Mod: PBBFAC

## 2024-09-03 PROCEDURE — 99999 PR PBB SHADOW E&M-EST. PATIENT-LVL III: CPT | Mod: PBBFAC,,, | Performed by: PODIATRIST

## 2024-09-03 NOTE — PROGRESS NOTES
Subjective:     Patient ID: Ashutosh Ferrari is a 49 y.o. male.    Chief Complaint: Wound Care (Right foot )    Ashutosh is a 49 y.o. male who presents to the clinic for evaluation and treatment of high risk feet. Ashutosh has a past medical history of Diabetes mellitus, Diabetes mellitus, type 2, Essential hypertension, benign (07/18/2013), and Herpes. The patient's chief complaint is diabetic foot ulcer, R. Patient has been in football dressing, ambulating in Darco shoe x 1 weeks with out issues    This patient has documented high risk feet requiring routine maintenance secondary to diabetes mellitis and those secondary complications of diabetes, as mentioned..    9/3:  Patient presents to clinic for R foot wound follow up.  Patient says he has just returned from vacation in the [place] where he did a lot of walking.  Has been compliant with wearing short cam boot at all times.  Has kept dressings clean, dry, and intact.  Denies any recent fevers, chills, nausea, or vomiting.  Denies any pain to his R foot.  Denies any new pedal complaints.    PCP: [facility] -    Date Last Seen by PCP:   Chief Complaint   Patient presents with    Wound Care     Right foot      Hemoglobin A1C   Date Value Ref Range Status   04/06/2024 12.3 (H) 4.0 - 5.6 % Final     Comment:     ADA Screening Guidelines:  5.7-6.4%  Consistent with prediabetes  >or=6.5%  Consistent with diabetes    High levels of fetal hemoglobin interfere with the HbA1C  assay. Heterozygous hemoglobin variants (HbS, HgC, etc)do  not significantly interfere with this assay.   However, presence of multiple variants may affect accuracy.     03/27/2022 12.5 (H) 4.0 - 5.6 % Final     Comment:     ADA Screening Guidelines:  5.7-6.4%  Consistent with prediabetes  >or=6.5%  Consistent with diabetes    High levels of fetal hemoglobin interfere with the HbA1C  assay. Heterozygous hemoglobin variants (HbS, HgC, etc)do  not significantly interfere with this assay.   However,  "presence of multiple variants may affect accuracy.     09/19/2021 12.8 (H) 4.0 - 5.6 % Final     Comment:     ADA Screening Guidelines:  5.7-6.4%  Consistent with prediabetes  >or=6.5%  Consistent with diabetes    High levels of fetal hemoglobin interfere with the HbA1C  assay. Heterozygous hemoglobin variants (HbS, HgC, etc)do  not significantly interfere with this assay.   However, presence of multiple variants may affect accuracy.         Review of Systems   Constitutional: Negative for chills, decreased appetite and fever.   Cardiovascular:  Negative for leg swelling.   Skin:  Positive for dry skin and poor wound healing. Negative for flushing, itching, rash and skin cancer.   Musculoskeletal:  Negative for arthritis, joint pain, joint swelling and myalgias.   Gastrointestinal:  Negative for nausea and vomiting.   Neurological:  Negative for loss of balance, numbness and paresthesias.        Objective:     Vitals:    09/03/24 0943   BP: (!) 159/94   Pulse: 86   Weight: 98 kg (216 lb 0.8 oz)   Height: 6' 3" (1.905 m)   PainSc:   6   PainLoc: Foot          Physical Exam  Vitals reviewed.   Constitutional:       Appearance: He is well-developed.   Cardiovascular:      Comments: dorsalis pedis and posterior tibial pulses are palpable bilaterally. Capillary refill time is within normal limits. + pedal hair growth         Musculoskeletal:         General: No tenderness or signs of injury. Normal range of motion.      Comments: Adequate joint range of motion without pain, limitation, nor crepitation Bilateral feet and ankle joints. Muscle strength is 5/5 in all groups bilaterally.         Skin:     General: Skin is warm and dry.      Findings: No erythema, lesion or rash.   Neurological:      Mental Status: He is alert and oriented to person, place, and time.      Sensory: No sensory deficit.      Comments: Eastman-Angella 5.07 monofilament is intact bilateral feet.      Psychiatric:         Behavior: Behavior normal. "         6.19.24    0.8x0.5x0.3 Cm ulceration to the plantar aspect of the right 5th metatarsophalangeal joint with exuberant hyperkeratotic tissue observed.  Moderate periwound maceration is noted.  No malodor.  No edema mild serous drainage noted.  No deep probe.  No purulence    6.26.24    0.5x0.3x0.3 cm ulceration to the plantar aspect of the right 5th metatarsophalangeal joint.  Periwound hyperkeratosis and maceration is noted.  Improved from last week.  No purulence noted.  No exposed bone or tendinous structures observed.  Mild odor observed.    7.3.24    0.4x0.2x0.1 cm Healthy granular wound bed.  Periwound hyperkeratosis with mild maceration observed.  Serous drainage.    7.18.24    0.6x0.4x0.3 cm granular base, + lesa wound maceration. No surrounding erythema, edema, malodor, nor drainage noted     7.25.24    0.9x0.4x0.4 cm granular base, + lesa wound maceration. No surrounding erythema, edema, malodor, nor drainage noted     8.6.24    0.8X0.4X0.3 CM GRANULAR BASE MILD PERIWOUND MACERATION.  HEALTHY PERIWOUND SKIN.  NO MALODOR.  MILD BLOODY DRAINAGE.      0.3x0.2x0.3 cm granular wound base with friable periwound.  Mild serous drainage is observed.  No erythema no edema no swelling     8.21.24    0.4x0.3x0.1 cm with healthy granular wound base.  Increased periwound hyperkeratosis is noted.  The wound has slightly increased in width however the depth is greatly improved.    9.3.24    0.4 x 0.3 x 0.2 cm wound with mostly granular base.  Hyperkeratotic periwound.  Wound stable    Assessment:        Encounter Diagnosis   Name Primary?    Diabetic foot ulcer associated with type 2 diabetes mellitus, unspecified laterality, unspecified part of foot, unspecified ulcer stage Yes     Plan:     Ashutosh was seen today for wound care.    Diagnoses and all orders for this visit:    Diabetic foot ulcer associated with type 2 diabetes mellitus, unspecified laterality, unspecified part of foot, unspecified ulcer stage  -      "Debridement      I counseled the patient on his conditions, their implications and medical management.    Independent review of patients previous clinical notes    Reviewed current medication(s), and lab(s) specific to foot ailment(s) with patient in detail. All questions answered     Debridement: With verbal consent, nonviable tissues on the right foot were debrided beyond sub q utilizing a sterile No. 3 scalpel. Minimal bleeding controlled with direct pressure. The patient tolerated this well.   Patient has been moving for the past several weeks I advised the patient that is very important to stay off his foot as much as possible he voices understanding.  He has travel plans coming up out of the country.  We will schedule follow-up upon his arrival.  Dressings: alg  Offloading: Foam/FELT CUT OUT     SKYLER SCHMITT assisted w/ application of football dressing under my direct supervision. short CAM boot applied to offload the area. Advised patient that this should be worn on the affected foot at all times when ambulating     Follow-up:Patient is to return to the clinic in 1 week  for follow-up but should call [facility] immediately if any signs of infection, such as fever, chills, sweats, increased redness or pain.    Short-term goals include maintaining good offloading and minimizing bioburden, promoting granulation and epithelialization to healing.  Long-term goals include keeping the wound healed by good offloading and medical management under the direction of internist.    Debridement    Date/Time: 9/3/2024 12:15 PM    Performed by: Hiram Montez DPM  Authorized by: Gaby Hager DPM    Time out: Immediately prior to procedure a "time out" was called to verify the correct patient, procedure, equipment, support staff and site/side marked as required.    Consent Done?:  Yes (Verbal)    Preparation: Patient was prepped and draped with clean technique    Local anesthesia used?: No      Wound Details:    " Location:  Right foot    Location:  Right 5th Metatarsal Head    Type of Debridement:  Excisional       Length (cm):  0.4       Area (sq cm):  0.12       Width (cm):  0.3       Percent Debrided (%):  100       Depth (cm):  0.2       Total Area Debrided (sq cm):  0.12    Depth of debridement:  Subcutaneous tissue    Tissue debrided:  Subcutaneous, Epidermis and Dermis    Devitalized tissue debrided:  Biofilm, Callus, Clots, Exudate, Necrotic/Eschar and Slough    Instruments:  Blade, Curette, Forceps and Nippers  Bleeding:  Minimal  Hemostasis Achieved: Yes  Method Used:  Pressure    I have seen the patient, reviewed the Resident's procedure note. I have personally interviewed and examined the patient at bedside and agree with the findings.           Gaby L Castillo, DPM  System Chair, Podiatry Department

## 2024-09-23 ENCOUNTER — TELEPHONE (OUTPATIENT)
Dept: PODIATRY | Facility: CLINIC | Age: 49
End: 2024-09-23

## 2024-09-23 NOTE — TELEPHONE ENCOUNTER
Patient gave verbal understanding of being scheduled for wound care with Dr Castillo on 9/26 at 11 am.

## 2024-09-26 ENCOUNTER — OFFICE VISIT (OUTPATIENT)
Dept: PODIATRY | Facility: CLINIC | Age: 49
End: 2024-09-26
Payer: MEDICAID

## 2024-09-26 VITALS
HEART RATE: 90 BPM | SYSTOLIC BLOOD PRESSURE: 151 MMHG | BODY MASS INDEX: 27 KG/M2 | HEIGHT: 75 IN | DIASTOLIC BLOOD PRESSURE: 99 MMHG

## 2024-09-26 DIAGNOSIS — L97.512 DIABETIC ULCER OF OTHER PART OF RIGHT FOOT ASSOCIATED WITH DIABETES MELLITUS DUE TO UNDERLYING CONDITION, WITH FAT LAYER EXPOSED: ICD-10-CM

## 2024-09-26 DIAGNOSIS — L97.509 DIABETIC FOOT ULCER ASSOCIATED WITH TYPE 2 DIABETES MELLITUS, UNSPECIFIED LATERALITY, UNSPECIFIED PART OF FOOT, UNSPECIFIED ULCER STAGE: Primary | ICD-10-CM

## 2024-09-26 DIAGNOSIS — E11.621 DIABETIC FOOT ULCER ASSOCIATED WITH TYPE 2 DIABETES MELLITUS, UNSPECIFIED LATERALITY, UNSPECIFIED PART OF FOOT, UNSPECIFIED ULCER STAGE: Primary | ICD-10-CM

## 2024-09-26 DIAGNOSIS — E08.621 DIABETIC ULCER OF OTHER PART OF RIGHT FOOT ASSOCIATED WITH DIABETES MELLITUS DUE TO UNDERLYING CONDITION, WITH FAT LAYER EXPOSED: ICD-10-CM

## 2024-09-26 DIAGNOSIS — L97.512 ULCERATED, FOOT, RIGHT, WITH FAT LAYER EXPOSED: ICD-10-CM

## 2024-09-26 DIAGNOSIS — L97.522 ULCERATED, FOOT, LEFT, WITH FAT LAYER EXPOSED: ICD-10-CM

## 2024-09-26 PROCEDURE — 11042 DBRDMT SUBQ TIS 1ST 20SQCM/<: CPT | Mod: S$PBB,GC,, | Performed by: PODIATRIST

## 2024-09-26 PROCEDURE — 11042 DBRDMT SUBQ TIS 1ST 20SQCM/<: CPT | Mod: PBBFAC

## 2024-09-26 PROCEDURE — 99499 UNLISTED E&M SERVICE: CPT | Mod: S$PBB,,, | Performed by: PODIATRIST

## 2024-09-26 PROCEDURE — 99999 PR PBB SHADOW E&M-EST. PATIENT-LVL IV: CPT | Mod: PBBFAC,,, | Performed by: PODIATRIST

## 2024-09-26 PROCEDURE — 99214 OFFICE O/P EST MOD 30 MIN: CPT | Mod: PBBFAC | Performed by: PODIATRIST

## 2024-09-26 NOTE — PROGRESS NOTES
Subjective:     Patient ID: Ashutosh Ferrari is a 49 y.o. male.    Chief Complaint: Wound Care (Right foot) and Diabetes Mellitus    Ashutosh is a 49 y.o. male who presents to the clinic for evaluation and treatment of high risk feet. Ashutosh has a past medical history of Diabetes mellitus, Diabetes mellitus, type 2, Essential hypertension, benign (07/18/2013), and Herpes. The patient's chief complaint is diabetic foot ulcer, R. Patient has been in football dressing, ambulating in Darco shoe x 1 weeks with out issues    This patient has documented high risk feet requiring routine maintenance secondary to diabetes mellitis and those secondary complications of diabetes, as mentioned..    9/26:  Patient presents to clinic for R foot wound follow up.  Patient says he has recently returned from the beach, admits to running and walking a lot more for fitness. Has been compliant with wearing short cam boot at all times.  Has kept dressings clean, dry, and intact.  Denies any recent fevers, chills, nausea, or vomiting.  Denies any pain to his R foot.  Denies any new pedal complaints.    PCP: [facility] -    Date Last Seen by PCP:   Chief Complaint   Patient presents with    Wound Care     Right foot    Diabetes Mellitus     Hemoglobin A1C   Date Value Ref Range Status   04/06/2024 12.3 (H) 4.0 - 5.6 % Final     Comment:     ADA Screening Guidelines:  5.7-6.4%  Consistent with prediabetes  >or=6.5%  Consistent with diabetes    High levels of fetal hemoglobin interfere with the HbA1C  assay. Heterozygous hemoglobin variants (HbS, HgC, etc)do  not significantly interfere with this assay.   However, presence of multiple variants may affect accuracy.     03/27/2022 12.5 (H) 4.0 - 5.6 % Final     Comment:     ADA Screening Guidelines:  5.7-6.4%  Consistent with prediabetes  >or=6.5%  Consistent with diabetes    High levels of fetal hemoglobin interfere with the HbA1C  assay. Heterozygous hemoglobin variants (HbS, HgC,  "etc)do  not significantly interfere with this assay.   However, presence of multiple variants may affect accuracy.     09/19/2021 12.8 (H) 4.0 - 5.6 % Final     Comment:     ADA Screening Guidelines:  5.7-6.4%  Consistent with prediabetes  >or=6.5%  Consistent with diabetes    High levels of fetal hemoglobin interfere with the HbA1C  assay. Heterozygous hemoglobin variants (HbS, HgC, etc)do  not significantly interfere with this assay.   However, presence of multiple variants may affect accuracy.         Review of Systems   Constitutional: Negative for chills, decreased appetite and fever.   Cardiovascular:  Negative for leg swelling.   Skin:  Positive for dry skin and poor wound healing. Negative for flushing, itching, rash and skin cancer.   Musculoskeletal:  Negative for arthritis, joint pain, joint swelling and myalgias.   Gastrointestinal:  Negative for nausea and vomiting.   Neurological:  Negative for loss of balance, numbness and paresthesias.        Objective:     Vitals:    09/26/24 1058   BP: (!) 151/99   Pulse: 90   Height: 6' 3" (1.905 m)   PainSc: 0-No pain          Physical Exam  Vitals reviewed.   Constitutional:       Appearance: He is well-developed.   Cardiovascular:      Comments: dorsalis pedis and posterior tibial pulses are palpable bilaterally. Capillary refill time is within normal limits. + pedal hair growth         Musculoskeletal:         General: No tenderness or signs of injury. Normal range of motion.      Comments: Adequate joint range of motion without pain, limitation, nor crepitation Bilateral feet and ankle joints. Muscle strength is 5/5 in all groups bilaterally.         Skin:     General: Skin is warm and dry.      Findings: No erythema, lesion or rash.   Neurological:      Mental Status: He is alert and oriented to person, place, and time.      Sensory: No sensory deficit.      Comments: [place]-Angella 5.07 monofilament is intact bilateral feet.      Psychiatric:         " Behavior: Behavior normal.       6.19.24    0.8x0.5x0.3 Cm ulceration to the plantar aspect of the right 5th metatarsophalangeal joint with exuberant hyperkeratotic tissue observed.  Moderate periwound maceration is noted.  No malodor.  No edema mild serous drainage noted.  No deep probe.  No purulence    6.26.24    0.5x0.3x0.3 cm ulceration to the plantar aspect of the right 5th metatarsophalangeal joint.  Periwound hyperkeratosis and maceration is noted.  Improved from last week.  No purulence noted.  No exposed bone or tendinous structures observed.  Mild odor observed.    7.3.24    0.4x0.2x0.1 cm Healthy granular wound bed.  Periwound hyperkeratosis with mild maceration observed.  Serous drainage.    7.18.24    0.6x0.4x0.3 cm granular base, + lesa wound maceration. No surrounding erythema, edema, malodor, nor drainage noted     7.25.24    0.9x0.4x0.4 cm granular base, + lesa wound maceration. No surrounding erythema, edema, malodor, nor drainage noted     8.6.24    0.8X0.4X0.3 CM GRANULAR BASE MILD PERIWOUND MACERATION.  HEALTHY PERIWOUND SKIN.  NO MALODOR.  MILD BLOODY DRAINAGE.      0.3x0.2x0.3 cm granular wound base with friable periwound.  Mild serous drainage is observed.  No erythema no edema no swelling     8.21.24    0.4x0.3x0.1 cm with healthy granular wound base.  Increased periwound hyperkeratosis is noted.  The wound has slightly increased in width however the depth is greatly improved.    9.3.24    0.4 x 0.3 x 0.2 cm wound with mostly granular base.  Hyperkeratotic periwound.  Wound stable    9.26.24  R foot        Chronic ulceration 1.0 x 0.4 x 0.3 cm     Left foot        New ulceration measuring1.3 x 0.9 x 0.3 cm without pain, erythema, edema, purulence.    Assessment:        Encounter Diagnoses   Name Primary?    Diabetic foot ulcer associated with type 2 diabetes mellitus, unspecified laterality, unspecified part of foot, unspecified ulcer stage Yes    Ulcerated, foot, right, with fat layer  exposed     Diabetic ulcer of other part of right foot associated with diabetes mellitus due to underlying condition, with fat layer exposed     Ulcerated, foot, left, with fat layer exposed        Plan:     Ashutosh was seen today for wound care and diabetes mellitus.    Diagnoses and all orders for this visit:    Diabetic foot ulcer associated with type 2 diabetes mellitus, unspecified laterality, unspecified part of foot, unspecified ulcer stage    Ulcerated, foot, right, with fat layer exposed    Diabetic ulcer of other part of right foot associated with diabetes mellitus due to underlying condition, with fat layer exposed    Ulcerated, foot, left, with fat layer exposed      - NEW ulceration on left plantar 5th MTPJ, likely due to increased physical activity  - Chronic ulceration of R plantar 5th MTPJ    I counseled the patient on his conditions, their implications and medical management.    Independent review of patients previous clinical notes    Reviewed current medication(s), and lab(s) specific to foot ailment(s) with patient in detail. All questions answered     Debridement: With verbal consent, nonviable tissues on the right foot were debrided beyond sub q utilizing a sterile curette. Minimal bleeding controlled with direct pressure. The patient tolerated this well.   Patient has been moving for the past several weeks I advised the patient that is very important to stay off his foot as much as possible he voices understanding.  He has travel plans coming up out of the country.  We will schedule follow-up upon his arrival.  Dressings: alg  Offloading: Foam/FELT CUT OUT     TOPEKA MA assisted w/ bilateral application of football dressing under my direct supervision. short CAM boot applied to Right foot. DARCO shoe for Left foot. Advised patient that this should be worn on the affected foot at all times when ambulating     Follow-up:Patient is to return to the clinic in 1 week  for follow-up but should call  [facility] immediately if any signs of infection, such as fever, chills, sweats, increased redness or pain.    Short-term goals include maintaining good offloading and minimizing bioburden, promoting granulation and epithelialization to healing.  Long-term goals include keeping the wound healed by good offloading and medical management under the direction of internist.    Debridement    Date/Time: 9/26/2024 12:02 PM    Performed by: Greg Chou MD  Authorized by: Gaby Hager DPM    Consent Done?:  Yes (Verbal)    Preparation: Patient was prepped and draped with clean technique    Local anesthesia used?: No      Wound Details:    Location:  Right foot    Location:  Right 5th Metatarsal Head    Type of Debridement:  Excisional       Length (cm):  1       Area (sq cm):  0.3       Width (cm):  0.3       Percent Debrided (%):  100       Depth (cm):  0.4       Total Area Debrided (sq cm):  0.3    Depth of debridement:  Subcutaneous tissue    Devitalized tissue debrided:  Necrotic/Eschar and Slough    Instruments:  Curette    Additional wounds:  1    2nd Wound Details:     Location:  Left foot    Location:  Left 5th Metatarsal Head    Location:  Left 5th Metatarsal Head       Length (cm):  1.3       Area (sq cm):  1.17       Width (cm):  0.9       Percent Debrided (%):  100       Depth (cm):  0.3       Total Area Debrided (sq cm):  1.17    Depth of debridement:  Subcutaneous tissue    Devitalized tissue debrided:  Slough and Fibrin    Instruments:  Curette  Bleeding:  Minimal  Hemostasis Achieved: Yes    Method Used:  Pressure  1st Wound Pain Assessment: 0  2nd Wound Pain Assessment: 0

## 2024-10-03 ENCOUNTER — OFFICE VISIT (OUTPATIENT)
Dept: PODIATRY | Facility: CLINIC | Age: 49
End: 2024-10-03
Payer: MEDICAID

## 2024-10-03 VITALS
RESPIRATION RATE: 18 BRPM | HEART RATE: 90 BPM | WEIGHT: 216.06 LBS | SYSTOLIC BLOOD PRESSURE: 140 MMHG | BODY MASS INDEX: 26.86 KG/M2 | HEIGHT: 75 IN | TEMPERATURE: 99 F | DIASTOLIC BLOOD PRESSURE: 94 MMHG

## 2024-10-03 DIAGNOSIS — N18.30 STAGE 3 CHRONIC KIDNEY DISEASE, UNSPECIFIED WHETHER STAGE 3A OR 3B CKD: ICD-10-CM

## 2024-10-03 DIAGNOSIS — L97.512 ULCERATED, FOOT, RIGHT, WITH FAT LAYER EXPOSED: Primary | ICD-10-CM

## 2024-10-03 DIAGNOSIS — E11.42 TYPE 2 DIABETES MELLITUS WITH DIABETIC POLYNEUROPATHY, WITHOUT LONG-TERM CURRENT USE OF INSULIN: ICD-10-CM

## 2024-10-03 DIAGNOSIS — L97.522 ULCERATED, FOOT, LEFT, WITH FAT LAYER EXPOSED: ICD-10-CM

## 2024-10-03 PROCEDURE — 11042 DBRDMT SUBQ TIS 1ST 20SQCM/<: CPT | Mod: S$PBB,,, | Performed by: PODIATRIST

## 2024-10-03 PROCEDURE — 99214 OFFICE O/P EST MOD 30 MIN: CPT | Mod: PBBFAC | Performed by: PODIATRIST

## 2024-10-03 PROCEDURE — 11042 DBRDMT SUBQ TIS 1ST 20SQCM/<: CPT | Mod: PBBFAC | Performed by: PODIATRIST

## 2024-10-03 PROCEDURE — 99999 PR PBB SHADOW E&M-EST. PATIENT-LVL IV: CPT | Mod: PBBFAC,,, | Performed by: PODIATRIST

## 2024-10-03 PROCEDURE — 99499 UNLISTED E&M SERVICE: CPT | Mod: S$PBB,,, | Performed by: PODIATRIST

## 2024-10-03 NOTE — PROGRESS NOTES
Subjective:     Patient ID: Ashutosh Ferrari is a 49 y.o. male.    Chief Complaint: Wound Care (Bilateral foot ulcers ) and Dressing Change (Football )    Ashutosh is a 49 y.o. male who presents to the clinic for evaluation and treatment of high risk feet. Ashutosh has a past medical history of Diabetes mellitus, Diabetes mellitus, type 2, Essential hypertension, benign (07/18/2013), and Herpes. The patient's chief complaint is diabetic foot ulcer, R. Patient has been in football dressing, ambulating in Darco shoe x 1 weeks with out issues    This patient has documented high risk feet requiring routine maintenance secondary to diabetes mellitis and those secondary complications of diabetes, as mentioned..    9/26:  Patient presents to clinic for R foot wound follow up.  Patient says he has recently returned from the beach, admits to running and walking a lot more for fitness. Has been compliant with wearing short cam boot at all times.  Has kept dressings clean, dry, and intact.  Denies any recent fevers, chills, nausea, or vomiting.  Denies any pain to his R foot.  Denies any new pedal complaints.    10.3.24: Patient has been in football dressing, ambulating in Darco shoe x 1 week with out issues     PCP: [facility] -    Date Last Seen by PCP:   Chief Complaint   Patient presents with    Wound Care     Bilateral foot ulcers     Dressing Change     Football      Hemoglobin A1C   Date Value Ref Range Status   04/06/2024 12.3 (H) 4.0 - 5.6 % Final     Comment:     ADA Screening Guidelines:  5.7-6.4%  Consistent with prediabetes  >or=6.5%  Consistent with diabetes    High levels of fetal hemoglobin interfere with the HbA1C  assay. Heterozygous hemoglobin variants (HbS, HgC, etc)do  not significantly interfere with this assay.   However, presence of multiple variants may affect accuracy.     03/27/2022 12.5 (H) 4.0 - 5.6 % Final     Comment:     ADA Screening Guidelines:  5.7-6.4%  Consistent with  "prediabetes  >or=6.5%  Consistent with diabetes    High levels of fetal hemoglobin interfere with the HbA1C  assay. Heterozygous hemoglobin variants (HbS, HgC, etc)do  not significantly interfere with this assay.   However, presence of multiple variants may affect accuracy.     09/19/2021 12.8 (H) 4.0 - 5.6 % Final     Comment:     ADA Screening Guidelines:  5.7-6.4%  Consistent with prediabetes  >or=6.5%  Consistent with diabetes    High levels of fetal hemoglobin interfere with the HbA1C  assay. Heterozygous hemoglobin variants (HbS, HgC, etc)do  not significantly interfere with this assay.   However, presence of multiple variants may affect accuracy.         Review of Systems   Constitutional: Negative for chills, decreased appetite and fever.   Cardiovascular:  Negative for leg swelling.   Skin:  Positive for color change, dry skin and poor wound healing. Negative for flushing, itching, rash and skin cancer.   Musculoskeletal:  Negative for arthritis, joint pain, joint swelling and myalgias.   Gastrointestinal:  Negative for nausea and vomiting.   Neurological:  Negative for loss of balance, numbness and paresthesias.        Objective:     Vitals:    10/03/24 1037   BP: (!) 140/94   Pulse: 90   Resp: 18   Temp: 98.6 °F (37 °C)   TempSrc: Oral   Weight: 98 kg (216 lb 0.8 oz)   Height: 6' 3" (1.905 m)   PainSc: 0-No pain          Physical Exam  Vitals reviewed.   Constitutional:       Appearance: He is well-developed.   Cardiovascular:      Comments: dorsalis pedis and posterior tibial pulses are palpable bilaterally. Capillary refill time is within normal limits. + pedal hair growth         Musculoskeletal:         General: No tenderness or signs of injury. Normal range of motion.      Comments: Adequate joint range of motion without pain, limitation, nor crepitation Bilateral feet and ankle joints. Muscle strength is 5/5 in all groups bilaterally.         Skin:     General: Skin is warm and dry.      Findings: " No erythema, lesion or rash.   Neurological:      Mental Status: He is alert and oriented to person, place, and time.      Sensory: No sensory deficit.      Comments: [place] 5.07 monofilament is intact bilateral feet.      Psychiatric:         Behavior: Behavior normal.         6.19.24    0.8x0.5x0.3 Cm ulceration to the plantar aspect of the right 5th metatarsophalangeal joint with exuberant hyperkeratotic tissue observed.  Moderate periwound maceration is noted.  No malodor.  No edema mild serous drainage noted.  No deep probe.  No purulence    6.26.24    0.5x0.3x0.3 cm ulceration to the plantar aspect of the right 5th metatarsophalangeal joint.  Periwound hyperkeratosis and maceration is noted.  Improved from last week.  No purulence noted.  No exposed bone or tendinous structures observed.  Mild odor observed.    7.3.24    0.4x0.2x0.1 cm Healthy granular wound bed.  Periwound hyperkeratosis with mild maceration observed.  Serous drainage.    7.18.24    0.6x0.4x0.3 cm granular base, + lesa wound maceration. No surrounding erythema, edema, malodor, nor drainage noted     7.25.24    0.9x0.4x0.4 cm granular base, + lesa wound maceration. No surrounding erythema, edema, malodor, nor drainage noted     8.6.24    0.8X0.4X0.3 CM GRANULAR BASE MILD PERIWOUND MACERATION.  HEALTHY PERIWOUND SKIN.  NO MALODOR.  MILD BLOODY DRAINAGE.      0.3x0.2x0.3 cm granular wound base with friable periwound.  Mild serous drainage is observed.  No erythema no edema no swelling     8.21.24    0.4x0.3x0.1 cm with healthy granular wound base.  Increased periwound hyperkeratosis is noted.  The wound has slightly increased in width however the depth is greatly improved.    9.3.24    0.4 x 0.3 x 0.2 cm wound with mostly granular base.  Hyperkeratotic periwound.  Wound stable    9.26.24  R foot        Chronic ulceration 1.0 x 0.4 x 0.3 cm     Left foot        New ulceration measuring1.3 x 0.9 x 0.3 cm without pain, erythema, edema,  purulence.    10.3.24    0.5x0.5x0.1 cm L sub 5th MPJ w/ granular base. No surrounding erythema, edema, malodor, nor drainage noted       0.5x0.2x0.3 cm sub 5th MJ R w/ hyperkeratotic lesa wound. Granular base. Mild lesa wound maceration       Assessment:        Encounter Diagnoses   Name Primary?    Ulcerated, foot, right, with fat layer exposed Yes    Ulcerated, foot, left, with fat layer exposed     Type 2 diabetes mellitus with diabetic polyneuropathy, without long-term current use of insulin     Stage 3 chronic kidney disease, unspecified whether stage 3a or 3b CKD        Plan:     Ashutosh was seen today for wound care and dressing change.    Diagnoses and all orders for this visit:    Ulcerated, foot, right, with fat layer exposed    Ulcerated, foot, left, with fat layer exposed    Type 2 diabetes mellitus with diabetic polyneuropathy, without long-term current use of insulin    Stage 3 chronic kidney disease, unspecified whether stage 3a or 3b CKD        I counseled the patient on his conditions, their implications and medical management.    Independent review of patients previous clinical notes    Reviewed current medication(s), and lab(s) specific to foot ailment(s) with patient in detail. All questions answered     Debridement: With verbal consent, nonviable tissues on the right foot were debrided beyond sub q utilizing a sterile curette. Minimal bleeding controlled with direct pressure. The patient tolerated this well.   Patient has been moving for the past several weeks I advised the patient that is very important to stay off his foot as much as possible he voices understanding.  He has travel plans coming up out of the country.  We will schedule follow-up upon his arrival.    Dressings: HB foam  Offloading: Michael COPELAND MA assisted w/ application of football dressing under my direct supervision. Darco shoe applied to offload the area. Advised patient that this should be worn on the affected foot at  all times when ambulating     Follow-up:Patient is to return to the clinic in 1 week  for follow-up but should call [facility] immediately if any signs of infection, such as fever, chills, sweats, increased redness or pain.    Short-term goals include maintaining good offloading and minimizing bioburden, promoting granulation and epithelialization to healing.  Long-term goals include keeping the wound healed by good offloading and medical management under the direction of internist.

## 2024-10-16 ENCOUNTER — TELEPHONE (OUTPATIENT)
Dept: PODIATRY | Facility: CLINIC | Age: 49
End: 2024-10-16
Payer: MEDICAID

## 2024-10-16 NOTE — TELEPHONE ENCOUNTER
Spoke with patient and he states that he had fixed insurance problems and to please schedule appointment for wound care with Dr Castillo, appt schedule for 10/16/24 at 9:15 am wound care.

## 2024-10-16 NOTE — TELEPHONE ENCOUNTER
----- Message from Tj sent at 10/16/2024 12:31 PM CDT -----  Regarding: FW: Appt  Contact: pt [phone]    ----- Message -----  From: Dwayne Wray LPN  Sent: 10/16/2024  12:21 PM CDT  To: Tj Jamil  Subject: RE: Appt                                         This needs to go to podiatry, the provider in wound care only does hip to ankle.      Thanks  Nurse Dwayne  ----- Message -----  From: Tj Jamil  Sent: 10/16/2024  12:12 PM CDT  To: [facility] Wound Clinical Support  Subject: Appt                                             Pt is requesting call back to schedule wound care appt, please call pt @[phone]

## 2024-10-23 ENCOUNTER — OFFICE VISIT (OUTPATIENT)
Dept: PODIATRY | Facility: CLINIC | Age: 49
End: 2024-10-23
Payer: MEDICAID

## 2024-10-23 VITALS
HEART RATE: 85 BPM | WEIGHT: 216.06 LBS | SYSTOLIC BLOOD PRESSURE: 151 MMHG | BODY MASS INDEX: 26.86 KG/M2 | HEIGHT: 75 IN | TEMPERATURE: 98 F | DIASTOLIC BLOOD PRESSURE: 98 MMHG | RESPIRATION RATE: 18 BRPM

## 2024-10-23 DIAGNOSIS — E11.42 TYPE 2 DIABETES MELLITUS WITH DIABETIC POLYNEUROPATHY, WITHOUT LONG-TERM CURRENT USE OF INSULIN: Primary | ICD-10-CM

## 2024-10-23 DIAGNOSIS — L97.522 ULCERATED, FOOT, LEFT, WITH FAT LAYER EXPOSED: ICD-10-CM

## 2024-10-23 DIAGNOSIS — L97.512 ULCERATED, FOOT, RIGHT, WITH FAT LAYER EXPOSED: ICD-10-CM

## 2024-10-23 PROCEDURE — 11042 DBRDMT SUBQ TIS 1ST 20SQCM/<: CPT | Mod: S$PBB,,, | Performed by: PODIATRIST

## 2024-10-23 PROCEDURE — 99999 PR PBB SHADOW E&M-EST. PATIENT-LVL III: CPT | Mod: PBBFAC,,, | Performed by: PODIATRIST

## 2024-10-23 PROCEDURE — 11042 DBRDMT SUBQ TIS 1ST 20SQCM/<: CPT | Mod: PBBFAC | Performed by: PODIATRIST

## 2024-10-23 PROCEDURE — 99213 OFFICE O/P EST LOW 20 MIN: CPT | Mod: PBBFAC,25 | Performed by: PODIATRIST

## 2024-10-23 PROCEDURE — 99499 UNLISTED E&M SERVICE: CPT | Mod: S$PBB,,, | Performed by: PODIATRIST

## 2024-10-23 NOTE — PROGRESS NOTES
Subjective:     Patient ID: Ashutosh Ferrari is a 49 y.o. male.    Chief Complaint: Wound Care (BILATERAL FOOT ULCERS ) and Dressing Change (Football )    Ashutosh is a 49 y.o. male who presents to the clinic for evaluation and treatment of high risk feet. Ashutosh has a past medical history of Diabetes mellitus, Diabetes mellitus, type 2, Essential hypertension, benign (07/18/2013), and Herpes. The patient's chief complaint is diabetic foot ulcer, R. Patient has been in football dressing, ambulating in Darco shoe x 1 weeks with out issues    This patient has documented high risk feet requiring routine maintenance secondary to diabetes mellitis and those secondary complications of diabetes, as mentioned..    9/26:  Patient presents to clinic for R foot wound follow up.  Patient says he has recently returned from the beach, admits to running and walking a lot more for fitness. Has been compliant with wearing short cam boot at all times.  Has kept dressings clean, dry, and intact.  Denies any recent fevers, chills, nausea, or vomiting.  Denies any pain to his R foot.  Denies any new pedal complaints.    10.3.24: Patient has been in football dressing, ambulating in Darco shoe x 1 week with out issues     10.23.24:  Patient lost to follow up x2 weeks secondary to family issues as well as insurance verification.  Has been in bilateral football dressings since last appointment.  PCP: [facility] -    Date Last Seen by PCP:   Chief Complaint   Patient presents with    Wound Care     BILATERAL FOOT ULCERS     Dressing Change     Football      Hemoglobin A1C   Date Value Ref Range Status   04/06/2024 12.3 (H) 4.0 - 5.6 % Final     Comment:     ADA Screening Guidelines:  5.7-6.4%  Consistent with prediabetes  >or=6.5%  Consistent with diabetes    High levels of fetal hemoglobin interfere with the HbA1C  assay. Heterozygous hemoglobin variants (HbS, HgC, etc)do  not significantly interfere with this assay.   However,  "presence of multiple variants may affect accuracy.     03/27/2022 12.5 (H) 4.0 - 5.6 % Final     Comment:     ADA Screening Guidelines:  5.7-6.4%  Consistent with prediabetes  >or=6.5%  Consistent with diabetes    High levels of fetal hemoglobin interfere with the HbA1C  assay. Heterozygous hemoglobin variants (HbS, HgC, etc)do  not significantly interfere with this assay.   However, presence of multiple variants may affect accuracy.     09/19/2021 12.8 (H) 4.0 - 5.6 % Final     Comment:     ADA Screening Guidelines:  5.7-6.4%  Consistent with prediabetes  >or=6.5%  Consistent with diabetes    High levels of fetal hemoglobin interfere with the HbA1C  assay. Heterozygous hemoglobin variants (HbS, HgC, etc)do  not significantly interfere with this assay.   However, presence of multiple variants may affect accuracy.         Review of Systems   Constitutional: Negative for chills, decreased appetite and fever.   Cardiovascular:  Negative for leg swelling.   Skin:  Positive for color change, dry skin and poor wound healing. Negative for flushing, itching, rash and skin cancer.   Musculoskeletal:  Negative for arthritis, joint pain, joint swelling and myalgias.   Gastrointestinal:  Negative for nausea and vomiting.   Neurological:  Negative for loss of balance, numbness and paresthesias.        Objective:     Vitals:    10/23/24 0955   BP: (!) 151/98   Pulse: 85   Resp: 18   Temp: 97.8 °F (36.6 °C)   TempSrc: Oral   Weight: 98 kg (216 lb 0.8 oz)   Height: 6' 3" (1.905 m)          Physical Exam  Vitals reviewed.   Constitutional:       Appearance: He is well-developed.   Cardiovascular:      Comments: dorsalis pedis and posterior tibial pulses are palpable bilaterally. Capillary refill time is within normal limits. + pedal hair growth         Musculoskeletal:         General: Deformity present. No tenderness or signs of injury. Normal range of motion.      Comments: Adequate joint range of motion without pain, limitation, " nor crepitation Bilateral feet and ankle joints. Muscle strength is 5/5 in all groups bilaterally.         Skin:     General: Skin is warm and dry.      Findings: No erythema, lesion or rash.   Neurological:      Mental Status: He is alert and oriented to person, place, and time.      Sensory: No sensory deficit.      Comments: [place] 5.07 monofilament is intact bilateral feet.      Psychiatric:         Behavior: Behavior normal.         6.19.24    0.8x0.5x0.3 Cm ulceration to the plantar aspect of the right 5th metatarsophalangeal joint with exuberant hyperkeratotic tissue observed.  Moderate periwound maceration is noted.  No malodor.  No edema mild serous drainage noted.  No deep probe.  No purulence    6.26.24    0.5x0.3x0.3 cm ulceration to the plantar aspect of the right 5th metatarsophalangeal joint.  Periwound hyperkeratosis and maceration is noted.  Improved from last week.  No purulence noted.  No exposed bone or tendinous structures observed.  Mild odor observed.    7.3.24    0.4x0.2x0.1 cm Healthy granular wound bed.  Periwound hyperkeratosis with mild maceration observed.  Serous drainage.    7.18.24    0.6x0.4x0.3 cm granular base, + lesa wound maceration. No surrounding erythema, edema, malodor, nor drainage noted     7.25.24    0.9x0.4x0.4 cm granular base, + lesa wound maceration. No surrounding erythema, edema, malodor, nor drainage noted     8.6.24    0.8X0.4X0.3 CM GRANULAR BASE MILD PERIWOUND MACERATION.  HEALTHY PERIWOUND SKIN.  NO MALODOR.  MILD BLOODY DRAINAGE.      0.3x0.2x0.3 cm granular wound base with friable periwound.  Mild serous drainage is observed.  No erythema no edema no swelling     8.21.24    0.4x0.3x0.1 cm with healthy granular wound base.  Increased periwound hyperkeratosis is noted.  The wound has slightly increased in width however the depth is greatly improved.    9.3.24    0.4 x 0.3 x 0.2 cm wound with mostly granular base.  Hyperkeratotic periwound.  Wound  stable    9.26.24  R foot        Chronic ulceration 1.0 x 0.4 x 0.3 cm     Left foot        New ulceration measuring1.3 x 0.9 x 0.3 cm without pain, erythema, edema, purulence.    10.3.24    0.5x0.5x0.1 cm L sub 5th MPJ w/ granular base. No surrounding erythema, edema, malodor, nor drainage noted       0.5x0.2x0.3 cm sub 5th MJ R w/ hyperkeratotic lesa wound. Granular base. Mild lesa wound maceration     10.23.24    0.2x0.2x0.3 cm right sub 5th MPJ with exuberant hyperkeratotic tissue.  No erythema no edema no malodor.  Purulent base with periwound maceration noted.  Assessment:        Encounter Diagnoses   Name Primary?    Type 2 diabetes mellitus with diabetic polyneuropathy, without long-term current use of insulin Yes    Ulcerated, foot, right, with fat layer exposed     Ulcerated, foot, left, with fat layer exposed        Plan:     Ashutosh was seen today for wound care and dressing change.    Diagnoses and all orders for this visit:    Type 2 diabetes mellitus with diabetic polyneuropathy, without long-term current use of insulin    Ulcerated, foot, right, with fat layer exposed    Ulcerated, foot, left, with fat layer exposed        I counseled the patient on his conditions, their implications and medical management.    Independent review of patients previous clinical notes    Reviewed current medication(s), and lab(s) specific to foot ailment(s) with patient in detail. All questions answered     Debridement: With verbal consent, nonviable tissues on the right foot were debrided beyond sub q utilizing a sterile curette. Minimal bleeding controlled with direct pressure. The patient tolerated this well.     Left foot ulceration is basically healed with the extremely thin epithelial tissue noted.  We will continue security football.    Dressings: alginate  Offloading: Michael COPELAND MA assisted w/ application of football dressing under my direct supervision. Darco shoe applied to offload the area. Advised  patient that this should be worn on the affected foot at all times when ambulating     Follow-up:Patient is to return to the clinic in 1 week  for follow-up but should call [facility] immediately if any signs of infection, such as fever, chills, sweats, increased redness or pain.    Short-term goals include maintaining good offloading and minimizing bioburden, promoting granulation and epithelialization to healing.  Long-term goals include keeping the wound healed by good offloading and medical management under the direction of internist.

## 2024-10-30 ENCOUNTER — OFFICE VISIT (OUTPATIENT)
Dept: PODIATRY | Facility: CLINIC | Age: 49
End: 2024-10-30
Payer: COMMERCIAL

## 2024-10-30 VITALS
DIASTOLIC BLOOD PRESSURE: 97 MMHG | HEART RATE: 91 BPM | BODY MASS INDEX: 26.86 KG/M2 | RESPIRATION RATE: 18 BRPM | SYSTOLIC BLOOD PRESSURE: 137 MMHG | TEMPERATURE: 98 F | HEIGHT: 75 IN | WEIGHT: 216.06 LBS

## 2024-10-30 DIAGNOSIS — L97.512 ULCERATED, FOOT, RIGHT, WITH FAT LAYER EXPOSED: ICD-10-CM

## 2024-10-30 DIAGNOSIS — E11.42 TYPE 2 DIABETES MELLITUS WITH DIABETIC POLYNEUROPATHY, WITHOUT LONG-TERM CURRENT USE OF INSULIN: Primary | ICD-10-CM

## 2024-10-30 PROCEDURE — 11042 DBRDMT SUBQ TIS 1ST 20SQCM/<: CPT | Mod: S$GLB,,, | Performed by: PODIATRIST

## 2024-10-30 PROCEDURE — 99999 PR PBB SHADOW E&M-EST. PATIENT-LVL III: CPT | Mod: PBBFAC,,, | Performed by: PODIATRIST

## 2024-10-30 PROCEDURE — 99499 UNLISTED E&M SERVICE: CPT | Mod: S$GLB,,, | Performed by: PODIATRIST

## 2024-11-06 ENCOUNTER — OFFICE VISIT (OUTPATIENT)
Dept: PODIATRY | Facility: CLINIC | Age: 49
End: 2024-11-06
Payer: COMMERCIAL

## 2024-11-06 VITALS
RESPIRATION RATE: 18 BRPM | TEMPERATURE: 99 F | HEIGHT: 75 IN | BODY MASS INDEX: 26.86 KG/M2 | WEIGHT: 216.06 LBS | HEART RATE: 98 BPM | SYSTOLIC BLOOD PRESSURE: 159 MMHG | DIASTOLIC BLOOD PRESSURE: 95 MMHG

## 2024-11-06 DIAGNOSIS — L97.522 ULCERATED, FOOT, LEFT, WITH FAT LAYER EXPOSED: ICD-10-CM

## 2024-11-06 DIAGNOSIS — L97.512 ULCERATED, FOOT, RIGHT, WITH FAT LAYER EXPOSED: Primary | ICD-10-CM

## 2024-11-06 DIAGNOSIS — E11.42 TYPE 2 DIABETES MELLITUS WITH DIABETIC POLYNEUROPATHY, WITHOUT LONG-TERM CURRENT USE OF INSULIN: ICD-10-CM

## 2024-11-06 PROCEDURE — 87186 SC STD MICRODIL/AGAR DIL: CPT | Performed by: PODIATRIST

## 2024-11-06 PROCEDURE — 99999 PR PBB SHADOW E&M-EST. PATIENT-LVL III: CPT | Mod: PBBFAC,,, | Performed by: PODIATRIST

## 2024-11-06 PROCEDURE — 87075 CULTR BACTERIA EXCEPT BLOOD: CPT | Performed by: PODIATRIST

## 2024-11-06 PROCEDURE — 87077 CULTURE AEROBIC IDENTIFY: CPT | Performed by: PODIATRIST

## 2024-11-06 PROCEDURE — 87070 CULTURE OTHR SPECIMN AEROBIC: CPT | Performed by: PODIATRIST

## 2024-11-06 PROCEDURE — 99214 OFFICE O/P EST MOD 30 MIN: CPT | Mod: 25,S$GLB,, | Performed by: PODIATRIST

## 2024-11-06 PROCEDURE — 11042 DBRDMT SUBQ TIS 1ST 20SQCM/<: CPT | Mod: S$GLB,,, | Performed by: PODIATRIST

## 2024-11-08 LAB — BACTERIA SPEC AEROBE CULT: ABNORMAL

## 2024-11-11 LAB — BACTERIA SPEC ANAEROBE CULT: NORMAL

## 2024-11-12 ENCOUNTER — PATIENT MESSAGE (OUTPATIENT)
Dept: PODIATRY | Facility: CLINIC | Age: 49
End: 2024-11-12
Payer: COMMERCIAL

## 2024-11-12 RX ORDER — DOXYCYCLINE 100 MG/1
100 CAPSULE ORAL EVERY 12 HOURS
Qty: 28 CAPSULE | Refills: 0 | Status: SHIPPED | OUTPATIENT
Start: 2024-11-12 | End: 2024-11-26

## 2024-11-13 ENCOUNTER — OFFICE VISIT (OUTPATIENT)
Dept: PODIATRY | Facility: CLINIC | Age: 49
End: 2024-11-13
Payer: COMMERCIAL

## 2024-11-13 ENCOUNTER — HOSPITAL ENCOUNTER (OUTPATIENT)
Dept: RADIOLOGY | Facility: HOSPITAL | Age: 49
Discharge: HOME | End: 2024-11-13
Attending: PODIATRIST
Payer: COMMERCIAL

## 2024-11-13 VITALS — BODY MASS INDEX: 27 KG/M2 | SYSTOLIC BLOOD PRESSURE: 164 MMHG | HEIGHT: 75 IN | DIASTOLIC BLOOD PRESSURE: 97 MMHG

## 2024-11-13 DIAGNOSIS — L97.512 ULCERATED, FOOT, RIGHT, WITH FAT LAYER EXPOSED: ICD-10-CM

## 2024-11-13 DIAGNOSIS — L97.512 ULCERATED, FOOT, RIGHT, WITH FAT LAYER EXPOSED: Primary | ICD-10-CM

## 2024-11-13 DIAGNOSIS — E11.42 TYPE 2 DIABETES MELLITUS WITH DIABETIC POLYNEUROPATHY, WITHOUT LONG-TERM CURRENT USE OF INSULIN: ICD-10-CM

## 2024-11-13 PROCEDURE — 73630 X-RAY EXAM OF FOOT: CPT | Mod: TC,RT

## 2024-11-13 PROCEDURE — 99999 PR PBB SHADOW E&M-EST. PATIENT-LVL IV: CPT | Mod: PBBFAC,,, | Performed by: PODIATRIST

## 2024-11-13 PROCEDURE — 73630 X-RAY EXAM OF FOOT: CPT | Mod: 26,RT,, | Performed by: RADIOLOGY

## 2024-11-13 NOTE — PROGRESS NOTES
Subjective:     Patient ID: Ashutosh Ferrari is a 49 y.o. male.    Chief Complaint: Wound Care (right)    Ashutosh is a 49 y.o. male who presents to the clinic for evaluation and treatment of high risk feet. Ashutosh has a past medical history of Diabetes mellitus, Diabetes mellitus, type 2, Essential hypertension, benign (07/18/2013), and Herpes. The patient's chief complaint is diabetic foot ulcer, R. Patient has been in football dressing, ambulating in Darco shoe x 1 weeks with out issues    This patient has documented high risk feet requiring routine maintenance secondary to diabetes mellitis and those secondary complications of diabetes, as mentioned..    9/26:  Patient presents to clinic for R foot wound follow up.  Patient says he has recently returned from the beach, admits to running and walking a lot more for fitness. Has been compliant with wearing short cam boot at all times.  Has kept dressings clean, dry, and intact.  Denies any recent fevers, chills, nausea, or vomiting.  Denies any pain to his R foot.  Denies any new pedal complaints.    10.3.24: Patient has been in football dressing, ambulating in Darco shoe x 1 week with out issues     10.23.24:  Patient lost to follow up x2 weeks secondary to family issues as well as insurance verification.  Has been in bilateral football dressings since last appointment.    11.13.24: Patient f/u for ulcer. Left foot fine, right foot still with ulcer. Patient finished abx. Denies any acute infection symptoms such as n/f/v/chills/CP/SOB. No Drainage.     PCP: [facility] -    Date Last Seen by PCP:   Chief Complaint   Patient presents with    Wound Care     right     Hemoglobin A1C   Date Value Ref Range Status   04/06/2024 12.3 (H) 4.0 - 5.6 % Final     Comment:     ADA Screening Guidelines:  5.7-6.4%  Consistent with prediabetes  >or=6.5%  Consistent with diabetes    High levels of fetal hemoglobin interfere with the HbA1C  assay. Heterozygous hemoglobin  "variants (HbS, HgC, etc)do  not significantly interfere with this assay.   However, presence of multiple variants may affect accuracy.     03/27/2022 12.5 (H) 4.0 - 5.6 % Final     Comment:     ADA Screening Guidelines:  5.7-6.4%  Consistent with prediabetes  >or=6.5%  Consistent with diabetes    High levels of fetal hemoglobin interfere with the HbA1C  assay. Heterozygous hemoglobin variants (HbS, HgC, etc)do  not significantly interfere with this assay.   However, presence of multiple variants may affect accuracy.     09/19/2021 12.8 (H) 4.0 - 5.6 % Final     Comment:     ADA Screening Guidelines:  5.7-6.4%  Consistent with prediabetes  >or=6.5%  Consistent with diabetes    High levels of fetal hemoglobin interfere with the HbA1C  assay. Heterozygous hemoglobin variants (HbS, HgC, etc)do  not significantly interfere with this assay.   However, presence of multiple variants may affect accuracy.         Review of Systems   Constitutional: Negative for chills, decreased appetite and fever.   Cardiovascular:  Negative for leg swelling.   Skin:  Positive for color change, dry skin and poor wound healing. Negative for flushing, itching, rash and skin cancer.   Musculoskeletal:  Negative for arthritis, joint pain, joint swelling and myalgias.   Gastrointestinal:  Negative for nausea and vomiting.   Neurological:  Negative for loss of balance, numbness and paresthesias.        Objective:     Vitals:    11/13/24 0924   BP: (!) 164/97   Height: 6' 3" (1.905 m)   PainSc:   4   PainLoc: Foot          Physical Exam  Vitals reviewed.   Constitutional:       Appearance: He is well-developed.   Cardiovascular:      Comments: dorsalis pedis and posterior tibial pulses are palpable bilaterally. Capillary refill time is within normal limits. + pedal hair growth         Musculoskeletal:         General: Deformity present. No tenderness or signs of injury. Normal range of motion.      Comments: Adequate joint range of motion without " pain, limitation, nor crepitation Bilateral feet and ankle joints. Muscle strength is 5/5 in all groups bilaterally.         Skin:     General: Skin is warm and dry.      Findings: No erythema, lesion or rash.   Neurological:      Mental Status: He is alert and oriented to person, place, and time.      Sensory: No sensory deficit.      Comments: [place] 5.07 monofilament is intact bilateral feet.      Psychiatric:         Behavior: Behavior normal.         6.19.24    0.8x0.5x0.3 Cm ulceration to the plantar aspect of the right 5th metatarsophalangeal joint with exuberant hyperkeratotic tissue observed.  Moderate periwound maceration is noted.  No malodor.  No edema mild serous drainage noted.  No deep probe.  No purulence    6.26.24    0.5x0.3x0.3 cm ulceration to the plantar aspect of the right 5th metatarsophalangeal joint.  Periwound hyperkeratosis and maceration is noted.  Improved from last week.  No purulence noted.  No exposed bone or tendinous structures observed.  Mild odor observed.    7.3.24    0.4x0.2x0.1 cm Healthy granular wound bed.  Periwound hyperkeratosis with mild maceration observed.  Serous drainage.    7.18.24    0.6x0.4x0.3 cm granular base, + lesa wound maceration. No surrounding erythema, edema, malodor, nor drainage noted     7.25.24    0.9x0.4x0.4 cm granular base, + lesa wound maceration. No surrounding erythema, edema, malodor, nor drainage noted     8.6.24    0.8X0.4X0.3 CM GRANULAR BASE MILD PERIWOUND MACERATION.  HEALTHY PERIWOUND SKIN.  NO MALODOR.  MILD BLOODY DRAINAGE.      0.3x0.2x0.3 cm granular wound base with friable periwound.  Mild serous drainage is observed.  No erythema no edema no swelling     8.21.24    0.4x0.3x0.1 cm with healthy granular wound base.  Increased periwound hyperkeratosis is noted.  The wound has slightly increased in width however the depth is greatly improved.    9.3.24    0.4 x 0.3 x 0.2 cm wound with mostly granular base.  Hyperkeratotic  periwound.  Wound stable    9.26.24  R foot        Chronic ulceration 1.0 x 0.4 x 0.3 cm     Left foot        New ulceration measuring1.3 x 0.9 x 0.3 cm without pain, erythema, edema, purulence.    10.3.24    0.5x0.5x0.1 cm L sub 5th MPJ w/ granular base. No surrounding erythema, edema, malodor, nor drainage noted       0.5x0.2x0.3 cm sub 5th MJ R w/ hyperkeratotic lesa wound. Granular base. Mild lesa wound maceration     10.23.24    0.2x0.2x0.3 cm right sub 5th MPJ with exuberant hyperkeratotic tissue.  No erythema no edema no malodor.  Purulent base with periwound maceration noted.      11/13/24      0.4 x 0.5 x 0.3 cm right plantar 5th MPJ ulcer. Granular base, edges are hyperkeratotic. No erythema or edema. No purulence.       Assessment:        Encounter Diagnoses   Name Primary?    Ulcerated, foot, right, with fat layer exposed Yes    Type 2 diabetes mellitus with diabetic polyneuropathy, without long-term current use of insulin        Plan:     Ashutosh was seen today for wound care.    Diagnoses and all orders for this visit:    Ulcerated, foot, right, with fat layer exposed  -     X-Ray Foot Complete Right; Future    Type 2 diabetes mellitus with diabetic polyneuropathy, without long-term current use of insulin      I counseled the patient on his conditions, their implications and medical management.    Independent review of patients previous clinical notes    Reviewed current medication(s), and lab(s) specific to foot ailment(s) with patient in detail. All questions answered     With oral antibiotics.  Independent review of x-rays without any signs of bony involvement with the chronic ulceration.  No signs of osteo nor septic arthritis or observed.    Debridement: With verbal consent, nonviable tissues on the right foot were debrided beyond sub q utilizing a sterile curette. Minimal bleeding controlled with direct pressure. The patient tolerated this well.     Dressings:   Offloading: Michael Toure MA  assisted w/ application of football dressing under my direct supervision. Darco shoe applied to offload the area. Advised patient that this should be worn on the affected foot at all times when ambulating     Follow-up:Patient is to return to the clinic in 1 week  for follow-up but should call [facility] immediately if any signs of infection, such as fever, chills, sweats, increased redness or pain.    Short-term goals include maintaining good offloading and minimizing bioburden, promoting granulation and epithelialization to healing.  Long-term goals include keeping the wound healed by good offloading and medical management under the direction of internist.

## 2024-12-04 ENCOUNTER — TELEPHONE (OUTPATIENT)
Dept: PODIATRY | Facility: CLINIC | Age: 49
End: 2024-12-04
Payer: MEDICAID

## 2024-12-04 NOTE — TELEPHONE ENCOUNTER
Spoke with patient in regards to a F/U appointment request for wound care w/ Dr. Castillo(Podiatry). Date and time was given to patient and staff message has been sent to MD Ashley to assist with scheduling. I am unable to schedule due to patient insurance- Medicaid            ----- Message from Beverly sent at 12/4/2024  9:37 AM CST -----    Name of Caller:DONOVAN MARVIN [[id number]]      When is the first available appointment?n/a      Symptoms: f/u appt     Best Call Back Number Telephone Information:  Mobile          [phone]

## 2024-12-05 ENCOUNTER — OFFICE VISIT (OUTPATIENT)
Dept: PODIATRY | Facility: CLINIC | Age: 49
End: 2024-12-05
Payer: MEDICAID

## 2024-12-05 VITALS
HEIGHT: 75 IN | BODY MASS INDEX: 26.86 KG/M2 | RESPIRATION RATE: 18 BRPM | HEART RATE: 93 BPM | WEIGHT: 216.06 LBS | DIASTOLIC BLOOD PRESSURE: 109 MMHG | SYSTOLIC BLOOD PRESSURE: 154 MMHG

## 2024-12-05 DIAGNOSIS — E11.42 TYPE 2 DIABETES MELLITUS WITH DIABETIC POLYNEUROPATHY, WITHOUT LONG-TERM CURRENT USE OF INSULIN: ICD-10-CM

## 2024-12-05 DIAGNOSIS — L97.512 ULCERATED, FOOT, RIGHT, WITH FAT LAYER EXPOSED: Primary | ICD-10-CM

## 2024-12-05 PROCEDURE — 87186 SC STD MICRODIL/AGAR DIL: CPT | Performed by: PODIATRIST

## 2024-12-05 PROCEDURE — 1159F MED LIST DOCD IN RCRD: CPT | Mod: CPTII,,, | Performed by: PODIATRIST

## 2024-12-05 PROCEDURE — 99213 OFFICE O/P EST LOW 20 MIN: CPT | Mod: 25,S$PBB,, | Performed by: PODIATRIST

## 2024-12-05 PROCEDURE — 3046F HEMOGLOBIN A1C LEVEL >9.0%: CPT | Mod: CPTII,,, | Performed by: PODIATRIST

## 2024-12-05 PROCEDURE — 3077F SYST BP >= 140 MM HG: CPT | Mod: CPTII,,, | Performed by: PODIATRIST

## 2024-12-05 PROCEDURE — 87070 CULTURE OTHR SPECIMN AEROBIC: CPT | Performed by: PODIATRIST

## 2024-12-05 PROCEDURE — 87075 CULTR BACTERIA EXCEPT BLOOD: CPT | Performed by: PODIATRIST

## 2024-12-05 PROCEDURE — 99213 OFFICE O/P EST LOW 20 MIN: CPT | Mod: PBBFAC | Performed by: PODIATRIST

## 2024-12-05 PROCEDURE — 3080F DIAST BP >= 90 MM HG: CPT | Mod: CPTII,,, | Performed by: PODIATRIST

## 2024-12-05 PROCEDURE — 11042 DBRDMT SUBQ TIS 1ST 20SQCM/<: CPT | Mod: S$PBB,,, | Performed by: PODIATRIST

## 2024-12-05 PROCEDURE — 3008F BODY MASS INDEX DOCD: CPT | Mod: CPTII,,, | Performed by: PODIATRIST

## 2024-12-05 PROCEDURE — 87077 CULTURE AEROBIC IDENTIFY: CPT | Performed by: PODIATRIST

## 2024-12-05 PROCEDURE — 11042 DBRDMT SUBQ TIS 1ST 20SQCM/<: CPT | Mod: PBBFAC | Performed by: PODIATRIST

## 2024-12-05 PROCEDURE — 99999 PR PBB SHADOW E&M-EST. PATIENT-LVL III: CPT | Mod: PBBFAC,,, | Performed by: PODIATRIST

## 2024-12-09 ENCOUNTER — PATIENT MESSAGE (OUTPATIENT)
Dept: PODIATRY | Facility: CLINIC | Age: 49
End: 2024-12-09
Payer: MEDICAID

## 2024-12-09 LAB
BACTERIA SPEC AEROBE CULT: ABNORMAL
BACTERIA SPEC ANAEROBE CULT: NORMAL

## 2024-12-09 RX ORDER — DOXYCYCLINE 100 MG/1
100 CAPSULE ORAL EVERY 12 HOURS
Qty: 20 CAPSULE | Refills: 0 | Status: SHIPPED | OUTPATIENT
Start: 2024-12-09 | End: 2024-12-19

## 2024-12-10 NOTE — PROGRESS NOTES
Subjective:     Patient ID: Ashutosh Ferrari is a 49 y.o. male.    Chief Complaint: Wound Care (Rt foot ulcer ) and Dressing Change (Football )    Ashutosh is a 49 y.o. male who presents to the clinic for evaluation and treatment of high risk feet. Ashutosh has a past medical history of Diabetes mellitus, Diabetes mellitus, type 2, Essential hypertension, benign (07/18/2013), and Herpes. The patient's chief complaint is diabetic foot ulcer, R. Patient has been in football dressing, ambulating in Darco shoe x 1 weeks with out issues    This patient has documented high risk feet requiring routine maintenance secondary to diabetes mellitis and those secondary complications of diabetes, as mentioned..    9/26:  Patient presents to clinic for R foot wound follow up.  Patient says he has recently returned from the beach, admits to running and walking a lot more for fitness. Has been compliant with wearing short cam boot at all times.  Has kept dressings clean, dry, and intact.  Denies any recent fevers, chills, nausea, or vomiting.  Denies any pain to his R foot.  Denies any new pedal complaints.    10.3.24: Patient has been in football dressing, ambulating in Darco shoe x 1 week with out issues     10.23.24:  Patient lost to follow up x2 weeks secondary to family issues as well as insurance verification.  Has been in bilateral football dressings since last appointment.    10.30.24 Patient has been in football dressing, ambulating in Darco shoe x 1 week with out issues     PCP: [facility] -    Date Last Seen by PCP:   Chief Complaint   Patient presents with    Wound Care     Rt foot ulcer     Dressing Change     Football      Hemoglobin A1C   Date Value Ref Range Status   04/06/2024 12.3 (H) 4.0 - 5.6 % Final     Comment:     ADA Screening Guidelines:  5.7-6.4%  Consistent with prediabetes  >or=6.5%  Consistent with diabetes    High levels of fetal hemoglobin interfere with the HbA1C  assay. Heterozygous  "hemoglobin variants (HbS, HgC, etc)do  not significantly interfere with this assay.   However, presence of multiple variants may affect accuracy.     03/27/2022 12.5 (H) 4.0 - 5.6 % Final     Comment:     ADA Screening Guidelines:  5.7-6.4%  Consistent with prediabetes  >or=6.5%  Consistent with diabetes    High levels of fetal hemoglobin interfere with the HbA1C  assay. Heterozygous hemoglobin variants (HbS, HgC, etc)do  not significantly interfere with this assay.   However, presence of multiple variants may affect accuracy.     09/19/2021 12.8 (H) 4.0 - 5.6 % Final     Comment:     ADA Screening Guidelines:  5.7-6.4%  Consistent with prediabetes  >or=6.5%  Consistent with diabetes    High levels of fetal hemoglobin interfere with the HbA1C  assay. Heterozygous hemoglobin variants (HbS, HgC, etc)do  not significantly interfere with this assay.   However, presence of multiple variants may affect accuracy.         Review of Systems   Constitutional: Negative for chills, decreased appetite and fever.   Cardiovascular:  Negative for leg swelling.   Skin:  Positive for color change, dry skin and poor wound healing. Negative for flushing, itching, rash and skin cancer.   Musculoskeletal:  Negative for arthritis, joint pain, joint swelling and myalgias.   Gastrointestinal:  Negative for nausea and vomiting.   Neurological:  Negative for loss of balance, numbness and paresthesias.        Objective:     Vitals:    11/06/24 1521   BP: (!) 159/95   Pulse: 98   Resp: 18   Temp: 98.5 °F (36.9 °C)   TempSrc: Oral   Weight: 98 kg (216 lb 0.8 oz)   Height: 6' 3" (1.905 m)   PainSc:   5   PainLoc: Foot          Physical Exam  Vitals reviewed.   Constitutional:       Appearance: He is well-developed.   Cardiovascular:      Comments: dorsalis pedis and posterior tibial pulses are palpable bilaterally. Capillary refill time is within normal limits. + pedal hair growth         Musculoskeletal:         General: Deformity present. No " tenderness or signs of injury. Normal range of motion.      Comments: Adequate joint range of motion without pain, limitation, nor crepitation Bilateral feet and ankle joints. Muscle strength is 5/5 in all groups bilaterally.         Skin:     General: Skin is warm and dry.      Findings: No erythema, lesion or rash.   Neurological:      Mental Status: He is alert and oriented to person, place, and time.      Sensory: No sensory deficit.      Comments: [place] 5.07 monofilament is intact bilateral feet.      Psychiatric:         Behavior: Behavior normal.         6.19.24    0.8x0.5x0.3 Cm ulceration to the plantar aspect of the right 5th metatarsophalangeal joint with exuberant hyperkeratotic tissue observed.  Moderate periwound maceration is noted.  No malodor.  No edema mild serous drainage noted.  No deep probe.  No purulence    6.26.24    0.5x0.3x0.3 cm ulceration to the plantar aspect of the right 5th metatarsophalangeal joint.  Periwound hyperkeratosis and maceration is noted.  Improved from last week.  No purulence noted.  No exposed bone or tendinous structures observed.  Mild odor observed.    7.3.24    0.4x0.2x0.1 cm Healthy granular wound bed.  Periwound hyperkeratosis with mild maceration observed.  Serous drainage.    7.18.24    0.6x0.4x0.3 cm granular base, + lesa wound maceration. No surrounding erythema, edema, malodor, nor drainage noted     7.25.24    0.9x0.4x0.4 cm granular base, + lesa wound maceration. No surrounding erythema, edema, malodor, nor drainage noted     8.6.24    0.8X0.4X0.3 CM GRANULAR BASE MILD PERIWOUND MACERATION.  HEALTHY PERIWOUND SKIN.  NO MALODOR.  MILD BLOODY DRAINAGE.      0.3x0.2x0.3 cm granular wound base with friable periwound.  Mild serous drainage is observed.  No erythema no edema no swelling     8.21.24    0.4x0.3x0.1 cm with healthy granular wound base.  Increased periwound hyperkeratosis is noted.  The wound has slightly increased in width however the  depth is greatly improved.    9.3.24    0.4 x 0.3 x 0.2 cm wound with mostly granular base.  Hyperkeratotic periwound.  Wound stable    9.26.24  R foot        Chronic ulceration 1.0 x 0.4 x 0.3 cm     Left foot        New ulceration measuring1.3 x 0.9 x 0.3 cm without pain, erythema, edema, purulence.    10.3.24    0.5x0.5x0.1 cm L sub 5th MPJ w/ granular base. No surrounding erythema, edema, malodor, nor drainage noted       0.5x0.2x0.3 cm sub 5th MJ R w/ hyperkeratotic lesa wound. Granular base. Mild lesa wound maceration     10.23.24    0.2x0.2x0.3 cm right sub 5th MPJ with exuberant hyperkeratotic tissue.  No erythema no edema no malodor.  Purulent base with periwound maceration noted.    10.30.24    .2x0.2x0.3 cm ulceration to the plantar aspect of the right foot with periwound maceration and hyperkeratosis noted.  No malodor is observed.  No increased temperature.  No erythema.    11.6.24:    .5x.6x0.2 ulceration noted to the plantar aspect of the right foot with increased periwound hyperkeratosis, increased maceration and serosanguineous drainage.  Increased size and wound no exposed bone.  Area does probe deeply near bone.  Assessment:        Encounter Diagnoses   Name Primary?    Ulcerated, foot, right, with fat layer exposed Yes    Type 2 diabetes mellitus with diabetic polyneuropathy, without long-term current use of insulin     Ulcerated, foot, left, with fat layer exposed        Plan:     Ashutosh was seen today for wound care and dressing change.    Diagnoses and all orders for this visit:    Ulcerated, foot, right, with fat layer exposed  -     Aerobic culture  -     Culture, Anaerobic  -     X-Ray Foot Complete Right; Future    Type 2 diabetes mellitus with diabetic polyneuropathy, without long-term current use of insulin    Ulcerated, foot, left, with fat layer exposed        I counseled the patient on his conditions, their implications and medical management.    Independent review of patients previous  clinical notes    Reviewed current medication(s), and lab(s) specific to foot ailment(s) with patient in detail. All questions answered     Aerobic/anaerobic cultures obtained from wound. Prescription written for broad spectrum abx, will monitor results and tailor abx as needed.     Radio pending to eval integrity of osseous structures in the patient with a chronic wound that has been recalcitrant to treatment and healing.    Debridement: With verbal consent, nonviable tissues on the right foot were debrided beyond sub q utilizing a sterile curette. Minimal bleeding controlled with direct pressure. The patient tolerated this well.     Left foot ulceration is basically healed with the extremely thin epithelial tissue noted.  We will continue security football.    Dressings: alginate  Offloading: Michael COPELAND MA assisted w/ application of football dressing under my direct supervision. Darco shoe applied to offload the area. Advised patient that this should be worn on the affected foot at all times when ambulating     Follow-up:Patient is to return to the clinic in 1 week  for follow-up but should call [facility] immediately if any signs of infection, such as fever, chills, sweats, increased redness or pain.    Short-term goals include maintaining good offloading and minimizing bioburden, promoting granulation and epithelialization to healing.  Long-term goals include keeping the wound healed by good offloading and medical management under the direction of internist.

## 2024-12-10 NOTE — PROGRESS NOTES
Subjective:     Patient ID: Ashutosh Ferrari is a 49 y.o. male.    Chief Complaint: Wound Care (Foot ulcer Rt foot ) and Dressing Change (Football )    Ashutosh is a 49 y.o. male who presents to the clinic for evaluation and treatment of high risk feet. Ashutosh has a past medical history of Diabetes mellitus, Diabetes mellitus, type 2, Essential hypertension, benign (07/18/2013), and Herpes. The patient's chief complaint is diabetic foot ulcer, R. Patient has been in football dressing, ambulating in Darco shoe x 1 weeks with out issues    This patient has documented high risk feet requiring routine maintenance secondary to diabetes mellitis and those secondary complications of diabetes, as mentioned..    9/26:  Patient presents to clinic for R foot wound follow up.  Patient says he has recently returned from the beach, admits to running and walking a lot more for fitness. Has been compliant with wearing short cam boot at all times.  Has kept dressings clean, dry, and intact.  Denies any recent fevers, chills, nausea, or vomiting.  Denies any pain to his R foot.  Denies any new pedal complaints.    10.3.24: Patient has been in football dressing, ambulating in Darco shoe x 1 week with out issues     10.23.24:  Patient lost to follow up x2 weeks secondary to family issues as well as insurance verification.  Has been in bilateral football dressings since last appointment.    11.13.24: Patient f/u for ulcer. Left foot fine, right foot still with ulcer. Patient finished abx. Denies any acute infection symptoms such as n/f/v/chills/CP/SOB. No Drainage.     12.5.24:  Patient reports he went to [place] to visit family.  Left football dressing intact for over  2 weeks.  PCP: [facility] -    Date Last Seen by PCP:   Chief Complaint   Patient presents with    Wound Care     Foot ulcer Rt foot     Dressing Change     Football      Hemoglobin A1C   Date Value Ref Range Status   04/06/2024 12.3 (H) 4.0 - 5.6 % Final      "Comment:     ADA Screening Guidelines:  5.7-6.4%  Consistent with prediabetes  >or=6.5%  Consistent with diabetes    High levels of fetal hemoglobin interfere with the HbA1C  assay. Heterozygous hemoglobin variants (HbS, HgC, etc)do  not significantly interfere with this assay.   However, presence of multiple variants may affect accuracy.     03/27/2022 12.5 (H) 4.0 - 5.6 % Final     Comment:     ADA Screening Guidelines:  5.7-6.4%  Consistent with prediabetes  >or=6.5%  Consistent with diabetes    High levels of fetal hemoglobin interfere with the HbA1C  assay. Heterozygous hemoglobin variants (HbS, HgC, etc)do  not significantly interfere with this assay.   However, presence of multiple variants may affect accuracy.     09/19/2021 12.8 (H) 4.0 - 5.6 % Final     Comment:     ADA Screening Guidelines:  5.7-6.4%  Consistent with prediabetes  >or=6.5%  Consistent with diabetes    High levels of fetal hemoglobin interfere with the HbA1C  assay. Heterozygous hemoglobin variants (HbS, HgC, etc)do  not significantly interfere with this assay.   However, presence of multiple variants may affect accuracy.         Review of Systems   Constitutional: Negative for chills, decreased appetite and fever.   Cardiovascular:  Negative for leg swelling.   Skin:  Positive for color change, dry skin and poor wound healing. Negative for flushing, itching, rash and skin cancer.   Musculoskeletal:  Negative for arthritis, joint pain, joint swelling and myalgias.   Gastrointestinal:  Negative for nausea and vomiting.   Neurological:  Negative for loss of balance, numbness and paresthesias.        Objective:     Vitals:    12/05/24 1321   BP: (!) 154/109   Pulse: 93   Resp: 18   Weight: 98 kg (216 lb 0.8 oz)   Height: 6' 3" (1.905 m)   PainSc: 0-No pain          Physical Exam  Vitals reviewed.   Constitutional:       Appearance: He is well-developed.   Cardiovascular:      Comments: dorsalis pedis and posterior tibial pulses are palpable " bilaterally. Capillary refill time is within normal limits. + pedal hair growth         Musculoskeletal:         General: Deformity present. No tenderness or signs of injury. Normal range of motion.      Comments: Adequate joint range of motion without pain, limitation, nor crepitation Bilateral feet and ankle joints. Muscle strength is 5/5 in all groups bilaterally.         Skin:     General: Skin is warm and dry.      Findings: No erythema, lesion or rash.   Neurological:      Mental Status: He is alert and oriented to person, place, and time.      Sensory: No sensory deficit.      Comments: [place] 5.07 monofilament is intact bilateral feet.      Psychiatric:         Behavior: Behavior normal.         6.19.24    0.8x0.5x0.3 Cm ulceration to the plantar aspect of the right 5th metatarsophalangeal joint with exuberant hyperkeratotic tissue observed.  Moderate periwound maceration is noted.  No malodor.  No edema mild serous drainage noted.  No deep probe.  No purulence    6.26.24    0.5x0.3x0.3 cm ulceration to the plantar aspect of the right 5th metatarsophalangeal joint.  Periwound hyperkeratosis and maceration is noted.  Improved from last week.  No purulence noted.  No exposed bone or tendinous structures observed.  Mild odor observed.    7.3.24    0.4x0.2x0.1 cm Healthy granular wound bed.  Periwound hyperkeratosis with mild maceration observed.  Serous drainage.    7.18.24    0.6x0.4x0.3 cm granular base, + lesa wound maceration. No surrounding erythema, edema, malodor, nor drainage noted     7.25.24    0.9x0.4x0.4 cm granular base, + lesa wound maceration. No surrounding erythema, edema, malodor, nor drainage noted     8.6.24    0.8X0.4X0.3 CM GRANULAR BASE MILD PERIWOUND MACERATION.  HEALTHY PERIWOUND SKIN.  NO MALODOR.  MILD BLOODY DRAINAGE.      0.3x0.2x0.3 cm granular wound base with friable periwound.  Mild serous drainage is observed.  No erythema no edema no swelling      8.21.24    0.4x0.3x0.1 cm with healthy granular wound base.  Increased periwound hyperkeratosis is noted.  The wound has slightly increased in width however the depth is greatly improved.    9.3.24    0.4 x 0.3 x 0.2 cm wound with mostly granular base.  Hyperkeratotic periwound.  Wound stable    9.26.24  R foot        Chronic ulceration 1.0 x 0.4 x 0.3 cm     Left foot        New ulceration measuring1.3 x 0.9 x 0.3 cm without pain, erythema, edema, purulence.    10.3.24    0.5x0.5x0.1 cm L sub 5th MPJ w/ granular base. No surrounding erythema, edema, malodor, nor drainage noted       0.5x0.2x0.3 cm sub 5th MJ R w/ hyperkeratotic lesa wound. Granular base. Mild lesa wound maceration     10.23.24    0.2x0.2x0.3 cm right sub 5th MPJ with exuberant hyperkeratotic tissue.  No erythema no edema no malodor.  Purulent base with periwound maceration noted.      11/13/24      0.4 x 0.5 x 0.3 cm right plantar 5th MPJ ulcer. Granular base, edges are hyperkeratotic. No erythema or edema. No purulence.     12.5.24    .6x0.6x0.3 cm ulceration noted to the plantar aspect of the right foot with surrounding periwound maceration hyperkeratosis.  Mild-to-moderate malodor is observed.  No erythema.  No purulence.  Again deep probe near bone with no exposed bone or tendinous structures observed.  No fluctuance no crepitus.    Assessment:        Encounter Diagnoses   Name Primary?    Ulcerated, foot, right, with fat layer exposed Yes    Type 2 diabetes mellitus with diabetic polyneuropathy, without long-term current use of insulin        Plan:     Ashutosh was seen today for wound care and dressing change.    Diagnoses and all orders for this visit:    Ulcerated, foot, right, with fat layer exposed  -     Aerobic culture  -     Culture, Anaerobic    Type 2 diabetes mellitus with diabetic polyneuropathy, without long-term current use of insulin      I counseled the patient on his conditions, their implications and medical  management.    Independent review of patients previous clinical notes    Reviewed current medication(s), and lab(s) specific to foot ailment(s) with patient in detail. All questions answered     Patient again lost to follow up.  Advised patient of the importance of 2nd toe treatment course.  Also advised patient to never leave the dressings on his foot for an extended period of time.  His last bandage was left in place for over 2 weeks.    Debridement: With verbal consent, nonviable tissues on the right foot were debrided beyond sub q utilizing a sterile curette. Minimal bleeding controlled with direct pressure. The patient tolerated this well.     Dressings:  beta alg  Offloading: Michael Toure MA assisted w/ application of football dressing under my direct supervision. Darco shoe applied to offload the area. Advised patient that this should be worn on the affected foot at all times when ambulating     Follow-up:Patient is to return to the clinic in 1 week  for follow-up but should call [facility] immediately if any signs of infection, such as fever, chills, sweats, increased redness or pain.    Short-term goals include maintaining good offloading and minimizing bioburden, promoting granulation and epithelialization to healing.  Long-term goals include keeping the wound healed by good offloading and medical management under the direction of internist.

## 2024-12-10 NOTE — PROGRESS NOTES
Subjective:     Patient ID: Ashutosh Ferrari is a 49 y.o. male.    Chief Complaint: Wound Care (Bilateral foot ulcer ), Dressing Change (Football ), and Foot Pain (Tingling )    Ashutosh is a 49 y.o. male who presents to the clinic for evaluation and treatment of high risk feet. Ashutosh has a past medical history of Diabetes mellitus, Diabetes mellitus, type 2, Essential hypertension, benign (07/18/2013), and Herpes. The patient's chief complaint is diabetic foot ulcer, R. Patient has been in football dressing, ambulating in Darco shoe x 1 weeks with out issues    This patient has documented high risk feet requiring routine maintenance secondary to diabetes mellitis and those secondary complications of diabetes, as mentioned..    9/26:  Patient presents to clinic for R foot wound follow up.  Patient says he has recently returned from the beach, admits to running and walking a lot more for fitness. Has been compliant with wearing short cam boot at all times.  Has kept dressings clean, dry, and intact.  Denies any recent fevers, chills, nausea, or vomiting.  Denies any pain to his R foot.  Denies any new pedal complaints.    10.3.24: Patient has been in football dressing, ambulating in Darco shoe x 1 week with out issues     10.23.24:  Patient lost to follow up x2 weeks secondary to family issues as well as insurance verification.  Has been in bilateral football dressings since last appointment.    10.30.24 Patient has been in football dressing, ambulating in Darco shoe x 1 week with out issues     PCP: [facility] -    Date Last Seen by PCP:   Chief Complaint   Patient presents with    Wound Care     Bilateral foot ulcer     Dressing Change     Football     Foot Pain     Tingling      Hemoglobin A1C   Date Value Ref Range Status   04/06/2024 12.3 (H) 4.0 - 5.6 % Final     Comment:     ADA Screening Guidelines:  5.7-6.4%  Consistent with prediabetes  >or=6.5%  Consistent with diabetes    High levels of  "fetal hemoglobin interfere with the HbA1C  assay. Heterozygous hemoglobin variants (HbS, HgC, etc)do  not significantly interfere with this assay.   However, presence of multiple variants may affect accuracy.     03/27/2022 12.5 (H) 4.0 - 5.6 % Final     Comment:     ADA Screening Guidelines:  5.7-6.4%  Consistent with prediabetes  >or=6.5%  Consistent with diabetes    High levels of fetal hemoglobin interfere with the HbA1C  assay. Heterozygous hemoglobin variants (HbS, HgC, etc)do  not significantly interfere with this assay.   However, presence of multiple variants may affect accuracy.     09/19/2021 12.8 (H) 4.0 - 5.6 % Final     Comment:     ADA Screening Guidelines:  5.7-6.4%  Consistent with prediabetes  >or=6.5%  Consistent with diabetes    High levels of fetal hemoglobin interfere with the HbA1C  assay. Heterozygous hemoglobin variants (HbS, HgC, etc)do  not significantly interfere with this assay.   However, presence of multiple variants may affect accuracy.         Review of Systems   Constitutional: Negative for chills, decreased appetite and fever.   Cardiovascular:  Negative for leg swelling.   Skin:  Positive for color change, dry skin and poor wound healing. Negative for flushing, itching, rash and skin cancer.   Musculoskeletal:  Negative for arthritis, joint pain, joint swelling and myalgias.   Gastrointestinal:  Negative for nausea and vomiting.   Neurological:  Negative for loss of balance, numbness and paresthesias.        Objective:     Vitals:    10/30/24 1418   BP: (!) 137/97   Pulse: 91   Resp: 18   Temp: 97.8 °F (36.6 °C)   TempSrc: Oral   Weight: 98 kg (216 lb 0.8 oz)   Height: 6' 3" (1.905 m)   PainSc:   5   PainLoc: Foot          Physical Exam  Vitals reviewed.   Constitutional:       Appearance: He is well-developed.   Cardiovascular:      Comments: dorsalis pedis and posterior tibial pulses are palpable bilaterally. Capillary refill time is within normal limits. + pedal hair growth "         Musculoskeletal:         General: Deformity present. No tenderness or signs of injury. Normal range of motion.      Comments: Adequate joint range of motion without pain, limitation, nor crepitation Bilateral feet and ankle joints. Muscle strength is 5/5 in all groups bilaterally.         Skin:     General: Skin is warm and dry.      Findings: No erythema, lesion or rash.   Neurological:      Mental Status: He is alert and oriented to person, place, and time.      Sensory: No sensory deficit.      Comments: [place] 5.07 monofilament is intact bilateral feet.      Psychiatric:         Behavior: Behavior normal.         6.19.24    0.8x0.5x0.3 Cm ulceration to the plantar aspect of the right 5th metatarsophalangeal joint with exuberant hyperkeratotic tissue observed.  Moderate periwound maceration is noted.  No malodor.  No edema mild serous drainage noted.  No deep probe.  No purulence    6.26.24    0.5x0.3x0.3 cm ulceration to the plantar aspect of the right 5th metatarsophalangeal joint.  Periwound hyperkeratosis and maceration is noted.  Improved from last week.  No purulence noted.  No exposed bone or tendinous structures observed.  Mild odor observed.    7.3.24    0.4x0.2x0.1 cm Healthy granular wound bed.  Periwound hyperkeratosis with mild maceration observed.  Serous drainage.    7.18.24    0.6x0.4x0.3 cm granular base, + lesa wound maceration. No surrounding erythema, edema, malodor, nor drainage noted     7.25.24    0.9x0.4x0.4 cm granular base, + lesa wound maceration. No surrounding erythema, edema, malodor, nor drainage noted     8.6.24    0.8X0.4X0.3 CM GRANULAR BASE MILD PERIWOUND MACERATION.  HEALTHY PERIWOUND SKIN.  NO MALODOR.  MILD BLOODY DRAINAGE.      0.3x0.2x0.3 cm granular wound base with friable periwound.  Mild serous drainage is observed.  No erythema no edema no swelling     8.21.24    0.4x0.3x0.1 cm with healthy granular wound base.  Increased periwound hyperkeratosis is  noted.  The wound has slightly increased in width however the depth is greatly improved.    9.3.24    0.4 x 0.3 x 0.2 cm wound with mostly granular base.  Hyperkeratotic periwound.  Wound stable    9.26.24  R foot        Chronic ulceration 1.0 x 0.4 x 0.3 cm     Left foot        New ulceration measuring1.3 x 0.9 x 0.3 cm without pain, erythema, edema, purulence.    10.3.24    0.5x0.5x0.1 cm L sub 5th MPJ w/ granular base. No surrounding erythema, edema, malodor, nor drainage noted       0.5x0.2x0.3 cm sub 5th MJ R w/ hyperkeratotic lesa wound. Granular base. Mild lesa wound maceration     10.23.24    0.2x0.2x0.3 cm right sub 5th MPJ with exuberant hyperkeratotic tissue.  No erythema no edema no malodor.  Purulent base with periwound maceration noted.    10.30.24    .2x0.2x0.3 cm ulceration to the plantar aspect of the right foot with periwound maceration and hyperkeratosis noted.  No malodor is observed.  No increased temperature.  No erythema.  Assessment:        Encounter Diagnoses   Name Primary?    Type 2 diabetes mellitus with diabetic polyneuropathy, without long-term current use of insulin Yes    Ulcerated, foot, right, with fat layer exposed        Plan:     Ashutosh was seen today for wound care, dressing change and foot pain.    Diagnoses and all orders for this visit:    Type 2 diabetes mellitus with diabetic polyneuropathy, without long-term current use of insulin    Ulcerated, foot, right, with fat layer exposed        I counseled the patient on his conditions, their implications and medical management.    Independent review of patients previous clinical notes    Reviewed current medication(s), and lab(s) specific to foot ailment(s) with patient in detail. All questions answered     Debridement: With verbal consent, nonviable tissues on the right foot were debrided beyond sub q utilizing a sterile curette. Minimal bleeding controlled with direct pressure. The patient tolerated this well.     Left foot  ulceration is basically healed with the extremely thin epithelial tissue noted.  We will continue security football.    Dressings: alginate  Offloading: Michael COPELAND MA assisted w/ application of football dressing under my direct supervision. Darco shoe applied to offload the area. Advised patient that this should be worn on the affected foot at all times when ambulating     Follow-up:Patient is to return to the clinic in 1 week  for follow-up but should call [facility] immediately if any signs of infection, such as fever, chills, sweats, increased redness or pain.    Short-term goals include maintaining good offloading and minimizing bioburden, promoting granulation and epithelialization to healing.  Long-term goals include keeping the wound healed by good offloading and medical management under the direction of internist.

## 2024-12-12 ENCOUNTER — OFFICE VISIT (OUTPATIENT)
Dept: PODIATRY | Facility: CLINIC | Age: 49
End: 2024-12-12
Payer: MEDICAID

## 2024-12-12 VITALS
DIASTOLIC BLOOD PRESSURE: 94 MMHG | HEIGHT: 75 IN | BODY MASS INDEX: 27 KG/M2 | SYSTOLIC BLOOD PRESSURE: 152 MMHG | HEART RATE: 97 BPM

## 2024-12-12 DIAGNOSIS — L97.512 ULCERATED, FOOT, RIGHT, WITH FAT LAYER EXPOSED: ICD-10-CM

## 2024-12-12 DIAGNOSIS — E11.42 TYPE 2 DIABETES MELLITUS WITH DIABETIC POLYNEUROPATHY, WITHOUT LONG-TERM CURRENT USE OF INSULIN: Primary | ICD-10-CM

## 2024-12-12 PROCEDURE — 99213 OFFICE O/P EST LOW 20 MIN: CPT | Mod: PBBFAC | Performed by: PODIATRIST

## 2024-12-12 PROCEDURE — 99999 PR PBB SHADOW E&M-EST. PATIENT-LVL III: CPT | Mod: PBBFAC,,, | Performed by: PODIATRIST

## 2024-12-12 PROCEDURE — 11042 DBRDMT SUBQ TIS 1ST 20SQCM/<: CPT | Mod: PBBFAC | Performed by: PODIATRIST

## 2024-12-12 NOTE — PROGRESS NOTES
Subjective:     Patient ID: Ashutosh Ferrari is a 49 y.o. male.    Chief Complaint: Wound Care (Right foot) and Diabetes Mellitus    Ashutosh is a 49 y.o. male who presents to the clinic for evaluation and treatment of high risk feet. Ashutosh has a past medical history of Diabetes mellitus, Diabetes mellitus, type 2, Essential hypertension, benign (07/18/2013), and Herpes. The patient's chief complaint is diabetic foot ulcer, R. Patient has been in football dressing, ambulating in Darco shoe x 1 weeks with out issues    This patient has documented high risk feet requiring routine maintenance secondary to diabetes mellitis and those secondary complications of diabetes, as mentioned..    9/26:  Patient presents to clinic for R foot wound follow up.  Patient says he has recently returned from the beach, admits to running and walking a lot more for fitness. Has been compliant with wearing short cam boot at all times.  Has kept dressings clean, dry, and intact.  Denies any recent fevers, chills, nausea, or vomiting.  Denies any pain to his R foot.  Denies any new pedal complaints.    10.3.24: Patient has been in football dressing, ambulating in Darco shoe x 1 week with out issues     10.23.24:  Patient lost to follow up x2 weeks secondary to family issues as well as insurance verification.  Has been in bilateral football dressings since last appointment.    11.13.24: Patient f/u for ulcer. Left foot fine, right foot still with ulcer. Patient finished abx. Denies any acute infection symptoms such as n/f/v/chills/CP/SOB. No Drainage.     12.5.24:  Patient reports he went to [place] to visit family.  Left football dressing intact for over  2 weeks.  PCP: [facility] -    Date Last Seen by PCP:   Chief Complaint   Patient presents with    Wound Care     Right foot    Diabetes Mellitus     Hemoglobin A1C   Date Value Ref Range Status   04/06/2024 12.3 (H) 4.0 - 5.6 % Final     Comment:     ADA Screening  "Guidelines:  5.7-6.4%  Consistent with prediabetes  >or=6.5%  Consistent with diabetes    High levels of fetal hemoglobin interfere with the HbA1C  assay. Heterozygous hemoglobin variants (HbS, HgC, etc)do  not significantly interfere with this assay.   However, presence of multiple variants may affect accuracy.     03/27/2022 12.5 (H) 4.0 - 5.6 % Final     Comment:     ADA Screening Guidelines:  5.7-6.4%  Consistent with prediabetes  >or=6.5%  Consistent with diabetes    High levels of fetal hemoglobin interfere with the HbA1C  assay. Heterozygous hemoglobin variants (HbS, HgC, etc)do  not significantly interfere with this assay.   However, presence of multiple variants may affect accuracy.     09/19/2021 12.8 (H) 4.0 - 5.6 % Final     Comment:     ADA Screening Guidelines:  5.7-6.4%  Consistent with prediabetes  >or=6.5%  Consistent with diabetes    High levels of fetal hemoglobin interfere with the HbA1C  assay. Heterozygous hemoglobin variants (HbS, HgC, etc)do  not significantly interfere with this assay.   However, presence of multiple variants may affect accuracy.         Review of Systems   Constitutional: Negative for chills, decreased appetite and fever.   Cardiovascular:  Negative for leg swelling.   Skin:  Positive for color change, dry skin and poor wound healing. Negative for flushing, itching, rash and skin cancer.   Musculoskeletal:  Negative for arthritis, joint pain, joint swelling and myalgias.   Gastrointestinal:  Negative for nausea and vomiting.   Neurological:  Negative for loss of balance, numbness and paresthesias.        Objective:     Vitals:    12/12/24 1055   BP: (!) 152/94   Pulse: 97   Height: 6' 3" (1.905 m)   PainSc:   6   PainLoc: Foot          Physical Exam  Vitals reviewed.   Constitutional:       Appearance: He is well-developed.   Cardiovascular:      Comments: dorsalis pedis and posterior tibial pulses are palpable bilaterally. Capillary refill time is within normal limits. + " pedal hair growth         Musculoskeletal:         General: Deformity present. No tenderness or signs of injury. Normal range of motion.      Right lower leg: No edema.      Comments: Adequate joint range of motion without pain, limitation, nor crepitation Bilateral feet and ankle joints. Muscle strength is 5/5 in all groups bilaterally.         Skin:     General: Skin is warm and dry.      Findings: No erythema, lesion or rash.   Neurological:      Mental Status: He is alert and oriented to person, place, and time.      Sensory: No sensory deficit.      Comments: [place]-Angella 5.07 monofilament is intact bilateral feet.      Psychiatric:         Behavior: Behavior normal.         6.19.24    0.8x0.5x0.3 Cm ulceration to the plantar aspect of the right 5th metatarsophalangeal joint with exuberant hyperkeratotic tissue observed.  Moderate periwound maceration is noted.  No malodor.  No edema mild serous drainage noted.  No deep probe.  No purulence    6.26.24    0.5x0.3x0.3 cm ulceration to the plantar aspect of the right 5th metatarsophalangeal joint.  Periwound hyperkeratosis and maceration is noted.  Improved from last week.  No purulence noted.  No exposed bone or tendinous structures observed.  Mild odor observed.    7.3.24    0.4x0.2x0.1 cm Healthy granular wound bed.  Periwound hyperkeratosis with mild maceration observed.  Serous drainage.    7.18.24    0.6x0.4x0.3 cm granular base, + lesa wound maceration. No surrounding erythema, edema, malodor, nor drainage noted     7.25.24    0.9x0.4x0.4 cm granular base, + lesa wound maceration. No surrounding erythema, edema, malodor, nor drainage noted     8.6.24    0.8X0.4X0.3 CM GRANULAR BASE MILD PERIWOUND MACERATION.  HEALTHY PERIWOUND SKIN.  NO MALODOR.  MILD BLOODY DRAINAGE.      0.3x0.2x0.3 cm granular wound base with friable periwound.  Mild serous drainage is observed.  No erythema no edema no swelling     8.21.24    0.4x0.3x0.1 cm with healthy granular  wound base.  Increased periwound hyperkeratosis is noted.  The wound has slightly increased in width however the depth is greatly improved.    9.3.24    0.4 x 0.3 x 0.2 cm wound with mostly granular base.  Hyperkeratotic periwound.  Wound stable    9.26.24  R foot        Chronic ulceration 1.0 x 0.4 x 0.3 cm     Left foot        New ulceration measuring1.3 x 0.9 x 0.3 cm without pain, erythema, edema, purulence.    10.3.24    0.5x0.5x0.1 cm L sub 5th MPJ w/ granular base. No surrounding erythema, edema, malodor, nor drainage noted       0.5x0.2x0.3 cm sub 5th MJ R w/ hyperkeratotic lesa wound. Granular base. Mild lesa wound maceration     10.23.24    0.2x0.2x0.3 cm right sub 5th MPJ with exuberant hyperkeratotic tissue.  No erythema no edema no malodor.  Purulent base with periwound maceration noted.      11/13/24      0.4 x 0.5 x 0.3 cm right plantar 5th MPJ ulcer. Granular base, edges are hyperkeratotic. No erythema or edema. No purulence.     12.5.24    .6x0.6x0.3 cm ulceration noted to the plantar aspect of the right foot with surrounding periwound maceration hyperkeratosis.  Mild-to-moderate malodor is observed.  No erythema.  No purulence.  Again deep probe near bone with no exposed bone or tendinous structures observed.  No fluctuance no crepitus.    12.12.24        1.0x0.5x0.3 cm with a healthy granular base.  Dry intact periwound with mild hyperkeratosis noted.  Assessment:        Encounter Diagnoses   Name Primary?    Type 2 diabetes mellitus with diabetic polyneuropathy, without long-term current use of insulin Yes    Ulcerated, foot, right, with fat layer exposed        Plan:     Ashutosh was seen today for wound care and diabetes mellitus.    Diagnoses and all orders for this visit:    Type 2 diabetes mellitus with diabetic polyneuropathy, without long-term current use of insulin    Ulcerated, foot, right, with fat layer exposed      I counseled the patient on his conditions, their implications and medical  management.    Independent review of patients previous clinical notes    Reviewed current medication(s), and lab(s) specific to foot ailment(s) with patient in detail. All questions answered     No issues with the antibiotics.  Continue treatment course.  We did discuss the issues with the patient's chronically nonhealing wound.  The wound does wax and wane in nature.  A portion of the issue is just the overall mechanical function of the patient's foot.  He has increased pressures about the sub 5th metatarsophalangeal joint with nearly complete after feet of the plantar that.  Patient also has issues with compliance as well as appointment follow up.  I did tell patient is surgically he would benefit from a metatarsal head resection with surgical excision of ulceration and primary closure.  Patient states that he does believe a large portion of his nonhealing is attributed she has a compliance issues.  He states that he will work on his compliance and would like to discuss her consider surgery at a later time.    Debridement: With verbal consent, nonviable tissues on the right foot were debrided beyond sub q utilizing a sterile curette. Minimal bleeding controlled with direct pressure. The patient tolerated this well.     Dressings:  HB  Offloading: Michael Arboleda MA assisted w/ application of football dressing under my direct supervision. Darco shoe applied to offload the area. Advised patient that this should be worn on the affected foot at all times when ambulating     Follow-up:Patient is to return to the clinic in 1 week  for follow-up but [facility] if any signs of infection, such as fever, chills, sweats, increased redness or pain.    Short-term goals include maintaining good offloading and minimizing bioburden, promoting granulation and epithelialization to healing.  Long-term goals include keeping the wound healed by good offloading and medical management under the direction of  internist.

## 2024-12-18 ENCOUNTER — OFFICE VISIT (OUTPATIENT)
Dept: PODIATRY | Facility: CLINIC | Age: 49
End: 2024-12-18
Payer: MEDICAID

## 2024-12-18 VITALS
RESPIRATION RATE: 18 BRPM | DIASTOLIC BLOOD PRESSURE: 94 MMHG | WEIGHT: 216.06 LBS | BODY MASS INDEX: 26.86 KG/M2 | HEART RATE: 102 BPM | SYSTOLIC BLOOD PRESSURE: 147 MMHG | HEIGHT: 75 IN

## 2024-12-18 DIAGNOSIS — N18.30 STAGE 3 CHRONIC KIDNEY DISEASE, UNSPECIFIED WHETHER STAGE 3A OR 3B CKD: ICD-10-CM

## 2024-12-18 DIAGNOSIS — L97.512 ULCERATED, FOOT, RIGHT, WITH FAT LAYER EXPOSED: ICD-10-CM

## 2024-12-18 DIAGNOSIS — E11.42 TYPE 2 DIABETES MELLITUS WITH DIABETIC POLYNEUROPATHY, WITHOUT LONG-TERM CURRENT USE OF INSULIN: Primary | ICD-10-CM

## 2024-12-18 PROCEDURE — 99213 OFFICE O/P EST LOW 20 MIN: CPT | Mod: PBBFAC | Performed by: PODIATRIST

## 2024-12-18 PROCEDURE — 99999 PR PBB SHADOW E&M-EST. PATIENT-LVL III: CPT | Mod: PBBFAC,,, | Performed by: PODIATRIST

## 2024-12-18 PROCEDURE — 99499 UNLISTED E&M SERVICE: CPT | Mod: S$PBB,,, | Performed by: PODIATRIST

## 2024-12-18 PROCEDURE — 11042 DBRDMT SUBQ TIS 1ST 20SQCM/<: CPT | Mod: PBBFAC | Performed by: PODIATRIST

## 2024-12-18 PROCEDURE — 11042 DBRDMT SUBQ TIS 1ST 20SQCM/<: CPT | Mod: S$PBB,,, | Performed by: PODIATRIST

## 2024-12-18 NOTE — PROGRESS NOTES
Subjective:     Patient ID: Ashutosh Ferrari is a 49 y.o. male.    Chief Complaint: Wound Care (Rt foot ulcer ) and Dressing Change (Football )    Ashutosh is a 49 y.o. male who presents to the clinic for evaluation and treatment of high risk feet. Ashutosh has a past medical history of Diabetes mellitus, Diabetes mellitus, type 2, Essential hypertension, benign (07/18/2013), and Herpes. The patient's chief complaint is diabetic foot ulcer, R. Patient has been in football dressing, ambulating in Darco shoe x 1 weeks with out issues    This patient has documented high risk feet requiring routine maintenance secondary to diabetes mellitis and those secondary complications of diabetes, as mentioned..    9/26:  Patient presents to clinic for R foot wound follow up.  Patient says he has recently returned from the beach, admits to running and walking a lot more for fitness. Has been compliant with wearing short cam boot at all times.  Has kept dressings clean, dry, and intact.  Denies any recent fevers, chills, nausea, or vomiting.  Denies any pain to his R foot.  Denies any new pedal complaints.    10.3.24: Patient has been in football dressing, ambulating in Darco shoe x 1 week with out issues     10.23.24:  Patient lost to follow up x2 weeks secondary to family issues as well as insurance verification.  Has been in bilateral football dressings since last appointment.    11.13.24: Patient f/u for ulcer. Left foot fine, right foot still with ulcer. Patient finished abx. Denies any acute infection symptoms such as n/f/v/chills/CP/SOB. No Drainage.     12.5.24:  Patient reports he went to [place] to visit family.  Left football dressing intact for over  2 weeks.    12.18.24: Patient has been in football dressing, ambulating in Darco shoe x 1 week with out issues     PCP: [facility] -    Date Last Seen by PCP:   Chief Complaint   Patient presents with    Wound Care     Rt foot ulcer     Dressing Change      "Football      Hemoglobin A1C   Date Value Ref Range Status   04/06/2024 12.3 (H) 4.0 - 5.6 % Final     Comment:     ADA Screening Guidelines:  5.7-6.4%  Consistent with prediabetes  >or=6.5%  Consistent with diabetes    High levels of fetal hemoglobin interfere with the HbA1C  assay. Heterozygous hemoglobin variants (HbS, HgC, etc)do  not significantly interfere with this assay.   However, presence of multiple variants may affect accuracy.     03/27/2022 12.5 (H) 4.0 - 5.6 % Final     Comment:     ADA Screening Guidelines:  5.7-6.4%  Consistent with prediabetes  >or=6.5%  Consistent with diabetes    High levels of fetal hemoglobin interfere with the HbA1C  assay. Heterozygous hemoglobin variants (HbS, HgC, etc)do  not significantly interfere with this assay.   However, presence of multiple variants may affect accuracy.     09/19/2021 12.8 (H) 4.0 - 5.6 % Final     Comment:     ADA Screening Guidelines:  5.7-6.4%  Consistent with prediabetes  >or=6.5%  Consistent with diabetes    High levels of fetal hemoglobin interfere with the HbA1C  assay. Heterozygous hemoglobin variants (HbS, HgC, etc)do  not significantly interfere with this assay.   However, presence of multiple variants may affect accuracy.         Review of Systems   Constitutional: Negative for chills, decreased appetite and fever.   Cardiovascular:  Negative for leg swelling.   Skin:  Positive for color change, dry skin and poor wound healing. Negative for flushing, itching, rash and skin cancer.   Musculoskeletal:  Negative for arthritis, joint pain, joint swelling and myalgias.   Gastrointestinal:  Negative for nausea and vomiting.   Neurological:  Negative for loss of balance, numbness and paresthesias.        Objective:     Vitals:    12/18/24 1102   BP: (!) 147/94   Pulse: 102   Resp: 18   Weight: 98 kg (216 lb 0.8 oz)   Height: 6' 3" (1.905 m)   PainSc: 0-No pain          Physical Exam  Vitals reviewed.   Constitutional:       Appearance: He is " well-developed.   Cardiovascular:      Comments: dorsalis pedis and posterior tibial pulses are palpable bilaterally. Capillary refill time is within normal limits. + pedal hair growth         Musculoskeletal:         General: Deformity present. No tenderness or signs of injury. Normal range of motion.      Right lower leg: No edema.      Comments: Adequate joint range of motion without pain, limitation, nor crepitation Bilateral feet and ankle joints. Muscle strength is 5/5 in all groups bilaterally.         Skin:     General: Skin is warm and dry.      Findings: No erythema, lesion or rash.   Neurological:      Mental Status: He is alert and oriented to person, place, and time.      Sensory: No sensory deficit.      Comments: [place] 5.07 monofilament is intact bilateral feet.      Psychiatric:         Behavior: Behavior normal.         6.19.24    0.8x0.5x0.3 Cm ulceration to the plantar aspect of the right 5th metatarsophalangeal joint with exuberant hyperkeratotic tissue observed.  Moderate periwound maceration is noted.  No malodor.  No edema mild serous drainage noted.  No deep probe.  No purulence    6.26.24    0.5x0.3x0.3 cm ulceration to the plantar aspect of the right 5th metatarsophalangeal joint.  Periwound hyperkeratosis and maceration is noted.  Improved from last week.  No purulence noted.  No exposed bone or tendinous structures observed.  Mild odor observed.    7.3.24    0.4x0.2x0.1 cm Healthy granular wound bed.  Periwound hyperkeratosis with mild maceration observed.  Serous drainage.    7.18.24    0.6x0.4x0.3 cm granular base, + lesa wound maceration. No surrounding erythema, edema, malodor, nor drainage noted     7.25.24    0.9x0.4x0.4 cm granular base, + lesa wound maceration. No surrounding erythema, edema, malodor, nor drainage noted     8.6.24    0.8X0.4X0.3 CM GRANULAR BASE MILD PERIWOUND MACERATION.  HEALTHY PERIWOUND SKIN.  NO MALODOR.  MILD BLOODY DRAINAGE.      0.3x0.2x0.3 cm  granular wound base with friable periwound.  Mild serous drainage is observed.  No erythema no edema no swelling     8.21.24    0.4x0.3x0.1 cm with healthy granular wound base.  Increased periwound hyperkeratosis is noted.  The wound has slightly increased in width however the depth is greatly improved.    9.3.24    0.4 x 0.3 x 0.2 cm wound with mostly granular base.  Hyperkeratotic periwound.  Wound stable    9.26.24  R foot        Chronic ulceration 1.0 x 0.4 x 0.3 cm     Left foot        New ulceration measuring1.3 x 0.9 x 0.3 cm without pain, erythema, edema, purulence.    10.3.24    0.5x0.5x0.1 cm L sub 5th MPJ w/ granular base. No surrounding erythema, edema, malodor, nor drainage noted       0.5x0.2x0.3 cm sub 5th MJ R w/ hyperkeratotic lesa wound. Granular base. Mild lesa wound maceration     10.23.24    0.2x0.2x0.3 cm right sub 5th MPJ with exuberant hyperkeratotic tissue.  No erythema no edema no malodor.  Purulent base with periwound maceration noted.      11/13/24      0.4 x 0.5 x 0.3 cm right plantar 5th MPJ ulcer. Granular base, edges are hyperkeratotic. No erythema or edema. No purulence.     12.5.24    .6x0.6x0.3 cm ulceration noted to the plantar aspect of the right foot with surrounding periwound maceration hyperkeratosis.  Mild-to-moderate malodor is observed.  No erythema.  No purulence.  Again deep probe near bone with no exposed bone or tendinous structures observed.  No fluctuance no crepitus.    12.12.24        1.0x0.5x0.3 cm with a healthy granular base.  Dry intact periwound with mild hyperkeratosis noted.    12.18.24    0.3x0.4x0.2 cm   Assessment:        Encounter Diagnoses   Name Primary?    Type 2 diabetes mellitus with diabetic polyneuropathy, without long-term current use of insulin Yes    Ulcerated, foot, right, with fat layer exposed     Stage 3 chronic kidney disease, unspecified whether stage 3a or 3b CKD        Plan:     Ashutosh was seen today for wound care and dressing  change.    Diagnoses and all orders for this visit:    Type 2 diabetes mellitus with diabetic polyneuropathy, without long-term current use of insulin    Ulcerated, foot, right, with fat layer exposed    Stage 3 chronic kidney disease, unspecified whether stage 3a or 3b CKD      I counseled the patient on his conditions, their implications and medical management.    Independent review of patients previous clinical notes    Reviewed current medication(s), and lab(s) specific to foot ailment(s) with patient in detail. All questions answered     We did afain  discuss the issues with the patient's chronically nonhealing wound.  The wound does wax and wane in nature.  A portion of the issue is just the overall mechanical function of the patient's foot.  He has increased pressures about the sub 5th metatarsophalangeal joint with nearly complete after feet of the plantar that.  Patient also has issues with compliance as well as appointment follow up.  I did tell patient is surgically he would benefit from a metatarsal head resection with surgical excision of ulceration and primary closure.  Patient states that he does believe a large portion of his nonhealing is attributed she has a compliance issues.  He states that he will work on his compliance and would like to discuss her consider surgery at a later time.    Debridement: With verbal consent, nonviable tissues on the right foot were debrided beyond sub q utilizing a sterile curette. Minimal bleeding controlled with direct pressure. The patient tolerated this well.     Dressings:  HB  Offloading: Michael Couch MA assisted w/ application of football dressing under my direct supervision. Darco shoe applied to offload the area. Advised patient that this should be worn on the affected foot at all times when ambulating     Follow-up:Patient is to return to the clinic in 1 week  for follow-up but should [facility] immediately if any signs of infection, such as fever,  chills, sweats, increased redness or pain.    Short-term goals include maintaining good offloading and minimizing bioburden, promoting granulation and epithelialization to healing.  Long-term goals include keeping the wound healed by good offloading and medical management under the direction of internist.

## (undated) DEVICE — BLANKET UPPER BODY 78.7X29.9IN

## (undated) DEVICE — PAD PREP 50/CA

## (undated) DEVICE — NDL 27G X 1 1/4

## (undated) DEVICE — Device

## (undated) DEVICE — BLADE SURG STAINLESS STEEL #15

## (undated) DEVICE — CANISTER SUCTION 2 LTR

## (undated) DEVICE — ELECTRODE REM PLYHSV RETURN 9

## (undated) DEVICE — GLOVE SURGICAL LATEX SZ 6.5

## (undated) DEVICE — COVER OVERHEAD SURG LT BLUE

## (undated) DEVICE — SEE L#120831

## (undated) DEVICE — PACK ARTHROSCOPY W/ISO BAC

## (undated) DEVICE — SEE MEDLINE ITEM 157110

## (undated) DEVICE — SUT ETHILON 3-0 PS2 18 BLK

## (undated) DEVICE — NDL 18GA X1 1/2 REG BEVEL

## (undated) DEVICE — SEE MEDLINE ITEM 154981

## (undated) DEVICE — TOURNIQUET SB QC DP 18X4IN

## (undated) DEVICE — GAUZE PACKING STRIP PLN 1/4X5

## (undated) DEVICE — SOL IRR NACL .9% 3000ML

## (undated) DEVICE — TOWEL OR DISP STRL BLUE 4/PK

## (undated) DEVICE — SYR 10CC LUER LOCK

## (undated) DEVICE — BANDAGE MATRIX HK LOOP 4IN 5YD

## (undated) DEVICE — DRESSING GAUZE XEROFORM 5X9

## (undated) DEVICE — PAD CAST SPECIALIST STRL 4

## (undated) DEVICE — PULSAVAC ZIMMER

## (undated) DEVICE — GAUZE SPONGE 4X4 12PLY

## (undated) DEVICE — SPONGE DERMACEA GAUZE 4X4